# Patient Record
Sex: FEMALE | Race: BLACK OR AFRICAN AMERICAN | NOT HISPANIC OR LATINO | Employment: FULL TIME | URBAN - METROPOLITAN AREA
[De-identification: names, ages, dates, MRNs, and addresses within clinical notes are randomized per-mention and may not be internally consistent; named-entity substitution may affect disease eponyms.]

---

## 2017-01-17 ENCOUNTER — ALLSCRIPTS OFFICE VISIT (OUTPATIENT)
Dept: OTHER | Facility: OTHER | Age: 32
End: 2017-01-17

## 2017-01-20 ENCOUNTER — ALLSCRIPTS OFFICE VISIT (OUTPATIENT)
Dept: OTHER | Facility: OTHER | Age: 32
End: 2017-01-20

## 2017-01-20 ENCOUNTER — APPOINTMENT (OUTPATIENT)
Dept: LAB | Facility: HOSPITAL | Age: 32
End: 2017-01-20
Payer: COMMERCIAL

## 2017-01-20 DIAGNOSIS — J02.9 ACUTE PHARYNGITIS: ICD-10-CM

## 2017-01-20 LAB
FLUAV AG SPEC QL IA: NEGATIVE
INFLUENZA B AG (HISTORICAL): NEGATIVE
S PYO AG THROAT QL: NEGATIVE

## 2017-01-20 PROCEDURE — 87070 CULTURE OTHR SPECIMN AEROBIC: CPT

## 2017-01-22 ENCOUNTER — GENERIC CONVERSION - ENCOUNTER (OUTPATIENT)
Dept: OTHER | Facility: OTHER | Age: 32
End: 2017-01-22

## 2017-01-22 LAB — BACTERIA THROAT CULT: NORMAL

## 2017-02-03 ENCOUNTER — ALLSCRIPTS OFFICE VISIT (OUTPATIENT)
Dept: OTHER | Facility: OTHER | Age: 32
End: 2017-02-03

## 2017-02-15 ENCOUNTER — ALLSCRIPTS OFFICE VISIT (OUTPATIENT)
Dept: OTHER | Facility: OTHER | Age: 32
End: 2017-02-15

## 2017-02-17 ENCOUNTER — ALLSCRIPTS OFFICE VISIT (OUTPATIENT)
Dept: OTHER | Facility: OTHER | Age: 32
End: 2017-02-17

## 2017-02-20 DIAGNOSIS — M65.30 TRIGGER FINGER: ICD-10-CM

## 2017-02-23 ENCOUNTER — TRANSCRIBE ORDERS (OUTPATIENT)
Dept: LAB | Facility: HOSPITAL | Age: 32
End: 2017-02-23

## 2017-02-23 ENCOUNTER — APPOINTMENT (OUTPATIENT)
Dept: LAB | Facility: HOSPITAL | Age: 32
End: 2017-02-23
Attending: ORTHOPAEDIC SURGERY
Payer: COMMERCIAL

## 2017-02-23 DIAGNOSIS — M65.30 TRIGGER FINGER: ICD-10-CM

## 2017-02-23 LAB
ANION GAP SERPL CALCULATED.3IONS-SCNC: 7 MMOL/L (ref 4–13)
BASOPHILS # BLD AUTO: 0.03 THOUSANDS/ΜL (ref 0–0.1)
BASOPHILS NFR BLD AUTO: 0 % (ref 0–1)
BUN SERPL-MCNC: 12 MG/DL (ref 5–25)
CALCIUM SERPL-MCNC: 9.3 MG/DL (ref 8.3–10.1)
CHLORIDE SERPL-SCNC: 107 MMOL/L (ref 100–108)
CO2 SERPL-SCNC: 27 MMOL/L (ref 21–32)
CREAT SERPL-MCNC: 1 MG/DL (ref 0.6–1.3)
EOSINOPHIL # BLD AUTO: 0.14 THOUSAND/ΜL (ref 0–0.61)
EOSINOPHIL NFR BLD AUTO: 2 % (ref 0–6)
ERYTHROCYTE [DISTWIDTH] IN BLOOD BY AUTOMATED COUNT: 14.2 % (ref 11.6–15.1)
GFR SERPL CREATININE-BSD FRML MDRD: >60 ML/MIN/1.73SQ M
GLUCOSE SERPL-MCNC: 73 MG/DL (ref 65–140)
HCT VFR BLD AUTO: 40.5 % (ref 34.8–46.1)
HGB BLD-MCNC: 13.3 G/DL (ref 11.5–15.4)
LYMPHOCYTES # BLD AUTO: 2.31 THOUSANDS/ΜL (ref 0.6–4.47)
LYMPHOCYTES NFR BLD AUTO: 32 % (ref 14–44)
MCH RBC QN AUTO: 25.8 PG (ref 26.8–34.3)
MCHC RBC AUTO-ENTMCNC: 32.8 G/DL (ref 31.4–37.4)
MCV RBC AUTO: 79 FL (ref 82–98)
MONOCYTES # BLD AUTO: 0.33 THOUSAND/ΜL (ref 0.17–1.22)
MONOCYTES NFR BLD AUTO: 5 % (ref 4–12)
NEUTROPHILS # BLD AUTO: 4.51 THOUSANDS/ΜL (ref 1.85–7.62)
NEUTS SEG NFR BLD AUTO: 61 % (ref 43–75)
NRBC BLD AUTO-RTO: 0 /100 WBCS
PLATELET # BLD AUTO: 259 THOUSANDS/UL (ref 149–390)
PMV BLD AUTO: 11.3 FL (ref 8.9–12.7)
POTASSIUM SERPL-SCNC: 4 MMOL/L (ref 3.5–5.3)
RBC # BLD AUTO: 5.16 MILLION/UL (ref 3.81–5.12)
SODIUM SERPL-SCNC: 141 MMOL/L (ref 136–145)
WBC # BLD AUTO: 7.33 THOUSAND/UL (ref 4.31–10.16)

## 2017-02-23 PROCEDURE — 85025 COMPLETE CBC W/AUTO DIFF WBC: CPT

## 2017-02-23 PROCEDURE — 80048 BASIC METABOLIC PNL TOTAL CA: CPT

## 2017-02-23 PROCEDURE — 36415 COLL VENOUS BLD VENIPUNCTURE: CPT

## 2017-02-27 ENCOUNTER — ANESTHESIA EVENT (OUTPATIENT)
Dept: PERIOP | Facility: HOSPITAL | Age: 32
End: 2017-02-27
Payer: COMMERCIAL

## 2017-02-27 RX ORDER — LIRAGLUTIDE 6 MG/ML
2.4 INJECTION, SOLUTION SUBCUTANEOUS DAILY
COMMUNITY
End: 2018-03-15

## 2017-02-27 RX ORDER — AMLODIPINE BESYLATE 5 MG/1
5 TABLET ORAL
COMMUNITY
End: 2018-03-27 | Stop reason: SDUPTHER

## 2017-02-28 ENCOUNTER — ANESTHESIA (OUTPATIENT)
Dept: PERIOP | Facility: HOSPITAL | Age: 32
End: 2017-02-28
Payer: COMMERCIAL

## 2017-02-28 ENCOUNTER — HOSPITAL ENCOUNTER (OUTPATIENT)
Facility: HOSPITAL | Age: 32
Setting detail: OUTPATIENT SURGERY
Discharge: HOME | End: 2017-02-28
Attending: ORTHOPAEDIC SURGERY | Admitting: ORTHOPAEDIC SURGERY
Payer: COMMERCIAL

## 2017-02-28 VITALS
HEART RATE: 78 BPM | RESPIRATION RATE: 16 BRPM | BODY MASS INDEX: 32.43 KG/M2 | TEMPERATURE: 97.3 F | SYSTOLIC BLOOD PRESSURE: 125 MMHG | OXYGEN SATURATION: 94 % | WEIGHT: 183 LBS | HEIGHT: 63 IN | DIASTOLIC BLOOD PRESSURE: 80 MMHG

## 2017-02-28 PROBLEM — M65.311 TRIGGER FINGER OF RIGHT THUMB: Status: ACTIVE | Noted: 2017-02-28

## 2017-02-28 LAB — EXT PREGNANCY TEST URINE: NEGATIVE

## 2017-02-28 PROCEDURE — 81025 URINE PREGNANCY TEST: CPT | Performed by: STUDENT IN AN ORGANIZED HEALTH CARE EDUCATION/TRAINING PROGRAM

## 2017-02-28 RX ORDER — ONDANSETRON 2 MG/ML
INJECTION INTRAMUSCULAR; INTRAVENOUS AS NEEDED
Status: DISCONTINUED | OUTPATIENT
Start: 2017-02-28 | End: 2017-02-28 | Stop reason: SURG

## 2017-02-28 RX ORDER — BUPIVACAINE HYDROCHLORIDE 2.5 MG/ML
INJECTION, SOLUTION INFILTRATION; PERINEURAL AS NEEDED
Status: DISCONTINUED | OUTPATIENT
Start: 2017-02-28 | End: 2017-02-28 | Stop reason: HOSPADM

## 2017-02-28 RX ORDER — PROPOFOL 10 MG/ML
INJECTION, EMULSION INTRAVENOUS AS NEEDED
Status: DISCONTINUED | OUTPATIENT
Start: 2017-02-28 | End: 2017-02-28 | Stop reason: SURG

## 2017-02-28 RX ORDER — ONDANSETRON 2 MG/ML
4 INJECTION INTRAMUSCULAR; INTRAVENOUS EVERY 6 HOURS PRN
Status: DISCONTINUED | OUTPATIENT
Start: 2017-02-28 | End: 2017-02-28 | Stop reason: HOSPADM

## 2017-02-28 RX ORDER — OXYCODONE HYDROCHLORIDE 10 MG/1
10 TABLET ORAL EVERY 4 HOURS PRN
Status: DISCONTINUED | OUTPATIENT
Start: 2017-02-28 | End: 2017-02-28 | Stop reason: HOSPADM

## 2017-02-28 RX ORDER — MAGNESIUM HYDROXIDE 1200 MG/15ML
LIQUID ORAL AS NEEDED
Status: DISCONTINUED | OUTPATIENT
Start: 2017-02-28 | End: 2017-02-28 | Stop reason: HOSPADM

## 2017-02-28 RX ORDER — OXYCODONE AND ACETAMINOPHEN 2.5; 325 MG/1; MG/1
1 TABLET ORAL EVERY 4 HOURS PRN
Qty: 30 TABLET | Refills: 0 | Status: SHIPPED | OUTPATIENT
Start: 2017-02-28 | End: 2017-03-10

## 2017-02-28 RX ORDER — OXYCODONE HYDROCHLORIDE 5 MG/1
5 TABLET ORAL EVERY 4 HOURS PRN
Status: DISCONTINUED | OUTPATIENT
Start: 2017-02-28 | End: 2017-02-28 | Stop reason: HOSPADM

## 2017-02-28 RX ORDER — MORPHINE SULFATE 2 MG/ML
2 INJECTION, SOLUTION INTRAMUSCULAR; INTRAVENOUS EVERY 4 HOURS PRN
Status: DISCONTINUED | OUTPATIENT
Start: 2017-02-28 | End: 2017-02-28 | Stop reason: HOSPADM

## 2017-02-28 RX ORDER — PROPOFOL 10 MG/ML
INJECTION, EMULSION INTRAVENOUS CONTINUOUS PRN
Status: DISCONTINUED | OUTPATIENT
Start: 2017-02-28 | End: 2017-02-28 | Stop reason: SURG

## 2017-02-28 RX ORDER — MIDAZOLAM HYDROCHLORIDE 1 MG/ML
INJECTION INTRAMUSCULAR; INTRAVENOUS AS NEEDED
Status: DISCONTINUED | OUTPATIENT
Start: 2017-02-28 | End: 2017-02-28 | Stop reason: SURG

## 2017-02-28 RX ORDER — FENTANYL CITRATE/PF 50 MCG/ML
25 SYRINGE (ML) INJECTION
Status: DISCONTINUED | OUTPATIENT
Start: 2017-02-28 | End: 2017-02-28 | Stop reason: HOSPADM

## 2017-02-28 RX ORDER — FENTANYL CITRATE 50 UG/ML
INJECTION, SOLUTION INTRAMUSCULAR; INTRAVENOUS AS NEEDED
Status: DISCONTINUED | OUTPATIENT
Start: 2017-02-28 | End: 2017-02-28 | Stop reason: SURG

## 2017-02-28 RX ORDER — ACETAMINOPHEN 325 MG/1
650 TABLET ORAL EVERY 6 HOURS PRN
Status: DISCONTINUED | OUTPATIENT
Start: 2017-02-28 | End: 2017-02-28 | Stop reason: HOSPADM

## 2017-02-28 RX ORDER — ONDANSETRON 2 MG/ML
4 INJECTION INTRAMUSCULAR; INTRAVENOUS ONCE
Status: DISCONTINUED | OUTPATIENT
Start: 2017-02-28 | End: 2017-02-28 | Stop reason: HOSPADM

## 2017-02-28 RX ORDER — SODIUM CHLORIDE, SODIUM LACTATE, POTASSIUM CHLORIDE, CALCIUM CHLORIDE 600; 310; 30; 20 MG/100ML; MG/100ML; MG/100ML; MG/100ML
125 INJECTION, SOLUTION INTRAVENOUS CONTINUOUS
Status: DISCONTINUED | OUTPATIENT
Start: 2017-02-28 | End: 2017-02-28 | Stop reason: HOSPADM

## 2017-02-28 RX ADMIN — MIDAZOLAM HYDROCHLORIDE 2 MG: 1 INJECTION, SOLUTION INTRAMUSCULAR; INTRAVENOUS at 08:27

## 2017-02-28 RX ADMIN — SODIUM CHLORIDE, SODIUM LACTATE, POTASSIUM CHLORIDE, AND CALCIUM CHLORIDE 125 ML/HR: .6; .31; .03; .02 INJECTION, SOLUTION INTRAVENOUS at 07:31

## 2017-02-28 RX ADMIN — PROPOFOL 70 MCG/KG/MIN: 10 INJECTION, EMULSION INTRAVENOUS at 08:31

## 2017-02-28 RX ADMIN — ONDANSETRON 4 MG: 2 INJECTION INTRAMUSCULAR; INTRAVENOUS at 08:31

## 2017-02-28 RX ADMIN — SODIUM CHLORIDE, SODIUM LACTATE, POTASSIUM CHLORIDE, AND CALCIUM CHLORIDE: .6; .31; .03; .02 INJECTION, SOLUTION INTRAVENOUS at 08:27

## 2017-02-28 RX ADMIN — CEFAZOLIN SODIUM 2000 MG: 2 SOLUTION INTRAVENOUS at 08:27

## 2017-02-28 RX ADMIN — FENTANYL CITRATE 100 MCG: 50 INJECTION INTRAMUSCULAR; INTRAVENOUS at 08:27

## 2017-02-28 RX ADMIN — PROPOFOL 40 MG: 10 INJECTION, EMULSION INTRAVENOUS at 08:31

## 2017-02-28 RX ADMIN — OXYCODONE HYDROCHLORIDE 5 MG: 5 TABLET ORAL at 09:26

## 2017-03-13 ENCOUNTER — ALLSCRIPTS OFFICE VISIT (OUTPATIENT)
Dept: OTHER | Facility: OTHER | Age: 32
End: 2017-03-13

## 2017-03-27 ENCOUNTER — ALLSCRIPTS OFFICE VISIT (OUTPATIENT)
Dept: OTHER | Facility: OTHER | Age: 32
End: 2017-03-27

## 2017-03-27 ENCOUNTER — APPOINTMENT (EMERGENCY)
Dept: RADIOLOGY | Facility: HOSPITAL | Age: 32
End: 2017-03-27
Payer: COMMERCIAL

## 2017-03-27 ENCOUNTER — HOSPITAL ENCOUNTER (EMERGENCY)
Facility: HOSPITAL | Age: 32
Discharge: HOME | End: 2017-03-27
Attending: EMERGENCY MEDICINE | Admitting: EMERGENCY MEDICINE
Payer: COMMERCIAL

## 2017-03-27 VITALS
WEIGHT: 180.4 LBS | TEMPERATURE: 97.5 F | HEIGHT: 63 IN | DIASTOLIC BLOOD PRESSURE: 85 MMHG | HEART RATE: 73 BPM | BODY MASS INDEX: 31.96 KG/M2 | OXYGEN SATURATION: 99 % | SYSTOLIC BLOOD PRESSURE: 122 MMHG | RESPIRATION RATE: 18 BRPM

## 2017-03-27 DIAGNOSIS — R07.9 CHEST PAIN: ICD-10-CM

## 2017-03-27 DIAGNOSIS — R00.2 HEART PALPITATIONS: Primary | ICD-10-CM

## 2017-03-27 LAB
ANION GAP SERPL CALCULATED.3IONS-SCNC: 7 MMOL/L (ref 4–13)
BASOPHILS # BLD AUTO: 0.04 THOUSANDS/ΜL (ref 0–0.1)
BASOPHILS NFR BLD AUTO: 1 % (ref 0–1)
BUN SERPL-MCNC: 9 MG/DL (ref 5–25)
CALCIUM SERPL-MCNC: 9.4 MG/DL (ref 8.3–10.1)
CHLORIDE SERPL-SCNC: 106 MMOL/L (ref 100–108)
CO2 SERPL-SCNC: 28 MMOL/L (ref 21–32)
CREAT SERPL-MCNC: 0.94 MG/DL (ref 0.6–1.3)
EOSINOPHIL # BLD AUTO: 0.08 THOUSAND/ΜL (ref 0–0.61)
EOSINOPHIL NFR BLD AUTO: 1 % (ref 0–6)
ERYTHROCYTE [DISTWIDTH] IN BLOOD BY AUTOMATED COUNT: 14.3 % (ref 11.6–15.1)
GFR SERPL CREATININE-BSD FRML MDRD: >60 ML/MIN/1.73SQ M
GLUCOSE SERPL-MCNC: 94 MG/DL (ref 65–140)
HCG UR QL: NEGATIVE
HCT VFR BLD AUTO: 40.7 % (ref 34.8–46.1)
HGB BLD-MCNC: 13.5 G/DL (ref 11.5–15.4)
LYMPHOCYTES # BLD AUTO: 1.25 THOUSANDS/ΜL (ref 0.6–4.47)
LYMPHOCYTES NFR BLD AUTO: 17 % (ref 14–44)
MCH RBC QN AUTO: 25.7 PG (ref 26.8–34.3)
MCHC RBC AUTO-ENTMCNC: 33.2 G/DL (ref 31.4–37.4)
MCV RBC AUTO: 77 FL (ref 82–98)
MONOCYTES # BLD AUTO: 0.39 THOUSAND/ΜL (ref 0.17–1.22)
MONOCYTES NFR BLD AUTO: 5 % (ref 4–12)
NEUTROPHILS # BLD AUTO: 5.52 THOUSANDS/ΜL (ref 1.85–7.62)
NEUTS SEG NFR BLD AUTO: 76 % (ref 43–75)
NRBC BLD AUTO-RTO: 0 /100 WBCS
PLATELET # BLD AUTO: 263 THOUSANDS/UL (ref 149–390)
PMV BLD AUTO: 10.9 FL (ref 8.9–12.7)
POTASSIUM SERPL-SCNC: 3.8 MMOL/L (ref 3.5–5.3)
RBC # BLD AUTO: 5.26 MILLION/UL (ref 3.81–5.12)
SODIUM SERPL-SCNC: 141 MMOL/L (ref 136–145)
SPECIMEN SOURCE: NORMAL
TROPONIN I BLD-MCNC: 0 NG/ML (ref 0–0.08)
TSH SERPL DL<=0.05 MIU/L-ACNC: 2.74 UIU/ML (ref 0.36–3.74)
WBC # BLD AUTO: 7.29 THOUSAND/UL (ref 4.31–10.16)

## 2017-03-27 PROCEDURE — 80048 BASIC METABOLIC PNL TOTAL CA: CPT

## 2017-03-27 PROCEDURE — 84484 ASSAY OF TROPONIN QUANT: CPT

## 2017-03-27 PROCEDURE — 81025 URINE PREGNANCY TEST: CPT | Performed by: EMERGENCY MEDICINE

## 2017-03-27 PROCEDURE — 99285 EMERGENCY DEPT VISIT HI MDM: CPT

## 2017-03-27 PROCEDURE — 36415 COLL VENOUS BLD VENIPUNCTURE: CPT

## 2017-03-27 PROCEDURE — 71020 HB CHEST X-RAY 2VW FRONTAL&LATL: CPT

## 2017-03-27 PROCEDURE — 84443 ASSAY THYROID STIM HORMONE: CPT | Performed by: EMERGENCY MEDICINE

## 2017-03-27 PROCEDURE — 93005 ELECTROCARDIOGRAM TRACING: CPT

## 2017-03-27 PROCEDURE — 85025 COMPLETE CBC W/AUTO DIFF WBC: CPT

## 2017-03-27 RX ORDER — ONDANSETRON 2 MG/ML
4 INJECTION INTRAMUSCULAR; INTRAVENOUS ONCE
Status: DISCONTINUED | OUTPATIENT
Start: 2017-03-27 | End: 2017-03-27

## 2017-03-27 RX ORDER — ONDANSETRON 4 MG/1
4 TABLET, ORALLY DISINTEGRATING ORAL ONCE
Status: COMPLETED | OUTPATIENT
Start: 2017-03-27 | End: 2017-03-27

## 2017-03-27 RX ADMIN — ONDANSETRON 4 MG: 4 TABLET, ORALLY DISINTEGRATING ORAL at 16:04

## 2017-03-28 ENCOUNTER — GENERIC CONVERSION - ENCOUNTER (OUTPATIENT)
Dept: OTHER | Facility: OTHER | Age: 32
End: 2017-03-28

## 2017-03-28 LAB
ATRIAL RATE: 81 BPM
P AXIS: 66 DEGREES
PR INTERVAL: 128 MS
QRS AXIS: 56 DEGREES
QRSD INTERVAL: 78 MS
QT INTERVAL: 378 MS
QTC INTERVAL: 439 MS
T WAVE AXIS: 37 DEGREES
VENTRICULAR RATE: 81 BPM

## 2017-03-31 ENCOUNTER — HOSPITAL ENCOUNTER (OUTPATIENT)
Dept: NON INVASIVE DIAGNOSTICS | Facility: HOSPITAL | Age: 32
Discharge: HOME | End: 2017-03-31
Attending: EMERGENCY MEDICINE
Payer: COMMERCIAL

## 2017-03-31 DIAGNOSIS — R00.2 HEART PALPITATIONS: ICD-10-CM

## 2017-03-31 PROCEDURE — 93226 XTRNL ECG REC<48 HR SCAN A/R: CPT

## 2017-03-31 PROCEDURE — 93225 XTRNL ECG REC<48 HRS REC: CPT

## 2017-04-03 ENCOUNTER — ALLSCRIPTS OFFICE VISIT (OUTPATIENT)
Dept: OTHER | Facility: OTHER | Age: 32
End: 2017-04-03

## 2017-04-13 ENCOUNTER — ALLSCRIPTS OFFICE VISIT (OUTPATIENT)
Dept: OTHER | Facility: OTHER | Age: 32
End: 2017-04-13

## 2017-05-17 ENCOUNTER — ALLSCRIPTS OFFICE VISIT (OUTPATIENT)
Dept: OTHER | Facility: OTHER | Age: 32
End: 2017-05-17

## 2017-05-17 ENCOUNTER — LAB REQUISITION (OUTPATIENT)
Dept: LAB | Facility: HOSPITAL | Age: 32
End: 2017-05-17
Payer: COMMERCIAL

## 2017-05-17 DIAGNOSIS — Z01.419 ENCOUNTER FOR GYNECOLOGICAL EXAMINATION WITHOUT ABNORMAL FINDING: ICD-10-CM

## 2017-05-17 DIAGNOSIS — I10 ESSENTIAL (PRIMARY) HYPERTENSION: ICD-10-CM

## 2017-05-17 DIAGNOSIS — E66.9 OBESITY: ICD-10-CM

## 2017-05-17 DIAGNOSIS — R73.01 IMPAIRED FASTING GLUCOSE: ICD-10-CM

## 2017-05-17 DIAGNOSIS — R10.2 PELVIC AND PERINEAL PAIN: ICD-10-CM

## 2017-05-17 PROCEDURE — 87624 HPV HI-RISK TYP POOLED RSLT: CPT | Performed by: PHYSICIAN ASSISTANT

## 2017-05-17 PROCEDURE — G0145 SCR C/V CYTO,THINLAYER,RESCR: HCPCS | Performed by: PHYSICIAN ASSISTANT

## 2017-05-18 ENCOUNTER — ALLSCRIPTS OFFICE VISIT (OUTPATIENT)
Dept: OTHER | Facility: OTHER | Age: 32
End: 2017-05-18

## 2017-05-22 LAB — HPV RRNA GENITAL QL NAA+PROBE: NORMAL

## 2017-05-23 LAB
LAB AP GYN PRIMARY INTERPRETATION: NORMAL
LAB AP LMP: NORMAL
Lab: NORMAL

## 2017-05-30 ENCOUNTER — ALLSCRIPTS OFFICE VISIT (OUTPATIENT)
Dept: OTHER | Facility: OTHER | Age: 32
End: 2017-05-30

## 2017-06-20 ENCOUNTER — ALLSCRIPTS OFFICE VISIT (OUTPATIENT)
Dept: OTHER | Facility: OTHER | Age: 32
End: 2017-06-20

## 2017-06-20 ENCOUNTER — LAB REQUISITION (OUTPATIENT)
Dept: LAB | Facility: HOSPITAL | Age: 32
End: 2017-06-20
Payer: COMMERCIAL

## 2017-06-20 DIAGNOSIS — N89.8 OTHER SPECIFIED NONINFLAMMATORY DISORDER OF VAGINA: ICD-10-CM

## 2017-06-20 DIAGNOSIS — L29.8 OTHER PRURITUS: ICD-10-CM

## 2017-06-20 PROCEDURE — 87070 CULTURE OTHR SPECIMN AEROBIC: CPT | Performed by: PHYSICIAN ASSISTANT

## 2017-06-23 ENCOUNTER — GENERIC CONVERSION - ENCOUNTER (OUTPATIENT)
Dept: OTHER | Facility: OTHER | Age: 32
End: 2017-06-23

## 2017-06-23 LAB — BACTERIA GENITAL AEROBE CULT: NORMAL

## 2017-08-10 ENCOUNTER — TRANSCRIBE ORDERS (OUTPATIENT)
Dept: LAB | Facility: HOSPITAL | Age: 32
End: 2017-08-10

## 2017-08-10 ENCOUNTER — APPOINTMENT (OUTPATIENT)
Dept: LAB | Facility: HOSPITAL | Age: 32
End: 2017-08-10
Payer: COMMERCIAL

## 2017-08-10 ENCOUNTER — GENERIC CONVERSION - ENCOUNTER (OUTPATIENT)
Dept: OTHER | Facility: OTHER | Age: 32
End: 2017-08-10

## 2017-08-10 DIAGNOSIS — I10 ESSENTIAL (PRIMARY) HYPERTENSION: ICD-10-CM

## 2017-08-10 DIAGNOSIS — E66.9 OBESITY: ICD-10-CM

## 2017-08-10 DIAGNOSIS — R73.01 IMPAIRED FASTING GLUCOSE: ICD-10-CM

## 2017-08-10 LAB
ALBUMIN SERPL BCP-MCNC: 3.9 G/DL (ref 3.5–5)
ALP SERPL-CCNC: 59 U/L (ref 46–116)
ALT SERPL W P-5'-P-CCNC: 25 U/L (ref 12–78)
ANION GAP SERPL CALCULATED.3IONS-SCNC: 7 MMOL/L (ref 4–13)
AST SERPL W P-5'-P-CCNC: 15 U/L (ref 5–45)
BILIRUB SERPL-MCNC: 0.3 MG/DL (ref 0.2–1)
BUN SERPL-MCNC: 8 MG/DL (ref 5–25)
CALCIUM SERPL-MCNC: 9.4 MG/DL (ref 8.3–10.1)
CHLORIDE SERPL-SCNC: 103 MMOL/L (ref 100–108)
CHOLEST SERPL-MCNC: 186 MG/DL (ref 50–200)
CO2 SERPL-SCNC: 28 MMOL/L (ref 21–32)
CREAT SERPL-MCNC: 0.91 MG/DL (ref 0.6–1.3)
EST. AVERAGE GLUCOSE BLD GHB EST-MCNC: 117 MG/DL
GFR SERPL CREATININE-BSD FRML MDRD: 96 ML/MIN/1.73SQ M
GLUCOSE P FAST SERPL-MCNC: 96 MG/DL (ref 65–99)
HBA1C MFR BLD: 5.7 % (ref 4.2–6.3)
HDLC SERPL-MCNC: 64 MG/DL (ref 40–60)
LDLC SERPL CALC-MCNC: 93 MG/DL (ref 0–100)
POTASSIUM SERPL-SCNC: 3.6 MMOL/L (ref 3.5–5.3)
PROT SERPL-MCNC: 7.7 G/DL (ref 6.4–8.2)
SODIUM SERPL-SCNC: 138 MMOL/L (ref 136–145)
TRIGL SERPL-MCNC: 146 MG/DL

## 2017-08-10 PROCEDURE — 80053 COMPREHEN METABOLIC PANEL: CPT

## 2017-08-10 PROCEDURE — 36415 COLL VENOUS BLD VENIPUNCTURE: CPT

## 2017-08-10 PROCEDURE — 80061 LIPID PANEL: CPT

## 2017-08-10 PROCEDURE — 83036 HEMOGLOBIN GLYCOSYLATED A1C: CPT

## 2017-08-22 ENCOUNTER — ALLSCRIPTS OFFICE VISIT (OUTPATIENT)
Dept: OTHER | Facility: OTHER | Age: 32
End: 2017-08-22

## 2017-08-22 ENCOUNTER — LAB REQUISITION (OUTPATIENT)
Dept: LAB | Facility: HOSPITAL | Age: 32
End: 2017-08-22
Payer: COMMERCIAL

## 2017-08-22 DIAGNOSIS — N89.8 OTHER SPECIFIED NONINFLAMMATORY DISORDER OF VAGINA: ICD-10-CM

## 2017-08-22 PROCEDURE — 87660 TRICHOMONAS VAGIN DIR PROBE: CPT | Performed by: NURSE PRACTITIONER

## 2017-08-22 PROCEDURE — 87510 GARDNER VAG DNA DIR PROBE: CPT | Performed by: NURSE PRACTITIONER

## 2017-08-22 PROCEDURE — 87480 CANDIDA DNA DIR PROBE: CPT | Performed by: NURSE PRACTITIONER

## 2017-08-24 LAB
CANDIDA RRNA VAG QL PROBE: NEGATIVE
G VAGINALIS RRNA GENITAL QL PROBE: POSITIVE
T VAGINALIS RRNA GENITAL QL PROBE: NEGATIVE

## 2017-09-12 ENCOUNTER — TRANSCRIBE ORDERS (OUTPATIENT)
Dept: ADMINISTRATIVE | Facility: HOSPITAL | Age: 32
End: 2017-09-12

## 2017-09-12 DIAGNOSIS — I49.3 VENTRICULAR EXTRASYSTOLES: ICD-10-CM

## 2017-09-12 DIAGNOSIS — I10 BENIGN HYPERTENSION: Primary | ICD-10-CM

## 2017-09-15 ENCOUNTER — HOSPITAL ENCOUNTER (OUTPATIENT)
Dept: NON INVASIVE DIAGNOSTICS | Facility: CLINIC | Age: 32
Discharge: HOME | End: 2017-09-15
Payer: COMMERCIAL

## 2017-09-15 DIAGNOSIS — I10 BENIGN HYPERTENSION: ICD-10-CM

## 2017-09-15 DIAGNOSIS — I49.3 VENTRICULAR EXTRASYSTOLES: ICD-10-CM

## 2017-09-15 PROCEDURE — 93306 TTE W/DOPPLER COMPLETE: CPT

## 2017-09-16 ENCOUNTER — GENERIC CONVERSION - ENCOUNTER (OUTPATIENT)
Dept: OTHER | Facility: OTHER | Age: 32
End: 2017-09-16

## 2017-09-18 ENCOUNTER — ALLSCRIPTS OFFICE VISIT (OUTPATIENT)
Dept: OTHER | Facility: OTHER | Age: 32
End: 2017-09-18

## 2017-09-18 ENCOUNTER — APPOINTMENT (OUTPATIENT)
Dept: LAB | Facility: HOSPITAL | Age: 32
End: 2017-09-18
Payer: COMMERCIAL

## 2017-09-18 DIAGNOSIS — N39.0 URINARY TRACT INFECTION: ICD-10-CM

## 2017-09-18 DIAGNOSIS — M54.50 LOW BACK PAIN: ICD-10-CM

## 2017-09-18 DIAGNOSIS — R10.2 PELVIC AND PERINEAL PAIN: ICD-10-CM

## 2017-09-18 LAB
BILIRUB UR QL STRIP: NORMAL
CLARITY UR: NORMAL
COLOR UR: YELLOW
GLUCOSE (HISTORICAL): NORMAL
HGB UR QL STRIP.AUTO: NORMAL
KETONES UR STRIP-MCNC: NORMAL MG/DL
LEUKOCYTE ESTERASE UR QL STRIP: NORMAL
NITRITE UR QL STRIP: NORMAL
PH UR STRIP.AUTO: 7.5 [PH]
PROT UR STRIP-MCNC: NORMAL MG/DL
SP GR UR STRIP.AUTO: 1
UROBILINOGEN UR QL STRIP.AUTO: 0.2

## 2017-09-18 PROCEDURE — 87086 URINE CULTURE/COLONY COUNT: CPT

## 2017-09-21 ENCOUNTER — GENERIC CONVERSION - ENCOUNTER (OUTPATIENT)
Dept: OTHER | Facility: OTHER | Age: 32
End: 2017-09-21

## 2017-09-21 LAB — BACTERIA UR CULT: NORMAL

## 2017-11-09 ENCOUNTER — ALLSCRIPTS OFFICE VISIT (OUTPATIENT)
Dept: OTHER | Facility: OTHER | Age: 32
End: 2017-11-09

## 2017-11-10 NOTE — PROGRESS NOTES
Assessment  Assessed    1  PVC (premature ventricular contraction) (427 69) (I49 3)   2  Palpitations (785 1) (R00 2)   3  Benign essential hypertension (401 1) (I10)    Plan  PVC (premature ventricular contraction)    · Follow-up visit in 1 year Evaluation and Treatment  Follow-up  Status: Hold For -Scheduling  Requested for: 06JJN1183   Ordered;PVC (premature ventricular contraction); Ordered By: Yi Lee Performed:  Due: 61CHZ2579    Discussion/Summary  Cardiology Discussion Summary Free Text Note Form Matthew Hem:   1  palps, on occasion correlate with pvc, at times nsrholter 3/2017normal lvef  hypertension, controlled  Patient has occasionally continue palpitations  Beta-blocker therapy has been discussed previously caused airway reaction per her report  We will change her amlodipine to Cardizem CD 1 20 mg daily  She will do home blood pressure checks to ensure blood pressure is controlled on this regimen  Hopefully, this will offer some relief of her palpitations  Again, as mostly sinus rhythm on monitoring but occasion she has an isolated PVC  Continued sensible diet and exercise encouraged  Cholesterol profile reviewed and shows no significant elevation with a low 10 year risk  Chief Complaint  Chief Complaint Free Text Note Form: 6 mth f/u Occ  palps      History of Present Illness  Cardiology HPI Free Text Note Form [facility]: Patient is seen in follow-up palpitations  echocardiogram performed in September shows normal left ventricular systolic function  This is consistent with the prior echocardiogram   monitoring for palpitations in March of 2017 showed sinus rhythm during palpitations on occasion isolated PVC  PVC total 499 for 24 hour     that visit, we discussed beta-blocker therapy but states she had issues with an airway reaction on metoprolol  has been on amlodipine 10 mg daily for essential hypertension    any lightheadedness dizziness chest discomfort shortness of breath, does not feel occasional dizziness if her palpitations persist   other complaints      Review of Systems  Cardiology Female ROS:    Cardiac: palpitations present , but-- as noted in HPI  Skin: No complaints of nonhealing sores or skin rash  Genitourinary: No complaints of recurrent urinary tract infections, frequent urination at night, difficult urination, blood in urine, kidney stones, loss of bladder control, kidney problems, denies any birth control or hormone replacement, is not post menopausal, not currently pregnant  Psychological: No complaints of feeling depressed, anxiety, panic attacks, or difficulty concentrating  General: No complaints of trouble sleeping, lack of energy, fatigue, appetite changes, weight changes, fever, frequent infections, or night sweats  Respiratory: No complaints of shortness of breath, cough with sputum, or wheezing  HEENT: No complaints of serious problems, hearing problems, nose problems, throat problems, or snoring  Gastrointestinal: No complaints of liver problems, nausea, vomiting, heartburn, constipation, bloody stools, diarrhea, problems swallowing, adbominal pain, or rectal bleeding  Hematologic: No complaints of bleeding disorders, anemia, blood clots, or excessive brusing  Neurological: No complaints of numbness, tingling, dizziness, weakness, seizures, headaches, syncope or fainting, AM fatigue, daytime sleepiness, no witnessed apnea episodes  Musculoskeletal: No complaints of arthritis, back pain, or painfull swelling  Active Problems  Problems    1  Acute UTI (599 0) (N39 0)   2  Asthma in adult, mild intermittent, uncomplicated (608 58) (C52 48)   3  Benign essential hypertension (401 1) (I10)   4  Burning with urination (788 1) (R30 0)   5  Encounter for postoperative care (V58 49) (Z48 89)   6  Encounter for routine gynecological examination (V72 31) (Z01 419)   7  Examination following surgery (V67 00) (Z09)   8  Female infertility (628 9) (N97 9)   9   GERD (gastroesophageal reflux disease) (530 81) (K21 9)   10  Impaired fasting glucose (790 21) (R73 01)   11  Low back pain (724 2) (M54 5)   12  Mild mitral regurgitation (424 0) (I34 0)   13  Need for DTP vaccine (V06 1) (Z23)   14  Obesity (BMI 30-39 9) (278 00) (E66 9)   15  Pap smear, as part of routine gynecological examination (V76 2) (Z01 419)   16  Pelvic pain in female (625 9) (R10 2)   17  PVC (premature ventricular contraction) (427 69) (I49 3)   18  Screening for colon cancer (V76 51) (Z12 11)   19  Screening for diabetes mellitus (DM) (V77 1) (Z13 1)   20  Screening for lipoid disorders (V77 91) (Z13 220)   21  Trigger finger, right (727 03) (M65 30)   22  Trigger thumb, acquired (727 03) (M65 30)   23  Vaginal irritation (623 9) (N89 8)   24  Vaginal itching (698 1) (L29 8)    Past Medical History  Problems    1  History of Mild Intermittent Asthma (493 90)  Active Problems And Past Medical History Reviewed: The active problems and past medical history were reviewed and updated today  Surgical History  Problems    1  History of Cholecystectomy Laparoscopic   2  History of Hernia Repair  Surgical History Reviewed: The surgical history was reviewed and updated today  Family History  Mother    1  Family history of Hypertension (V17 49)   2  Family history of Type 2 Diabetes Mellitus  Father    3  Family history of Hypertension (V17 49)   4  Family history of Type 2 Diabetes Mellitus  Grandmother    5  Family history of arthritis (V17 7) (Z82 61)   6  Family history of cerebrovascular accident (CVA) (V17 1) (Z82 3)   7  Family history of malignant neoplasm (V16 9) (Z80 9)   8  Family history of High cholesterol  Maternal Grandfather    9  Family history of Acute Myocardial Infarction (V17 3)   10  Family history of arthritis (V17 7) (Z82 61)   11  Family history of cerebrovascular accident (CVA) (V17 1) (Z82 3)   15  Family history of malignant neoplasm (V16 9) (Z80 9)   13   Family history of High cholesterol  Paternal Grandfather    14  Family history of arthritis (V17 7) (Z82 61)   15  Family history of cerebrovascular accident (CVA) (V17 1) (Z82 3)   12  Family history of malignant neoplasm (V16 9) (Z80 9)   17  Family history of High cholesterol  Maternal Uncle    25  Family history of Acute Myocardial Infarction (V17 3)  Family History    19  Family history of cerebrovascular accident (CVA) (V17 1) (Z82 3)   21  Family history of High cholesterol  Family History Reviewed: The family history was reviewed and updated today  Social History  Problems    · Alcohol Use (History)   · Exercises, 3x week   ·    · Never A Smoker   · Pets/Animals: Dog  Social History Reviewed: The social history was reviewed and updated today  Current Meds   1  AmLODIPine Besylate 5 MG Oral Tablet; TAKE 1 TABLET DAILY AS DIRECTED; Therapy: 69Fvw9390 to (Evaluate:14Jan2018)  Requested for: 43Vow7485; Last Rx:99Kec4610 Ordered   2  Cyclobenzaprine HCl - 10 MG Oral Tablet; TAKE 1 TABLET 3 TIMES DAILY AS NEEDED; Therapy: 93Poc3667 to (Evaluate:73Cvp7434)  Requested for: 06Tsd4210; Last Rx:48Twb7122 Ordered   3  Meloxicam 15 MG Oral Tablet; TAKE 1 TABLET DAILY AS NEEDED; Therapy: 07ZWK8678 to (Evaluate:12Jan2018)  Requested for: 74KLJ2056; Last Rx:17Jan2017 Ordered   4  Montelukast Sodium 10 MG Oral Tablet; take 1 tablet by mouth daily as directed; Therapy: 16QGU9065 to (Evaluate:01Jap6097)  Requested for: 42ZKH6025; Last Rx:29Slr7246 Ordered   5  Multi For Her Oral Tablet; TAKE 1 TABLET DAILY; Therapy: 10WIJ4979 to Recorded   6  NovoFine 32G X 6 MM Miscellaneous; USE AS DIRECTED; Therapy: 84RUP8891 to (Last Rx:10Tqy4446)  Requested for: 13Oav0873 Ordered   7  ProAir  (90 Base) MCG/ACT Inhalation Aerosol Solution; INHALE 2 PUFFS EVERY 4-6 HOURS AS NEEDED; Therapy: 90Fcm3293 to (Evaluate:78Rlj3910)  Requested for: 62JFW2037; Last Rx:13Zbm6588 Ordered   8   Saxenda 18 MG/3ML Subcutaneous Solution Pen-injector; 0 6mg Sub Q once a day for 1 week then increase dose by 0 6mg/day q week  Max 3 mg/day; Therapy: 01TXS7610 to (Evaluate:85Two1436)  Requested for: 83Dcl5196; Last Rx:39Dju0622 Ordered  Medication List Reviewed: The medication list was reviewed and updated today  Allergies  Medication    1  No Known Drug Allergies  Non-Medication    2  Seasonal    Vitals  Vital Signs    Recorded: 04YVT6518 10:07AM   Heart Rate 76   Systolic 621, RUE, Sitting   Diastolic 88, RUE, Sitting   Height 5 ft 3 in   Weight 185 lb 2 oz   BMI Calculated 32 79   BSA Calculated 1 87       Physical Exam   Constitutional  General appearance: No acute distress, well appearing and well nourished  Eyes  Conjunctiva and Sclera examination: Conjunctiva pink, sclera anicteric  Ears, Nose, Mouth, and Throat - Oropharynx: Clear, nares are clear, mucous membranes are moist   Neck  Neck and thyroid: Normal, supple, trachea midline, no thyromegaly  Pulmonary  Respiratory effort: No increased work of breathing or signs of respiratory distress  Auscultation of lungs: Clear to auscultation, no rales, no rhonchi, no wheezing, good air movement  Cardiovascular  Auscultation of heart: Normal rate and rhythm, normal S1 and S2, no murmurs  Carotid pulses: Normal, 2+ bilaterally  Peripheral vascular exam: Normal pulses throughout, no tenderness, erythema or swelling  Pedal pulses: Normal, 2+ bilaterally  Examination of extremities for edema and/or varicosities: Normal    Abdomen  Abdomen: Non-tender and no distention  Liver and spleen: No hepatomegaly or splenomegaly  Musculoskeletal Gait and station: Normal gait  -- Digits and nails: Normal without clubbing or cyanosis  -- Inspection/palpation of joints, bones, and muscles: Normal, ROM normal    Skin - Skin and subcutaneous tissue: Normal without rashes or lesions  Skin is warm and well perfused, normal turgor  Neurologic - Cranial nerves: II - XII intact  -- Speech: Normal

## 2017-11-13 ENCOUNTER — HOSPITAL ENCOUNTER (OUTPATIENT)
Dept: RADIOLOGY | Facility: HOSPITAL | Age: 32
Discharge: HOME | End: 2017-11-13
Payer: COMMERCIAL

## 2017-11-13 ENCOUNTER — TRANSCRIBE ORDERS (OUTPATIENT)
Dept: RADIOLOGY | Facility: HOSPITAL | Age: 32
End: 2017-11-13

## 2017-11-13 DIAGNOSIS — R10.2 PELVIC AND PERINEAL PAIN: ICD-10-CM

## 2017-11-13 PROCEDURE — 76830 TRANSVAGINAL US NON-OB: CPT

## 2017-11-13 PROCEDURE — 76856 US EXAM PELVIC COMPLETE: CPT

## 2017-11-20 ENCOUNTER — GENERIC CONVERSION - ENCOUNTER (OUTPATIENT)
Dept: OTHER | Facility: OTHER | Age: 32
End: 2017-11-20

## 2017-11-29 ENCOUNTER — ALLSCRIPTS OFFICE VISIT (OUTPATIENT)
Dept: OTHER | Facility: OTHER | Age: 32
End: 2017-11-29

## 2017-12-05 NOTE — CONSULTS
Assessment    1  Encounter for preconception consultation (V26 49) (Z31 69)    Discussion/Summary  Discussion Summary:   TOPIC: US RESULTS     Carefully reviewed the results of the Pelvic us     Other than small fibroids, results were NORMAL     She had 1 unsuccessful IVF     Suggested another opinion with Dr Jorge DUBOSE at [facility]    Phone # was given and she will contact them     All questions were answered for her  Counseling Documentation [facility]: The patient was counseled regarding diagnostic results, instructions for management, risk factor reductions, prognosis, patient and family education, impressions, risks and benefits of treatment options and importance of compliance with treatment  Chief Complaint    1  Abdominal Pain   2  Pelvic Pain  Chief Complaint Free Text Note Form: Gyn Consult  Pt is here to discuss u/s result      History of Present Illness  HPI: As noted in the Chief Complaint      Active Problems    1  Acute UTI (599 0) (N39 0)   2  Asthma in adult, mild intermittent, uncomplicated (409 67) (Y72 82)   3  Benign essential hypertension (401 1) (I10)   4  Burning with urination (788 1) (R30 0)   5  Encounter for postoperative care (V58 49) (Z48 89)   6  Encounter for routine gynecological examination (V72 31) (Z01 419)   7  Examination following surgery (V67 00) (Z09)   8  Female infertility (628 9) (N97 9)   9  GERD (gastroesophageal reflux disease) (530 81) (K21 9)   10  Impaired fasting glucose (790 21) (R73 01)   11  Low back pain (724 2) (M54 5)   12  Mild mitral regurgitation (424 0) (I34 0)   13  Need for DTP vaccine (V06 1) (Z23)   14  Obesity (BMI 30-39 9) (278 00) (E66 9)   15  Palpitations (785 1) (R00 2)   16  Pap smear, as part of routine gynecological examination (V76 2) (Z01 419)   17  Pelvic pain in female (625 9) (R10 2)   18  PVC (premature ventricular contraction) (427 69) (I49 3)   19  Screening for colon cancer (V76 51) (Z12 11)   20   Screening for diabetes mellitus (DM) (V77 1) (Z13 1)   21  Screening for lipoid disorders (V77 91) (Z13 220)   22  Trigger finger, right (727 03) (M65 30)   23  Trigger thumb, acquired (727 03) (M65 30)   24  Vaginal irritation (623 9) (N89 8)   25  Vaginal itching (698 1) (L29 8)    Past Medical History    1  History of Mild Intermittent Asthma (493 90)    Surgical History    1  History of Cholecystectomy Laparoscopic   2  History of Hernia Repair    Family History  Mother    1  Family history of Hypertension (V17 49)   2  Family history of Type 2 Diabetes Mellitus  Father    3  Family history of Hypertension (V17 49)   4  Family history of Type 2 Diabetes Mellitus  Grandmother    5  Family history of arthritis (V17 7) (Z82 61)   6  Family history of cerebrovascular accident (CVA) (V17 1) (Z82 3)   7  Family history of malignant neoplasm (V16 9) (Z80 9)   8  Family history of High cholesterol  Maternal Grandfather    9  Family history of Acute Myocardial Infarction (V17 3)   10  Family history of arthritis (V17 7) (Z82 61)   11  Family history of cerebrovascular accident (CVA) (V17 1) (Z82 3)   15  Family history of malignant neoplasm (V16 9) (Z80 9)   13  Family history of High cholesterol  Paternal Grandfather    15  Family history of arthritis (V17 7) (Z82 61)   15  Family history of cerebrovascular accident (CVA) (V17 1) (Z82 3)   12  Family history of malignant neoplasm (V16 9) (Z80 9)   17  Family history of High cholesterol  Maternal Uncle    25  Family history of Acute Myocardial Infarction (V17 3)  Family History    19  Family history of cerebrovascular accident (CVA) (V17 1) (Z82 3)   21  Family history of High cholesterol    Social History    · Alcohol Use (History)   · Exercises, 3x week   ·    · Never A Smoker   · Pets/Animals: Dog    Current Meds   1  Cyclobenzaprine HCl - 10 MG Oral Tablet; TAKE 1 TABLET 3 TIMES DAILY AS NEEDED;    Therapy: 67Ppj6651 to (Evaluate:16Wlh8438)  Requested for: 31DSG5846; Last Rx: 14KVE9686 Ordered   2  DilTIAZem HCl  MG Oral Capsule Extended Release 24 Hour; TAKE 1 CAPSULE   ONCE DAILY; Therapy: 23IIV3464 to (Evaluate:94Umz0770)  Requested for: 28QBF9962; Last   Rx:13Nov2017 Ordered   3  Meloxicam 15 MG Oral Tablet; TAKE 1 TABLET DAILY AS NEEDED; Therapy: 97KPH4763 to (Evaluate:12Jan2018)  Requested for: 90XGF4212; Last   Rx:17Jan2017 Ordered   4  Montelukast Sodium 10 MG Oral Tablet; take 1 tablet by mouth daily as directed; Therapy: 91AJF9480 to (Evaluate:94Snv1152)  Requested for: 28DDX6510; Last   Rx:83Spc7363 Ordered   5  Multi For Her Oral Tablet; TAKE 1 TABLET DAILY; Therapy: 79FDR3651 to Recorded   6  NovoFine 32G X 6 MM Miscellaneous; USE AS DIRECTED; Therapy: 76VVP3637 to (Last Rx:84Kmv8303)  Requested for: 49Klk3086 Ordered   7  ProAir  (90 Base) MCG/ACT Inhalation Aerosol Solution; INHALE 2 PUFFS   EVERY 4-6 HOURS AS NEEDED; Therapy: 62Vyf5288 to (Evaluate:53Xwa1618)  Requested for: 73BZR6008; Last   Rx:58Gje3139 Ordered   8  Saxenda 18 MG/3ML Subcutaneous Solution Pen-injector; 0 6mg Sub Q once a day for 1   week then increase dose by 0 6mg/day q week  Max 3 mg/day; Therapy: 36AKC4957 to (Evaluate:17Aug2017)  Requested for: 93Vpm6395; Last   Rx:39Unc8342 Ordered    Allergies    1  No Known Drug Allergies    2   Seasonal    Vitals  Vital Signs    Recorded: 38YTD8949 73:84ZH   Systolic 941   Diastolic 60   Height 5 ft 3 in   Weight 185 lb 8 oz   BMI Calculated 32 86   BSA Calculated 1 87   LMP 25XMP5297     Signatures   Electronically signed by : VENECIA Molina ; Nov 30 2017  2:03PM EST                       (Author)

## 2017-12-07 ENCOUNTER — GENERIC CONVERSION - ENCOUNTER (OUTPATIENT)
Dept: OTHER | Facility: OTHER | Age: 32
End: 2017-12-07

## 2017-12-07 ENCOUNTER — LAB REQUISITION (OUTPATIENT)
Dept: LAB | Facility: HOSPITAL | Age: 32
End: 2017-12-07
Payer: COMMERCIAL

## 2017-12-07 DIAGNOSIS — L29.8 OTHER PRURITUS: ICD-10-CM

## 2017-12-07 PROCEDURE — 87660 TRICHOMONAS VAGIN DIR PROBE: CPT | Performed by: OBSTETRICS & GYNECOLOGY

## 2017-12-07 PROCEDURE — 87510 GARDNER VAG DNA DIR PROBE: CPT | Performed by: OBSTETRICS & GYNECOLOGY

## 2017-12-07 PROCEDURE — 87480 CANDIDA DNA DIR PROBE: CPT | Performed by: OBSTETRICS & GYNECOLOGY

## 2017-12-08 LAB
CANDIDA RRNA VAG QL PROBE: NEGATIVE
G VAGINALIS RRNA GENITAL QL PROBE: NEGATIVE
T VAGINALIS RRNA GENITAL QL PROBE: NEGATIVE

## 2017-12-28 ENCOUNTER — ALLSCRIPTS OFFICE VISIT (OUTPATIENT)
Dept: OTHER | Facility: OTHER | Age: 32
End: 2017-12-28

## 2017-12-29 NOTE — PROGRESS NOTES
Assessment   1  Obesity (BMI 30-39 9) (278 00) (E66 9)   2  Benign essential hypertension (401 1) (I10)    Plan   Benign essential hypertension    · Lisinopril 10 MG Oral Tablet; TAKE 1 TABLET BY MOUTH ONCE DAILY  Benign essential hypertension, Impaired fasting glucose    · AmLODIPine Besylate 5 MG Oral Tablet; TAKE 1 TABLET DAILY AS    DIRECTED  Obesity (BMI 30-39  9)    · Saxenda 18 MG/3ML Subcutaneous Solution Pen-injector; 0 6mg Sub Q once a    day for 1 week then increase dose by 0 6mg/day q week  Max 3 mg/day    Discussion/Summary      To start her lisinopril today  continue Amlodipine  call in 1 week with readings  in 2 months  Chief Complaint   Pt here for BP Check      History of Present Illness   HPI: blood pressure has been ups and downs for the last  170/111-196/100 in the last 3-4 days  on and off  stopped Diltiazem a month ago due to shortness of breath  tolerate metoprolol either  started on Lisinopril         Hypertension (Follow-Up): The patient presents for follow-up of essential hypertension  The patient states she has been stable with her blood pressure control since the last visit  She has no comorbid illnesses  She has no significant interval events  Symptoms: Associated symptoms include headache, but-- no focal neurologic deficits-- and-- no memory loss  Home monitoring: The patient checks her blood pressure sporadically  Blood pressure control has been poor  Medications: the patient is adherent with her medication regimen  -- She denies medication side effects  Disease Management: the patient is not doing well with her blood pressure goals  Obesity (Follow-Up): The patient is being seen for follow-up of obesity  The patient reports doing well and a weight loss of 3 pounds  She has had no significant interval events  The patient is currently asymptomatic  Associated symptoms: no poor self esteem-- and-- no constipation        Medications:  the patient is adherent to her medication regimen, but-- she denies medication side effects  Disease management:  the patient is doing well with her goals  Review of Systems        Constitutional: no fever-- and-- no chills  ENT: no ear ache, no loss of hearing, no nosebleeds or nasal discharge, no sore throat or hoarseness  Cardiovascular: no complaints of slow or fast heart rate, no chest pain, no palpitations, no leg claudication or lower extremity edema  Respiratory: no complaints of shortness of breath, no wheezing, no dyspnea on exertion, no orthopnea or PND  Integumentary: no complaints of skin rash or lesion, no itching or dry skin, no skin wounds  Neurological: headache, but-- no numbness,-- no tingling,-- no confusion,-- no dizziness-- and-- no fainting  Active Problems   1  Asthma in adult, mild intermittent, uncomplicated (263 63) (W61 76)   2  Benign essential hypertension (401 1) (I10)   3  Examination following surgery (V67 00) (Z09)   4  Female infertility (628 9) (N97 9)   5  GERD (gastroesophageal reflux disease) (530 81) (K21 9)   6  Impaired fasting glucose (790 21) (R73 01)   7  Low back pain (724 2) (M54 5)   8  Mild mitral regurgitation (424 0) (I34 0)   9  Need for DTP vaccine (V06 1) (Z23)   10  Obesity (BMI 30-39 9) (278 00) (E66 9)   11  Palpitations (785 1) (R00 2)   12  Pap smear, as part of routine gynecological examination (V76 2) (Z01 419)   13  PVC (premature ventricular contraction) (427 69) (I49 3)   14  Trigger thumb, acquired (727 03) (M65 30)    Past Medical History   1  History of Mild Intermittent Asthma (493 90)  Active Problems And Past Medical History Reviewed: The active problems and past medical history were reviewed and updated today  Family History   Mother    1  Family history of Hypertension (V17 49)   2  Family history of Type 2 Diabetes Mellitus  Father    3  Family history of Hypertension (V17 49)   4   Family history of Type 2 Diabetes Mellitus  Grandmother    5  Family history of arthritis (V17 7) (Z82 61)   6  Family history of cerebrovascular accident (CVA) (V17 1) (Z82 3)   7  Family history of malignant neoplasm (V16 9) (Z80 9)   8  Family history of High cholesterol  Maternal Grandfather    9  Family history of Acute Myocardial Infarction (V17 3)   10  Family history of arthritis (V17 7) (Z82 61)   11  Family history of cerebrovascular accident (CVA) (V17 1) (Z82 3)   15  Family history of malignant neoplasm (V16 9) (Z80 9)   13  Family history of High cholesterol  Paternal Grandfather    15  Family history of arthritis (V17 7) (Z82 61)   15  Family history of cerebrovascular accident (CVA) (V17 1) (Z82 3)   12  Family history of malignant neoplasm (V16 9) (Z80 9)   17  Family history of High cholesterol  Maternal Uncle    25  Family history of Acute Myocardial Infarction (V17 3)  Family History    19  Family history of cerebrovascular accident (CVA) (V17 1) (Z82 3)   21  Family history of High cholesterol  Family History Reviewed: The family history was reviewed and updated today  Social History    · Alcohol Use (History)   · Exercises, 3x week   ·    · Never A Smoker   · Pets/Animals: Dog  The social history was reviewed and updated today  The social history was reviewed and is unchanged  Surgical History   1  History of Cholecystectomy Laparoscopic   2  History of Hernia Repair  Surgical History Reviewed: The surgical history was reviewed and updated today  Current Meds    1  AmLODIPine Besylate 5 MG Oral Tablet; TAKE 1 TABLET DAILY AS DIRECTED; Therapy: 87Khr2843 to (Evaluate:32Jth0526)  Requested for: 46Gqy9506 Recorded   2  Cyclobenzaprine HCl - 10 MG Oral Tablet; TAKE 1 TABLET 3 TIMES DAILY AS NEEDED; Therapy: 71Qqb5452 to (Evaluate:97Ybt0503)  Requested for: 04Kpp7397; Last     Rx:94Epo5481 Ordered   3  Lisinopril 10 MG Oral Tablet; TAKE 1 TABLET BY MOUTH ONCE DAILY;      Therapy: 65CJU9984 to (SDYZTZGS:76ZPF3750)  Requested for: 38Uhx4193; Last     Rx:13Fhs4593 Ordered   4  Meloxicam 15 MG Oral Tablet; TAKE 1 TABLET DAILY AS NEEDED; Therapy: 79XRS6977 to (Evaluate:12Jan2018)  Requested for: 55UVK5321; Last     Rx:17Jan2017 Ordered   5  Multi For Her Oral Tablet; TAKE 1 TABLET DAILY; Therapy: 03QBF1155 to Recorded   6  NovoFine 32G X 6 MM Miscellaneous; USE AS DIRECTED; Therapy: 53FUL0755 to (Last Rx:13Uzi6354)  Requested for: 07Abh0169 Ordered   7  ProAir  (90 Base) MCG/ACT Inhalation Aerosol Solution; INHALE 2 PUFFS     EVERY 4-6 HOURS AS NEEDED; Therapy: 12Apr2011 to (Evaluate:97Gpk9917)  Requested for: 78KEV4755; Last     Rx:33Cps8463 Ordered   8  Saxenda 18 MG/3ML Subcutaneous Solution Pen-injector; 0 6mg Sub Q once a day for 1     week then increase dose by 0 6mg/day q week  Max 3 mg/day; Therapy: 35FQK6747 to (Evaluate:17Aug2017)  Requested for: 52Gim8540; Last     Rx:95Vgs4403 Ordered     The medication list was reviewed and updated today  Allergies   1  No Known Drug Allergies  2  Seasonal    Vitals    Recorded: 28Dec2017 02:13PM   Heart Rate 72   Respiration 16   Systolic 355   Diastolic 92   Height 5 ft 3 in   Weight 182 lb 4 oz   BMI Calculated 32 28   BSA Calculated 1 86     Physical Exam        Constitutional      General appearance: No acute distress, well appearing and well nourished  Eyes      Conjunctiva and lids: No swelling, erythema or discharge  Pupils and irises: Equal, round and reactive to light  Pulmonary      Respiratory effort: No increased work of breathing or signs of respiratory distress  Auscultation of lungs: Clear to auscultation  Cardiovascular      Palpation of heart: Normal PMI, no thrills  Auscultation of heart: Normal rate and rhythm, normal S1 and S2, without murmurs         Examination of extremities for edema and/or varicosities: Normal        Lymphatic      Palpation of lymph nodes in neck: No lymphadenopathy  Skin      Skin and subcutaneous tissue: Normal without rashes or lesions  Neurologic      Cranial nerves: Cranial nerves 2-12 intact  Reflexes: 2+ and symmetric  Sensation: No sensory loss         Psychiatric      Orientation to person, place, and time: Normal        Mood and affect: Normal           Signatures    Electronically signed by : Hilario Leonard MD; Dec 28 2017  2:31PM EST                       (Author)

## 2018-01-10 NOTE — PROGRESS NOTES
Chief Complaint  Patient presents to office post Right L5/S1 Metryx microdiscectomy  History of Present Illness  HPI: Patient presents to office accompanied by family member for 2-week POV for incision check  Patient states she is doing well  C/O minimal lower back pain with tightness of posterior R thigh and slight tingling of R foot  C/O tingling of L arm into hand and mid back discomfort since surgery, relieved with Methocarbamol  Pt using Tylenol, methocarbamol and Oxycodone 5mg sometimes before bed  Ambulating without difficulty  Pt questioning PT  Active Problems     1  Low back pain (724 2) (M54 5)   2  Mild Intermittent Asthma (493 90)   3  Mild mitral regurgitation (424 0) (I34 0)   4  Need for DTP vaccine (V06 1) (Z23)   5  Screening for colon cancer (V76 51) (Z12 11)   6  Screening for diabetes mellitus (DM) (V77 1) (Z13 1)   7  Screening for lipoid disorders (V77 91) (Z13 220)    Lower abdominal pain (789 09) (R10 30)          Current Meds   1  Chlorzoxazone 500 MG Oral Tablet; TAKE 1 TABLET 3 TIMES DAILY; Therapy: 37Rla0539 to (Evaluate:11Yep5332)  Requested for: 00Upc2967; Last   Rx:33Nic5414 Ordered   2  Dicyclomine HCl - 20 MG Oral Tablet; TAKE 1 TABLET EVERY 6 HOURS AS NEEDED; Therapy: 32EZM3601 to (Veronica Roche)  Requested for: 16DND9853; Last   Rx:08Jun2016 Ordered   3  Gabapentin 300 MG Oral Capsule; TAKE 1 CAPSULE AT BEDTIME; Therapy: 69RYN0625 to (Evaluate:45Mal1167)  Requested for: 39Zry2354; Last   Rx:06Jun2016 Ordered   4  Meloxicam 15 MG Oral Tablet; TAKE 1 TABLET DAILY AS NEEDED; Therapy: 70NEO3111 to (Evaluate:80Ugi9620)  Requested for: 34Tvu2475; Last   Rx:34Ntj5324 Ordered   5  Multi For Her Oral Tablet; Therapy: 77DER8038 to Recorded   6  Prenatal Vitamins TABS; TAKE 1 TABLET DAILY; Therapy: (97 525557) to Recorded   7  ProAir  (90 Base) MCG/ACT Inhalation Aerosol Solution; INHALE 2 PUFFS   EVERY 4-6 HOURS AS NEEDED;    Therapy: 84Xpi7845 to (Aracely Cowan)  Requested for: 21Apr2015; Last   Rx:10Dcg9932 Ordered   8  TraMADol HCl - 50 MG Oral Tablet; TAKE 1 TABLET Every twelve hours PRN; Therapy: 41DVX8315 to (Evaluate:29Hxh9112); Last Rx:95Gmq1073 Ordered    Allergies    1  No Known Drug Allergies    2  Seasonal    Vitals  Signs    Systolic: 499, LUE, Sitting  Diastolic: 440, LUE, Sitting  Heart Rate: 93, L Brachial Artery  Pulse Quality: Normal, L Brachial Artery  Respiration Quality: Normal  Respiration: 16  Temperature: 97 8 F, Tympanic  Height: 5 ft 3 in  Weight: 190 lb   BMI Calculated: 33 66  BSA Calculated: 1 89    Procedure  The wound was located on the lumbar  Wound Exam: Incision c/d/i  No gapping, edema, redness or drainage present  Procedure Note:      Plan  Examination following surgery    · XR SPINE LUMBAR BENDING ONLY 2 OR 3 VIEWS; Status:Active; Requested  for:28Sep2016;    · Follow-up Visit in 4 Weeks Evaluation and Treatment  Follow-up  Status: Complete   Done: 00GCC0034    Discussion/Summary    Reviewed postop instructions and wound care with pt and family member  Wash incision with mild soap and water, pat dry  No creams, lotions or ointments to be applied to incision  No hot tub use or tub baths  Observe incision daily for s/s of infection which include increased redness, edema, increased tenderness, gapping of incision, drainage or fever > 101  Increase ambulation daily as tolerated safely  Use proper body mechanics and maintain weight restrictions  Discussed xray to be done 2-3 days prior to next f/u  No refills provided  Pt advised to contact PCP if L arm issues continue  To contact office for any questions or concerns  Future Appointments    Date/Time Provider Specialty Site   02/14/2017 02:30 PM Pro Rhodes MD [id number] [facility]   10/07/2016 07:00 AM David Smart MD Pain Management [facility] SPINE   11/01/2016 11:30 AM VENECIA Jones   [facility]urosurgery [facility]     Signatures   Electronically signed by : Bartolome Morgan, ; Sep 28 2016  1:30PM EST                       (Author)    Electronically signed by : VENECIA Reinoso ; Oct  4 2016 11:10AM EST                       (Author)

## 2018-01-10 NOTE — PROGRESS NOTES
Assessment    1  Encounter for preventive health examination (V70 0) (Z00 00)   2  Obesity (BMI 30-39 9) (278 00) (E66 9)    Plan  Health Maintenance    · Follow-up visit in 1 year Evaluation and Treatment  Follow-up  Status: Hold For -  Scheduling  Requested for: 85QNI4143   · Always use a seat belt and shoulder strap when riding or driving a motor vehicle ;  Status:Complete;   Done: 96ULR5207   · Begin a limited exercise program ; Status:Complete;   Done: 11HGS9628   · Begin or continue regular aerobic exercise  Gradually work up to at least 3 sessions of 30  minutes of exercise a week ; Status:Complete;   Done: 27NNO5773   · Brush your teeth 3 times a day and floss at least once a day ; Status:Complete;   Done:  02NJT4289   · Eat a low fat and low cholesterol diet ; Status:Complete;   Done: 72YFW9028   · Eat foods that are high in calcium ; Status:Complete;   Done: 14UUJ1608   · Use a sun block product with an SPF of 15 or more ; Status:Complete;   Done:  39ADZ0139   · Vitamins can help you get daily requirements that your diet may not be giving you ;  Status:Complete;   Done: 40AQS0465   · We recommend routine visits to a dentist ; Status:Complete;   Done: 88GBK5649   · We recommend that you bring your body mass index down to 26 ; Status:Complete;    Done: 53TCT1628   · Call [phone] if: You find a new or different kind of lump in your breast ;  Status:Complete;   Done: 07OKF9980   · Call [phone] if: You have any warning signs of skin cancer ; Status:Complete;    Done: 93BUR2881  Obesity (BMI 30-39  9)    · Saxenda 18 MG/3ML Subcutaneous Solution Pen-injector; 0 6mg Sub Q once a  day for 1 week then increase dose by 0 6mg/day q week  Max 3 mg/day    Discussion/Summary  health maintenance visit Currently, she eats an adequate diet   the risks and benefits of cervical cancer screening were discussed cervical cancer screening is current Breast cancer screening: the risks and benefits of breast cancer screening were discussed and breast cancer screening is not indicated  Colorectal cancer screening: the risks and benefits of colorectal cancer screening were discussed and colorectal cancer screening is not indicated  Osteoporosis screening: the risks and benefits of osteoporosis screening were discussed and bone mineral density testing is not indicated  The immunizations are up to date  She was advised to be evaluated by an ophthalmologist and a dentist  Advice and education were given regarding nutrition, aerobic exercise, weight bearing exercise, weight loss, calcium supplements, vitamin D supplements, contraception, sunscreen use, self skin examination, helmet use and seat belt use  Patient discussion: discussed with the patient  Chief Complaint  pt here for annual wellness  History of Present Illness  HM, Adult Female: The patient is being seen for a health maintenance evaluation  The last health maintenance visit was 1 year(s) ago  General Health: The patient's health since the last visit is described as good  She has regular dental visits  She denies vision problems  She denies hearing loss  Immunizations status: not up to date  Lifestyle:  She consumes a diverse and healthy diet  She does not have any weight concerns  She exercises regularly  She does not use tobacco  She denies alcohol use  She denies drug use  Reproductive health:  she reports normal menses  she uses no contraception  she is sexually active  she denies prior pregnancies  Screening: cancer screening reviewed and updated  metabolic screening reviewed and updated  risk screening reviewed and updated  Review of Systems    Constitutional: No fever, no chills, feels well, no tiredness, no recent weight gain or weight loss  Eyes: No complaints of eye pain, no red eyes, no eyesight problems, no discharge, no dry eyes, no itching of eyes     ENT: no complaints of earache, no loss of hearing, no nose bleeds, no nasal discharge, no sore throat, no hoarseness  Cardiovascular: No complaints of slow heart rate, no fast heart rate, no chest pain, no palpitations, no leg claudication, no lower extremity edema  Respiratory: No complaints of shortness of breath, no wheezing, no cough, no SOB on exertion, no orthopnea, no PND  Gastrointestinal: No complaints of abdominal pain, no constipation, no nausea or vomiting, no diarrhea, no bloody stools  Genitourinary: No complaints of dysuria, no incontinence, no pelvic pain, no dysmenorrhea, no vaginal discharge or bleeding  Musculoskeletal: No complaints of arthralgias, no myalgias, no joint swelling or stiffness, no limb pain or swelling  Integumentary: No complaints of skin rash or lesions, no itching, no skin wounds, no breast pain or lump  Neurological: No complaints of headache, no confusion, no convulsions, no numbness, no dizziness or fainting, no tingling, no limb weakness, no difficulty walking  Psychiatric: Not suicidal, no sleep disturbance, no anxiety or depression, no change in personality, no emotional problems  Endocrine: No complaints of proptosis, no hot flashes, no muscle weakness, no deepening of the voice, no feelings of weakness  Hematologic/Lymphatic: No complaints of swollen glands, no swollen glands in the neck, does not bleed easily, does not bruise easily  Active Problems    1  Asthma in adult, mild intermittent, uncomplicated (945 64) (R84 74)   2  Benign essential hypertension (401 1) (I10)   3  Body aches (780 96) (R52)   4  Encounter for postoperative care (V58 49) (Z48 89)   5  Examination following surgery (V67 00) (Z09)   6  GERD (gastroesophageal reflux disease) (530 81) (K21 9)   7  Impaired fasting glucose (790 21) (R73 01)   8  Low back pain (724 2) (M54 5)   9  Mild mitral regurgitation (424 0) (I34 0)   10  Need for DTP vaccine (V06 1) (Z23)   11  Obesity (BMI 30-39 9) (278 00) (E66 9)   12   Screening for colon cancer (V76 51) (Z12 11)   13  Screening for diabetes mellitus (DM) (V77 1) (Z13 1)   14  Screening for lipoid disorders (V77 91) (Z13 220)   15  Sore throat (462) (J02 9)   16  Trigger finger, right (727 03) (M65 30)    Past Medical History    · History of Mild Intermittent Asthma (493 90)   · History of Palpitations (785 1) (R00 2)    Surgical History    · History of Cholecystectomy Laparoscopic   · History of Hernia Repair    Family History  Mother    · Family history of Hypertension (V17 49)   · Family history of Type 2 Diabetes Mellitus  Father    · Family history of Hypertension (V17 49)   · Family history of Type 2 Diabetes Mellitus  Grandmother    · Family history of arthritis (V17 7) (Z82 61)   · Family history of cerebrovascular accident (CVA) (V17 1) (Z82 3)   · Family history of malignant neoplasm (V16 9) (Z80 9)   · Family history of High cholesterol  Maternal Grandfather    · Family history of Acute Myocardial Infarction (V17 3)   · Family history of arthritis (V17 7) (Z82 61)   · Family history of cerebrovascular accident (CVA) (V17 1) (Z82 3)   · Family history of malignant neoplasm (V16 9) (Z80 9)   · Family history of High cholesterol  Paternal Grandfather    · Family history of arthritis (V17 7) (Z82 61)   · Family history of cerebrovascular accident (CVA) (V17 1) (Z82 3)   · Family history of malignant neoplasm (V16 9) (Z80 9)   · Family history of High cholesterol  Maternal Uncle    · Family history of Acute Myocardial Infarction (V17 3)  Family History    · Family history of cerebrovascular accident (CVA) (V17 1) (Z82 3)   · Family history of High cholesterol    Social History    · Alcohol Use (History)   · Exercises, 3x week   ·    · Never A Smoker   · Pets/Animals: Dog    Current Meds   1  AmLODIPine Besylate 5 MG Oral Tablet; TAKE 1 TABLET DAILY AS DIRECTED; Therapy: 93Vvu9275 to (Evaluate:25Jun2017)  Requested for: 54SDK6785; Last   Rx:29Fth0684 Ordered   2   Flovent HFA 44 MCG/ACT Inhalation Aerosol; INHALE 2 PUFFS EVERY 12 HOURS; Therapy: 50AQX0497 to (Evaluate:89Gxb1182)  Requested for: 20Jan2017; Last   Rx:20Jan2017 Ordered   3  Meloxicam 15 MG Oral Tablet; TAKE 1 TABLET DAILY AS NEEDED; Therapy: 19OTE4079 to (Evaluate:12Jan2018)  Requested for: 78HQM3710; Last   Rx:17Jan2017 Ordered   4  Methocarbamol 500 MG Oral Tablet; TAKE 1 TABLET Every 6 hours PRN; Therapy: (Recorded:01Nov2016) to Recorded   5  Multi For Her Oral Tablet; TAKE 1 TABLET DAILY; Therapy: 10BIF4507 to Recorded   6  ProAir  (90 Base) MCG/ACT Inhalation Aerosol Solution; INHALE 2 PUFFS   EVERY 4-6 HOURS AS NEEDED; Therapy: 12Apr2011 to (Evaluate:00Qfn0084)  Requested for: 65LFD8272; Last   Rx:97Qss9365 Ordered   7  Promethazine-DM 6 25-15 MG/5ML Oral Syrup; TAKE 5 ML EVERY 4 TO 6 HOURS AS   NEEDED FOR COUGH; Therapy: 57EDA0201 to (Evaluate:27Jan2017)  Requested for: 20Jan2017; Last   Rx:20Jan2017 Ordered   8  Saxenda 18 MG/3ML Subcutaneous Solution Pen-injector; 0 6mg Sub Q once a day for 1   week then increase dose by 0 6mg/day q week  Max 3 mg/day; Therapy: 33AMZ3277 to (Evaluate:50Rfj0880); Last Rx:17Jan2017 Ordered    Allergies    1  No Known Drug Allergies    2  Seasonal    Vitals   Recorded: 12CZJ5288 04:32PM   Heart Rate 62   Respiration 18   Systolic 954   Diastolic 88   Height 5 ft 3 62 in   Weight 186 lb 8 oz   BMI Calculated 32 39   BSA Calculated 1 89     Physical Exam    Constitutional   General appearance: No acute distress, well appearing and well nourished  Head and Face   Head and face: Normal     Palpation of the face and sinuses: No sinus tenderness  Eyes   Conjunctiva and lids: No swelling, erythema or discharge  Pupils and irises: Equal, round, reactive to light  Ophthalmoscopic examination: Normal fundi and optic discs  Ears, Nose, Mouth, and Throat   External inspection of ears and nose: Normal     Otoscopic examination: Tympanic membranes translucent with normal light reflex  Canals patent without erythema  Hearing: Normal     Nasal mucosa, septum, and turbinates: Normal without edema or erythema  Lips, teeth, and gums: Normal, good dentition  Oropharynx: Normal with no erythema, edema, exudate or lesions  Neck   Neck: Supple, symmetric, trachea midline, no masses  Thyroid: Normal, no thyromegaly  Pulmonary   Respiratory effort: No increased work of breathing or signs of respiratory distress  Percussion of chest: Normal     Auscultation of lungs: Clear to auscultation  Cardiovascular   Palpation of heart: Normal PMI, no thrills  Auscultation of heart: Normal rate and rhythm, normal S1 and S2, no murmurs  Examination of extremities for edema and/or varicosities: Normal     Chest   Chest: Normal     Abdomen   Abdomen: Non-tender, no masses  Liver and spleen: No hepatomegaly or splenomegaly  Examination for hernias: No hernia appreciated  Lymphatic   Palpation of lymph nodes in neck: No lymphadenopathy  Palpation of lymph nodes in axillae: No lymphadenopathy  Palpation of lymph nodes in groin: No lymphadenopathy  Musculoskeletal   Gait and station: Normal     Digits and nails: Normal without clubbing or cyanosis  Joints, bones, and muscles: Normal     Range of motion: Normal     Stability: Normal     Muscle strength/tone: Normal     Skin   Skin and subcutaneous tissue: Normal without rashes or lesions  Palpation of skin and subcutaneous tissue: Normal turgor  Neurologic   Cranial nerves: Cranial nerves II-XII intact  Cortical function: Normal mental status  Reflexes: 2+ and symmetric  Sensation: No sensory loss  Coordination: Normal finger to nose and heel to shin  Psychiatric   Judgment and insight: Normal     Orientation to person, place, and time: Normal     Recent and remote memory: Intact      Mood and affect: Normal        Future Appointments    Date/Time Provider Specialty Site   02/17/2017 01:40 PM Joanne Stallings DO Orthopedic Surgery  [address] [facility]     Signatures   Electronically signed by : Mariza Myers MD; Feb 15 2017  4:57PM EST                       (Author)

## 2018-01-10 NOTE — RESULT NOTES
Discussion/Summary     negative urine culture   she may continue her antibiotics if she is feeling better    - Dr Noah Fulton     Verified Results  (1) URINE CULTURE 50Htv0629 10:10PM Jovon Rhodes Order Number: [id number]     Test Name Result Flag Reference   CLINICAL REPORT (Report)     Test:        Urine culture  Specimen Type:   Urine  Specimen Date:   9/18/2017 10:10 PM  Result Date:    9/21/2017 2:20 PM  Result Status:   Final result  Resulting Lab:   [place] [address]            Tel: [phone]      CULTURE                                       ------------------                                   20,000-29,000 cfu/ml Mixed Contaminants X3       *** Including lactose fermenting gram negative rods ***   Including lactose fermenting gram negative rods       Signatures   Electronically signed by : Gurpreet Govea MD; Sep 21 2017  2:42PM EST                       (Author)

## 2018-01-11 NOTE — MISCELLANEOUS
Signatures   Electronically signed by : Meron Benz MD; Oct  7 2016  7:41AM EST                       (Author)

## 2018-01-11 NOTE — MISCELLANEOUS
Message   Recorded as Task   Date: 09/28/2016 01:44 PM, Created By: Allison Andrea   Task Name: Miscellaneous   Assigned To: Costenbader,Rondel   Regarding Patient: Jeff Dahl, Status: Active   Comment:    Costenbader,Rondel - 28 Sep 2016 1:44 PM     TASK CREATED  Pt seen for 2-week POV, doing well  Minimal LBP with tightness of posterior R thigh with some tingling of leg present  Using tylenol, methocarbamol and ocassional Oxycodone 5mg before bed  Questioning if she will need PT and when she can start  Please advise, Michaela Mercado - 06 Oct 2016 3:09 PM     TASK REPLIED TO: Previously Assigned To Margert Reyna                      usually at 4-6 wks   Costenbader,Rondel - 07 Oct 2016 10:06 AM     TASK EDITED                 [facility] requesting call back  Costenbader,Rondel - 07 Oct 2016 1:47 PM     TASK EDITED                 Informed pt of DKO's reply  Pt will discuss PT at 6 week f/u          Signatures   Electronically signed by : Jenna Velazco, ; Oct  7 2016  1:47PM EST                       (Author)

## 2018-01-12 VITALS
DIASTOLIC BLOOD PRESSURE: 85 MMHG | HEART RATE: 80 BPM | SYSTOLIC BLOOD PRESSURE: 125 MMHG | BODY MASS INDEX: 33.04 KG/M2 | WEIGHT: 186.5 LBS

## 2018-01-12 NOTE — PROGRESS NOTES
Assessment    1  Abdominal pain (789 00) (R10 9)    Plan  Abdominal pain    · Follow-up PRN Evaluation and Treatment  Follow-up  Status: Complete  Done:  20OCS2596   Ordered; For: Abdominal pain; Ordered By: Deja Armas Performed:  Due: 28COD1946    Discussion/Summary  Discussion Summary:   33 year old female with nonspecific abdominal pain without alarm symptoms  Seems more like dyspepsia and has improved on it's own with dietary changes  Does have family history of stomach cancer and Colon cancer in a paternal grandfather  EGD with gastritis but without h  pylori  She does not have Celiac disease  Plan:  screening colonoscopy at age 38 since she is    can follow up as needed       Chief Complaint  Chief Complaint Free Text Note Form: Pt here for follow up  History of Present Illness  HPI: 31year old female here for follow up  Symptoms now much improved  Grandmother had stomach cancer diagnosed in her 63's  Paternal grandfather had colon cancer unsure when he was diagnosed  My partner did EGD while I was on leave and she had gastriitis, small HH  Path was neg for h pylori and duodenal biopsies were normal without villous blunting  She tried a trial of gluten free diet but quit and she is feeling better anyway  Recemly tried IVF but it was unsuccessful  She is quite affected by this  Review of Systems  Complete-Female GI Adult:   Constitutional: No fever, no chills, feels well, no tiredness, no recent weight gain or weight loss  Eyes: No complaints of eye pain, no red eyes, no eyesight problems, no discharge, no dry eyes, no itching of eyes  ENT: no complaints of earache, no loss of hearing, no nose bleeds, no nasal discharge, no sore throat, no hoarseness  Cardiovascular: No complaints of slow heart rate, no fast heart rate, no chest pain, no palpitations, no leg claudication, no lower extremity edema     Respiratory: No complaints of shortness of breath, no wheezing, no cough, no SOB on exertion, no orthopnea, no PND  Gastrointestinal: No complaints of abdominal pain, no constipation, no nausea or vomiting, no diarrhea, no bloody stools  Genitourinary: No complaints of dysuria, no incontinence, no pelvic pain, no dysmenorrhea, no vaginal discharge or bleeding  Musculoskeletal: No complaints of arthralgias, no myalgias, no joint swelling or stiffness, no limb pain or swelling  Integumentary: No complaints of skin rash or lesions, no itching, no skin wounds, no breast pain or lump  Neurological: No complaints of headache, no confusion, no convulsions, no numbness, no dizziness or fainting, no tingling, no limb weakness, no difficulty walking  Psychiatric: Not suicidal, no sleep disturbance, no anxiety or depression, no change in personality, no emotional problems  Endocrine: No complaints of proptosis, no hot flashes, no muscle weakness, no deepening of the voice, no feelings of weakness  Hematologic/Lymphatic: No complaints of swollen glands, no swollen glands in the neck, does not bleed easily, does not bruise easily  ROS Reviewed:   ROS reviewed  Active Problems    1  Abdominal pain (789 00) (R10 9)   2  Chest pain (786 50) (R07 9)   3  Low back pain (724 2) (M54 5)   4  Lumbago (724 2) (M54 5)   5  Mild Intermittent Asthma (493 90)   6  Need for DTP vaccine (V06 1) (Z23)   7  Palpitations (785 1) (R00 2)   8  Screening for diabetes mellitus (DM) (V77 1) (Z13 1)   9  Screening for lipoid disorders (V77 91) (Z13 220)    Past Medical History    1  History of Mild Intermittent Asthma (493 90)    Surgical History    1  History of Cholecystectomy Laparoscopic   2  History of Hernia Repair  Surgical History Reviewed: The surgical history was reviewed and updated today  Family History    1  Family history of Hypertension (V17 49)   2  Family history of Type 2 Diabetes Mellitus    3  Family history of Hypertension (V17 49)   4  Family history of Type 2 Diabetes Mellitus    5  Family history of Acute Myocardial Infarction (V17 3)    6  Family history of Acute Myocardial Infarction (V17 3)  Family History Reviewed: The family history was reviewed and updated today  Social History    · Alcohol Use (History)   · Never A Smoker  Social History Reviewed: The social history was reviewed and updated today  Current Meds   1  Meloxicam 7 5 MG Oral Tablet; TAKE 1 TABLET DAILY AS NEEDED; Therapy: 68IBK6463 to (Verona King)  Requested for: 04NTV3412; Last   Rx:06Jan2016 Ordered   2  MetFORMIN HCl TABS Recorded   3  Prenatal Vitamins TABS; TAKE 1 TABLET DAILY; Therapy: (72 [phone]) to Recorded   4  ProAir  (90 Base) MCG/ACT Inhalation Aerosol Solution; INHALE 2 PUFFS   EVERY 4-6 HOURS AS NEEDED; Therapy: 12Apr2011 to (Evaluate:79Osg9234)  Requested for: 21Apr2015; Last   Rx:21Apr2015 Ordered  Medication List Reviewed: The medication list was reviewed and updated today  Allergies    1  No Known Drug Allergies    Vitals  Vital Signs [Data Includes: Current Encounter]    Recorded: 60HWT6816 01:10PM   Temperature 97 7 F   Heart Rate 78   Respiration 16   Systolic 123   Diastolic 90   Height 5 ft 3 9 in   Weight 187 lb 2 08 oz   BMI Calculated 32 22   BSA Calculated 1 9     Physical Exam    Constitutional   General appearance: No acute distress, well appearing and well nourished  Eyes   Conjunctiva and lids: No swelling, erythema or discharge  Pupils and irises: Equal, round and reactive to light  Ears, Nose, Mouth, and Throat   External inspection of ears and nose: Normal     Nasal mucosa, septum, and turbinates: Normal without edema or erythema  Oropharynx: Normal with no erythema, edema, exudate or lesions  Pulmonary   Respiratory effort: No increased work of breathing or signs of respiratory distress  Auscultation of lungs: Clear to auscultation      Cardiovascular   Auscultation of heart: Normal rate and rhythm, normal S1 and S2, without murmurs  Examination of extremities for edema and/or varicosities: Normal     Abdomen   Abdomen: Non-tender, no masses  Liver and spleen: No hepatomegaly or splenomegaly  Lymphatic   Palpation of lymph nodes in neck: No lymphadenopathy  Musculoskeletal   Gait and station: Normal     Digits and nails: Normal without clubbing or cyanosis  Inspection/palpation of joints, bones, and muscles: Normal     Skin   Skin and subcutaneous tissue: Normal without rashes or lesions      Psychiatric   Orientation to person, place, and time: Normal     Mood and affect: Normal          Future Appointments    Date/Time Provider Specialty Site   02/11/2016 02:00 PM Damien Rhodes MD [address]   Electronically signed by : Jose Condon DO; Jan 27 2016 10:11PM EST                       (Author)

## 2018-01-13 VITALS
HEIGHT: 63 IN | BODY MASS INDEX: 32.87 KG/M2 | WEIGHT: 185.5 LBS | DIASTOLIC BLOOD PRESSURE: 77 MMHG | HEART RATE: 98 BPM | SYSTOLIC BLOOD PRESSURE: 109 MMHG

## 2018-01-13 VITALS
DIASTOLIC BLOOD PRESSURE: 85 MMHG | BODY MASS INDEX: 33.31 KG/M2 | SYSTOLIC BLOOD PRESSURE: 131 MMHG | WEIGHT: 188 LBS | HEART RATE: 93 BPM | HEIGHT: 63 IN

## 2018-01-13 VITALS
RESPIRATION RATE: 18 BRPM | DIASTOLIC BLOOD PRESSURE: 90 MMHG | HEART RATE: 62 BPM | WEIGHT: 195.5 LBS | BODY MASS INDEX: 34.63 KG/M2 | SYSTOLIC BLOOD PRESSURE: 134 MMHG

## 2018-01-13 VITALS
HEART RATE: 64 BPM | WEIGHT: 182.56 LBS | HEIGHT: 63 IN | SYSTOLIC BLOOD PRESSURE: 110 MMHG | BODY MASS INDEX: 32.35 KG/M2 | DIASTOLIC BLOOD PRESSURE: 68 MMHG

## 2018-01-13 VITALS
DIASTOLIC BLOOD PRESSURE: 82 MMHG | RESPIRATION RATE: 16 BRPM | SYSTOLIC BLOOD PRESSURE: 122 MMHG | HEART RATE: 64 BPM | BODY MASS INDEX: 31.93 KG/M2 | WEIGHT: 180.25 LBS

## 2018-01-13 VITALS
DIASTOLIC BLOOD PRESSURE: 88 MMHG | HEIGHT: 63 IN | BODY MASS INDEX: 32.8 KG/M2 | SYSTOLIC BLOOD PRESSURE: 120 MMHG | HEART RATE: 76 BPM | WEIGHT: 185.13 LBS

## 2018-01-13 VITALS
HEIGHT: 63 IN | SYSTOLIC BLOOD PRESSURE: 108 MMHG | BODY MASS INDEX: 32.78 KG/M2 | DIASTOLIC BLOOD PRESSURE: 70 MMHG | WEIGHT: 185 LBS

## 2018-01-13 VITALS
DIASTOLIC BLOOD PRESSURE: 88 MMHG | HEIGHT: 64 IN | HEART RATE: 62 BPM | SYSTOLIC BLOOD PRESSURE: 118 MMHG | BODY MASS INDEX: 31.84 KG/M2 | RESPIRATION RATE: 18 BRPM | WEIGHT: 186.5 LBS

## 2018-01-13 NOTE — RESULT NOTES
Verified Results  (1) THROAT CULTURE (CULTURE, UPPER RESPIRATORY) 20Jan2017 07:48PM CarmeloPriscila Kana Order Number: [id number]     Test Name Result Flag Reference   CLINICAL REPORT (Report)     Test:        Throat culture  Specimen Type:   Throat  Specimen Date:   1/20/2017 7:48 PM  Result Date:    1/22/2017 2:36 PM  Result Status:   Final result  Resulting Lab:   [place] [address]            Tel: [phone]      CULTURE                                       ------------------                                   Negative for beta-hemolytic Streptococcus       Discussion/Summary   negative throat culture   - Dr Roxanna Porras   Electronically signed by : Sarmad Kirkland MD; Jan 22 2017  4:37PM EST                       (Author)

## 2018-01-13 NOTE — RESULT NOTES
Verified Results  HOLTER MONITOR - 50 HOUR 21Jan2016 01:43PM Imani Rhodes     Test Name Result Flag Reference   HOLTER MONITOR - 50 HOUR (Report)     PATIENT NAME: Luisa Peterson      AGE: 31 y o  Sex: female   MRN: [id number]       PROCEDURE: Holter monitor - 48 hour        INDICATIONS: Palpitations     HOLTER FINDINGS:     The patient was monitored for 48 continuous hours  This revealed the patient to be in Sinus rhythm throughout the duration of the study, with an average rate of 77 BPM; a minimum rate of 48 BPM; and a maximum rate of 185 BPM       There were a total of 71 premature ventricular contractions  One ventricular couplet, and no arrhythmias  There were a total of 13 premature atrial contractions  One atrial couplet and no arrhythmias  There was no evidence of atrial fibrillation or    atrial flutter  There was approximately 5 hours of bradycardia  The slowest episode was in the late morning hours, was 48 bpm, Which was sinus bradycardia  There was no evidence of heart block, and no significant pauses were seen  There was    approximately 2 hours of tachycardia  Highest HR was 185 bpm, this was sinus tachycardia  There were a few symptoms of palpitations and one of shortness of breath, but did not correspond with any significant findings  1  The patient was in sinus rhythm throughout the duration of the study  2  The patients average heart rate was 77 bpm     3  Rare ectopy with no arrhythmias  4  Symptoms as stated above not corresponding with any significant findings         Discussion/Summary   Normal 48 hour holter monitor     - Dr Jimmy Rangel   Electronically signed by : Verenice Olguin MD; Jan 28 2016 12:53PM EST                       (Author)

## 2018-01-13 NOTE — RESULT NOTES
Discussion/Summary     cholesterol looks good   Normal blood sugar, liver and kidney function    overall looks good!   - Dr Carlita Kothari     Verified Results  (1) COMPREHENSIVE METABOLIC PANEL 21EKK3695 73:95DU Jatin Rhodes Marker Order Number: [id number]     Test Name Result Flag Reference   SODIUM 138 mmol/L  136-145   POTASSIUM 3 6 mmol/L  3 5-5 3   CHLORIDE 103 mmol/L  100-108   CARBON DIOXIDE 28 mmol/L  21-32   ANION GAP (CALC) 7 mmol/L  4-13   BLOOD UREA NITROGEN 8 mg/dL  5-25   CREATININE 0 91 mg/dL  0 60-1 30   Standardized to IDMS reference method   CALCIUM 9 4 mg/dL  8 3-10 1   BILI, TOTAL 0 30 mg/dL  0 20-1 00   ALK PHOSPHATAS 59 U/L     ALT (SGPT) 25 U/L  12-78   Specimen collection should occur prior to Sulfasalazine and/or Sulfapyridine administration due to the potential for falsely depressed results  AST(SGOT) 15 U/L  5-45   Specimen collection should occur prior to Sulfasalazine administration due to the potential for falsely depressed results  ALBUMIN 3 9 g/dL  3 5-5 0   TOTAL PROTEIN 7 7 g/dL  6 4-8 2   eGFR 96 ml/min/1 73sq m     [facility] Disease Education Program recommendations are as follows:  GFR calculation is accurate only with a steady state creatinine  Chronic Kidney disease less than 60 ml/min/1 73 sq  meters  Kidney failure less than 15 ml/min/1 73 sq  meters  GLUCOSE FASTING 96 mg/dL  65-99   Specimen collection should occur prior to Sulfasalazine administration due to the potential for falsely depressed results  Specimen collection should occur prior to Sulfapyridine administration due to the potential for falsely elevated results  (1) LIPID PANEL, FASTING 10Aug2017 09:52AM Jatin Rhodes Marker Order Number: [id number]     Test Name Result Flag Reference   CHOLESTEROL 186 mg/dL     HDL,DIRECT 64 mg/dL H 40-60   Specimen collection should occur prior to Metamizole administration due to the potential for falsley depressed results     LDL CHOLESTEROL CALCULATED 93 mg/dL  0-100   Triglyceride:        Normal ??? ??? ??? ??? ??? ??? ??? <150 mg/dl   ??? ??? ???Borderline High ??? ??? 150-199 mg/dl   ??? ??? ? ?? High ??? ??? ??? ??? ??? ??? ??? 200-499 mg/dl   ??? ??? ? ??Very High ??? ??? ??? ??? ??? >499 mg/dl      Cholesterol:       Desirable ??? ??? ??? ??? <200 mg/dl   ??? ??? Borderline High ??? 200-239 mg/dl   ??? ??? High ??? ??? ??? ??? ??? ??? >239 mg/dl      HDL Cholesterol:       High ??? ???>59 mg/dL   ??? ??? Low ??? ??? <41 mg/dL      This screening LDL is a calculated result  It does not have the accuracy of the Direct Measured LDL in the monitoring of patients with hyperlipidemia and/or statin therapy  Direct Measure LDL (EXI038) must be ordered separately in these patients  TRIGLYCERIDES 146 mg/dL  <=150   Specimen collection should occur prior to N-Acetylcysteine or Metamizole administration due to the potential for falsely depressed results  (1) HEMOGLOBIN A1C 10Aug2017 09:52AM Jigna Rhodes Order Number: [id number]     Test Name Result Flag Reference   HEMOGLOBIN A1C 5 7 %  4 2-6 3   EST  AVG   GLUCOSE 117 mg/dl         Signatures   Electronically signed by : Ari Ramos MD; Aug 10 2017 12:01PM EST                       (Author)

## 2018-01-13 NOTE — RESULT NOTES
Verified Results  STRESS TEST ONLY, EXERCISE [id number] 02:00PM Shirley Ferrera Order Number: [id number]     Test Name Result Flag Reference   STRESS TEST ONLY, EXERCISE (Report)     [address] [facility]   [phone]     Stress Electrocardiography during Exercise     Name: Emely Leung   MR #: [id number]   Account #: [de-identified]   Study date: 2016   : 1985   Age: 31 years   Gender: Female   Height: 63 in   Weight: 184 lb   BSA: 1 87 m squared     Allergies: ALLERGIES NOT ON FILE     RN: Isauro Shaw RN   Primary Physician: Chris Frost MD   Referring Physician: Chris Frost MD   Group: [facility]   Report Prepared By[de-identified] Isauro Shaw RN   Technician: Anthony Pettit   Interpreting Physician: Gisselle Byrd MD     CLINICAL QUESTION: Detection of coronary artery disease  HISTORY: The patient is a 31year old  female  Chest pain   status: chest pain  Other symptoms: palpitations  Coronary artery disease risk   factors: family history of premature coronary artery disease  Cardiovascular   history: none significant  PHYSICAL EXAM: Baseline physical exam screening: normal      REST ECG: Normal sinus rhythm  Nonspecific ST and T wave abnormalities were   present  PROCEDURE: Treadmill exercise testing was performed, using the Todd protocol  Stress electrocardiographic evaluation was performed  TODD PROTOCOL:   HR bpm SBP mmHg DBP mmHg Symptoms   Baseline 78 138 80 none   Stage 1 123 152 84 --   Stage 2 151 164 82 --   Stage 3 166 182 82 --   Stage 4 176 186 80 moderate fatigue, leg pain   Recovery 1 139 162 84 none   Recovery 2 108 132 80 none   The patient's O2 sat was 98% pre stress test and 99% with peak exercise  No   medications or fluids given  STRESS SUMMARY: Duration of exercise was 9 min and 52 sec  The patient   exercised to protocol stage 4   Maximal work rate was 11  5 METs  Functional   capacity was normal  Maximal heart rate during stress was 176 bpm ( 93 % of   maximal predicted heart rate)  The heart rate response to stress was normal    There was normal resting blood pressure with an appropriate response to stress  The rate-pressure product for the peak heart rate and blood pressure was 30703  There was no chest pain during stress  The stress test was terminated due to   leg pain and moderate fatigue  The stress ECG was negative for ischemia  There   were no stress arrhythmias or conduction abnormalities  Based on [facility] Treadmill   Scoring, this patient was at low risk for cardiac events  SUMMARY:   - Stress results: Duration of exercise was 9 min and 52 sec  Target heart rate   was achieved  There was no chest pain during stress  - ECG conclusions: The   stress ECG was negative for ischemia  Based on [facility] Treadmill Scoring, this   patient was at low risk for cardiac events  IMPRESSIONS: Normal study after maximal exercise       Prepared and signed by     Gisela George MD   Signed 02/26/2016 15:06:41       Discussion/Summary   normal stress test     - Dr Ofe Rivreo   Electronically signed by : Shilpi Cassidy MD; Feb 26 2016  3:18PM EST                       (Author)

## 2018-01-14 VITALS
HEART RATE: 72 BPM | RESPIRATION RATE: 16 BRPM | DIASTOLIC BLOOD PRESSURE: 74 MMHG | BODY MASS INDEX: 31.91 KG/M2 | SYSTOLIC BLOOD PRESSURE: 112 MMHG | WEIGHT: 180.13 LBS

## 2018-01-14 VITALS
BODY MASS INDEX: 31.92 KG/M2 | HEIGHT: 63 IN | SYSTOLIC BLOOD PRESSURE: 126 MMHG | DIASTOLIC BLOOD PRESSURE: 76 MMHG | WEIGHT: 180.13 LBS

## 2018-01-14 VITALS
HEART RATE: 68 BPM | SYSTOLIC BLOOD PRESSURE: 132 MMHG | DIASTOLIC BLOOD PRESSURE: 78 MMHG | RESPIRATION RATE: 16 BRPM | WEIGHT: 178.5 LBS | BODY MASS INDEX: 31.62 KG/M2

## 2018-01-14 VITALS
WEIGHT: 191.13 LBS | SYSTOLIC BLOOD PRESSURE: 122 MMHG | TEMPERATURE: 96.8 F | HEART RATE: 96 BPM | DIASTOLIC BLOOD PRESSURE: 86 MMHG | BODY MASS INDEX: 33.86 KG/M2 | RESPIRATION RATE: 18 BRPM

## 2018-01-14 VITALS
HEIGHT: 63 IN | SYSTOLIC BLOOD PRESSURE: 118 MMHG | BODY MASS INDEX: 32.87 KG/M2 | DIASTOLIC BLOOD PRESSURE: 60 MMHG | WEIGHT: 185.5 LBS

## 2018-01-14 VITALS
HEIGHT: 63 IN | SYSTOLIC BLOOD PRESSURE: 126 MMHG | DIASTOLIC BLOOD PRESSURE: 86 MMHG | BODY MASS INDEX: 31.94 KG/M2 | HEART RATE: 81 BPM | WEIGHT: 180.25 LBS

## 2018-01-15 NOTE — RESULT NOTES
Verified Results  (1) CBC/PLT/DIFF [id number] 12:41PM [facility] Order Number: [id number]     Test Name Result Flag Reference   WBC COUNT 8 00 Thousand/uL  4 31-10 16   RBC COUNT 5 29 Million/uL H 3 81-5 12   HEMOGLOBIN 13 8 g/dL  11 5-15 4   HEMATOCRIT 41 6 %  34 8-46  1   MCV 79 fL L 82-98   MCH 26 1 pg L 26 8-34 3   MCHC 33 2 g/dL  31 4-37 4   RDW 14 2 %  11 6-15 1   MPV 11 0 fL  8 9-12 7   PLATELET COUNT 352 Thousands/uL  149-390   nRBC AUTOMATED 0 /100 WBCs     NEUTROPHILS RELATIVE PERCENT 66 %  43-75   LYMPHOCYTES RELATIVE PERCENT 27 %  14-44   MONOCYTES RELATIVE PERCENT 5 %  4-12   EOSINOPHILS RELATIVE PERCENT 1 %  0-6   BASOPHILS RELATIVE PERCENT 1 %  0-1   NEUTROPHILS ABSOLUTE COUNT 5 28 Thousands/?L  1 85-7 62   LYMPHOCYTES ABSOLUTE COUNT 2 15 Thousands/?L  0 60-4 47   MONOCYTES ABSOLUTE COUNT 0 38 Thousand/?L  0 17-1 22   EOSINOPHILS ABSOLUTE COUNT 0 11 Thousand/?L  0 00-0 61   BASOPHILS ABSOLUTE COUNT 0 05 Thousands/?L  0 00-0 10   - Patient Instructions: This bloodwork is non-fasting  Please drink two glasses of water morning of bloodwork  - Patient Instructions: This bloodwork is non-fasting  Please drink two glasses of water morning of bloodwork  (1) LIPID PANEL, FASTING [id number] 12:41PM [facility] Order Number: [id number]     Test Name Result Flag Reference   CHOLESTEROL 209 mg/dL H    HDL,DIRECT 77 mg/dL H 40-60   Specimen collection should occur prior to Metamizole administration due to the potential for falsely depressed results  LDL CHOLESTEROL CALCULATED 111 mg/dL H 0-100   - Patient Instructions: This is a fasting blood test  Water,black tea or black  coffee only after 9:00pm the night before test   Drink 2 glasses of water the morning of test     - Patient Instructions:  This is a fasting blood test  Water,black tea or black  coffee only after 9:00pm the night before test Drink 2 glasses of water the morning of test   Triglyceride: Normal              <150 mg/dl       Borderline High    150-199 mg/dl       High               200-499 mg/dl       Very High          >499 mg/dl  Cholesterol:         Desirable        <200 mg/dl      Borderline High  200-239 mg/dl      High             >239 mg/dl  HDL Cholesterol:        High    >59 mg/dL      Low     <41 mg/dL  LDL CALCULATED:    This screening LDL is a calculated result  It does not have the accuracy of the Direct Measured LDL in the monitoring of patients with hyperlipidemia and/or statin therapy  Direct Measure LDL (AVP816) must be ordered separately in these patients  TRIGLYCERIDES 107 mg/dL  <=150   Specimen collection should occur prior to N-Acetylcysteine or Metamizole administration due to the potential for falsely depressed results  (1) HEMOGLOBIN A1C [id number] 12:41PM Jovany Rueda Order Number: [id number]     Test Name Result Flag Reference   HEMOGLOBIN A1C 5 6 %  4 2-6 3   EST  AVG  GLUCOSE 114 mg/dl       (1) COMPREHENSIVE METABOLIC PANEL [id number] 98:52AT Jovany Rueda Order Number: [id number]     Test Name Result Flag Reference   GLUCOSE,RANDM 98 mg/dL     If the patient is fasting, the ADA then defines impaired fasting glucose as > 100 mg/dL and diabetes as > or equal to 123 mg/dL  SODIUM 138 mmol/L  136-145   POTASSIUM 4 3 mmol/L  3 5-5 3   CHLORIDE 104 mmol/L  100-108   CARBON DIOXIDE 28 mmol/L  21-32   ANION GAP (CALC) 6 mmol/L  4-13   BLOOD UREA NITROGEN 13 mg/dL  5-25   CREATININE 1 03 mg/dL  0 60-1 30   Standardized to IDMS reference method   CALCIUM 9 4 mg/dL  8 3-10 1   BILI, TOTAL 0 29 mg/dL  0 20-1 00   ALK PHOSPHATAS 67 U/L     ALT (SGPT) 35 U/L  12-78   AST(SGOT) 15 U/L  5-45   ALBUMIN 4 1 g/dL  3 5-5 0   TOTAL PROTEIN 8 1 g/dL  6 4-8 2   eGFR Non-African American      >60 0 ml/min/1 73sq m   - Patient Instructions:  This is a fasting blood test  Water,black tea or black  coffee only after 9:00pm the night before test Drink 2 glasses of water the morning of test   National Kidney Disease Education Program recommendations are as follows:  GFR calculation is accurate only with a steady state creatinine  Chronic Kidney disease less than 60 ml/min/1 73 sq  meters  Kidney failure less than 15 ml/min/1 73 sq  meters       (1) URINALYSIS WITH MICROSCOPIC [id number] 12:41PM Zo Reed Order Number: [id number]     Test Name Result Flag Reference   COLOR Yellow     CLARITY Cloudy     PH UA 5 0  4 5-8 0   LEUKOCYTE ESTERASE UA Small A Negative   NITRITE UA Negative  Negative   PROTEIN UA Negative mg/dl  Negative   GLUCOSE UA Negative mg/dl  Negative   KETONES UA Negative mg/dl  Negative   UROBILINOGEN UA 0 2 E U /dl  0 2, 1 0 E U /dl   BILIRUBIN UA Negative  Negative   BLOOD UA Negative  Negative   SPECIFIC GRAVITY UA 1 018  1 003-1 030   WBC UA 4-10 /hpf A None Seen   RBC UA None Seen /hpf  None Seen   BACTERIA Occasional /hpf  None Seen, Occasional   EPITHELIAL CELLS None Seen /hpf  None Seen, Occasional

## 2018-01-15 NOTE — RESULT NOTES
Verified Results  XR SPINE LUMBAR COMPLETE Maria Eugenia Melena MINIMUM 6 VIEWS [id number] 05:43PM Henry Rhodes Order Number: [id number]     Test Name Result Flag Reference   XR SPINE LUMBAR COMPLETE W BENDING MINIMUM 6 VIEWS (Report)     LUMBAR SPINE     INDICATION: Back pain  COMPARISON: None     VIEWS: AP, bilateral oblique, coned down and lateral projections in neutral, flexion and extension; 7 images     FINDINGS: Small anterior marginal osteophytes are noted in the lower thoracic spine  Minimal thoracolumbar levoscoliosis  There is no subluxation and alignment is stable in flexion, extension, and neutral positioning  There is no radiographic evidence of acute fracture or destructive osseous lesion  No significant lumbar degenerative change noted  Surgical clips are noted in the right upper quadrant of the abdomen  IMPRESSION:     Mild degenerative changes, lower thoracic spine  Workstation performed: [id number]     Signed by:   Syed Carson MD   3/22/16       Discussion/Summary   mild scoliosis mid back along with minimal arthritis  may need Physical therapy   Orthopedic referral if not better    - Dr Emmy Ibarra   Electronically signed by : Rosa Maria Varner MD; Mar 22 2016 11:15AM EST                       (Author)

## 2018-01-15 NOTE — RESULT NOTES
Verified Results  ECHO COMPLETE WITH CONTRAST IF INDICATED [id number] 11:08AM [employer] [employer]     Test Name Result Flag Reference   ECHO COMPLETE WITH CONTRAST IF INDICATED (Report)     [facility] [address]   [address]   [phone]     Transthoracic Echocardiogram   2D, M-mode, Doppler, and Color Doppler     Study date: 06-Sep-2016     Patient: Marquis Sandhu   MR number: [id number]   Account number: [de-identified]   : 1985   Age: 27 years   Gender: Female   Status: Outpatient   Location: Echo lab   Height: 63 in   Weight: 191 lb   BP: 134/ 82 mmHg     Indications: Chest pain, evaluate LV function, mild MR     Diagnoses: R07 9 - Chest pain, unspecified     Sonographer: Stew Foreman   Primary Physician: Kenneth Lewis MD   Referring Physician: Lluvia Thomsa DO   Group: [facility]   Interpreting Physician: Margoth Mendez MD     SUMMARY     LEFT VENTRICLE:   Systolic function was normal  Ejection fraction was estimated to be 60 %  There were no regional wall motion abnormalities  RIGHT VENTRICLE:   The size was normal    Systolic function was normal      HISTORY: PRIOR HISTORY: Asthma     PROCEDURE: The procedure was performed in the echo lab  This was a routine   study  The transthoracic approach was used  The study included complete 2D   imaging, M-mode, complete spectral Doppler, and color Doppler  Image quality   was adequate  LEFT VENTRICLE: Size was normal  Systolic function was normal  Ejection   fraction was estimated to be 60 %  There were no regional wall motion   abnormalities   Wall thickness was normal  DOPPLER: The transmitral flow pattern   was normal  Left ventricular diastolic function parameters were normal      RIGHT VENTRICLE: The size was normal  Systolic function was normal  Wall   thickness was normal      LEFT ATRIUM: Size was normal      RIGHT ATRIUM: Size was normal      MITRAL VALVE: Valve structure was normal  There was normal leaflet separation  DOPPLER: The transmitral velocity was within the normal range  There was no   evidence for stenosis  There was trace regurgitation  AORTIC VALVE: The valve was trileaflet  Leaflets exhibited normal thickness and   normal cuspal separation  DOPPLER: Transaortic velocity was within the normal   range  There was no evidence for stenosis  There was no significant   regurgitation  TRICUSPID VALVE: The valve structure was normal  There was normal leaflet   separation  DOPPLER: The transtricuspid velocity was within the normal range  There was no evidence for stenosis  There was trace regurgitation  PULMONIC VALVE: Leaflets exhibited normal thickness, no calcification, and   normal cuspal separation  DOPPLER: The transpulmonic velocity was within the   normal range  There was trace regurgitation  PERICARDIUM: There was no pericardial effusion  The pericardium was normal in   appearance  AORTA: The root exhibited normal size  SYSTEMIC VEINS: IVC: The inferior vena cava was normal in size   Respirophasic   changes were normal      SYSTEM MEASUREMENT TABLES     2D   %FS: 25 68 %   Ao Diam: 2 94 cm   EDV(Teich): 101 73 ml   EF(Teich): 50 55 %   ESV(Teich): 50 3 ml   IVSd: 0 8 cm   LA Area: 12 95 cm2   LA Diam: 3 01 cm   LVEDV MOD A4C: 113 4 ml   LVEF MOD A4C: 54 55 %   LVESV MOD A4C: 51 54 ml   LVIDd: 4 69 cm   LVIDs: 3 48 cm   LVLd A4C: 7 59 cm   LVLs A4C: 6 39 cm   LVPWd: 0 89 cm   RA Area: 9 93 cm2   RVIDd: 2 78 cm   SV MOD A4C: 61 86 ml   SV(Teich): 51 43 ml     MM   TAPSE: 1 56 cm     PW   AVC: 374 2 ms   E': 0 07 m/s   E/E': 7 8   MV A Chaim: 0 6 m/s   MV Dec [place]: 3 65 m/s2   MV DecT: 140 38 ms   MV E Chaim: 0 51 m/s   MV E/A Ratio: 0 85   MV PHT: 40 71 ms   MVA By PHT: 5 4 cm2     In[employer]oci[employer] Commission Accredited Echocardiography Laboratory     Prepared and electronically signed by     Martha Dalal MD   Signed 06-Sep-2016 13:50:27

## 2018-01-16 NOTE — RESULT NOTES
Verified Results  (1) HEMOGLOBIN A1C [id number] 12:01PM Imani Rhodes   5 7-6 4% impaired fasting glucose  >=6 5% diagnosis of diabetes    Falsely low levels are seen in conditions linked to short RBC life span-  hemolytic anemia, and splenomegaly  Falsely elevated levels are seen in situations where there is an increased production of RBC- receipt of erythropoietin or blood transfusions  Adopted from ADA-Clinical Practice Recommendations     Test Name Result Flag Reference   HEMOGLOBIN A1C 5 8 % H 4 0-5 6   EST  AVG  GLUCOSE 120 mg/dl       (1) COMPREHENSIVE METABOLIC PANEL [id number] 93:57PJ Imani Rhodes   National Kidney Disease Education Program recommendations are as follows:  GFR calculation is accurate only with a steady state creatinine  Chronic Kidney disease less than 60 ml/min/1 73 sq  meters  Kidney failure less than 15 ml/min/1 73 sq  meters  Test Name Result Flag Reference   GLUCOSE,RANDM 98 mg/dL     If the patient is fasting, the ADA then defines impaired fasting glucose as > 100 mg/dL and diabetes as > or equal to 123 mg/dL     SODIUM 139 mmol/L  136-145   POTASSIUM 4 5 mmol/L  3 5-5 3   CHLORIDE 108 mmol/L  100-108   CARBON DIOXIDE 23 mmol/L  21-32   ANION GAP (CALC) 8 mmol/L  4-13   BLOOD UREA NITROGEN 10 mg/dL  5-25   CREATININE 0 91 mg/dL  0 60-1 30   Standardized to IDMS reference method   CALCIUM 9 2 mg/dL  8 3-10 1   BILI, TOTAL 0 34 mg/dL  0 20-1 00   ALK PHOSPHATAS 51 U/L     ALT (SGPT) 34 U/L  12-78   AST(SGOT) 19 U/L  5-45   ALBUMIN 4 0 g/dL  3 5-5 0   TOTAL PROTEIN 7 5 g/dL  6 4-8 2   eGFR Non-African American      >60 0 ml/min/1 73sq m     (1) LIPID PANEL, FASTING [id number] 12:01PM Imani Rhodes   Triglyceride:         Normal              <150 mg/dl       Borderline High    150-199 mg/dl       High               200-499 mg/dl       Very High          >499 mg/dl  Cholesterol:         Desirable        <200 mg/dl      Borderline High  200-239 mg/dl      High >239 mg/dl  HDL Cholesterol:        High    >59 mg/dL      Low     <41 mg/dL  LDL CALCULATED:    This screening LDL is a calculated result  It does not have the accuracy of the Direct Measured LDL in the monitoring of patients with hyperlipidemia and/or statin therapy  Direct Measure LDL (PQH476) must be ordered separately in these patients  Test Name Result Flag Reference   CHOLESTEROL 209 mg/dL H    HDL,DIRECT 68 mg/dL H 40-60   LDL CHOLESTEROL CALCULATED 118 mg/dL H 0-100   TRIGLYCERIDES 115 mg/dL  <=150     (1) TSH WITH FT4 REFLEX [id number] 12:01PM Imani Rhodes   Patients undergoing fluorescein dye angiography may retain small amounts of fluorescein in the body for 48-72 hours post procedure  Samples containing fluorescein can produce falsely depressed TSH values  If the patient had this procedure,a specimen should be resubmitted post fluorescein clearance          The recommended reference ranges for TSH during pregnancy are as follows:  First trimester 0 1 to 2 5 uIU/mL  Second trimester  0 2 to 3 0 uIU/mL  Third trimester 0 3 to 3 0 uIU/m     Test Name Result Flag Reference   TSH 1 940 uIU/mL  0 358-3 740       Discussion/Summary      diabetes test showed you are in Pre-diabetes range   normal kidney and liver function   slightly high bad cholesterol   watch sweets, fatty food and regular exercises   - Dr Andrew Terry   Electronically signed by : Masoud Woodall MD; Feb 11 2016  3:53PM EST                       (Author)

## 2018-01-16 NOTE — PROGRESS NOTES
Assessment    1  Vaginal discharge (623 5) (N89 8)   2  Vaginal irritation (623 9) (N89 8)   3  Vaginal odor (625 8) (N89 8)    Plan  Vaginal discharge, Vaginal irritation, Vaginal odor    · (1) VAGINITIS/ VAGINOSIS, DNA ( AFFIRM); Status: In Progress - Specimen/Data  Collected,Retrospective By Protocol Authorization;   Done: [id number]   Perform:[facility] In Office Collection; Due:35Qum8219; Last Updated By:Haley Cardenas; 8/22/2017 2:53:53 PM;Ordered; For:Vaginal discharge, Vaginal irritation, Vaginal odor; Ordered By:Sharee Ray;  Vaginal irritation    · Fluconazole 150 MG Oral Tablet; TAKE 1 TABLET NOW, AND REPEAT IN 4 DAYS   Rx By: Peyman Juarez; Dispense: 2 Days ; #:2 Tablet; Refill: 0; For: Vaginal irritation; IVAN = N; Verified Transmission to [address]; Last Updated By: [employer]; 8/22/2017 3:02:32 PM  Vaginal odor    · MetroNIDAZOLE 500 MG Oral Tablet; TAKE 1 TABLET TWICE DAILY UNTIL  FINISHED   Rx By: Peyman Juarez; Dispense: 7 Days ; #:14 Tablet; Refill: 0; For: Vaginal odor; IVAN = N; Verified Transmission to [address]; Last Updated By: System, [employer]; 8/22/2017 3:02:32 PM    Discussion/Summary  Discussion Summary:   Vaginal discharge noted on exam, malodorous  Vaginal culture obtained  RX send for oral metronidazole  Also requests fluconazole to follow to prevent yeast infection, which was sent as well  GYN Discussion and Summary:   Vaginitis--  Goals and Barriers: The patient has the current Goals: Resolution of vulvovaginal symptoms  The patent has the current Barriers: None  Patient's Capacity to Self-Care: Patient is able to Self-Care  Medication SE Review and Pt Understands Tx: Possible side effects of new medications were reviewed with the patient/guardian today  The treatment plan was reviewed with the patient/guardian   The patient/guardian understands and agrees with the treatment plan   Self Referrals: Self Referrals: No      Chief Complaint  Chief Complaint Free Text Note Form: Acute Visit  Pt c/o vaginal irritation with discharge and odor      History of Present Illness  HPI: 33 yo patient presents for problem visit  CC: vulvovaginal symptoms    c/o vaginal irritation with discharge and odor-- started on Thursday (5 days ago), after sex noticed foul odor, pelvic discomfort and pressure  burny/irritated, no itching  no abnormal vaginal bleeding  no urinary frequency, urgency or dysuria, just burns when urine hits outside  No recent antibiotic treatment  Last seen for same on 6/20/17 by NC  Vaginal culture negative at this time  Used topical monistat for symptoms  Review of Systems   Female ROS: pelvic pressure, vaginal discharge and vaginal odor, but no vaginal itching, no nonmenstrual bleeding, no vulvar itching, no dysuria, no hematuria, no change in urinary frequency and no feelings of urinary urgency    The patient presents with complaints of pelvic pain (mild, achey)  Focused-Female:   Constitutional: no fever and no chills  Genitourinary: pelvic pain and vaginal discharge, but no dysuria, no incontinence and no unexplained vaginal bleeding  ROS Reviewed:   ROS reviewed  Active Problems    1  Asthma in adult, mild intermittent, uncomplicated (965 07) (L32 87)   2  Benign essential hypertension (401 1) (I10)   3  Encounter for postoperative care (V58 49) (Z48 89)   4  Encounter for routine gynecological examination (V72 31) (Z01 419)   5  Examination following surgery (V67 00) (Z09)   6  Female infertility (628 9) (N97 9)   7  GERD (gastroesophageal reflux disease) (530 81) (K21 9)   8  Impaired fasting glucose (790 21) (R73 01)   9  Mild mitral regurgitation (424 0) (I34 0)   10  Need for DTP vaccine (V06 1) (Z23)   11  Obesity (BMI 30-39 9) (278 00) (E66 9)   12  Pap smear, as part of routine gynecological examination (V76 2) (Z01 419)   13  Pelvic pain in female (625 9) (R10 2)   14  PVC (premature ventricular contraction) (427 69) (I49 3)   15  Screening for colon cancer (V76 51) (Z12 11)   16  Screening for diabetes mellitus (DM) (V77 1) (Z13 1)   17  Screening for lipoid disorders (V77 91) (Z13 220)   18  Trigger finger, right (727 03) (M65 30)   19  Trigger thumb, acquired (727 03) (M65 30)   20  Vaginal burning (625 8) (N94 9)   21  Vaginal itching (698 1) (L29 8)   22  Vaginal odor (625 8) (N89 8)    Past Medical History    1  History of Mild Intermittent Asthma (493 90)   2  History of Palpitations (785 1) (R00 2)  Active Problems And Past Medical History Reviewed: The active problems and past medical history were reviewed and updated today  Surgical History    1  History of Cholecystectomy Laparoscopic   2  History of Hernia Repair  Surgical History Reviewed: The surgical history was reviewed and updated today  Family History  Mother    1  Family history of Hypertension (V17 49)   2  Family history of Type 2 Diabetes Mellitus  Father    3  Family history of Hypertension (V17 49)   4  Family history of Type 2 Diabetes Mellitus  Grandmother    5  Family history of arthritis (V17 7) (Z82 61)   6  Family history of cerebrovascular accident (CVA) (V17 1) (Z82 3)   7  Family history of malignant neoplasm (V16 9) (Z80 9)   8  Family history of High cholesterol  Maternal Grandfather    9  Family history of Acute Myocardial Infarction (V17 3)   10  Family history of arthritis (V17 7) (Z82 61)   11  Family history of cerebrovascular accident (CVA) (V17 1) (Z82 3)   15  Family history of malignant neoplasm (V16 9) (Z80 9)   13  Family history of High cholesterol  Paternal Grandfather    15  Family history of arthritis (V17 7) (Z82 61)   15  Family history of cerebrovascular accident (CVA) (V17 1) (Z82 3)   12  Family history of malignant neoplasm (V16 9) (Z80 9)   17  Family history of High cholesterol  Maternal Uncle    25   Family history of Acute Myocardial Infarction (V17 3)  Family History    19  Family history of cerebrovascular accident (CVA) (V17 1) (Z82 3)   21  Family history of High cholesterol  Family History Reviewed: The family history was reviewed and updated today  Social History    · Alcohol Use (History)   · Exercises, 3x week   ·    · Never A Smoker   · Pets/Animals: Dog  Social History Reviewed: The social history was reviewed and updated today  Current Meds   1  AmLODIPine Besylate 5 MG Oral Tablet; TAKE 1 TABLET DAILY AS DIRECTED; Therapy: 51Siz7703 to (Evaluate:14Jan2018)  Requested for: 67LYC8847; Last   Rx:39Vqa2516 Ordered   2  Meloxicam 15 MG Oral Tablet; TAKE 1 TABLET DAILY AS NEEDED; Therapy: 54BKZ1219 to (Evaluate:12Jan2018)  Requested for: 70WZD9989; Last   Rx:17Jan2017 Ordered   3  Montelukast Sodium 10 MG Oral Tablet; take 1 tablet by mouth daily as directed; Therapy: 55QVI0939 to (Evaluate:01Msk3336)  Requested for: 39TAK2871; Last   Rx:85Dpk7979 Ordered   4  Multi For Her Oral Tablet; TAKE 1 TABLET DAILY; Therapy: 19OAH9042 to Recorded   5  NovoFine 32G X 6 MM Miscellaneous; USE AS DIRECTED; Therapy: 92JPX5930 to (Last Rx:23Npw2075)  Requested for: 02Grw6936 Ordered   6  ProAir  (90 Base) MCG/ACT Inhalation Aerosol Solution; INHALE 2 PUFFS   EVERY 4-6 HOURS AS NEEDED; Therapy: 63Qck1803 to (Evaluate:53Fiz4746)  Requested for: 76DTL6835; Last   Rx:99Otg1672 Ordered   7  Saxenda 18 MG/3ML Subcutaneous Solution Pen-injector; 0 6mg Sub Q once a day for 1   week then increase dose by 0 6mg/day q week  Max 3 mg/day; Therapy: 42KOO4514 to (Evaluate:17Aug2017)  Requested for: 66PQR2247; Last   Rx:90Xul2939 Ordered  Medication List Reviewed: The medication list was reviewed and updated today  Allergies    1  No Known Drug Allergies    2   Seasonal    Vitals  Vital Signs    Recorded: 14JUB1600 79:63JS   Systolic 507   Diastolic 72   Height 5 ft 3 in   Weight 178 lb 8 oz   BMI Calculated 31 62   BSA Calculated 1 84   LMP 69Dxp8091     Physical Exam    Constitutional   General appearance: No acute distress, well appearing and well nourished  Genitourinary   External genitalia: Normal and no lesions appreciated  Vagina: Abnormal   + off white malodorous discharge  Urethra: Normal     Urethral meatus: Normal     Bladder: Normal, soft, non-tender and no prolapse or masses appreciated  Cervix: Normal, no palpable masses  Uterus: Normal, non-tender, not enlarged, and no palpable masses  Adnexa/parametria: Abnormal   Nonpalpable bilaterally, mild tenderness on left        Future Appointments    Date/Time Provider Specialty Site   09/18/2017 04:45 PM Marily Rhodes MD [facility]  Electronically signed by : Jose Alejandro Barr NP; Aug 22 2017  3:06PM EST                       (Author)

## 2018-01-17 NOTE — MISCELLANEOUS
Message   Recorded as Task   Date: 09/21/2016 02:30 PM, Created By: Erica Tijerina   Task Name: Miscellaneous   Assigned To: Nelly Douglass   Regarding Patient: Charles Hugo, Status: Active   Comment:    Nelly Douglass - 21 Sep 2016 2:30 PM     TASK CREATED  Pt called to confirm her post-op office visits and asked some general pot-op questions re: the return of some nerve pain in her R leg  It was discussed that she did have a steroid placed at the time of surgery and this may be wearing off  It takes time post-op for the affected nerves to regenerate and she may continue to feel some discomfort  She stated an understanding          Signatures   Electronically signed by : Andie Chino, ; Sep 21 2016  2:30PM EST                       (Author)

## 2018-01-17 NOTE — RESULT NOTES
Discussion/Summary   ultrasound showed uterine fibroids  normal ovaries  I want her f/u with her OBGYN   - Dr Renan Rivera ([address] [place] [address]) [id number] 05:07PM Jona Leonard Order Number: [id number]    - Patient Instructions: To schedule this appointment, please contact Central Scheduling at [phone]  Test Name Result Flag Reference   US PELVIS COMPLETE (TRANSABDOMINAL AND TRANSVAGINAL) (Report)     PELVIC ULTRASOUND, COMPLETE     INDICATION: Pelvic pain  LMP = October 27, 2017        COMPARISON: December 18, 2009     TECHNIQUE:  Transabdominal pelvic ultrasound was performed in sagittal and transverse planes with a curvilinear transducer  Additional transvaginal imaging was performed to better evaluate the endometrium and ovaries  Imaging included volumetric    sweeps as well as traditional still imaging technique  FINDINGS:     UTERUS:   The uterus is anteverted in position, measuring 7 0 x 4 8 x 5 8 cm  Volume = 104 mL  Myometrial echotexture is heterogeneous  Posterior fundal subserosal fibroid measuring 1 4 cm  Anterior intramural/partially submucosal fibroid measuring 1 9 cm  The cervix shows no suspicious abnormality  ENDOMETRIUM:    Endometrium is thickened and heterogeneous measuring 15 mm  OVARIES/ADNEXA:   Right ovary: 2 9 x 2 5 x 2 2 cm  No suspicious right ovarian abnormality  Doppler flow within normal limits  Left ovary: 2 7 x 1 9 x 2 3 cm  No suspicious left ovarian abnormality  Doppler flow within normal limits  Trace fluid in the left adnexa and cul-de-sac  IMPRESSION:   Top normal sized uterus  Uterine fibroids as described  Normal thickness endometrium for patient's age  Normal ovaries  Workstation performed: [id number]     Signed by:    Jh Wilson DO   11/17/17       Signatures   Electronically signed by : Mariza Myers MD; Nov 20 2017  8:10AM EST (Author)

## 2018-01-17 NOTE — MISCELLANEOUS
Provider Comments  Provider Comments:   Dear Galdino Mclaughlin,    We called you about your scheduled appointment for today but were unable to reach you  It is very important that you follow up with us so that we can assess your physical and nutritional safety  Please call our office at [phone] to reschedule your appointment       Sincerely,     Nathen Downey [facility]            Signatures   Electronically signed by : Rosa Elena Barahona, ; Sep 27 2016  9:46AM EST                       (Author)

## 2018-01-17 NOTE — MISCELLANEOUS
Message  Spoke with patient regarding vaginal culture results - all negative  Can finish OTC Monistat 7 day        Signatures   Electronically signed by : TATIANA Rizo; Jun 23 2017  4:29PM EST                       (Author)

## 2018-01-18 ENCOUNTER — TRANSCRIBE ORDERS (OUTPATIENT)
Dept: ADMINISTRATIVE | Facility: HOSPITAL | Age: 33
End: 2018-01-18

## 2018-01-18 DIAGNOSIS — N97.9 PRIMARY FEMALE INFERTILITY: Primary | ICD-10-CM

## 2018-01-18 NOTE — RESULT NOTES
Verified Results  ECHO COMPLETE WITH CONTRAST IF INDICATED 2017 02:01PM [employer]     Test Name Result Flag Reference   ECHO COMPLETE WITH CONTRAST IF INDICATED (Report)     Sanju Syed   [address]   [phone]     Transthoracic Echocardiogram   Limited 2D, M-mode, Doppler, and Color Doppler     Study date: 15-Sep-2017     Patient: Raeann Hemphill   MR number: [id number]   Account number: [de-identified]   : 1985   Age: 34 years   Gender: Female   Status: Outpatient   Location: [address] [place] [address] [facility]   Height: 63 in   Weight: 178 lb   BP: 118/ 72 mmHg     Indications: Hypertension, PVCs     Diagnoses: I10  - Essential (primary) hypertension, I49 3 - Ventricular premature depolarization     Sonographer: JASON Bowles   Primary Physician: Hilario Leonard MD   Referring Physician: Reta Davis DO   Group: [facility]   Interpreting Physician: Caroline Shane MD     SUMMARY     LEFT VENTRICLE:   Systolic function was normal  Ejection fraction was estimated to be 60 %  There were no regional wall motion abnormalities  RIGHT VENTRICLE:   The size was normal    Systolic function was normal      PULMONIC VALVE:   There was trace regurgitation  HISTORY: PRIOR HISTORY: Hypertension, PVCs, asthma, GERD     PROCEDURE: The study was performed in the [address] [facility]  This was a routine study  The transthoracic approach was used  The study included limited 2D imaging, M-mode, complete spectral Doppler, and color Doppler  Image   quality was adequate  LEFT VENTRICLE: Size was normal  Systolic function was normal  Ejection fraction was estimated to be 60 %  There were no regional wall motion abnormalities   Wall thickness was normal  DOPPLER: Left ventricular diastolic function parameters   were normal      RIGHT VENTRICLE: The size was normal  Systolic function was normal  Wall thickness was normal      LEFT ATRIUM: Size was normal      RIGHT ATRIUM: Size was normal      MITRAL VALVE: Valve structure was normal  There was normal leaflet separation  DOPPLER: The transmitral velocity was within the normal range  There was no evidence for stenosis  There was no significant regurgitation  AORTIC VALVE: The valve was trileaflet  Leaflets exhibited normal thickness and normal cuspal separation  DOPPLER: Transaortic velocity was within the normal range  There was no evidence for stenosis  There was no significant   regurgitation  TRICUSPID VALVE: The valve structure was normal  There was normal leaflet separation  DOPPLER: The transtricuspid velocity was within the normal range  There was no evidence for stenosis  There was no significant regurgitation  PULMONIC VALVE: Leaflets exhibited normal thickness, no calcification, and normal cuspal separation  DOPPLER: The transpulmonic velocity was within the normal range  There was trace regurgitation  PERICARDIUM: There was no pericardial effusion  The pericardium was normal in appearance  AORTA: The root exhibited normal size  SYSTEMIC VEINS: IVC: The inferior vena cava was normal in size   Respirophasic changes were normal      MEASUREMENT TABLES     OTHER ECHO MEASUREMENTS   (Reference normals)   Estimated CVP  5 mmHg  (--)     SYSTEM MEASUREMENT TABLES     2D   %FS: 34 27 %   AV Diam: 3 02 cm   EDV(Teich): 86 04 ml   EF(Cube): 71 6 %   EF(Teich): 63 54 %   ESV(Cube): 23 61 ml   ESV(Teich): 31 37 ml   IVSd: 0 86 cm   LA Area: 11 98 cm2   LA Diam: 3 03 cm   LVEDV MOD A4C: 100 2 ml   LVEF MOD A4C: 62 06 %   LVESV MOD A4C: 38 02 ml   LVIDd: 4 36 cm   LVIDs: 2 87 cm   LVLd A4C: 8 03 cm   LVLs A4C: 5 85 cm   LVPWd: 0 83 cm   RA Area: 12 36 cm2   RV Diam : 2 98 cm   SV MOD A4C: 62 18 ml   SV(Cube): 59 53 ml   SV(Teich): 54 67 ml     MM   TAPSE: 1 75 cm     PW   E': 0 09 m/s   E/E': 5 55   MV A Chaim: 0 58 m/s   MV Dec [place]:  2 66 m/s2   MV DecT: 181 63 ms   MV E Chaim: 0 48 m/s   MV E/A Ratio: 0 84     In[employer]oci[employer] Commission Accredited Echocardiography Laboratory     Prepared and electronically signed by     Ritesh Spaulding MD   Signed 15-Sep-2017 14:58:19

## 2018-01-22 VITALS
SYSTOLIC BLOOD PRESSURE: 118 MMHG | DIASTOLIC BLOOD PRESSURE: 72 MMHG | WEIGHT: 178.5 LBS | HEIGHT: 63 IN | BODY MASS INDEX: 31.63 KG/M2

## 2018-01-23 VITALS
RESPIRATION RATE: 16 BRPM | HEIGHT: 63 IN | WEIGHT: 182.25 LBS | BODY MASS INDEX: 32.29 KG/M2 | SYSTOLIC BLOOD PRESSURE: 132 MMHG | DIASTOLIC BLOOD PRESSURE: 92 MMHG | HEART RATE: 72 BPM

## 2018-01-24 VITALS
HEIGHT: 63 IN | SYSTOLIC BLOOD PRESSURE: 112 MMHG | BODY MASS INDEX: 32.78 KG/M2 | WEIGHT: 185 LBS | DIASTOLIC BLOOD PRESSURE: 70 MMHG

## 2018-01-25 PROCEDURE — 88342 IMHCHEM/IMCYTCHM 1ST ANTB: CPT | Performed by: OBSTETRICS & GYNECOLOGY

## 2018-01-25 PROCEDURE — 88305 TISSUE EXAM BY PATHOLOGIST: CPT | Performed by: OBSTETRICS & GYNECOLOGY

## 2018-01-25 PROCEDURE — 88305 TISSUE EXAM BY PATHOLOGIST: CPT | Performed by: PATHOLOGY

## 2018-01-25 PROCEDURE — 88342 IMHCHEM/IMCYTCHM 1ST ANTB: CPT | Performed by: PATHOLOGY

## 2018-01-27 ENCOUNTER — LAB REQUISITION (OUTPATIENT)
Dept: LAB | Facility: HOSPITAL | Age: 33
End: 2018-01-27
Payer: COMMERCIAL

## 2018-01-27 DIAGNOSIS — N71.1 CHRONIC INFLAMMATORY DISEASE OF UTERUS: ICD-10-CM

## 2018-02-08 ENCOUNTER — HOSPITAL ENCOUNTER (OUTPATIENT)
Dept: RADIOLOGY | Facility: HOSPITAL | Age: 33
Discharge: HOME | End: 2018-02-08
Attending: OBSTETRICS & GYNECOLOGY | Admitting: RADIOLOGY
Payer: COMMERCIAL

## 2018-02-08 DIAGNOSIS — N97.9 PRIMARY FEMALE INFERTILITY: ICD-10-CM

## 2018-02-08 PROCEDURE — 58340 CATHETER FOR HYSTEROGRAPHY: CPT

## 2018-02-08 PROCEDURE — 74740 X-RAY FEMALE GENITAL TRACT: CPT

## 2018-02-08 RX ADMIN — IOHEXOL 50 ML: 240 INJECTION, SOLUTION INTRATHECAL; INTRAVASCULAR; INTRAVENOUS; ORAL at 12:10

## 2018-03-12 PROBLEM — I49.3 PVC (PREMATURE VENTRICULAR CONTRACTION): Status: ACTIVE | Noted: 2017-04-13

## 2018-03-12 PROBLEM — E66.9 OBESITY (BMI 30-39.9): Status: ACTIVE | Noted: 2017-01-17

## 2018-03-14 ENCOUNTER — ANESTHESIA EVENT (OUTPATIENT)
Dept: PERIOP | Facility: AMBULARY SURGERY CENTER | Age: 33
End: 2018-03-14
Payer: COMMERCIAL

## 2018-03-15 RX ORDER — LEVOTHYROXINE SODIUM 0.05 MG/1
50 TABLET ORAL
COMMUNITY
End: 2018-04-19

## 2018-03-15 RX ORDER — DIPHENOXYLATE HYDROCHLORIDE AND ATROPINE SULFATE 2.5; .025 MG/1; MG/1
1 TABLET ORAL
COMMUNITY
End: 2021-07-29

## 2018-03-15 RX ORDER — L.ACIDOPH,PLANT/B.ANIMAL,LONG 2B CELL
1 CAPSULE ORAL
COMMUNITY
End: 2019-04-30 | Stop reason: ALTCHOICE

## 2018-03-15 NOTE — PRE-PROCEDURE INSTRUCTIONS
Pre-Surgery Instructions:   Medication Instructions    albuterol (PROAIR HFA) 90 mcg/act inhaler Instructed patient per Anesthesia Guidelines   amLODIPine (NORVASC) 5 mg tablet Instructed patient per Anesthesia Guidelines   Cholecalciferol (VITAMIN D3 PO) Instructed patient per Anesthesia Guidelines   cyclobenzaprine (FLEXERIL) 10 mg tablet Instructed patient per Anesthesia Guidelines   levothyroxine 50 mcg tablet Instructed patient per Anesthesia Guidelines   Liraglutide -Weight Management (SAXENDA) 18 MG/3ML SOPN Instructed patient per Anesthesia Guidelines   losartan (COZAAR) 25 mg tablet Instructed patient per Anesthesia Guidelines   meloxicam (MOBIC) 15 mg tablet Instructed patient per Anesthesia Guidelines   multivitamin (THERAGRAN) TABS Instructed patient per Anesthesia Guidelines   Prenatal Multivit-Min-Fe-FA (PRE-AKIRA PO) Instructed patient per Anesthesia Guidelines   Probiotic Product (PROBIOTIC ACIDOPHILUS BEADS) CAPS Instructed patient per Anesthesia Guidelines      Pre op instructions reviewed- showering instructions using an antibacterial soap reviewed

## 2018-03-16 ENCOUNTER — OFFICE VISIT (OUTPATIENT)
Dept: FAMILY MEDICINE CLINIC | Facility: CLINIC | Age: 33
End: 2018-03-16
Payer: COMMERCIAL

## 2018-03-16 VITALS
HEART RATE: 76 BPM | RESPIRATION RATE: 16 BRPM | HEIGHT: 64 IN | SYSTOLIC BLOOD PRESSURE: 108 MMHG | WEIGHT: 181.8 LBS | BODY MASS INDEX: 31.04 KG/M2 | DIASTOLIC BLOOD PRESSURE: 72 MMHG

## 2018-03-16 DIAGNOSIS — I10 BENIGN ESSENTIAL HYPERTENSION: ICD-10-CM

## 2018-03-16 DIAGNOSIS — E66.9 OBESITY (BMI 30-39.9): ICD-10-CM

## 2018-03-16 DIAGNOSIS — Z00.00 WELL ADULT EXAM: Primary | ICD-10-CM

## 2018-03-16 PROCEDURE — 99395 PREV VISIT EST AGE 18-39: CPT | Performed by: FAMILY MEDICINE

## 2018-03-16 NOTE — PROGRESS NOTES
Lisa Steiner was seen today for physical exam     Diagnoses and all orders for this visit:    Well adult exam    Benign essential hypertension    Obesity (BMI 30-39 9)  -     Insulin Pen Needle 32G X 6 MM MISC; by Does not apply route daily for 90 days      Patient Counseling:  --Nutrition: Stressed importance of moderation in sodium/caffeine intake, saturated fat and cholesterol, caloric balance, sufficient intake of fresh fruits, vegetables, fiber, calcium, iron, and 1 mg of folate supplement per day   --Discussed  calcium supplement, and the daily use of baby aspirin  --Exercise: Stressed the importance of regular exercise  --Substance Abuse: Discussed cessation/primary prevention of tobacco, alcohol, or other drug use; driving or other dangerous activities under the influence; availability of treatment for abuse  --Sexuality: Discussed sexually transmitted diseases, partner selection, use of condoms, avoidance of unintended pregnancy  and contraceptive alternatives  --Injury prevention: Discussed safety belts, safety helmets, smoke detector, smoking near bedding or upholstery  --Dental health: Discussed importance of regular tooth brushing, flossing, and dental visits  --Immunizations reviewed  --Discussed benefits of screening colonoscopy    Chief Complaint   Patient presents with    Physical Exam     Pt here for Physical Exam       HPI    Patient here for a comprehensive physical exam The patient reports no problems    Do you take any herbs or supplements that were not prescribed by a doctor? no   Are you taking calcium supplements? no   Are you taking aspirin daily? no  How often do you exercise? 7 days ago- Yoga   Diet? Plant based diet  2-3 serving of fruits and vegetables  Dental visit: more than 1 year ago     Vision test: wears glasses for distance        History:  LMP: last month  Last pap date: 2017  Abnormal pap? no  : 0  Para: 0  Still trying to conceive for the last 10 years- seeing Reproductive specialist      History   Sexual Activity    Sexual activity: Yes    Partners: Male    Birth control/ protection: None       Review of Systems   Constitutional: Negative  HENT: Negative  Eyes: Negative  Respiratory: Negative  Cardiovascular: Negative  Gastrointestinal: Negative  Endocrine: Negative  Genitourinary: Negative  Musculoskeletal: Negative  Skin: Negative  Allergic/Immunologic: Negative  Neurological: Negative  Hematological: Negative  Psychiatric/Behavioral: Negative  Family History   Problem Relation Age of Onset    Diabetes Mother      type2    Hypertension Mother     Diabetes Father      type2    Hypertension Father     Heart attack Maternal Grandfather      acute MI    Arthritis Maternal Grandfather     Stroke Maternal Grandfather      CVA    Cancer Maternal Grandfather     Hyperlipidemia Maternal Grandfather     Arthritis Paternal Grandfather     Stroke Paternal Grandfather      CVA    Cancer Paternal Grandfather     Hyperlipidemia Paternal Grandfather     Heart attack Maternal Uncle      acute MI    Arthritis Family     Stroke Family      CVA    Cancer Family     Hyperlipidemia Family     Stroke Family      CVA    Hyperlipidemia Family        Past Medical History:   Diagnosis Date    Abdominal pain     Asthma     mild intermittent    Disease of thyroid gland     GERD (gastroesophageal reflux disease)     History of palpitations     Hypertension     Lower back pain     Trace mitral regurgitation by prior echocardiogram          Social History     Social History    Marital status: /Civil Union     Spouse name: N/A    Number of children: N/A    Years of education: N/A     Occupational History    Not on file       Social History Main Topics    Smoking status: Never Smoker    Smokeless tobacco: Never Used    Alcohol use Yes      Comment: socialy    Drug use: No    Sexual activity: Yes Partners: Male     Birth control/ protection: None     Other Topics Concern    Not on file     Social History Narrative    Exercises 3x week    Pet: dog       Current Outpatient Prescriptions on File Prior to Visit   Medication Sig Dispense Refill    amLODIPine (NORVASC) 5 mg tablet Take 5 mg by mouth daily after dinner        cyclobenzaprine (FLEXERIL) 10 mg tablet Take 1 tablet by mouth 3 (three) times a day as needed      levothyroxine 50 mcg tablet Take 50 mcg by mouth daily in the early morning      losartan (COZAAR) 25 mg tablet Take 1 tablet by mouth daily after dinner        meloxicam (MOBIC) 15 mg tablet Take 1 tablet by mouth daily as needed      Prenatal Multivit-Min-Fe-FA (PRE-AKIRA PO) Take 1 tablet by mouth daily in the early morning      [DISCONTINUED] Liraglutide -Weight Management (SAXENDA) 18 MG/3ML SOPN Inject 3 mg under the skin daily after dinner        albuterol (PROAIR HFA) 90 mcg/act inhaler Inhale 2 puffs every 4 (four) hours as needed      Cholecalciferol (VITAMIN D3 PO) Take 2,000 mg by mouth daily in the early morning      multivitamin (THERAGRAN) TABS Take 1 tablet by mouth daily after dinner      Probiotic Product (PROBIOTIC ACIDOPHILUS BEADS) CAPS Take 1 capsule by mouth daily in the early morning      [DISCONTINUED] Insulin Pen Needle (NOVOFINE) 32G X 6 MM MISC by Does not apply route      [DISCONTINUED] methocarbamol (ROBAXIN) 750 mg tablet Take 1 tablet by mouth every 8 (eight) hours for 30 days  60 tablet 0    [DISCONTINUED] metoprolol tartrate (LOPRESSOR) 25 mg tablet Take 0 5 tablets by mouth 2 (two) times a day for 15 days 15 tablet 0    [DISCONTINUED] ondansetron (ZOFRAN) 4 mg tablet Take 1 tablet by mouth every 4 (four) hours as needed for nausea or vomiting for up to 30 days  20 tablet 0     No current facility-administered medications on file prior to visit            No Known Allergies      Vitals:    18 1014   BP: 108/72   Pulse: 76   Resp: 16 Weight: 82 5 kg (181 lb 12 8 oz)   Height: 5' 4 06" (1 627 m)       Physical Exam   Constitutional: She is oriented to person, place, and time  Vital signs are normal  She appears well-developed and well-nourished  HENT:   Head: Normocephalic and atraumatic  Nose: Nose normal    Mouth/Throat: Oropharynx is clear and moist    Eyes: Pupils are equal, round, and reactive to light  Neck: Normal range of motion  Neck supple  No thyromegaly present  Cardiovascular: Normal rate and regular rhythm  No murmur heard  Pulmonary/Chest: Effort normal and breath sounds normal    Abdominal: Soft  Bowel sounds are normal    Musculoskeletal: Normal range of motion  She exhibits no edema or deformity  Neurological: She is alert and oriented to person, place, and time  She has normal reflexes  Skin: Skin is warm  No rash noted  No erythema  Psychiatric: She has a normal mood and affect   Her behavior is normal

## 2018-03-19 ENCOUNTER — ANESTHESIA (OUTPATIENT)
Dept: PERIOP | Facility: AMBULARY SURGERY CENTER | Age: 33
End: 2018-03-19
Payer: COMMERCIAL

## 2018-03-19 ENCOUNTER — HOSPITAL ENCOUNTER (OUTPATIENT)
Facility: AMBULARY SURGERY CENTER | Age: 33
Setting detail: OUTPATIENT SURGERY
Discharge: HOME | End: 2018-03-19
Attending: OBSTETRICS & GYNECOLOGY | Admitting: OBSTETRICS & GYNECOLOGY
Payer: COMMERCIAL

## 2018-03-19 VITALS
TEMPERATURE: 97.4 F | HEIGHT: 64 IN | OXYGEN SATURATION: 100 % | HEART RATE: 83 BPM | RESPIRATION RATE: 18 BRPM | DIASTOLIC BLOOD PRESSURE: 69 MMHG | SYSTOLIC BLOOD PRESSURE: 108 MMHG | BODY MASS INDEX: 31.05 KG/M2 | WEIGHT: 181.88 LBS

## 2018-03-19 DIAGNOSIS — D25.0 FIBROIDS, SUBMUCOSAL: ICD-10-CM

## 2018-03-19 LAB — EXT PREGNANCY TEST URINE: NEGATIVE

## 2018-03-19 PROCEDURE — 88305 TISSUE EXAM BY PATHOLOGIST: CPT | Performed by: PATHOLOGY

## 2018-03-19 PROCEDURE — 81025 URINE PREGNANCY TEST: CPT | Performed by: OBSTETRICS & GYNECOLOGY

## 2018-03-19 RX ORDER — ONDANSETRON 2 MG/ML
4 INJECTION INTRAMUSCULAR; INTRAVENOUS ONCE AS NEEDED
Status: DISCONTINUED | OUTPATIENT
Start: 2018-03-19 | End: 2018-03-19 | Stop reason: HOSPADM

## 2018-03-19 RX ORDER — SODIUM CHLORIDE 9 MG/ML
INJECTION, SOLUTION INTRAVENOUS CONTINUOUS PRN
Status: DISCONTINUED | OUTPATIENT
Start: 2018-03-19 | End: 2018-03-19 | Stop reason: SURG

## 2018-03-19 RX ORDER — AZITHROMYCIN 500 MG/1
250 TABLET, FILM COATED ORAL DAILY
COMMUNITY
End: 2018-04-19

## 2018-03-19 RX ORDER — FENTANYL CITRATE 50 UG/ML
INJECTION, SOLUTION INTRAMUSCULAR; INTRAVENOUS AS NEEDED
Status: DISCONTINUED | OUTPATIENT
Start: 2018-03-19 | End: 2018-03-19 | Stop reason: SURG

## 2018-03-19 RX ORDER — ONDANSETRON 2 MG/ML
4 INJECTION INTRAMUSCULAR; INTRAVENOUS EVERY 6 HOURS PRN
Status: DISCONTINUED | OUTPATIENT
Start: 2018-03-19 | End: 2018-03-19 | Stop reason: HOSPADM

## 2018-03-19 RX ORDER — KETOROLAC TROMETHAMINE 30 MG/ML
INJECTION, SOLUTION INTRAMUSCULAR; INTRAVENOUS AS NEEDED
Status: DISCONTINUED | OUTPATIENT
Start: 2018-03-19 | End: 2018-03-19 | Stop reason: SURG

## 2018-03-19 RX ORDER — FENTANYL CITRATE/PF 50 MCG/ML
25 SYRINGE (ML) INJECTION
Status: DISCONTINUED | OUTPATIENT
Start: 2018-03-19 | End: 2018-03-19 | Stop reason: HOSPADM

## 2018-03-19 RX ORDER — IBUPROFEN 600 MG/1
600 TABLET ORAL EVERY 6 HOURS PRN
Status: DISCONTINUED | OUTPATIENT
Start: 2018-03-19 | End: 2018-03-19 | Stop reason: HOSPADM

## 2018-03-19 RX ORDER — MIDAZOLAM HYDROCHLORIDE 1 MG/ML
INJECTION INTRAMUSCULAR; INTRAVENOUS AS NEEDED
Status: DISCONTINUED | OUTPATIENT
Start: 2018-03-19 | End: 2018-03-19 | Stop reason: SURG

## 2018-03-19 RX ORDER — GLYCOPYRROLATE 0.2 MG/ML
INJECTION INTRAMUSCULAR; INTRAVENOUS AS NEEDED
Status: DISCONTINUED | OUTPATIENT
Start: 2018-03-19 | End: 2018-03-19 | Stop reason: SURG

## 2018-03-19 RX ORDER — ACETAMINOPHEN 325 MG/1
650 TABLET ORAL EVERY 6 HOURS PRN
Status: DISCONTINUED | OUTPATIENT
Start: 2018-03-19 | End: 2018-03-19 | Stop reason: HOSPADM

## 2018-03-19 RX ORDER — SODIUM CHLORIDE, SODIUM LACTATE, POTASSIUM CHLORIDE, CALCIUM CHLORIDE 600; 310; 30; 20 MG/100ML; MG/100ML; MG/100ML; MG/100ML
125 INJECTION, SOLUTION INTRAVENOUS CONTINUOUS
Status: DISCONTINUED | OUTPATIENT
Start: 2018-03-19 | End: 2018-03-19 | Stop reason: HOSPADM

## 2018-03-19 RX ORDER — PROPOFOL 10 MG/ML
INJECTION, EMULSION INTRAVENOUS AS NEEDED
Status: DISCONTINUED | OUTPATIENT
Start: 2018-03-19 | End: 2018-03-19 | Stop reason: SURG

## 2018-03-19 RX ORDER — ONDANSETRON 2 MG/ML
INJECTION INTRAMUSCULAR; INTRAVENOUS AS NEEDED
Status: DISCONTINUED | OUTPATIENT
Start: 2018-03-19 | End: 2018-03-19 | Stop reason: SURG

## 2018-03-19 RX ADMIN — FENTANYL CITRATE 25 MCG: 50 INJECTION, SOLUTION INTRAMUSCULAR; INTRAVENOUS at 08:45

## 2018-03-19 RX ADMIN — ACETAMINOPHEN 650 MG: 325 TABLET ORAL at 09:50

## 2018-03-19 RX ADMIN — MIDAZOLAM HYDROCHLORIDE 2 MG: 1 INJECTION, SOLUTION INTRAMUSCULAR; INTRAVENOUS at 08:20

## 2018-03-19 RX ADMIN — FENTANYL CITRATE 25 MCG: 50 INJECTION, SOLUTION INTRAMUSCULAR; INTRAVENOUS at 08:36

## 2018-03-19 RX ADMIN — KETOROLAC TROMETHAMINE 30 MG: 30 INJECTION, SOLUTION INTRAMUSCULAR at 08:46

## 2018-03-19 RX ADMIN — SODIUM CHLORIDE: 0.9 INJECTION, SOLUTION INTRAVENOUS at 08:10

## 2018-03-19 RX ADMIN — FENTANYL CITRATE 50 MCG: 50 INJECTION, SOLUTION INTRAMUSCULAR; INTRAVENOUS at 08:24

## 2018-03-19 RX ADMIN — PROPOFOL 200 MG: 10 INJECTION, EMULSION INTRAVENOUS at 08:20

## 2018-03-19 RX ADMIN — ONDANSETRON 4 MG: 2 INJECTION INTRAMUSCULAR; INTRAVENOUS at 08:45

## 2018-03-19 RX ADMIN — GLYCOPYRROLATE 0.4 MG: 0.2 INJECTION, SOLUTION INTRAMUSCULAR; INTRAVENOUS at 08:25

## 2018-03-19 RX ADMIN — DEXAMETHASONE SODIUM PHOSPHATE 8 MG: 10 INJECTION INTRAMUSCULAR; INTRAVENOUS at 08:34

## 2018-03-19 NOTE — OP NOTE
OPERATIVE REPORT  PATIENT NAME: Tico Shipley    :    MRN: [id number]  Pt Location: [facility] OR ROOM 06    SURGERY DATE: 3/19/2018    Surgeon(s) and Role:     Alisha Ty DO - Primary    Preop Diagnosis:  Fibroids, submucosal [D25 0]    Post-Op Diagnosis Codes:     * Fibroids, submucosal [D25 0]    Procedure(s) (LRB):  HYSTEROSCOPY; MYOMECTOMY; ENDOMETRIAL BIOPSY (N/A)    Specimen(s):  ID Type Source Tests Collected by Time Destination   1 : endometrial biopsy Tissue Endometrium TISSUE EXAM Shawn Nobles DO 3/19/2018 9253        Estimated Blood Loss:   Minimal    Drains:None    IVF: 750 ml  Urine : 25 ml  Hysteroscopic fluid deficit: 260 ml       Anesthesia Type:   Choice    Operative Indications:  Fibroids, submucosal [D25 0]      Operative Findings: The uterus sounded to 9 5 centimeters retroverted  Through the hysteroscope both ostia were well-visualized  There was a polyp like structure the low uterine segment anterior uterine corpus as well as what appeared to be a possible fundal fibroid  Complications:   None    Procedure and Technique:  Patient was identified in the preoperative holding area and taken to the operative suite where she was placed in supine position  She then underwent general anesthesia  She was then placed in the dorsal lithotomy position and prepped and draped in the normal sterile fashion  A time-out was held according to protocol and was deemed we could begin the procedure  A straight catheter was used to empty the bladder  The cervix was visualized using a Monterroso retractor posteriorly and a Coleman retractor anteriorly  The anterior lip of the cervix was grasped with a single-tooth tenaculum  The uterus sounded to 9 5 centimeters retroverted using an endometrial biopsy [place]  The cervix was then progressively dilated up to a 23 [place] using Clarence Rosalio dilators    The hysteroscope was then inserted and a careful survey of the cavity revealed the findings as noted above   Using the MyoSure hysteroscope the low uterine segment anterior polyp was then resected  Attention was then turned to the fundus where the possible fundal fibroid was also resected  All tissue was sent to pathology for further evaluation  All instruments were then removed and hemostasis was observed  Patient was returned to supine position  The patient was awakened from anesthesia and taken to the recovery room were she was noted to be in stable condition  It should be noted at the end of procedure all sponge lap needle instrument counts were correct x2 per the RN     I was present for the entire procedure    Patient Disposition:  PACU     SIGNATURE: eRfugio Jeffery DO  DATE: March 19, 2018  TIME: 8:59 AM

## 2018-03-19 NOTE — ANESTHESIA POSTPROCEDURE EVALUATION
Post-Op Assessment Note      CV Status:  Stable    Mental Status:  Alert and awake    Hydration Status:  Euvolemic    PONV Controlled:  Controlled    Airway Patency:  Patent and adequate    Post Op Vitals Reviewed: Yes          Staff: CRNA           BP (P) 112/66 (03/19/18 0852)    Temp (!) (P) 97 3 °F (36 3 °C) (03/19/18 0852)    Pulse (!) (P) 112 (03/19/18 0852)   Resp (P) 18 (03/19/18 0852)    SpO2 (P) 100 % (03/19/18 0852)

## 2018-03-19 NOTE — DISCHARGE INSTRUCTIONS
Myomectomy   WHAT YOU NEED TO KNOW:   Myomectomy is surgery to remove one or more myomas from your uterus  Myomas are also called fibroid tumors or leiomyomas  DISCHARGE INSTRUCTIONS:   Medicines:   · Medicines  may be given to prevent or treat a bacterial infection or decrease pain  Ask your healthcare provider how to take prescription pain medicine safely  You may be given medicine to help decrease certain hormones in your blood  You may also need to take iron pills if you have anemia  · Take your medicine as directed  Contact your healthcare provider if you think your medicine is not helping or if you have side effects  Tell him if you are allergic to any medicine  Keep a list of the medicines, vitamins, and herbs you take  Include the amounts, and when and why you take them  Bring the list or the pill bottles to follow-up visits  Carry your medicine list with you in case of an emergency  Follow up with your surgeon or gynecologist as directed: You may need to return for an ultrasound to check for new myomas  Write down your questions so you remember to ask them during your visits  Wound care:  Care for your wound as directed  You may need to wash the wound with soap and water  Dry the area and put on new, clean bandages as directed  Change your bandages when they get wet or dirty  Activity:  Ask your healthcare provider if you should avoid any activities after surgery  Contact your surgeon or gynecologist if:   · You start to bleed more than usual during or between your monthly periods, after your myomas are removed  · You are constipated  · You still cannot get pregnant, even after your myomas have been removed  · You feel new pressure in your abdomen  · You have a fever  · You have nausea, or you are vomiting  · You have new pain in your abdomen, or you have pain that is getting worse  · Your wound is swollen, red, or has pus coming from it    Seek care immediately or call 911 if:   · You feel lightheaded, short of breath, and have chest pain  · You cough up blood  · Your arm or leg feels warm, tender, and painful  It may look swollen and red  · You have any of the following signs of a heart attack:      ¨ Squeezing, pressure, or pain in your chest that lasts longer than 5 minutes or returns    ¨ Discomfort or pain in your back, neck, jaw, stomach, or arm     ¨ Trouble breathing    ¨ Nausea or vomiting    ¨ Lightheadedness or a sudden cold sweat, especially with chest pain or trouble breathing    · You cannot have a bowel movement at all  · You cannot urinate, or you urinate very little  · You have bleeding from your vagina that does not stop  · You suddenly have severe abdominal pain  · Your stitches come apart  © 2017 [address] Information is for End User's use only and may not be sold, redistributed or otherwise used for commercial purposes  All illustrations and images included in CareNotes® are the copyrighted property of A D A M [employer]  or Clarence Santamaria  The above information is an  only  It is not intended as medical advice for individual conditions or treatments  Talk to your doctor, nurse or pharmacist before following any medical regimen to see if it is safe and effective for you  Hysteroscopy   WHAT YOU SHOULD KNOW:   A hysteroscopy is a procedure to look at the inside of your uterus  Other procedures may also be done during the hysteroscopy  AFTER YOU LEAVE:   Medicines:   · Acetaminophen  decreases pain  It is available without a doctor's order  Ask how much to take and how often to take it  Follow directions  Acetaminophen can cause liver damage if not taken correctly  · NSAIDs  help decrease swelling and pain  This medicine is available with or without a doctor's order  NSAIDs can cause stomach bleeding or kidney problems in certain people   If you take blood thinner medicine, always ask your primary healthcare provider (PHP) if NSAIDs are safe for you  Always read the medicine label and follow directions  · Take your medicine as directed  Contact your PHP if you think your medicine is not helping or if you have side effects  Tell him if you are allergic to any medicine  Keep a list of the medicines, vitamins, and herbs you take  Include the amounts, and when and why you take them  Bring the list or the pill bottles to follow-up visits  Carry your medicine list with you in case of an emergency  Follow up with your PHP or gynecologist as directed:  Write down your questions so you remember to ask them during your visits  Activity guidelines: You may feel like resting more after the procedure  Slowly start to do more each day  Rest when you feel it is needed  Ask your PHP when you can return to your daily activities  Self-care:   · Do not put anything into your vagina until your PHP says it is okay  Do not have sex, douche, or use tampons  · You may need to continue to wear a sanitary pad for up to a week  You may have light bleeding or spotting  Contact your PHP if:   · You have a fever  · You have to change your sanitary pad every hour  · You have chills, a cough, or feel weak and achy  · You have abdominal pain that does not go away  · You have abnormal or foul-smelling vaginal discharge  · Your skin is itchy, swollen, or you have a rash  · You have questions or concerns about your condition or care  © 2014 [address] is for End User's use only and may not be sold, redistributed or otherwise used for commercial purposes  All illustrations and images included in CareNotes® are the copyrighted property of [employer]  or Clarence Santamaria  The above information is an  only  It is not intended as medical advice for individual conditions or treatments   Talk to your doctor, nurse or pharmacist before following any medical regimen to see if it is safe and effective for you

## 2018-03-19 NOTE — ANESTHESIA PREPROCEDURE EVALUATION
Review of Systems/Medical History  Patient summary reviewed        Cardiovascular  EKG reviewed, Hypertension ,   Comment: LEFT VENTRICLE:  Systolic function was normal  Ejection fraction was estimated to be 60 %  There were no regional wall motion abnormalities      RIGHT VENTRICLE:  The size was normal   Systolic function was normal      PULMONIC VALVE:  There was trace regurgitation  ,  Pulmonary  Asthma: well controlled/ stable ,        GI/Hepatic    GERD well controlled,        Negative  ROS        Endo/Other  History of thyroid disease , hypothyroidism,      GYN  Negative gynecology ROS          Hematology  Negative hematology ROS      Musculoskeletal  Negative musculoskeletal ROS        Neurology    Neuromuscular disease ,    Psychology   Negative psychology ROS              Physical Exam    Airway    Mallampati score: I  TM Distance: >3 FB  Neck ROM: full     Dental       Cardiovascular  Cardiovascular exam normal    Pulmonary  Pulmonary exam normal     Other Findings        Anesthesia Plan  ASA Score- 2     Anesthesia Type- general with ASA Monitors  Additional Monitors:   Airway Plan:         Plan Factors-    Induction- intravenous  Postoperative Plan-     Informed Consent- Anesthetic plan and risks discussed with patient  I personally reviewed this patient with the CRNA  Discussed and agreed on the Anesthesia Plan with the CRNA  Grady Rudd

## 2018-03-21 ENCOUNTER — TRANSCRIBE ORDERS (OUTPATIENT)
Dept: LAB | Facility: HOSPITAL | Age: 33
End: 2018-03-21

## 2018-03-21 ENCOUNTER — APPOINTMENT (OUTPATIENT)
Dept: LAB | Facility: HOSPITAL | Age: 33
End: 2018-03-21
Attending: OBSTETRICS & GYNECOLOGY
Payer: COMMERCIAL

## 2018-03-21 DIAGNOSIS — Z22.4 GONORRHEA CARRIER: ICD-10-CM

## 2018-03-21 DIAGNOSIS — Z01.83 ENCOUNTER FOR BLOOD TYPING: ICD-10-CM

## 2018-03-21 DIAGNOSIS — Z11.3 SCREENING EXAMINATION FOR VENEREAL DISEASE: ICD-10-CM

## 2018-03-21 DIAGNOSIS — E28.2 POLYCYSTIC OVARIES: Primary | ICD-10-CM

## 2018-03-21 DIAGNOSIS — E66.8 OBESITY OF ENDOCRINE ORIGIN: ICD-10-CM

## 2018-03-21 DIAGNOSIS — Z11.59 SCREENING EXAMINATION FOR POLIOMYELITIS: ICD-10-CM

## 2018-03-21 DIAGNOSIS — Z11.8 SCREENING EXAMINATION FOR TRACHOMA: ICD-10-CM

## 2018-03-21 DIAGNOSIS — R76.0 RAISED ANTIBODY TITER: ICD-10-CM

## 2018-03-21 DIAGNOSIS — D50.8 OTHER IRON DEFICIENCY ANEMIA: ICD-10-CM

## 2018-03-21 DIAGNOSIS — E28.2 POLYCYSTIC OVARIES: ICD-10-CM

## 2018-03-21 DIAGNOSIS — E07.9 DISEASE OF THYROID GLAND: ICD-10-CM

## 2018-03-21 DIAGNOSIS — Z31.41 FERTILITY TESTING: ICD-10-CM

## 2018-03-21 LAB
ABO GROUP BLD: NORMAL
ALBUMIN SERPL BCP-MCNC: 3.6 G/DL (ref 3.5–5)
ALP SERPL-CCNC: 50 U/L (ref 46–116)
ALT SERPL W P-5'-P-CCNC: 32 U/L (ref 12–78)
ANION GAP SERPL CALCULATED.3IONS-SCNC: 6 MMOL/L (ref 4–13)
AST SERPL W P-5'-P-CCNC: 15 U/L (ref 5–45)
BASOPHILS # BLD AUTO: 0.05 THOUSANDS/ΜL (ref 0–0.1)
BASOPHILS NFR BLD AUTO: 0 % (ref 0–1)
BILIRUB SERPL-MCNC: 0.22 MG/DL (ref 0.2–1)
BLD GP AB SCN SERPL QL: NEGATIVE
BUN SERPL-MCNC: 8 MG/DL (ref 5–25)
CALCIUM SERPL-MCNC: 8.5 MG/DL (ref 8.3–10.1)
CHLORIDE SERPL-SCNC: 107 MMOL/L (ref 100–108)
CHOLEST SERPL-MCNC: 173 MG/DL (ref 50–200)
CO2 SERPL-SCNC: 28 MMOL/L (ref 21–32)
CREAT SERPL-MCNC: 0.9 MG/DL (ref 0.6–1.3)
EOSINOPHIL # BLD AUTO: 0.13 THOUSAND/ΜL (ref 0–0.61)
EOSINOPHIL NFR BLD AUTO: 1 % (ref 0–6)
ERYTHROCYTE [DISTWIDTH] IN BLOOD BY AUTOMATED COUNT: 14.8 % (ref 11.6–15.1)
EST. AVERAGE GLUCOSE BLD GHB EST-MCNC: 108 MG/DL
GFR SERPL CREATININE-BSD FRML MDRD: 97 ML/MIN/1.73SQ M
GLUCOSE P FAST SERPL-MCNC: 88 MG/DL (ref 65–99)
HBA1C MFR BLD: 5.4 % (ref 4.2–6.3)
HCT VFR BLD AUTO: 38.6 % (ref 34.8–46.1)
HDLC SERPL-MCNC: 64 MG/DL (ref 40–60)
HGB BLD-MCNC: 12.8 G/DL (ref 11.5–15.4)
INSULIN SERPL-ACNC: 13.7 MU/L (ref 3–25)
LDLC SERPL CALC-MCNC: 82 MG/DL (ref 0–100)
LYMPHOCYTES # BLD AUTO: 4.14 THOUSANDS/ΜL (ref 0.6–4.47)
LYMPHOCYTES NFR BLD AUTO: 35 % (ref 14–44)
MCH RBC QN AUTO: 25.8 PG (ref 26.8–34.3)
MCHC RBC AUTO-ENTMCNC: 33.2 G/DL (ref 31.4–37.4)
MCV RBC AUTO: 78 FL (ref 82–98)
MONOCYTES # BLD AUTO: 0.43 THOUSAND/ΜL (ref 0.17–1.22)
MONOCYTES NFR BLD AUTO: 4 % (ref 4–12)
NEUTROPHILS # BLD AUTO: 7.07 THOUSANDS/ΜL (ref 1.85–7.62)
NEUTS SEG NFR BLD AUTO: 60 % (ref 43–75)
NRBC BLD AUTO-RTO: 0 /100 WBCS
PLATELET # BLD AUTO: 286 THOUSANDS/UL (ref 149–390)
PMV BLD AUTO: 10.5 FL (ref 8.9–12.7)
POTASSIUM SERPL-SCNC: 3.6 MMOL/L (ref 3.5–5.3)
PROLACTIN SERPL-MCNC: 29 NG/ML
PROT SERPL-MCNC: 7.2 G/DL (ref 6.4–8.2)
RBC # BLD AUTO: 4.96 MILLION/UL (ref 3.81–5.12)
RH BLD: NEGATIVE
RPR SER QL: NORMAL
RUBV IGG SERPL IA-ACNC: >175 IU/ML
SODIUM SERPL-SCNC: 141 MMOL/L (ref 136–145)
T3FREE SERPL-MCNC: 2.18 PG/ML (ref 2.3–4.2)
T4 FREE SERPL-MCNC: 0.93 NG/DL (ref 0.76–1.46)
TRIGL SERPL-MCNC: 133 MG/DL
TSH SERPL DL<=0.05 MIU/L-ACNC: 2.56 UIU/ML (ref 0.36–3.74)
WBC # BLD AUTO: 11.85 THOUSAND/UL (ref 4.31–10.16)

## 2018-03-21 PROCEDURE — 84146 ASSAY OF PROLACTIN: CPT

## 2018-03-21 PROCEDURE — 86631 CHLAMYDIA ANTIBODY: CPT

## 2018-03-21 PROCEDURE — 83036 HEMOGLOBIN GLYCOSYLATED A1C: CPT

## 2018-03-21 PROCEDURE — 86632 CHLAMYDIA IGM ANTIBODY: CPT

## 2018-03-21 PROCEDURE — 86705 HEP B CORE ANTIBODY IGM: CPT

## 2018-03-21 PROCEDURE — 84481 FREE ASSAY (FT-3): CPT

## 2018-03-21 PROCEDURE — 86790 VIRUS ANTIBODY NOS: CPT

## 2018-03-21 PROCEDURE — 86787 VARICELLA-ZOSTER ANTIBODY: CPT

## 2018-03-21 PROCEDURE — 84403 ASSAY OF TOTAL TESTOSTERONE: CPT

## 2018-03-21 PROCEDURE — 87661 TRICHOMONAS VAGINALIS AMPLIF: CPT

## 2018-03-21 PROCEDURE — 84439 ASSAY OF FREE THYROXINE: CPT

## 2018-03-21 PROCEDURE — 87591 N.GONORRHOEAE DNA AMP PROB: CPT

## 2018-03-21 PROCEDURE — 80053 COMPREHEN METABOLIC PANEL: CPT

## 2018-03-21 PROCEDURE — 86645 CMV ANTIBODY IGM: CPT

## 2018-03-21 PROCEDURE — 83516 IMMUNOASSAY NONANTIBODY: CPT

## 2018-03-21 PROCEDURE — 82627 DEHYDROEPIANDROSTERONE: CPT

## 2018-03-21 PROCEDURE — 36415 COLL VENOUS BLD VENIPUNCTURE: CPT

## 2018-03-21 PROCEDURE — 80061 LIPID PANEL: CPT

## 2018-03-21 PROCEDURE — 87491 CHLMYD TRACH DNA AMP PROBE: CPT

## 2018-03-21 PROCEDURE — 86704 HEP B CORE ANTIBODY TOTAL: CPT

## 2018-03-21 PROCEDURE — 83498 ASY HYDROXYPROGESTERONE 17-D: CPT

## 2018-03-21 PROCEDURE — 80081 OBSTETRIC PANEL INC HIV TSTG: CPT

## 2018-03-21 PROCEDURE — 83525 ASSAY OF INSULIN: CPT

## 2018-03-21 PROCEDURE — 84443 ASSAY THYROID STIM HORMONE: CPT

## 2018-03-21 PROCEDURE — 86644 CMV ANTIBODY: CPT

## 2018-03-21 PROCEDURE — 84402 ASSAY OF FREE TESTOSTERONE: CPT

## 2018-03-21 PROCEDURE — 86803 HEPATITIS C AB TEST: CPT

## 2018-03-22 LAB
CHLAMYDIA DNA CVX QL NAA+PROBE: NORMAL
CMV IGG SERPL IA-ACNC: >10 U/ML (ref 0–0.59)
CMV IGM SERPL IA-ACNC: <30 AU/ML (ref 0–29.9)
DHEA-S SERPL-MCNC: 75.7 UG/DL (ref 84.8–378)
HBV CORE AB SER QL: NORMAL
HBV CORE IGM SER QL: NORMAL
HBV SURFACE AG SER QL: NORMAL
HCV AB SER QL: NORMAL
HTLV I+II AB SER QL IA: NEGATIVE
N GONORRHOEA DNA GENITAL QL NAA+PROBE: NORMAL
TESTOST FREE SERPL-MCNC: 0.6 PG/ML (ref 0–4.2)
TESTOST SERPL-MCNC: 3 NG/DL (ref 8–48)
VZV IGG SER IA-ACNC: NORMAL

## 2018-03-23 LAB
17OHP SERPL-MCNC: 27 NG/DL
C TRACH IGM TITR SER IF: <0.8 INDEX (ref 0–0.7)
CHLAMYDIA IGG SER-ACNC: <0.91 RATIO (ref 0–0.9)
HIV 1+2 AB+HIV1 P24 AG SERPL QL IA: NORMAL
T VAGINALIS RRNA SPEC QL NAA+PROBE: NEGATIVE

## 2018-03-25 LAB — MIS SERPL-MCNC: 4.16 NG/ML

## 2018-03-27 DIAGNOSIS — J45.909 MILD ASTHMA, UNSPECIFIED WHETHER COMPLICATED, UNSPECIFIED WHETHER PERSISTENT: Primary | ICD-10-CM

## 2018-03-27 DIAGNOSIS — I10 HYPERTENSION, UNSPECIFIED TYPE: Primary | ICD-10-CM

## 2018-03-27 RX ORDER — AMLODIPINE BESYLATE 5 MG/1
5 TABLET ORAL
Qty: 90 TABLET | Refills: 3 | Status: SHIPPED | OUTPATIENT
Start: 2018-03-27 | End: 2018-04-19 | Stop reason: ALTCHOICE

## 2018-03-27 RX ORDER — ALBUTEROL SULFATE 90 UG/1
2 AEROSOL, METERED RESPIRATORY (INHALATION) EVERY 4 HOURS PRN
Qty: 1 INHALER | Refills: 0 | Status: SHIPPED | OUTPATIENT
Start: 2018-03-27 | End: 2020-01-13 | Stop reason: SDUPTHER

## 2018-04-19 ENCOUNTER — OFFICE VISIT (OUTPATIENT)
Dept: PERINATAL CARE | Facility: CLINIC | Age: 33
End: 2018-04-19
Payer: COMMERCIAL

## 2018-04-19 VITALS
SYSTOLIC BLOOD PRESSURE: 136 MMHG | HEIGHT: 64 IN | HEART RATE: 85 BPM | BODY MASS INDEX: 32.17 KG/M2 | WEIGHT: 188.4 LBS | DIASTOLIC BLOOD PRESSURE: 81 MMHG

## 2018-04-19 DIAGNOSIS — I10 ESSENTIAL HYPERTENSION: ICD-10-CM

## 2018-04-19 DIAGNOSIS — Z31.69 ENCOUNTER FOR PRECONCEPTION CONSULTATION: Primary | ICD-10-CM

## 2018-04-19 PROCEDURE — 99242 OFF/OP CONSLTJ NEW/EST SF 20: CPT | Performed by: OBSTETRICS & GYNECOLOGY

## 2018-04-19 RX ORDER — NIFEDIPINE 30 MG/1
30 TABLET, EXTENDED RELEASE ORAL DAILY
Qty: 30 TABLET | Refills: 11 | Status: SHIPPED | OUTPATIENT
Start: 2018-04-19 | End: 2018-07-23 | Stop reason: ALTCHOICE

## 2018-04-19 NOTE — PATIENT INSTRUCTIONS

## 2018-04-19 NOTE — PROGRESS NOTES
PRECONCEPTION CONSULTATION: MATERNAL-FETAL MEDICINE    Referring physician:   [facility]  [address]  [place], [address]    Reason for consultation:   Chief Complaint   Patient presents with   Michelle Manrique     Dear Dr Ceferino Cowan,    Thank you for referring patient Armaan Gayle for preconception Maternal-Fetal Medicine consultation regarding hypertension  As you know, Ms Derian Wilkerson is a 34y o  year-old para   Her history is as follows:    Past Medical History:   Diagnosis Date    Abdominal pain     Asthma     mild intermittent    GERD (gastroesophageal reflux disease)     History of palpitations     Hypertension     Lower back pain     Trace mitral regurgitation by prior echocardiogram      Past surgical history:   Past Surgical History:   Procedure Laterality Date    CHOLECYSTECTOMY      laparoscopic    HERNIA REPAIR      umbilical    LUMBAR LAMINECTOMY Right 2016    Procedure: Right L5/S1 Metryx microdiscectomy;  Surgeon: Ledy Wilkerson MD;  [facility];  Service:    Tali Hansen DC COLONOSCOPY FLX DX W/COLLJ SPEC WHEN PFRMD N/A 2016    Procedure: COLONOSCOPY;  Surgeon: Lily Hawkins DO;  [facility]; Service: Gastroenterology    DC HYSTEROSCOPY,W/ENDO BX N/A 3/19/2018    Procedure: HYSTEROSCOPY; MYOMECTOMY; ENDOMETRIAL BIOPSY;  Surgeon: Lea Freeman DO;  [facility];  Service: Gynecology    DC INCISE FINGER TENDON SHEATH Right 2017    Procedure: THUMB TRIGGER RELEASE ;  Surgeon: Klarissa Parker DO;  [facility];  Service: Orthopedics    WISDOM TOOTH EXTRACTION         Obstetric history:   OB History      Para Term  AB Living    1              SAB TAB Ectopic Multiple Live Births                     Gynecologic history: Patient's last menstrual period was 2017 (exact date)   4/15/18    Social History     Social History    Marital status: /Civil Union     Spouse name: N/A   Tali Hansen Number of children: N/A    Years of education: N/A     Occupational History    Not on file       Social History Main Topics    Smoking status: Never Smoker    Smokeless tobacco: Never Used    Alcohol use Yes      Comment: socialy    Drug use: No    Sexual activity: Yes     Partners: Male     Birth control/ protection: None     Other Topics Concern    Not on file     Social History Narrative    Exercises 3x week    Pet: dog     Family History   Problem Relation Age of Onset    Diabetes Mother      type2    Hypertension Mother     Diabetes Father      type2    Hypertension Father     Heart attack Maternal Grandfather      acute MI    Arthritis Maternal Grandfather     Stroke Maternal Grandfather      CVA    Cancer Maternal Grandfather     Hyperlipidemia Maternal Grandfather     Arthritis Paternal Grandfather     Stroke Paternal Grandfather      CVA    Cancer Paternal Grandfather     Hyperlipidemia Paternal Grandfather     Heart attack Maternal Uncle      acute MI    Arthritis Family     Stroke Family      CVA    Cancer Family     Hyperlipidemia Family     Stroke Family      CVA    Hyperlipidemia Family          Current Outpatient Prescriptions:     albuterol (PROAIR HFA) 90 mcg/act inhaler, Inhale 2 puffs every 4 (four) hours as needed (When has flare up), Disp: 1 Inhaler, Rfl: 0    amLODIPine (NORVASC) 5 mg tablet, Take 1 tablet (5 mg total) by mouth daily after dinner, Disp: 90 tablet, Rfl: 3    Cholecalciferol (VITAMIN D3 PO), Take 2,000 mg by mouth daily in the early morning, Disp: , Rfl:     Liraglutide -Weight Management 18 MG/3ML SOPN, Inject 3 mg under the skin, Disp: , Rfl:     meloxicam (MOBIC) 15 mg tablet, Take 1 tablet by mouth daily as needed, Disp: , Rfl:     multivitamin (THERAGRAN) TABS, Take 1 tablet by mouth daily after dinner, Disp: , Rfl:     Prenatal Multivit-Min-Fe-FA (PRE- PO), Take 1 tablet by mouth daily in the early morning, Disp: , Rfl:     Probiotic Product (PROBIOTIC ACIDOPHILUS BEADS) CAPS, Take 1 capsule by mouth daily in the early morning, Disp: , Rfl:     azithromycin (ZITHROMAX) 500 MG tablet, Take 250 mg by mouth daily, Disp: , Rfl:     cyclobenzaprine (FLEXERIL) 10 mg tablet, Take 1 tablet by mouth 3 (three) times a day as needed, Disp: , Rfl:     Insulin Pen Needle 32G X 6 MM MISC, by Does not apply route daily for 90 days, Disp: 100 each, Rfl: 3    levothyroxine 50 mcg tablet, Take 50 mcg by mouth daily in the early morning, Disp: , Rfl:     losartan (COZAAR) 25 mg tablet, Take 1 tablet by mouth daily after dinner  , Disp: , Rfl:     No Known Allergies    Exam:  Vitals: Blood pressure 136/81, pulse 85, height 5' 4" (1 626 m), weight 85 5 kg (188 lb 6 4 oz), last menstrual period 2017, unknown if currently breastfeeding  General appearance: alert, well appearing, and in no distress  The remainder of her physical examination was deferred as she was here today for consultation and discussion  Assessment and Plan: Ms Elier Haas is a 34y o  year-old para  here for preconception counseling  By issue:    Problem List Items Addressed This Visit     None        Hypertension   Summary of Recommendations:  1  Change from amlodopine to procardia xl given that there is greater safety data on Procardia during pregnancy  Although amlodipine is also safe to utilized during pregnancy  I provided her with a prescription for Procardia XL 30 mg daily  We discussed the implications of hypertension pregnancy in that there is increased risk for adverse pregnancy outcome, particularly related to hypertensive disorders of pregnancy such as preeclampsia as well as ischemic placental disease which can lead to fetal growth restriction  Unfortunately, antihypertensive therapy does not mitigate this risk of the development of preeclampsia or adverse pregnancy outcome and is reserved primarily for maternal benefit    I recommend antihypertensive therapy when there is persistent hypertension greater than or approaching 160/105 primarily for maternal benefit in the setting of chronic hypertension   surveillance is recommended starting at 32 weeks if the patient's blood pressures are greater than 140/90 or if she is on medications  2   We discussed her genetic screening results  She is a carrier for sickle cell disease and can of an disease  Her  is not  We discussed the low reproductive risks  3   We discussed her weight and I would like to see her lose a few lb before pregnancy but overall she is and has done a good job maintaining relatively good weight  4   We discussed continuing her current dose coenzyme Q10 and prenatal vitamins  5   We discussed the risk reduction for adverse outcomes related to hypertensive disorders of pregnancy in those women who take low-dose aspirin early in pregnancy  I advised her to start low-dose aspirin by 12 weeks gestation once she achieves pregnancy  Please note, I spent approximately 30 minutes of face-to-face time with the patient, greater than 50% of which was spent in counseling and the coordination of care for this patient  A total of [] minutes were spent in this encounter with >50% of the time spent in face-to-face counseling and in coordination of care  At the conclusion of today's encounter, all questions were answered to her satisfaction  Thank you very much for this kind referral and please do not hesitate to contact me with any further questions or concerns      Sincerely,    Rachel Crawford MD  Attending Physician, [facility]

## 2018-05-22 ENCOUNTER — OFFICE VISIT (OUTPATIENT)
Dept: OBGYN CLINIC | Facility: CLINIC | Age: 33
End: 2018-05-22
Payer: COMMERCIAL

## 2018-05-22 VITALS — SYSTOLIC BLOOD PRESSURE: 120 MMHG | DIASTOLIC BLOOD PRESSURE: 70 MMHG | BODY MASS INDEX: 33.03 KG/M2 | WEIGHT: 192.4 LBS

## 2018-05-22 DIAGNOSIS — N76.0 VULVOVAGINITIS: Primary | ICD-10-CM

## 2018-05-22 PROCEDURE — 87660 TRICHOMONAS VAGIN DIR PROBE: CPT | Performed by: OBSTETRICS & GYNECOLOGY

## 2018-05-22 PROCEDURE — 99214 OFFICE O/P EST MOD 30 MIN: CPT | Performed by: OBSTETRICS & GYNECOLOGY

## 2018-05-22 PROCEDURE — 87480 CANDIDA DNA DIR PROBE: CPT | Performed by: OBSTETRICS & GYNECOLOGY

## 2018-05-22 PROCEDURE — 87510 GARDNER VAG DNA DIR PROBE: CPT | Performed by: OBSTETRICS & GYNECOLOGY

## 2018-05-22 RX ORDER — AMLODIPINE BESYLATE 5 MG/1
TABLET ORAL DAILY
COMMUNITY
End: 2018-07-23 | Stop reason: ALTCHOICE

## 2018-05-22 NOTE — PROGRESS NOTES
Assessment/Plan:     Assessment:  Evaluation for white vaginal discharge and vulvar and vaginal itching    Plan:  Pelvic examination was essentially unremarkable except for a light white discharge    Appropriate cultures for yeast and BV were taken    Prescription for Mycolog was given with 3 refills to provide relief for the vulvar discomfort    Patient was told to call should any problems issues or concerns arise in the future    We will call her with the results of the cultures         Diagnoses and all orders for this visit:    Vulvovaginitis  -     VAGINOSIS DNA PROBE (AFFIRM)    Other orders  -     amLODIPine (NORVASC) 5 mg tablet; Daily  -     Probiotic Product (PROBIOTIC-10 PO); Probiotic  -     Multiple Vitamins-Minerals (MULTIVITAMIN ADULT EXTRA C PO); multivitamin          Subjective:  Patient presents today complaining of vaginal white discharge and mild vulvar and vaginal itching     Patient ID: Amalia Kirby is a 28 y o  female  Patient is a 59-year-old black female who presents today complaining of vaginal and vulvar itching along with mild white discharge    She denies any abnormal bleeding    She also denies any significant pelvic or abdominal pain    She also reports normal bowel and bladder habits    She denies any fever shakes or chills as well            Review of Systems   Constitutional: Negative  HENT: Negative  Eyes: Negative  Respiratory: Negative  Cardiovascular: Negative  Gastrointestinal: Negative  Endocrine: Negative  Genitourinary: Negative  Musculoskeletal: Negative  Skin: Negative  Neurological: Negative  Psychiatric/Behavioral: Negative  Objective:     Physical Exam   Constitutional: She is oriented to person, place, and time  She appears well-developed and well-nourished  HENT:   Head: Normocephalic  Neck: Normal range of motion  Neck supple  Pulmonary/Chest: Effort normal    Abdominal: Soft  There is no tenderness   There is no rebound and no guarding  Genitourinary: Uterus normal  Vaginal discharge found  Genitourinary Comments: Pelvic exam revealed mild white vaginal discharge    Culture for yeast and BV taken today    Remainder of the exam was unremarkable    Uterus was normal mid plane and mobile    Adnexae reveal no masses or tenderness       Musculoskeletal: Normal range of motion  Neurological: She is alert and oriented to person, place, and time  Psychiatric: She has a normal mood and affect   Her behavior is normal  Judgment and thought content normal

## 2018-05-22 NOTE — PATIENT INSTRUCTIONS
Topic:  Vaginal discharge with vulvar and vaginal itching    Appropriate cultures were obtained    Prescription for Mycolog was given to the patient    Further treatment will be based upon culture results    Patient will call should any problems issues or concerns arise during the interim

## 2018-05-25 DIAGNOSIS — N76.0 BACTERIAL VAGINOSIS: Primary | ICD-10-CM

## 2018-05-25 DIAGNOSIS — B96.89 BACTERIAL VAGINOSIS: Primary | ICD-10-CM

## 2018-05-25 RX ORDER — METRONIDAZOLE 500 MG/1
TABLET ORAL
Qty: 14 TABLET | Refills: 1 | Status: SHIPPED | OUTPATIENT
Start: 2018-05-25 | End: 2018-05-25

## 2018-06-08 ENCOUNTER — TELEPHONE (OUTPATIENT)
Dept: OBGYN CLINIC | Facility: CLINIC | Age: 33
End: 2018-06-08

## 2018-06-08 DIAGNOSIS — N89.8 VAGINAL ITCHING: Primary | ICD-10-CM

## 2018-06-08 RX ORDER — FLUCONAZOLE 150 MG/1
150 TABLET ORAL ONCE
Qty: 1 TABLET | Refills: 0 | Status: SHIPPED | OUTPATIENT
Start: 2018-06-08 | End: 2018-06-08

## 2018-06-08 NOTE — TELEPHONE ENCOUNTER
Patient e-mailed with question regarding possible yeast infection  States she just finished a course of Metronidazole for BV infection  She is now having itching and burning, and would like an rx for Diflucan  Rx sent to pharmacy  Patient to call if symptoms do not resolve

## 2018-07-19 ENCOUNTER — TRANSCRIBE ORDERS (OUTPATIENT)
Dept: LAB | Facility: HOSPITAL | Age: 33
End: 2018-07-19

## 2018-07-19 ENCOUNTER — APPOINTMENT (OUTPATIENT)
Dept: LAB | Facility: HOSPITAL | Age: 33
End: 2018-07-19
Payer: COMMERCIAL

## 2018-07-19 ENCOUNTER — OFFICE VISIT (OUTPATIENT)
Dept: OBGYN CLINIC | Facility: CLINIC | Age: 33
End: 2018-07-19
Payer: COMMERCIAL

## 2018-07-19 VITALS — WEIGHT: 195.2 LBS | BODY MASS INDEX: 33.51 KG/M2 | SYSTOLIC BLOOD PRESSURE: 110 MMHG | DIASTOLIC BLOOD PRESSURE: 64 MMHG

## 2018-07-19 DIAGNOSIS — N94.9 VAGINAL BURNING: ICD-10-CM

## 2018-07-19 DIAGNOSIS — Z00.8 HEALTH EXAMINATION IN POPULATION SURVEY: ICD-10-CM

## 2018-07-19 DIAGNOSIS — Z00.8 HEALTH EXAMINATION IN POPULATION SURVEY: Primary | ICD-10-CM

## 2018-07-19 DIAGNOSIS — L29.2 VULVAR ITCHING: Primary | ICD-10-CM

## 2018-07-19 LAB
CHOLEST SERPL-MCNC: 201 MG/DL (ref 50–200)
EST. AVERAGE GLUCOSE BLD GHB EST-MCNC: 108 MG/DL
HBA1C MFR BLD: 5.4 % (ref 4.2–6.3)
HDLC SERPL-MCNC: 65 MG/DL (ref 40–60)
LDLC SERPL CALC-MCNC: 106 MG/DL (ref 0–100)
NONHDLC SERPL-MCNC: 136 MG/DL
TRIGL SERPL-MCNC: 150 MG/DL

## 2018-07-19 PROCEDURE — 36415 COLL VENOUS BLD VENIPUNCTURE: CPT

## 2018-07-19 PROCEDURE — 83036 HEMOGLOBIN GLYCOSYLATED A1C: CPT

## 2018-07-19 PROCEDURE — 87480 CANDIDA DNA DIR PROBE: CPT | Performed by: PHYSICIAN ASSISTANT

## 2018-07-19 PROCEDURE — 87510 GARDNER VAG DNA DIR PROBE: CPT | Performed by: PHYSICIAN ASSISTANT

## 2018-07-19 PROCEDURE — 99214 OFFICE O/P EST MOD 30 MIN: CPT | Performed by: PHYSICIAN ASSISTANT

## 2018-07-19 PROCEDURE — 80061 LIPID PANEL: CPT

## 2018-07-19 PROCEDURE — 87660 TRICHOMONAS VAGIN DIR PROBE: CPT | Performed by: PHYSICIAN ASSISTANT

## 2018-07-19 RX ORDER — FLUCONAZOLE 150 MG/1
150 TABLET ORAL ONCE
Qty: 1 TABLET | Refills: 1 | Status: SHIPPED | OUTPATIENT
Start: 2018-07-19 | End: 2018-07-19

## 2018-07-19 NOTE — PATIENT INSTRUCTIONS
Rx for Diflucan sent to pharmacy  Take first dose today  Call [place] [facility] for appointment    F/u for annual exam

## 2018-07-19 NOTE — PROGRESS NOTES
Assessment/Plan:    No problem-specific Assessment & Plan notes found for this encounter  Diagnoses and all orders for this visit:    Vulvar itching  -     fluconazole (DIFLUCAN) 150 mg tablet; Take 1 tablet (150 mg total) by mouth once for 1 dose  -     VAGINOSIS DNA PROBE (AFFIRM)    Vaginal burning  -     fluconazole (DIFLUCAN) 150 mg tablet; Take 1 tablet (150 mg total) by mouth once for 1 dose  -     VAGINOSIS DNA PROBE (AFFIRM)        Vaginal cultures done  We will call with results  Rx for Diflucan 150 mg x 1 dose with 1 refill sent to pharmacy  Patient can take first dose today  Referred to [place] [facility] for evaluation  Discussed using [employer] laundry detergent and hypoallergenic body wash  F/u in 2 weeks for annual Gyn exam     Subjective:      Patient ID: Mariana Rodriguez is a 28 y o  female  Patient is here with complaint of likely yeast infection  She was treated for a yeast infection with Diflucan in early June  Symptoms returned after her most recent period on July 14  She is experiencing vulvovaginal itching and burning  No urinary symptoms, discharge, odor, abnormal bleeding, pelvic pain, abdominal pain, n/v, and fever/chills  No new sexual partners  Has a history of recurrent BV and yeast   [facility] issues  Overdue for yearly Gyn exam         The following portions of the patient's history were reviewed and updated as appropriate: allergies, current medications, past family history, past medical history, past social history, past surgical history and problem list     Review of Systems   Constitutional: Negative for chills and fever  Gastrointestinal: Negative for abdominal distention, abdominal pain, nausea and vomiting  Genitourinary: Negative for difficulty urinating, dysuria, frequency, genital sores, hematuria, menstrual problem, pelvic pain, urgency, vaginal bleeding, vaginal discharge and vaginal pain          Vulvovaginal itching and burning           Objective:      /64   Wt 88 5 kg (195 lb 3 2 oz)   LMP 07/14/2018   BMI 33 51 kg/m²          Physical Exam   Constitutional: She is oriented to person, place, and time  Vital signs are normal  She appears well-developed and well-nourished  Genitourinary: Vagina normal and uterus normal  No labial fusion  There is no rash, tenderness, lesion or injury on the right labia  There is no rash, tenderness, lesion or injury on the left labia  Cervix exhibits no motion tenderness, no discharge and no friability  Right adnexum displays no mass, no tenderness and no fullness  Left adnexum displays no mass, no tenderness and no fullness  No vaginal discharge found  Lymphadenopathy:        Right: No inguinal adenopathy present  Left: No inguinal adenopathy present  Neurological: She is alert and oriented to person, place, and time  Skin: Skin is warm and dry  Psychiatric: She has a normal mood and affect  Her behavior is normal  Judgment and thought content normal    Vitals reviewed

## 2018-07-23 ENCOUNTER — OFFICE VISIT (OUTPATIENT)
Dept: FAMILY MEDICINE CLINIC | Facility: CLINIC | Age: 33
End: 2018-07-23
Payer: COMMERCIAL

## 2018-07-23 ENCOUNTER — TELEPHONE (OUTPATIENT)
Dept: FAMILY MEDICINE CLINIC | Facility: CLINIC | Age: 33
End: 2018-07-23

## 2018-07-23 VITALS
HEIGHT: 64 IN | DIASTOLIC BLOOD PRESSURE: 78 MMHG | BODY MASS INDEX: 32.95 KG/M2 | HEART RATE: 78 BPM | SYSTOLIC BLOOD PRESSURE: 128 MMHG | WEIGHT: 193 LBS | RESPIRATION RATE: 18 BRPM

## 2018-07-23 DIAGNOSIS — K21.9 GASTROESOPHAGEAL REFLUX DISEASE WITHOUT ESOPHAGITIS: ICD-10-CM

## 2018-07-23 DIAGNOSIS — E66.9 OBESITY (BMI 30-39.9): ICD-10-CM

## 2018-07-23 DIAGNOSIS — I10 BENIGN ESSENTIAL HYPERTENSION: Primary | ICD-10-CM

## 2018-07-23 DIAGNOSIS — G43.709 CHRONIC MIGRAINE WITHOUT AURA WITHOUT STATUS MIGRAINOSUS, NOT INTRACTABLE: ICD-10-CM

## 2018-07-23 PROCEDURE — 3008F BODY MASS INDEX DOCD: CPT | Performed by: FAMILY MEDICINE

## 2018-07-23 PROCEDURE — 99214 OFFICE O/P EST MOD 30 MIN: CPT | Performed by: FAMILY MEDICINE

## 2018-07-23 PROCEDURE — 96372 THER/PROPH/DIAG INJ SC/IM: CPT | Performed by: FAMILY MEDICINE

## 2018-07-23 RX ORDER — AMLODIPINE BESYLATE 5 MG/1
5 TABLET ORAL DAILY
Qty: 90 TABLET | Refills: 3 | Status: SHIPPED | OUTPATIENT
Start: 2018-07-23 | End: 2018-12-03 | Stop reason: SDUPTHER

## 2018-07-23 RX ORDER — AMLODIPINE BESYLATE 5 MG/1
TABLET ORAL
Refills: 0 | Status: CANCELLED | OUTPATIENT
Start: 2018-07-23

## 2018-07-23 RX ORDER — AMLODIPINE BESYLATE 5 MG/1
5 TABLET ORAL DAILY
COMMUNITY
End: 2018-07-23 | Stop reason: SDUPTHER

## 2018-07-23 RX ORDER — KETOROLAC TROMETHAMINE 30 MG/ML
30 INJECTION, SOLUTION INTRAMUSCULAR; INTRAVENOUS ONCE
Status: COMPLETED | OUTPATIENT
Start: 2018-07-23 | End: 2018-07-24

## 2018-07-23 RX ORDER — SUMATRIPTAN 50 MG/1
50 TABLET, FILM COATED ORAL ONCE AS NEEDED
Qty: 9 TABLET | Refills: 0 | Status: SHIPPED | OUTPATIENT
Start: 2018-07-23

## 2018-07-23 NOTE — PROGRESS NOTES
Assessment/Plan:         Diagnoses and all orders for this visit:    Benign essential hypertension  -     amLODIPine (NORVASC) 5 mg tablet; Take 1 tablet (5 mg total) by mouth daily    Chronic migraine without aura without status migrainosus, not intractable  -     SUMAtriptan (IMITREX) 50 mg tablet; Take 1 tablet (50 mg total) by mouth once as needed for migraine for up to 1 dose  -     ketorolac (TORADOL) injection 30 mg; Inject 1 mL (30 mg total) into a muscle once     Gastroesophageal reflux disease without esophagitis    Obesity (BMI 30-39 9)  -     Insulin Pen Needle (NOVOFINE) 32G X 6 MM MISC; by Does not apply route daily    Other orders  -     Discontinue: amLODIPine (NORVASC) 5 mg tablet; Take 5 mg by mouth daily      continue current meds  To restart Saxenda for weight loss  Blood pressure doing well on amlodipine    Subjective:      Patient ID: Armaan Gayle is a 28 y o  female  Taking saxenda on and off for her weight loss  Trying to watch portion size   Cutting back on sweets  Headache - Recurrent or Known Dx Migraines   This is a recurrent problem  The current episode started in the past 7 days  The problem occurs 2 to 4 times per day  The problem has been waxing and waning  Associated symptoms include headaches, nausea and a visual change  Pertinent negatives include no abdominal pain, anorexia, arthralgias, change in bowel habit, chest pain, chills, congestion, coughing, diaphoresis, fatigue, fever, joint swelling, myalgias, neck pain, numbness, rash, sore throat, swollen glands, urinary symptoms, vertigo, vomiting or weakness  Nothing aggravates the symptoms  She has tried nothing for the symptoms  The treatment provided mild relief  Nausea   Associated symptoms include headaches, nausea and a visual change   Pertinent negatives include no abdominal pain, anorexia, arthralgias, change in bowel habit, chest pain, chills, congestion, coughing, diaphoresis, fatigue, fever, joint swelling, myalgias, neck pain, numbness, rash, sore throat, swollen glands, urinary symptoms, vertigo, vomiting or weakness  Hypertension   This is a chronic problem  The current episode started more than 1 year ago  The problem has been gradually improving since onset  The problem is controlled  Associated symptoms include headaches  Pertinent negatives include no anxiety, blurred vision, chest pain, neck pain, peripheral edema, PND or shortness of breath  There are no associated agents to hypertension  There are no known risk factors for coronary artery disease  Past treatments include calcium channel blockers  The current treatment provides moderate improvement  There are no compliance problems  There is no history of angina, kidney disease, CAD/MI, PVD or retinopathy  The following portions of the patient's history were reviewed and updated as appropriate: allergies, current medications, past family history, past medical history, past social history, past surgical history and problem list     Review of Systems   Constitutional: Negative for chills, diaphoresis, fatigue and fever  HENT: Negative for congestion and sore throat  Eyes: Negative for blurred vision  Respiratory: Negative for cough and shortness of breath  Cardiovascular: Negative for chest pain and PND  Gastrointestinal: Positive for nausea  Negative for abdominal pain, anorexia, change in bowel habit and vomiting  Musculoskeletal: Negative for arthralgias, joint swelling, myalgias and neck pain  Skin: Negative for rash  Neurological: Positive for headaches  Negative for vertigo, weakness and numbness               Past Medical History:   Diagnosis Date    Abdominal pain     Asthma     mild intermittent    GERD (gastroesophageal reflux disease)     History of palpitations     Hypertension     Lower back pain     Trace mitral regurgitation by prior echocardiogram      Past Surgical History:   Procedure Laterality Date    CHOLECYSTECTOMY      laparoscopic    HERNIA REPAIR      umbilical    LUMBAR LAMINECTOMY Right 9/14/2016    Procedure: Right L5/S1 Metryx microdiscectomy;  Surgeon: Kelli Liang MD;  [facility];  Service:    [facility] DE COLONOSCOPY FLX DX W/COLLJ SPEC WHEN PFRMD N/A 7/7/2016    Procedure: COLONOSCOPY;  Surgeon: Rogelia Prader, DO;  [facility]; Service: Gastroenterology    DE HYSTEROSCOPY,W/ENDO BX N/A 3/19/2018    Procedure: HYSTEROSCOPY; MYOMECTOMY; ENDOMETRIAL BIOPSY;  Surgeon: Camilla Simon DO;  [facility];  Service: Gynecology    DE INCISE FINGER TENDON SHEATH Right 2/28/2017    Procedure: THUMB TRIGGER RELEASE ;  Surgeon: Stacie Haynes DO;  [facility];  Service: Orthopedics    WISDOM TOOTH EXTRACTION       Social History     Social History    Marital status: /Civil Union     Spouse name: N/A    Number of children: N/A    Years of education: N/A     Occupational History    Not on file       Social History Main Topics    Smoking status: Never Smoker    Smokeless tobacco: Never Used    Alcohol use Yes      Comment: socialy    Drug use: No    Sexual activity: Yes     Partners: Male     Birth control/ protection: None     Other Topics Concern    Not on file     Social History Narrative    Exercises 3x week    Pet: dog     Allergies   Allergen Reactions    Other          Family History   Problem Relation Age of Onset    Diabetes Mother         type2    Hypertension Mother     Diabetes Father         type2    Hypertension Father     Heart attack Maternal Grandfather         acute MI    Arthritis Maternal Grandfather     Stroke Maternal Grandfather         CVA    Cancer Maternal Grandfather     Hyperlipidemia Maternal Grandfather     Arthritis Paternal Grandfather     Stroke Paternal Grandfather         CVA    Cancer Paternal Grandfather     Hyperlipidemia Paternal Grandfather     Heart attack Maternal Uncle         acute MI    Arthritis Family     Stroke Family         CVA    Cancer Family     Hyperlipidemia Family     Stroke Family         CVA    Hyperlipidemia Family            Current Outpatient Prescriptions:     albuterol (PROAIR HFA) 90 mcg/act inhaler, Inhale 2 puffs every 4 (four) hours as needed (When has flare up), Disp: 1 Inhaler, Rfl: 0    amLODIPine (NORVASC) 5 mg tablet, Take 1 tablet (5 mg total) by mouth daily, Disp: 90 tablet, Rfl: 3    Cholecalciferol (VITAMIN D3 PO), Take 2,000 mg by mouth daily in the early morning, Disp: , Rfl:     Liraglutide -Weight Management 18 MG/3ML SOPN, Inject 3 mg under the skin, Disp: , Rfl:     meloxicam (MOBIC) 15 mg tablet, Take 1 tablet by mouth daily as needed, Disp: , Rfl:     Multiple Vitamins-Minerals (MULTIVITAMIN ADULT EXTRA C PO), multivitamin, Disp: , Rfl:     multivitamin (THERAGRAN) TABS, Take 1 tablet by mouth daily after dinner, Disp: , Rfl:     nystatin-triamcinolone (MYCOLOG-II) cream, Apply topically 2 (two) times a day, Disp: 30 g, Rfl: 2    Prenatal Multivit-Min-Fe-FA (PRE-AKIRA PO), Take 1 tablet by mouth daily in the early morning, Disp: , Rfl:     Probiotic Product (PROBIOTIC ACIDOPHILUS BEADS) CAPS, Take 1 capsule by mouth daily in the early morning, Disp: , Rfl:     Probiotic Product (PROBIOTIC-10 PO), Probiotic, Disp: , Rfl:     Insulin Pen Needle (NOVOFINE) 32G X 6 MM MISC, by Does not apply route daily, Disp: 100 each, Rfl: 0    SUMAtriptan (IMITREX) 50 mg tablet, Take 1 tablet (50 mg total) by mouth once as needed for migraine for up to 1 dose, Disp: 9 tablet, Rfl: 0    Current Facility-Administered Medications:     ketorolac (TORADOL) injection 30 mg, 30 mg, Intramuscular, Once, Imani Rhodes MD      Objective:      /78 (BP Location: Left arm, Patient Position: Sitting, Cuff Size: Standard)   Pulse 78   Resp 18   Ht 5' 4" (1 626 m)   Wt 87 5 kg (193 lb)   LMP 2018   BMI 33 13 kg/m²          Physical Exam   Constitutional: She is oriented to person, place, and time  She appears well-developed  No distress  HENT:   Head: Normocephalic and atraumatic  Eyes: EOM are normal  Pupils are equal, round, and reactive to light  Neck: Normal range of motion  Neck supple  Cardiovascular: Normal rate and normal heart sounds  Pulmonary/Chest: Effort normal and breath sounds normal    Neurological: She is alert and oriented to person, place, and time  No cranial nerve deficit  Coordination normal    Psychiatric: She has a normal mood and affect   Her behavior is normal

## 2018-07-24 ENCOUNTER — TELEPHONE (OUTPATIENT)
Dept: OBGYN CLINIC | Facility: CLINIC | Age: 33
End: 2018-07-24

## 2018-07-24 DIAGNOSIS — N76.0 BACTERIAL VAGINOSIS: Primary | ICD-10-CM

## 2018-07-24 DIAGNOSIS — B96.89 BACTERIAL VAGINOSIS: Primary | ICD-10-CM

## 2018-07-24 DIAGNOSIS — B37.3 VAGINAL CANDIDIASIS: ICD-10-CM

## 2018-07-24 RX ORDER — FLUCONAZOLE 150 MG/1
150 TABLET ORAL ONCE
Qty: 1 TABLET | Refills: 0 | Status: SHIPPED | OUTPATIENT
Start: 2018-07-24 | End: 2018-07-24

## 2018-07-24 RX ORDER — METRONIDAZOLE 500 MG/1
500 TABLET ORAL EVERY 8 HOURS SCHEDULED
Qty: 15 TABLET | Refills: 0 | Status: SHIPPED | OUTPATIENT
Start: 2018-07-24 | End: 2018-07-29

## 2018-07-24 RX ADMIN — KETOROLAC TROMETHAMINE 30 MG: 30 INJECTION, SOLUTION INTRAMUSCULAR; INTRAVENOUS at 07:47

## 2018-07-24 NOTE — TELEPHONE ENCOUNTER
Spoke with patient regarding vaginal culture results  Positive for BV  Rx for metronidazole 500 mg BID x 5 days sent to pharmacy  Patient also requests a Diflucan as she often gets a yeast infection from antibiotics  Rx sent  Call if symptoms do not resolve

## 2018-08-08 ENCOUNTER — ANNUAL EXAM (OUTPATIENT)
Dept: OBGYN CLINIC | Facility: CLINIC | Age: 33
End: 2018-08-08
Payer: COMMERCIAL

## 2018-08-08 VITALS
SYSTOLIC BLOOD PRESSURE: 118 MMHG | HEIGHT: 63 IN | BODY MASS INDEX: 33.49 KG/M2 | DIASTOLIC BLOOD PRESSURE: 80 MMHG | WEIGHT: 189 LBS

## 2018-08-08 DIAGNOSIS — R10.2 PELVIC PAIN: ICD-10-CM

## 2018-08-08 DIAGNOSIS — Z01.419 PAP SMEAR, AS PART OF ROUTINE GYNECOLOGICAL EXAMINATION: ICD-10-CM

## 2018-08-08 DIAGNOSIS — Z01.419 ENCOUNTER FOR GYNECOLOGICAL EXAMINATION WITHOUT ABNORMAL FINDING: Primary | ICD-10-CM

## 2018-08-08 PROCEDURE — 99395 PREV VISIT EST AGE 18-39: CPT | Performed by: PHYSICIAN ASSISTANT

## 2018-08-08 PROCEDURE — G0145 SCR C/V CYTO,THINLAYER,RESCR: HCPCS | Performed by: PHYSICIAN ASSISTANT

## 2018-08-08 NOTE — PROGRESS NOTES
Assessment/Plan:    No problem-specific Assessment & Plan notes found for this encounter  Diagnoses and all orders for this visit:    Encounter for gynecological examination without abnormal finding    Pap smear, as part of routine gynecological examination  -     Liquid-based pap, screening    Pelvic pain  -     US pelvis complete w transvaginal; Future        Pap done  Patient given slip for pelvic U/S to assess pain  We will call with results  F/u with Dr James Sanchez as planned  If no problems, patient to return in 1 year for routine gyn care  Subjective:      Patient ID: Verner Ruby is a 28 y o  female  Patient is here for yearly Gyn exam   States she is doing well overall  All BV and yeast symptoms have resolved  Periods have been regular every 28-32 days, and bleeding lasts for 5-7 days  She has had heavy bleeding, as well as cramping  Currently under the care of Dr James Sanchez with [facility]  She and her  have gone through two rounds of IUI, as well as one of IVF  The embryos arrested  Her  is going in soon for semen analysis  She is complaining of L sided pelvic pain that has been going on for several days  She is due to get her period in a few days  Has a history of 6 cm ovarian cyst on that side 2 months ago  Patient denies any change in bowel/bladder habits, bloating, abdominal pain, n/v, change in appetite, and thyroid disease  She is performing self-breast exam   Denies new masses, skin changes, nipple discharge, and pain/tenderness  The following portions of the patient's history were reviewed and updated as appropriate: allergies, current medications, past family history, past medical history, past social history, past surgical history and problem list     Review of Systems   Constitutional: Negative for appetite change and unexpected weight change  Cardiovascular:        No masses, skin changes, nipple discharge, and pain/tenderness  Gastrointestinal: Negative for abdominal distention, abdominal pain, constipation, diarrhea, nausea and vomiting  Genitourinary: Positive for pelvic pain (L sided shooting, stabbing)  Negative for difficulty urinating, dysuria, frequency, genital sores, menstrual problem, urgency, vaginal bleeding, vaginal discharge and vaginal pain  Objective:      /80 (BP Location: Left arm, Patient Position: Sitting, Cuff Size: Standard)   Ht 5' 3" (1 6 m)   Wt 85 7 kg (189 lb)   LMP 07/14/2018 (Exact Date)   BMI 33 48 kg/m²          Physical Exam   Constitutional: She is oriented to person, place, and time  Vital signs are normal  She appears well-developed and well-nourished  Neck: No thyromegaly present  Cardiovascular: Normal rate, regular rhythm and normal heart sounds  Exam reveals no gallop and no friction rub  No murmur heard  Pulmonary/Chest: Effort normal and breath sounds normal  Right breast exhibits no inverted nipple, no mass, no nipple discharge, no skin change and no tenderness  Left breast exhibits no inverted nipple, no mass, no nipple discharge, no skin change and no tenderness  Breasts are symmetrical    Abdominal: Soft  Normal appearance and bowel sounds are normal  She exhibits no distension  There is no tenderness  Genitourinary: Vagina normal and uterus normal  No breast swelling, tenderness, discharge or bleeding  No labial fusion  There is no rash, tenderness, lesion or injury on the right labia  There is no rash, tenderness, lesion or injury on the left labia  Cervix exhibits no motion tenderness, no discharge and no friability  Right adnexum displays no mass, no tenderness and no fullness  Left adnexum displays no mass, no tenderness and no fullness  No erythema, tenderness or bleeding in the vagina  No vaginal discharge found  Lymphadenopathy:     She has no cervical adenopathy  Right: No inguinal adenopathy present          Left: No inguinal adenopathy present  Neurological: She is alert and oriented to person, place, and time  Skin: Skin is warm and dry  Psychiatric: She has a normal mood and affect  Her behavior is normal  Judgment and thought content normal    Vitals reviewed

## 2018-08-14 LAB
LAB AP GYN PRIMARY INTERPRETATION: NORMAL
Lab: NORMAL

## 2018-08-17 ENCOUNTER — TELEPHONE (OUTPATIENT)
Dept: OBGYN CLINIC | Facility: CLINIC | Age: 33
End: 2018-08-17

## 2018-09-06 ENCOUNTER — LAB REQUISITION (OUTPATIENT)
Dept: LAB | Facility: HOSPITAL | Age: 33
End: 2018-09-06
Payer: COMMERCIAL

## 2018-09-06 DIAGNOSIS — E34.9 ENDOCRINE DISORDER: ICD-10-CM

## 2018-09-06 DIAGNOSIS — E07.9 DISORDER OF THYROID: ICD-10-CM

## 2018-09-06 DIAGNOSIS — D10.0 BENIGN NEOPLASM OF LIP: ICD-10-CM

## 2018-09-06 DIAGNOSIS — Z31.41 ENCOUNTER FOR FERTILITY TESTING: ICD-10-CM

## 2018-09-06 LAB
PROLACTIN SERPL-MCNC: 26.8 NG/ML
T3FREE SERPL-MCNC: 2.91 PG/ML (ref 2.3–4.2)
T4 FREE SERPL-MCNC: 1.04 NG/DL (ref 0.76–1.46)
TSH SERPL DL<=0.05 MIU/L-ACNC: 1.42 UIU/ML (ref 0.36–3.74)

## 2018-09-06 PROCEDURE — 88305 TISSUE EXAM BY PATHOLOGIST: CPT | Performed by: PATHOLOGY

## 2018-09-06 PROCEDURE — 84443 ASSAY THYROID STIM HORMONE: CPT | Performed by: OBSTETRICS & GYNECOLOGY

## 2018-09-06 PROCEDURE — 84439 ASSAY OF FREE THYROXINE: CPT | Performed by: OBSTETRICS & GYNECOLOGY

## 2018-09-06 PROCEDURE — 84481 FREE ASSAY (FT-3): CPT | Performed by: OBSTETRICS & GYNECOLOGY

## 2018-09-06 PROCEDURE — 84146 ASSAY OF PROLACTIN: CPT | Performed by: OBSTETRICS & GYNECOLOGY

## 2018-09-19 PROBLEM — N97.9 FEMALE INFERTILITY: Status: ACTIVE | Noted: 2018-09-19

## 2018-09-19 PROBLEM — E28.2 POLYCYSTIC OVARIES: Status: ACTIVE | Noted: 2018-09-19

## 2018-09-27 ENCOUNTER — OFFICE VISIT (OUTPATIENT)
Dept: OBGYN CLINIC | Facility: CLINIC | Age: 33
End: 2018-09-27
Payer: COMMERCIAL

## 2018-09-27 VITALS — DIASTOLIC BLOOD PRESSURE: 70 MMHG | BODY MASS INDEX: 34.29 KG/M2 | SYSTOLIC BLOOD PRESSURE: 118 MMHG | WEIGHT: 193.6 LBS

## 2018-09-27 DIAGNOSIS — N89.8 VAGINAL DISCHARGE: Primary | ICD-10-CM

## 2018-09-27 DIAGNOSIS — N89.8 VAGINAL ITCHING: ICD-10-CM

## 2018-09-27 PROCEDURE — 87660 TRICHOMONAS VAGIN DIR PROBE: CPT | Performed by: PHYSICIAN ASSISTANT

## 2018-09-27 PROCEDURE — 87510 GARDNER VAG DNA DIR PROBE: CPT | Performed by: PHYSICIAN ASSISTANT

## 2018-09-27 PROCEDURE — 99214 OFFICE O/P EST MOD 30 MIN: CPT | Performed by: PHYSICIAN ASSISTANT

## 2018-09-27 PROCEDURE — 87480 CANDIDA DNA DIR PROBE: CPT | Performed by: PHYSICIAN ASSISTANT

## 2018-09-27 RX ORDER — LEVOTHYROXINE SODIUM 88 UG/1
88 TABLET ORAL DAILY
COMMUNITY
Start: 2018-09-06 | End: 2020-06-04

## 2018-09-27 NOTE — PROGRESS NOTES
Assessment/Plan:    No problem-specific Assessment & Plan notes found for this encounter  Diagnoses and all orders for this visit:    Vaginal discharge  -     VAGINOSIS DNA PROBE (AFFIRM)    Vaginal itching  -     VAGINOSIS DNA PROBE (AFFIRM)    Other orders  -     levothyroxine 88 mcg tablet;         Vaginal cultures done  We will call with results  Patient can start OTC Monistat 7 day treatment  Call if symptoms worsen or change  Subjective:      Patient ID: Verner Ruby is a 28 y o  female  Patient is here with complaints of a possible vaginal infection  Noticed an increase in yellow discharge 4 days ago  Also complains of vaginal itching and burning; there was some discomfort with intercourse  She denies any urinary symptoms, odor, abnormal bleeding, pelvic pain, abdominal pain, n/v, and fever/chills  No new sexual partners  The following portions of the patient's history were reviewed and updated as appropriate: allergies, current medications, past family history, past medical history, past social history, past surgical history and problem list     Review of Systems   Constitutional: Negative for chills and fever  Gastrointestinal: Negative for abdominal distention, abdominal pain, nausea and vomiting  Genitourinary: Positive for dyspareunia and vaginal discharge  Negative for difficulty urinating, dysuria, frequency, genital sores, hematuria, menstrual problem, pelvic pain, urgency, vaginal bleeding and vaginal pain  Vaginal itching and burning         Objective:      /70   Wt 87 8 kg (193 lb 9 6 oz)   LMP 09/09/2018   BMI 34 29 kg/m²          Physical Exam   Constitutional: She is oriented to person, place, and time  Vital signs are normal  She appears well-developed and well-nourished  Genitourinary: Uterus normal  No labial fusion  There is no rash, tenderness, lesion or injury on the right labia   There is no rash, tenderness, lesion or injury on the left labia  Cervix exhibits no motion tenderness, no discharge and no friability  Right adnexum displays no mass, no tenderness and no fullness  Left adnexum displays no mass, no tenderness and no fullness  No erythema, tenderness or bleeding in the vagina  Vaginal discharge (Moderate, thick, white) found  Lymphadenopathy:        Right: No inguinal adenopathy present  Left: No inguinal adenopathy present  Neurological: She is alert and oriented to person, place, and time  Skin: Skin is warm and dry  Psychiatric: She has a normal mood and affect  Her behavior is normal  Judgment and thought content normal    Vitals reviewed

## 2018-10-02 ENCOUNTER — TELEPHONE (OUTPATIENT)
Dept: OBGYN CLINIC | Facility: CLINIC | Age: 33
End: 2018-10-02

## 2018-10-02 DIAGNOSIS — B96.89 BACTERIAL VAGINOSIS: Primary | ICD-10-CM

## 2018-10-02 DIAGNOSIS — B37.3 VAGINAL CANDIDA: ICD-10-CM

## 2018-10-02 DIAGNOSIS — N76.0 BACTERIAL VAGINOSIS: Primary | ICD-10-CM

## 2018-10-02 RX ORDER — FLUCONAZOLE 150 MG/1
150 TABLET ORAL ONCE
Qty: 1 TABLET | Refills: 0 | Status: SHIPPED | OUTPATIENT
Start: 2018-10-02 | End: 2018-10-02

## 2018-10-02 RX ORDER — METRONIDAZOLE 500 MG/1
500 TABLET ORAL EVERY 12 HOURS SCHEDULED
Qty: 10 TABLET | Refills: 0 | Status: SHIPPED | OUTPATIENT
Start: 2018-10-02 | End: 2018-10-08 | Stop reason: ALTCHOICE

## 2018-10-08 ENCOUNTER — OFFICE VISIT (OUTPATIENT)
Dept: FAMILY MEDICINE CLINIC | Facility: CLINIC | Age: 33
End: 2018-10-08
Payer: COMMERCIAL

## 2018-10-08 VITALS
SYSTOLIC BLOOD PRESSURE: 128 MMHG | BODY MASS INDEX: 32.61 KG/M2 | DIASTOLIC BLOOD PRESSURE: 84 MMHG | WEIGHT: 191 LBS | HEIGHT: 64 IN | HEART RATE: 88 BPM | OXYGEN SATURATION: 99 % | RESPIRATION RATE: 18 BRPM | TEMPERATURE: 98 F

## 2018-10-08 DIAGNOSIS — I49.3 PVC (PREMATURE VENTRICULAR CONTRACTION): ICD-10-CM

## 2018-10-08 DIAGNOSIS — I10 BENIGN ESSENTIAL HYPERTENSION: Primary | ICD-10-CM

## 2018-10-08 DIAGNOSIS — K21.9 GASTROESOPHAGEAL REFLUX DISEASE WITHOUT ESOPHAGITIS: ICD-10-CM

## 2018-10-08 DIAGNOSIS — E66.9 OBESITY (BMI 30-39.9): ICD-10-CM

## 2018-10-08 DIAGNOSIS — N97.9 FEMALE INFERTILITY: ICD-10-CM

## 2018-10-08 DIAGNOSIS — M51.26 HERNIATED LUMBAR INTERVERTEBRAL DISC: ICD-10-CM

## 2018-10-08 DIAGNOSIS — J45.20 ASTHMA IN ADULT, MILD INTERMITTENT, UNCOMPLICATED: ICD-10-CM

## 2018-10-08 PROCEDURE — 99214 OFFICE O/P EST MOD 30 MIN: CPT | Performed by: FAMILY MEDICINE

## 2018-10-08 PROCEDURE — 3079F DIAST BP 80-89 MM HG: CPT | Performed by: FAMILY MEDICINE

## 2018-10-08 PROCEDURE — 3074F SYST BP LT 130 MM HG: CPT | Performed by: FAMILY MEDICINE

## 2018-10-08 RX ORDER — CYCLOBENZAPRINE HCL 10 MG
10 TABLET ORAL 3 TIMES DAILY PRN
Qty: 30 TABLET | Refills: 0 | Status: SHIPPED | OUTPATIENT
Start: 2018-10-08 | End: 2019-11-26 | Stop reason: SDUPTHER

## 2018-10-08 NOTE — PROGRESS NOTES
Assessment/Plan:         Diagnoses and all orders for this visit:    Benign essential hypertension    Asthma in adult, mild intermittent, uncomplicated    Gastroesophageal reflux disease without esophagitis    Obesity (BMI 30-39 9)  -     liraglutide (SAXENDA) injection; Inject 0 3 mL (1 8 mg total) under the skin daily    Female infertility    PVC (premature ventricular contraction)    Herniated lumbar intervertebral disc  -     cyclobenzaprine (FLEXERIL) 10 mg tablet; Take 1 tablet (10 mg total) by mouth 3 (three) times a day as needed for muscle spasms  -     Ambulatory referral to Physical Therapy      to continue current medications  To start PT for her low back   BP well controlled  Seeing cardiologist next month for her PVCs  Asthma well controlled at this time     Subjective:      Patient ID: Shaan Barros is a 28 y o  female  Started on Levothyroxine 88 mcg daily   No side effects from it   Still trying to get pregnant- trying to save money for IVF  Worsening back pain and sciatica symptoms for the last few weeks  No injury or trauma that she could recall     Hypertension   This is a chronic problem  The current episode started more than 1 year ago  The problem has been waxing and waning since onset  The problem is controlled  Pertinent negatives include no anxiety, blurred vision, chest pain, headaches, malaise/fatigue, neck pain, orthopnea, palpitations, peripheral edema, PND, shortness of breath or sweats  There are no associated agents to hypertension  Risk factors for coronary artery disease include obesity  The current treatment provides mild improvement  There are no compliance problems  There is no history of angina, kidney disease, CAD/MI, CVA, heart failure, left ventricular hypertrophy, PVD or retinopathy   There is no history of chronic renal disease, coarctation of the aorta, hyperaldosteronism, hypercortisolism, hyperparathyroidism, a hypertension causing med, pheochromocytoma, renovascular disease, sleep apnea or a thyroid problem  Asthma   There is no chest tightness, cough, difficulty breathing or shortness of breath  This is a chronic problem  The problem has been gradually improving  Pertinent negatives include no appetite change, chest pain, ear pain, headaches, heartburn, malaise/fatigue, nasal congestion, orthopnea, PND, rhinorrhea, sneezing, sore throat, sweats, trouble swallowing or weight loss  She reports minimal improvement on treatment  Her past medical history is significant for asthma  She is doing well 1 8 mg of Saxenda   Higher dose made her nausea    The following portions of the patient's history were reviewed and updated as appropriate: allergies, current medications, past family history, past medical history, past social history, past surgical history and problem list     Review of Systems   Constitutional: Negative  Negative for appetite change, malaise/fatigue and weight loss  HENT: Negative  Negative for ear pain, rhinorrhea, sneezing, sore throat and trouble swallowing  Eyes: Negative  Negative for blurred vision  Respiratory: Negative for cough and shortness of breath  Cardiovascular: Negative for chest pain, palpitations, orthopnea and PND  Gastrointestinal: Negative for heartburn  Endocrine: Negative  Genitourinary: Negative  Musculoskeletal: Negative for neck pain  Neurological: Negative for headaches  Psychiatric/Behavioral: Negative                Past Medical History:   Diagnosis Date    Abdominal pain     Asthma     mild intermittent    GERD (gastroesophageal reflux disease)     History of palpitations     Hypertension     Lower back pain     Trace mitral regurgitation by prior echocardiogram      Past Surgical History:   Procedure Laterality Date    CHOLECYSTECTOMY      laparoscopic    HERNIA REPAIR      umbilical    LUMBAR LAMINECTOMY Right 9/14/2016    Procedure: Right L5/S1 Metryx microdiscectomy;  Surgeon: Kaela Garcia Jude Brian MD;  [facility];  Service:     KS COLONOSCOPY FLX DX W/COLLJ SPEC WHEN PFRMD N/A 7/7/2016    Procedure: COLONOSCOPY;  Surgeon: Alton Ruiz DO;  Location: [facility]; Service: Gastroenterology    KS HYSTEROSCOPY,W/ENDO BX N/A 3/19/2018    Procedure: HYSTEROSCOPY; MYOMECTOMY; ENDOMETRIAL BIOPSY;  Surgeon: Price Mar DO;  [facility];  Service: Gynecology    KS INCISE FINGER TENDON SHEATH Right 2/28/2017    Procedure: THUMB TRIGGER RELEASE ;  Surgeon: Ke Camargo DO;  [facility];  Service: Orthopedics    WISDOM TOOTH EXTRACTION       Social History     Social History    Marital status: /Civil Union     Spouse name: N/A    Number of children: N/A    Years of education: N/A     Occupational History    Not on file       Social History Main Topics    Smoking status: Never Smoker    Smokeless tobacco: Never Used    Alcohol use Yes      Comment: socialy    Drug use: No    Sexual activity: Yes     Partners: Male     Birth control/ protection: None     Other Topics Concern    Not on file     Social History Narrative    Exercises 3x week    Pet: dog     Allergies   Allergen Reactions    Other          Family History   Problem Relation Age of Onset    Diabetes Mother         type2    Hypertension Mother     Diabetes Father         type2    Hypertension Father     Heart attack Maternal Grandfather         acute MI    Arthritis Maternal Grandfather     Stroke Maternal Grandfather         CVA    Cancer Maternal Grandfather     Hyperlipidemia Maternal Grandfather     Arthritis Paternal Grandfather     Stroke Paternal Grandfather         CVA    Cancer Paternal Grandfather     Hyperlipidemia Paternal Grandfather     Heart attack Maternal Uncle         acute MI    Arthritis Family     Stroke Family         CVA    Cancer Family     Hyperlipidemia Family     Stroke Family         CVA    Hyperlipidemia Family            Current Outpatient Prescriptions:     albuterol (PROAIR HFA) 90 mcg/act inhaler, Inhale 2 puffs every 4 (four) hours as needed (When has flare up), Disp: 1 Inhaler, Rfl: 0    amLODIPine (NORVASC) 5 mg tablet, Take 1 tablet (5 mg total) by mouth daily, Disp: 90 tablet, Rfl: 3    Cholecalciferol (VITAMIN D3 PO), Take 2,000 mg by mouth daily in the early morning, Disp: , Rfl:     Insulin Pen Needle (NOVOFINE) 32G X 6 MM MISC, by Does not apply route daily, Disp: 100 each, Rfl: 0    levothyroxine 88 mcg tablet, , Disp: , Rfl:     liraglutide (SAXENDA) injection, Inject 0 3 mL (1 8 mg total) under the skin daily, Disp: 15 pen, Rfl: 0    meloxicam (MOBIC) 15 mg tablet, Take 1 tablet by mouth daily as needed, Disp: , Rfl:     Multiple Vitamins-Minerals (MULTIVITAMIN ADULT EXTRA C PO), multivitamin, Disp: , Rfl:     multivitamin (THERAGRAN) TABS, Take 1 tablet by mouth daily after dinner, Disp: , Rfl:     Prenatal Multivit-Min-Fe-FA (PRE- PO), Take 1 tablet by mouth daily in the early morning, Disp: , Rfl:     Probiotic Product (PROBIOTIC ACIDOPHILUS BEADS) CAPS, Take 1 capsule by mouth daily in the early morning, Disp: , Rfl:     Probiotic Product (PROBIOTIC-10 PO), Probiotic, Disp: , Rfl:     SUMAtriptan (IMITREX) 50 mg tablet, Take 1 tablet (50 mg total) by mouth once as needed for migraine for up to 1 dose, Disp: 9 tablet, Rfl: 0    cyclobenzaprine (FLEXERIL) 10 mg tablet, Take 1 tablet (10 mg total) by mouth 3 (three) times a day as needed for muscle spasms, Disp: 30 tablet, Rfl: 0      Objective:      /84 (BP Location: Left arm, Patient Position: Sitting, Cuff Size: Standard)   Pulse 88   Temp 98 °F (36 7 °C)   Resp 18   Ht 5' 4" (1 626 m)   Wt 86 6 kg (191 lb)   LMP 2018   SpO2 99%   BMI 32 79 kg/m²          Physical Exam   Constitutional: She is oriented to person, place, and time  She appears well-developed and well-nourished  HENT:   Head: Normocephalic and atraumatic     Eyes: Pupils are equal, round, and reactive to light  EOM are normal    Neck: Normal range of motion  Neck supple  Cardiovascular: Normal rate  Pulmonary/Chest: Effort normal and breath sounds normal    Musculoskeletal: She exhibits no tenderness or deformity  Neurological: She is alert and oriented to person, place, and time  She displays normal reflexes  She exhibits normal muscle tone  Psychiatric: She has a normal mood and affect   Her behavior is normal

## 2018-10-25 ENCOUNTER — OFFICE VISIT (OUTPATIENT)
Dept: OBGYN CLINIC | Facility: CLINIC | Age: 33
End: 2018-10-25
Payer: COMMERCIAL

## 2018-10-25 VITALS — WEIGHT: 191.2 LBS | BODY MASS INDEX: 32.82 KG/M2 | DIASTOLIC BLOOD PRESSURE: 72 MMHG | SYSTOLIC BLOOD PRESSURE: 118 MMHG

## 2018-10-25 DIAGNOSIS — N89.8 VAGINAL DISCHARGE: Primary | ICD-10-CM

## 2018-10-25 DIAGNOSIS — N94.9 VAGINAL BURNING: ICD-10-CM

## 2018-10-25 PROCEDURE — 87510 GARDNER VAG DNA DIR PROBE: CPT | Performed by: PHYSICIAN ASSISTANT

## 2018-10-25 PROCEDURE — 87480 CANDIDA DNA DIR PROBE: CPT | Performed by: PHYSICIAN ASSISTANT

## 2018-10-25 PROCEDURE — 99214 OFFICE O/P EST MOD 30 MIN: CPT | Performed by: PHYSICIAN ASSISTANT

## 2018-10-25 PROCEDURE — 87660 TRICHOMONAS VAGIN DIR PROBE: CPT | Performed by: PHYSICIAN ASSISTANT

## 2018-10-25 RX ORDER — METRONIDAZOLE 500 MG/1
500 TABLET ORAL EVERY 12 HOURS SCHEDULED
Qty: 14 TABLET | Refills: 0 | Status: SHIPPED | OUTPATIENT
Start: 2018-10-25 | End: 2018-11-01

## 2018-10-25 RX ORDER — FLUCONAZOLE 150 MG/1
150 TABLET ORAL ONCE
Qty: 1 TABLET | Refills: 1 | Status: SHIPPED | OUTPATIENT
Start: 2018-10-25 | End: 2018-10-25

## 2018-10-25 NOTE — PROGRESS NOTES
Assessment/Plan:    No problem-specific Assessment & Plan notes found for this encounter  Diagnoses and all orders for this visit:    Vaginal discharge  -     metroNIDAZOLE (FLAGYL) 500 mg tablet; Take 1 tablet (500 mg total) by mouth every 12 (twelve) hours for 7 days  -     fluconazole (DIFLUCAN) 150 mg tablet; Take 1 tablet (150 mg total) by mouth once for 1 dose    Vaginal burning  -     metroNIDAZOLE (FLAGYL) 500 mg tablet; Take 1 tablet (500 mg total) by mouth every 12 (twelve) hours for 7 days  -     fluconazole (DIFLUCAN) 150 mg tablet; Take 1 tablet (150 mg total) by mouth once for 1 dose        Vaginal cultures done; we will call with results  Rx for metronidazole 500 mg BID x 7 days sent to pharmacy, as well as rx for Diflucan  Patient can continue probiotic if she wishes  Explained that it may not help  Have blood sugars checked  Call if symptoms worsen or change  Subjective:      Patient ID: Kelsy Ballesteros is a 28 y o  female  Patient here with complaints of recurrent vaginal infection  States symptoms started 10/21 and have been getting worse  Complains of thick, white vaginal discharge, as well as vaginal burning and mild pelvic cramping  Was treated for BV infection 3 weeks ago  She denies any urinary symptoms, itching, odor, abnormal bleeding, abdominal pain, n/v, and fever/chills  No new sexual partners  Thyroid is stable  Taking Saxenda for weight loss, but has not had blood sugars checked recently  Has not started hormonal fertility treatments yet  The following portions of the patient's history were reviewed and updated as appropriate: allergies, current medications, past family history, past medical history, past social history, past surgical history and problem list     Review of Systems   Constitutional: Negative for chills and fever  Gastrointestinal: Negative for abdominal distention, abdominal pain, nausea and vomiting     Genitourinary: Positive for pelvic pain (Mild cramping), vaginal discharge and vaginal pain (Burning)  Negative for difficulty urinating, dysuria, frequency, genital sores, hematuria, menstrual problem, urgency and vaginal bleeding  Objective:      /72   Wt 86 7 kg (191 lb 3 2 oz)   LMP 10/08/2018   BMI 32 82 kg/m²          Physical Exam   Constitutional: She is oriented to person, place, and time  Vital signs are normal  She appears well-developed and well-nourished  Genitourinary: Uterus normal  No labial fusion  There is no rash, tenderness, lesion or injury on the right labia  There is no rash, tenderness, lesion or injury on the left labia  Cervix exhibits no motion tenderness, no discharge and no friability  Right adnexum displays no mass, no tenderness and no fullness  Left adnexum displays no mass, no tenderness and no fullness  No erythema, tenderness or bleeding in the vagina  Vaginal discharge (Moderate, thick, white, curd-like discharge) found  Lymphadenopathy:        Right: No inguinal adenopathy present  Left: No inguinal adenopathy present  Neurological: She is alert and oriented to person, place, and time  Skin: Skin is warm and dry  Psychiatric: She has a normal mood and affect  Her behavior is normal  Judgment and thought content normal    Vitals reviewed

## 2018-10-30 ENCOUNTER — TELEPHONE (OUTPATIENT)
Dept: OBGYN CLINIC | Facility: CLINIC | Age: 33
End: 2018-10-30

## 2018-10-30 NOTE — TELEPHONE ENCOUNTER
Spoke with patient regarding vaginal culture - positive for BV  Patient to finish course of metronidazole  Call if symptoms do not resolve

## 2018-11-13 ENCOUNTER — OFFICE VISIT (OUTPATIENT)
Dept: OBGYN CLINIC | Facility: CLINIC | Age: 33
End: 2018-11-13
Payer: COMMERCIAL

## 2018-11-13 VITALS — DIASTOLIC BLOOD PRESSURE: 72 MMHG | WEIGHT: 188.6 LBS | BODY MASS INDEX: 32.37 KG/M2 | SYSTOLIC BLOOD PRESSURE: 120 MMHG

## 2018-11-13 DIAGNOSIS — N89.8 VAGINAL ITCHING: Primary | ICD-10-CM

## 2018-11-13 DIAGNOSIS — N89.8 VAGINAL DISCHARGE: ICD-10-CM

## 2018-11-13 PROCEDURE — 87480 CANDIDA DNA DIR PROBE: CPT | Performed by: PHYSICIAN ASSISTANT

## 2018-11-13 PROCEDURE — 99214 OFFICE O/P EST MOD 30 MIN: CPT | Performed by: PHYSICIAN ASSISTANT

## 2018-11-13 PROCEDURE — 87510 GARDNER VAG DNA DIR PROBE: CPT | Performed by: PHYSICIAN ASSISTANT

## 2018-11-13 PROCEDURE — 87660 TRICHOMONAS VAGIN DIR PROBE: CPT | Performed by: PHYSICIAN ASSISTANT

## 2018-11-13 RX ORDER — FLUCONAZOLE 150 MG/1
150 TABLET ORAL ONCE
Qty: 1 TABLET | Refills: 1 | Status: SHIPPED | OUTPATIENT
Start: 2018-11-13 | End: 2018-11-13

## 2018-11-13 NOTE — PATIENT INSTRUCTIONS
Rx for Diflucan 150 mg once with 1 refill sent to pharmacy  Take first dose today, and again in 48 hours if symptoms do not resolve

## 2018-11-13 NOTE — PROGRESS NOTES
Assessment/Plan:    No problem-specific Assessment & Plan notes found for this encounter  Diagnoses and all orders for this visit:    Vaginal itching  -     fluconazole (DIFLUCAN) 150 mg tablet; Take 1 tablet (150 mg total) by mouth once for 1 dose  -     VAGINOSIS DNA PROBE (AFFIRM)    Vaginal discharge  -     VAGINOSIS DNA PROBE (AFFIRM)        Vaginal cultures done  We will call with results  Suspect yeast infection due to recent courses of antibiotics  Rx for Diflucan 150 mg once with 1 refill sent to pharmacy  Patient to take medication today, then again in 48 hours if symptoms still present  F/u if symptoms do not resolve  Subjective:      Patient ID: Ching Nevarez is a 28 y o  female  Patient is here with complaints of probable yeast infection  Has had two courses of metronidazole for BV infection in the last 6 weeks  Symptoms resolved after second course  Got her period last week  Patient started to experience vaginal itching, dryness, and burning, as well as a dark brown discharge on 11/10  Symptoms are still present today  Had the discharge this morning  Notes that periods have been heavier since her fibroid surgery; she needs to wear 2 pads on her heaviest days  Not interested in hormonal contraception as she is seeing fertility specialist to try to conceive  She denies any urinary symptoms, odor, pelvic pain, abdominal pain, n/v, and fever/chills  The following portions of the patient's history were reviewed and updated as appropriate: allergies, current medications, past family history, past medical history, past social history, past surgical history and problem list     Review of Systems   Constitutional: Negative for chills and fever  Gastrointestinal: Negative for abdominal distention, abdominal pain, nausea and vomiting  Genitourinary: Positive for menstrual problem (Menorrhagia) and vaginal discharge   Negative for difficulty urinating, dysuria, frequency, genital sores, hematuria, pelvic pain, urgency, vaginal bleeding and vaginal pain  Vaginal itching, burning, dryness         Objective:      /72   Wt 85 5 kg (188 lb 9 6 oz)   LMP 03/22/2017 (Exact Date)   BMI 32 37 kg/m²          Physical Exam   Constitutional: She is oriented to person, place, and time  Vital signs are normal  She appears well-developed and well-nourished  Genitourinary: Vagina normal and uterus normal  No labial fusion  There is no rash, tenderness, lesion or injury on the right labia  There is no rash, tenderness, lesion or injury on the left labia  Cervix exhibits no motion tenderness, no discharge and no friability  Right adnexum displays no mass, no tenderness and no fullness  Left adnexum displays no mass, no tenderness and no fullness  No erythema, tenderness or bleeding in the vagina  No vaginal discharge found  Lymphadenopathy:        Right: No inguinal adenopathy present  Left: No inguinal adenopathy present  Neurological: She is alert and oriented to person, place, and time  Skin: Skin is warm and dry  Psychiatric: She has a normal mood and affect  Her behavior is normal  Judgment and thought content normal    Vitals reviewed

## 2018-11-16 ENCOUNTER — TELEPHONE (OUTPATIENT)
Dept: OBGYN CLINIC | Facility: CLINIC | Age: 33
End: 2018-11-16

## 2018-11-16 NOTE — TELEPHONE ENCOUNTER
Spoke with patient regarding vaginal culture results - negative  Patient took two doses of Diflucan but is still having itching  Recommended external hydrocortisone sparingly  Call if symptoms do not resolve

## 2018-12-03 DIAGNOSIS — I10 BENIGN ESSENTIAL HYPERTENSION: ICD-10-CM

## 2018-12-03 RX ORDER — AMLODIPINE BESYLATE 5 MG/1
5 TABLET ORAL DAILY
Qty: 90 TABLET | Refills: 3 | Status: SHIPPED | OUTPATIENT
Start: 2018-12-03 | End: 2019-04-23 | Stop reason: SDUPTHER

## 2018-12-03 NOTE — TELEPHONE ENCOUNTER
From: Amairani Medina  Sent: 45/5/9516 2:01 PM EST  Subject: Medication Renewal Request    Amairani Medina would like a refill of the following medications:     amLODIPine (NORVASC) 5 mg tablet [Imani Rhodes MD]    Preferred pharmacy: [facility] : [id number]    Comment:

## 2018-12-11 RX ORDER — FLUCONAZOLE 150 MG/1
TABLET ORAL
COMMUNITY
Start: 2018-11-15 | End: 2019-03-11

## 2018-12-12 ENCOUNTER — OFFICE VISIT (OUTPATIENT)
Dept: CARDIOLOGY CLINIC | Facility: CLINIC | Age: 33
End: 2018-12-12
Payer: COMMERCIAL

## 2018-12-12 VITALS
DIASTOLIC BLOOD PRESSURE: 86 MMHG | WEIGHT: 194.8 LBS | BODY MASS INDEX: 33.26 KG/M2 | HEIGHT: 64 IN | SYSTOLIC BLOOD PRESSURE: 122 MMHG | HEART RATE: 95 BPM

## 2018-12-12 DIAGNOSIS — I49.3 PVC (PREMATURE VENTRICULAR CONTRACTION): Primary | ICD-10-CM

## 2018-12-12 PROBLEM — R00.2 PALPITATIONS: Status: ACTIVE | Noted: 2018-12-12

## 2018-12-12 PROCEDURE — 93000 ELECTROCARDIOGRAM COMPLETE: CPT | Performed by: INTERNAL MEDICINE

## 2018-12-12 PROCEDURE — 99213 OFFICE O/P EST LOW 20 MIN: CPT | Performed by: INTERNAL MEDICINE

## 2018-12-19 NOTE — PROGRESS NOTES
Cardiology Follow Up Visit    Ankush Shi  [birth year]  [id number]  [address] [facility] 141  [phone]    1  PVC (premature ventricular contraction)  POCT ECG    Echo stress test w contrast if indicated       Interval History:     Patient presents follow-up history of palpitations  Since last visit, no major interval change  Had tried metoprolol and Cardizem unsuccessfully due to side effects  Holter monitoring overall not terribly revealing, occasional PVC    Admits to chest pain at times well at rest   Never with exertion  Unpredictable, no specific aggravating or relieving factors  Patient Active Problem List   Diagnosis    Lower back pain    Herniated lumbar intervertebral disc    Lumbar radiculopathy, acute    Trigger finger of right thumb    Asthma in adult, mild intermittent, uncomplicated    Benign essential hypertension    Mild mitral regurgitation    Obesity (BMI 30-39  9)    PVC (premature ventricular contraction)    Fibroids, submucosal    GERD (gastroesophageal reflux disease)    Female infertility    Polycystic ovaries    Palpitations     Past Medical History:   Diagnosis Date    Abdominal pain     Asthma     mild intermittent    GERD (gastroesophageal reflux disease)     History of palpitations     Hypertension     Lower back pain     Trace mitral regurgitation by prior echocardiogram      Social History     Social History    Marital status: /Civil Union     Spouse name: N/A    Number of children: N/A    Years of education: N/A     Occupational History    Not on file       Social History Main Topics    Smoking status: Never Smoker    Smokeless tobacco: Never Used    Alcohol use Yes      Comment: socialy    Drug use: No    Sexual activity: Yes     Partners: Male     Birth control/ protection: None     Other Topics Concern    Not on file     Social History Narrative    Exercises 3x week    Pet: dog      Family History   Problem Relation Age of Onset    Diabetes Mother         type2    Hypertension Mother     Diabetes Father         type2    Hypertension Father     Heart attack Maternal Grandfather         acute MI    Arthritis Maternal Grandfather     Stroke Maternal Grandfather         CVA    Cancer Maternal Grandfather     Hyperlipidemia Maternal Grandfather     Arthritis Paternal Grandfather     Stroke Paternal Grandfather         CVA    Cancer Paternal Grandfather     Hyperlipidemia Paternal Grandfather     Heart attack Maternal Uncle         acute MI    Arthritis Family     Stroke Family         CVA    Cancer Family     Hyperlipidemia Family     Stroke Family         CVA    Hyperlipidemia Family      Past Surgical History:   Procedure Laterality Date    CHOLECYSTECTOMY      laparoscopic    HERNIA REPAIR      umbilical    LUMBAR LAMINECTOMY Right 9/14/2016    Procedure: Right L5/S1 Metryx microdiscectomy;  Surgeon: Gauri Monroe MD;  [facility];  Service:    Tio Thompson [place] COLONOSCOPY FLX DX W/COLLJ SPEC WHEN PFRMD N/A 7/7/2016    Procedure: COLONOSCOPY;  Surgeon: Lieutenant Kristine DO;  Location: [facility];   Service: Gastroenterology    [place] HYSTEROSCOPY,W/ENDO BX N/A 3/19/2018    Procedure: HYSTEROSCOPY; MYOMECTOMY; ENDOMETRIAL BIOPSY;  Surgeon: Sapna Naranjo DO;  [facility];  Service: Gynecology    [place] INCISE FINGER TENDON SHEATH Right 2/28/2017    Procedure: THUMB TRIGGER RELEASE ;  Surgeon: Ke Newsome DO;  Location[facility];  Service: Orthopedics    WISDOM TOOTH EXTRACTION         Current Outpatient Prescriptions:     albuterol (PROAIR HFA) 90 mcg/act inhaler, Inhale 2 puffs every 4 (four) hours as needed (When has flare up), Disp: 1 Inhaler, Rfl: 0    amLODIPine (NORVASC) 5 mg tablet, Take 1 tablet (5 mg total) by mouth daily, Disp: 90 tablet, Rfl: 3    Cholecalciferol (VITAMIN D3 PO), Take 2,000 mg by mouth daily in the early morning, Disp: , Rfl:     cyclobenzaprine (FLEXERIL) 10 mg tablet, Take 1 tablet (10 mg total) by mouth 3 (three) times a day as needed for muscle spasms, Disp: 30 tablet, Rfl: 0    levothyroxine 88 mcg tablet, , Disp: , Rfl:     meloxicam (MOBIC) 15 mg tablet, Take 1 tablet by mouth daily as needed, Disp: , Rfl:     Multiple Vitamins-Minerals (MULTIVITAMIN ADULT EXTRA C PO), multivitamin, Disp: , Rfl:     Prenatal Multivit-Min-Fe-FA (PRE- PO), Take 1 tablet by mouth daily in the early morning, Disp: , Rfl:     Probiotic Product (PROBIOTIC ACIDOPHILUS BEADS) CAPS, Take 1 capsule by mouth daily in the early morning, Disp: , Rfl:     SUMAtriptan (IMITREX) 50 mg tablet, Take 1 tablet (50 mg total) by mouth once as needed for migraine for up to 1 dose, Disp: 9 tablet, Rfl: 0    fluconazole (DIFLUCAN) 150 mg tablet, , Disp: , Rfl:     Insulin Pen Needle (NOVOFINE) 32G X 6 MM MISC, by Does not apply route daily (Patient not taking: Reported on 2018 ), Disp: 100 each, Rfl: 0    liraglutide (SAXENDA) injection, Inject 0 3 mL (1 8 mg total) under the skin daily (Patient not taking: Reported on 2018 ), Disp: 15 pen, Rfl: 0    multivitamin (THERAGRAN) TABS, Take 1 tablet by mouth daily after dinner, Disp: , Rfl:     Probiotic Product (PROBIOTIC-10 PO), Probiotic, Disp: , Rfl:   Allergies   Allergen Reactions    Other          Social, Family, Medication history reviewed and updated as necessary      Labs:     Lab Results   Component Value Date     2015    K 3 6 2018     2018    CO2 28 2018    BUN 8 2018    CREATININE 0 90 2018    CREATININE 0 91 08/10/2017    GLUCOSE 85 2015    CALCIUM 8 5 2018       Lab Results   Component Value Date    WBC 11 85 (H) 2018    HGB 12 8 2018    HGB 13 5 2017    HCT 38 6 2018    HCT 40 7 2017     2018     2017 Lab Results   Component Value Date    CHOL 181 06/23/2015    CHOL 187 01/13/2015     Lab Results   Component Value Date    HDL 65 (H) 07/19/2018    HDL 64 (H) 03/21/2018     Lab Results   Component Value Date    LDLCALC 106 (H) 07/19/2018    LDLCALC 82 03/21/2018     No results found for: LDLDIRECT          Lab Results   Component Value Date    TRIG 150 07/19/2018    TRIG 133 03/21/2018       Lab Results   Component Value Date    ALT 32 03/21/2018    ALT 25 08/10/2017    AST 15 03/21/2018    AST 15 08/10/2017       Lab Results   Component Value Date    INR 1 03 09/13/2016       No results found for: NTBNP    Lab Results   Component Value Date    HGBA1C 5 4 07/19/2018    HGBA1C 5 4 03/21/2018    HGBA1C 5 7 08/10/2017           Imaging: Reviewed in epic        Review of Systems:  14 systems reviewed and negative with exception of the above         PHYSICAL EXAM:      Vitals:    12/12/18 1644   BP: 122/86   Pulse: 95     Body mass index is 33 44 kg/m²  Weight (last 2 days)     None          Gen: No acute distress  HEENT: anicteric, mucous membranes moist  Neck: supple, no jugular venous distention, or carotid bruit  Heart: regular, normal s1 and s2, no murmur/rub or gallop  Lungs :clear to auscultation bilaterally, no rales/rhonchi or wheeze  Abdomen: soft nontender, normoactive bowel sounds, no organomegaly  Ext: warm and perfused, normal femoral pulses, no edema, or clubbing  Skin: warm, no rashes  Neuro: AAO x 3, no focal findings  Psychiatric: normal affect  Musculoskeletal: no obvious joint deformities   Discussion/Summary:    1  Stable palpitations with a 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

## 2018-12-29 ENCOUNTER — HOSPITAL ENCOUNTER (EMERGENCY)
Facility: HOSPITAL | Age: 33
Discharge: HOME | End: 2018-12-29
Attending: EMERGENCY MEDICINE | Admitting: EMERGENCY MEDICINE
Payer: COMMERCIAL

## 2018-12-29 VITALS
BODY MASS INDEX: 34.2 KG/M2 | HEART RATE: 74 BPM | TEMPERATURE: 97.8 F | DIASTOLIC BLOOD PRESSURE: 85 MMHG | OXYGEN SATURATION: 99 % | WEIGHT: 193 LBS | RESPIRATION RATE: 18 BRPM | HEIGHT: 63 IN | SYSTOLIC BLOOD PRESSURE: 150 MMHG

## 2018-12-29 DIAGNOSIS — H81.399 PERIPHERAL VERTIGO: Primary | ICD-10-CM

## 2018-12-29 LAB
ATRIAL RATE: 76 BPM
P AXIS: 216 DEGREES
PR INTERVAL: 144 MS
QRS AXIS: 16 DEGREES
QRSD INTERVAL: 72 MS
QT INTERVAL: 360 MS
QTC INTERVAL: 405 MS
T WAVE AXIS: 13 DEGREES
VENTRICULAR RATE: 76 BPM

## 2018-12-29 PROCEDURE — 93010 ELECTROCARDIOGRAM REPORT: CPT | Performed by: INTERNAL MEDICINE

## 2018-12-29 PROCEDURE — 99284 EMERGENCY DEPT VISIT MOD MDM: CPT

## 2018-12-29 PROCEDURE — 93005 ELECTROCARDIOGRAM TRACING: CPT

## 2018-12-29 RX ORDER — ONDANSETRON 4 MG/1
4 TABLET, ORALLY DISINTEGRATING ORAL ONCE
Status: COMPLETED | OUTPATIENT
Start: 2018-12-29 | End: 2018-12-29

## 2018-12-29 RX ORDER — DIAZEPAM 5 MG/1
5 TABLET ORAL EVERY 8 HOURS PRN
Qty: 8 TABLET | Refills: 0 | Status: SHIPPED | OUTPATIENT
Start: 2018-12-29 | End: 2019-04-30 | Stop reason: ALTCHOICE

## 2018-12-29 RX ORDER — DIAZEPAM 5 MG/1
5 TABLET ORAL ONCE
Status: COMPLETED | OUTPATIENT
Start: 2018-12-29 | End: 2018-12-29

## 2018-12-29 RX ORDER — ONDANSETRON 4 MG/1
4 TABLET, ORALLY DISINTEGRATING ORAL EVERY 6 HOURS PRN
Qty: 20 TABLET | Refills: 0 | Status: SHIPPED | OUTPATIENT
Start: 2018-12-29

## 2018-12-29 RX ORDER — MECLIZINE HYDROCHLORIDE 25 MG/1
25 TABLET ORAL ONCE
Status: COMPLETED | OUTPATIENT
Start: 2018-12-29 | End: 2018-12-29

## 2018-12-29 RX ADMIN — MECLIZINE HYDROCHLORIDE 25 MG: 25 TABLET ORAL at 12:42

## 2018-12-29 RX ADMIN — ONDANSETRON 4 MG: 4 TABLET, ORALLY DISINTEGRATING ORAL at 13:24

## 2018-12-29 RX ADMIN — DIAZEPAM 5 MG: 5 TABLET ORAL at 13:42

## 2018-12-29 NOTE — ED NOTES
Patient symptoms not improved, still feels dizzy and is now also nauseous - Dr Kothari made aware     Ysabel Lopez, RN  12/29/18 1422

## 2018-12-29 NOTE — ED PROVIDER NOTES
History  Chief Complaint   Patient presents with    Dizziness     dizziness, since 11am, associated nausea, "the room just satrted spinning"  denied fall or head injury     50-year-old female presents for dizziness  Around 11:00 a m , 1 5 hours prior to presentation patient reports sudden-onset room spinning sensation  She was standing went to a lying flat position had sudden onset of symptoms  Symptoms considerably improved when lying still and worse when turning her head to left or right  Nausea without vomiting  No recent viral illness  Reports left ear fullness but no ear ringing or decreased hearing  No fevers chills cough  No recent trauma or falls  No recent medication changes  Does take medications for hypertension which she was unable to take today  Reports mild unstable gait secondary to room spinning sensation when up walking around  Has not had a fall or required assistance for ambulation  No history of similar symptoms in the past   No other focal numbness tingling weakness  No trouble swallowing difficulty with secretions or change in voice  Prior to Admission Medications   Prescriptions Last Dose Informant Patient Reported? Taking?    Cholecalciferol (VITAMIN D3 PO)  Self Yes No   Sig: Take 2,000 mg by mouth daily in the early morning   Insulin Pen Needle (NOVOFINE) 32G X 6 MM MISC  Self No No   Sig: by Does not apply route daily   Patient not taking: Reported on 2018    Multiple Vitamins-Minerals (MULTIVITAMIN ADULT EXTRA C PO)  Self Yes No   Sig: multivitamin   Prenatal Multivit-Min-Fe-FA (PRE-AKIRA PO)  Self Yes No   Sig: Take 1 tablet by mouth daily in the early morning   Probiotic Product (PROBIOTIC ACIDOPHILUS BEADS) CAPS  Self Yes No   Sig: Take 1 capsule by mouth daily in the early morning   Probiotic Product (PROBIOTIC-10 PO)  Self Yes No   Sig: Probiotic   SUMAtriptan (IMITREX) 50 mg tablet  Self No No   Sig: Take 1 tablet (50 mg total) by mouth once as needed for migraine for up to 1 dose   albuterol (PROAIR HFA) 90 mcg/act inhaler  Self No No   Sig: Inhale 2 puffs every 4 (four) hours as needed (When has flare up)   amLODIPine (NORVASC) 5 mg tablet  Self No No   Sig: Take 1 tablet (5 mg total) by mouth daily   cyclobenzaprine (FLEXERIL) 10 mg tablet  Self No No   Sig: Take 1 tablet (10 mg total) by mouth 3 (three) times a day as needed for muscle spasms   fluconazole (DIFLUCAN) 150 mg tablet  Self Yes No   levothyroxine 88 mcg tablet  Self Yes No   liraglutide (SAXENDA) injection  Self No No   Sig: Inject 0 3 mL (1 8 mg total) under the skin daily   Patient not taking: Reported on 12/12/2018    meloxicam (MOBIC) 15 mg tablet  Self Yes No   Sig: Take 1 tablet by mouth daily as needed   multivitamin (THERAGRAN) TABS  Self Yes No   Sig: Take 1 tablet by mouth daily after dinner      Facility-Administered Medications: None       Past Medical History:   Diagnosis Date    Abdominal pain     Asthma     mild intermittent    GERD (gastroesophageal reflux disease)     History of palpitations     Hypertension     Lower back pain     Trace mitral regurgitation by prior echocardiogram        Past Surgical History:   Procedure Laterality Date    CHOLECYSTECTOMY      laparoscopic    HERNIA REPAIR      umbilical    LUMBAR LAMINECTOMY Right 9/14/2016    Procedure: Right L5/S1 Metryx microdiscectomy;  Surgeon: Jessica Dorman MD;  [facility];  Service:    Maylin Oglesby [place] COLONOSCOPY FLX DX W/COLLJ SPEC WHEN PFRMD N/A 7/7/2016    Procedure: COLONOSCOPY;  Surgeon: Jose Condon DO;  [facility];   Service: Gastroenterology    [place] HYSTEROSCOPY,W/ENDO BX N/A 3/19/2018    Procedure: HYSTEROSCOPY; MYOMECTOMY; ENDOMETRIAL BIOPSY;  Surgeon: Racheal Lacy DO;  [facility];  Service: Gynecology    MT INCISE FINGER TENDON SHEATH Right 2/28/2017    Procedure: THUMB TRIGGER RELEASE ;  Surgeon: Tanmay Poon DO;  [facility];  Service: Orthopedics    ELLEN TOOTH EXTRACTION         Family History   Problem Relation Age of Onset    Diabetes Mother         type2    Hypertension Mother     Diabetes Father         type2    Hypertension Father     Heart attack Maternal Grandfather         acute MI    Arthritis Maternal Grandfather     Stroke Maternal Grandfather         CVA    Cancer Maternal Grandfather     Hyperlipidemia Maternal Grandfather     Arthritis Paternal Grandfather     Stroke Paternal Grandfather         CVA    Cancer Paternal Grandfather     Hyperlipidemia Paternal Grandfather     Heart attack Maternal Uncle         acute MI    Arthritis Family     Stroke Family         CVA    Cancer Family     Hyperlipidemia Family     Stroke Family         CVA    Hyperlipidemia Family      I have reviewed and agree with the history as documented  Social History   Substance Use Topics    Smoking status: Never Smoker    Smokeless tobacco: Never Used    Alcohol use Yes      Comment: socialy        Review of Systems   Constitutional: Negative for chills, fatigue and fever  HENT: Negative for congestion  Eyes: Negative for visual disturbance  Respiratory: Negative for chest tightness and shortness of breath  Cardiovascular: Negative for chest pain and palpitations  Gastrointestinal: Positive for nausea  Negative for abdominal pain, diarrhea and vomiting  Genitourinary: Negative for dysuria, frequency and urgency  Musculoskeletal: Negative for back pain and gait problem  Skin: Negative for rash  Neurological: Positive for dizziness  Negative for syncope, weakness, light-headedness, numbness and headaches         Physical Exam  ED Triage Vitals   Temperature Pulse Respirations Blood Pressure SpO2   12/29/18 1215 12/29/18 1215 12/29/18 1215 12/29/18 1215 12/29/18 1215   97 8 °F (36 6 °C) 86 18 148/77 100 %      Temp Source Heart Rate Source Patient Position - Orthostatic VS BP Location FiO2 (%)   12/29/18 1215 12/29/18 1215 12/29/18 1215 12/29/18 1215 --   Oral Monitor Sitting Left arm       Pain Score       12/29/18 1216       No Pain           Orthostatic Vital Signs  Vitals:    12/29/18 1215 12/29/18 1300 12/29/18 1401   BP: 148/77 148/86 150/85   Pulse: 86 78 74   Patient Position - Orthostatic VS: Sitting  Lying       Physical Exam   Constitutional: She is oriented to person, place, and time  Vital signs are normal  She appears well-developed and well-nourished  She does not appear ill  No distress  HENT:   Head: Normocephalic and atraumatic  Head is without abrasion and without contusion  Right Ear: Tympanic membrane normal    Left Ear: Tympanic membrane normal    Nose: Nose normal    Mouth/Throat: Uvula is midline, oropharynx is clear and moist and mucous membranes are normal    Eyes: Pupils are equal, round, and reactive to light  Conjunctivae and EOM are normal    Neck: Trachea normal, normal range of motion, full passive range of motion without pain and phonation normal  Neck supple  No JVD present  No spinous process tenderness and no muscular tenderness present  Carotid bruit is not present  Normal range of motion present  No thyromegaly present  Cardiovascular: Normal rate, regular rhythm and intact distal pulses  Exam reveals no friction rub  No murmur heard  Pulmonary/Chest: Effort normal and breath sounds normal  No accessory muscle usage or stridor  No tachypnea  No respiratory distress  She has no decreased breath sounds  She has no wheezes  She has no rhonchi  She has no rales  She exhibits no tenderness, no crepitus, no edema and no retraction  Abdominal: Soft  Normal appearance and bowel sounds are normal  She exhibits no distension  There is no tenderness  There is no rigidity, no rebound, no guarding and no CVA tenderness  Musculoskeletal: Normal range of motion  Lymphadenopathy:     She has no cervical adenopathy  Neurological: She is alert and oriented to person, place, and time  She has normal strength  She is not disoriented  No cranial nerve deficit or sensory deficit  She exhibits normal muscle tone  GCS eye subscore is 4  GCS verbal subscore is 5  GCS motor subscore is 6  Unremarkable cranial nerve exam  Pupils 4 mm equal round reactive to light  No nystagmus, normal extraocular motion  5 out of 5 upper and lower extremity strength  No subjective sensory deficits to the face, upper or lower extremity  Normal finger-nose and heel shin  Negative pronator drift  Normal tandem gait  no nystagmus  Normal head impulse testing  Skin: Skin is warm, dry and intact  No rash noted  She is not diaphoretic  Psychiatric: She has a normal mood and affect  Nursing note and vitals reviewed  ED Medications  Medications   meclizine (ANTIVERT) tablet 25 mg (25 mg Oral Given 12/29/18 1242)   ondansetron (ZOFRAN-ODT) dispersible tablet 4 mg (4 mg Oral Given 12/29/18 1324)   diazepam (VALIUM) tablet 5 mg (5 mg Oral Given 12/29/18 1342)       Diagnostic Studies  Results Reviewed     None                 No orders to display         Procedures  Procedures      Phone Consults  ED Phone Contact    ED Course  ED Course as of Dec 29 1640   Sat Dec 29, 2018   1332 Continued sx  Will tx w valium and reassess    1411 Patient reports considerableResolution of vertigo since Valium and complete resolution of nausea  1510 Patient reports complete resolution of symptoms  Ambulate without difficulty  No nausea or vomiting  [facility]  CritCare Time    Disposition  Final diagnoses:   Peripheral vertigo     Time reflects when diagnosis was documented in both MDM as applicable and the Disposition within this note     Time User Action Codes Description Comment    12/29/2018  3:11 PM Mechelle Trevino Add [P19 [id number]] Peripheral vertigo       ED Disposition     ED Disposition Condition Comment    Discharge  Gladystine Florence discharge to home/self care      Condition at discharge: Good        Follow-up Information Follow up With Specialties Details Why Contact Info Additional [address] Emergency Department Emergency Medicine Go to If symptoms worsen [address]  [phone] [facility], [address], Tae Lemus [place], MD Family Medicine Schedule an appointment as soon as possible for a visit in 2 days As needed [address]  [facility] [place] [address] [place]  [phone]             Discharge Medication List as of 12/29/2018  3:13 PM      START taking these medications    Details   diazepam (VALIUM) 5 mg tablet Take 1 tablet (5 mg total) by mouth every 8 (eight) hours as needed (vertigo) for up to 10 days, Starting Sat 12/29/2018, Until Tue 1/8/2019, Print      ondansetron (ZOFRAN-ODT) 4 mg disintegrating tablet Take 1 tablet (4 mg total) by mouth every 6 (six) hours as needed for nausea or vomiting, Starting Sat 12/29/2018, Print         CONTINUE these medications which have NOT CHANGED    Details   albuterol (PROAIR HFA) 90 mcg/act inhaler Inhale 2 puffs every 4 (four) hours as needed (When has flare up), Starting Tue 3/27/2018, Normal      amLODIPine (NORVASC) 5 mg tablet Take 1 tablet (5 mg total) by mouth daily, Starting Mon 12/3/2018, Normal      Cholecalciferol (VITAMIN D3 PO) Take 2,000 mg by mouth daily in the early morning, Historical Med      cyclobenzaprine (FLEXERIL) 10 mg tablet Take 1 tablet (10 mg total) by mouth 3 (three) times a day as needed for muscle spasms, Starting Mon 10/8/2018, Normal      fluconazole (DIFLUCAN) 150 mg tablet Starting Thu 11/15/2018, Historical Med      Insulin Pen Needle (NOVOFINE) 32G X 6 MM MISC by Does not apply route daily, Starting Mon 7/23/2018, Normal      levothyroxine 88 mcg tablet Starting Thu 9/6/2018, Historical Med      liraglutide (SAXENDA) injection Inject 0 3 mL (1 8 mg total) under the skin daily, Starting Mon 10/8/2018, Normal      meloxicam (MOBIC) 15 mg tablet Take 1 tablet by mouth daily as needed, Starting Thu 3/17/2016, Historical Med      Multiple Vitamins-Minerals (MULTIVITAMIN ADULT EXTRA C PO) multivitamin, Historical Med      multivitamin (THERAGRAN) TABS Take 1 tablet by mouth daily after dinner, Historical Med      Prenatal Multivit-Min-Fe-FA (PRE-AKIRA PO) Take 1 tablet by mouth daily in the early morning, Historical Med      !! Probiotic Product (PROBIOTIC ACIDOPHILUS BEADS) CAPS Take 1 capsule by mouth daily in the early morning, Historical Med      !! Probiotic Product (PROBIOTIC-10 PO) Probiotic, Historical Med      SUMAtriptan (IMITREX) 50 mg tablet Take 1 tablet (50 mg total) by mouth once as needed for migraine for up to 1 dose, Starting Mon 2018, Normal       !! - Potential duplicate medications found  Please discuss with provider  No discharge procedures on file  ED Provider  Attending physically available and evaluated Melanie Yani ATWOOD managed the patient along with the ED Attending      Electronically Signed by         Herrera Arellano DO  18 1640

## 2018-12-29 NOTE — DISCHARGE INSTRUCTIONS
Follow-up with the primary care doctor in 2-3 days  Return with any new worsening symptoms, severe headache vision changes blurring vision or double vision symptoms not improve by the medication provided today or any other concerning symptoms  Vertigo   WHAT YOU NEED TO KNOW:   Vertigo is a condition that causes you to feel dizzy  You may feel that you or everything around you is moving or spinning  You may also feel like you are being pulled down or toward your side  DISCHARGE INSTRUCTIONS:   Return to the emergency department if:   · You have a headache and a stiff neck  · You have shaking chills and a fever  · You vomit over and over with no relief  · You have blood, pus, or fluid coming out of your ears  · You are confused  Contact your healthcare provider if:   · Your symptoms do not get better with treatment  · You have questions about your condition or care  Medicines:   · Medicine  may be given to help relieve your symptoms  · Take your medicine as directed  Contact your healthcare provider if you think your medicine is not helping or if you have side effects  Tell him or her if you are allergic to any medicine  Keep a list of the medicines, vitamins, and herbs you take  Include the amounts, and when and why you take them  Bring the list or the pill bottles to follow-up visits  Carry your medicine list with you in case of an emergency  Manage your symptoms:   · Do not drive , walk without help, or operate heavy machinery when you are dizzy  · Move slowly  when you move from one position to another position  Get up slowly from sitting or lying down  Sit or lie down right away if you feel dizzy  · Drink plenty of liquids  Liquids help prevent dehydration  Ask how much liquid to drink each day and which liquids are best for you  · Vestibular and balance rehabilitation therapy (VBRT)  is used to teach you exercises to improve your balance and strength   These exercises may help decrease your vertigo and improve your balance  Ask for more information about this therapy  Follow up with your healthcare provider as directed:  Write down your questions so you remember to ask them during your visits  © 2017 [employer] Information is for End User's use only and may not be sold, redistributed or otherwise used for commercial purposes  All illustrations and images included in CareNotes® are the copyrighted property of [employer]  or Clarence Santamaria  The above information is an  only  It is not intended as medical advice for individual conditions or treatments  Talk to your doctor, nurse or pharmacist before following any medical regimen to see if it is safe and effective for you

## 2018-12-29 NOTE — ED ATTENDING ATTESTATION
Leigh Shabazz MD, saw and evaluated the patient  I have discussed the patient with the resident/non-physician practitioner and agree with the resident's/non-physician practitioner's findings, Plan of Care, and MDM as documented in the resident's/non-physician practitioner's note, except where noted  All available labs and Radiology studies were reviewed  At this point I agree with the current assessment done in the Emergency Department  I have conducted an independent evaluation of this patient a history and physical is as follows:    50-year-old female here with sudden onset of dizziness while leaning back in bed  Patient has had some facial and ear fullness on the left  He has never had symptoms like this before  Denies neck pain, neck manipulation, headache, limb swelling, or stroke risk factors  The patient does not have autoimmune disease  She denies numbness, tingling, weakness, or changes in her vision  She does have nausea, but no vomiting  Review of systems negative in 12 systems reviewed  On exam Vital signs were reviewed  Patient is awake, alert, interactive  The patient's pupils are equally round reactive to light  Oropharynx is clear with moist mucous membranes  Neck is supple and nontender with no adenopathy or JVD  Heart is regular with no murmurs, rubs, or gallops  Lungs are clear and equal with no wheezes, rales, or rhonchi  Abdomen is soft and nontender with no masses, rebound, or guarding  There is no CVA tenderness  The patient was completely exposed  There is no skin breakdown  There are no rashes or skin changes  Extremities are warm and well perfused with good pulses  The patient has normal strength, sensation, and cranial nerves  Impression:  Peripheral vertigo    Will plan to treat symptomatically  Critical Care Time  CritCare Time    Procedures

## 2019-01-16 ENCOUNTER — TELEPHONE (OUTPATIENT)
Dept: NON INVASIVE DIAGNOSTICS | Facility: HOSPITAL | Age: 34
End: 2019-01-16

## 2019-02-06 ENCOUNTER — OFFICE VISIT (OUTPATIENT)
Dept: OBGYN CLINIC | Facility: CLINIC | Age: 34
End: 2019-02-06
Payer: COMMERCIAL

## 2019-02-06 VITALS
HEIGHT: 63 IN | SYSTOLIC BLOOD PRESSURE: 118 MMHG | DIASTOLIC BLOOD PRESSURE: 82 MMHG | WEIGHT: 191 LBS | BODY MASS INDEX: 33.84 KG/M2

## 2019-02-06 DIAGNOSIS — N89.8 VAGINAL ODOR: ICD-10-CM

## 2019-02-06 DIAGNOSIS — N89.8 VAGINAL ITCHING: ICD-10-CM

## 2019-02-06 DIAGNOSIS — N89.8 VAGINAL DISCHARGE: Primary | ICD-10-CM

## 2019-02-06 PROCEDURE — 99214 OFFICE O/P EST MOD 30 MIN: CPT | Performed by: PHYSICIAN ASSISTANT

## 2019-02-06 PROCEDURE — 87660 TRICHOMONAS VAGIN DIR PROBE: CPT | Performed by: PHYSICIAN ASSISTANT

## 2019-02-06 PROCEDURE — 87510 GARDNER VAG DNA DIR PROBE: CPT | Performed by: PHYSICIAN ASSISTANT

## 2019-02-06 PROCEDURE — 87480 CANDIDA DNA DIR PROBE: CPT | Performed by: PHYSICIAN ASSISTANT

## 2019-02-06 NOTE — PATIENT INSTRUCTIONS
Can start OTC Monistat 7 day for itching  Call if pelvic pain worsens  F/u with PCP for screening labs

## 2019-02-06 NOTE — PROGRESS NOTES
Assessment/Plan:    No problem-specific Assessment & Plan notes found for this encounter  Diagnoses and all orders for this visit:    Vaginal discharge  -     VAGINOSIS DNA PROBE (AFFIRM)    Vaginal odor  -     VAGINOSIS DNA PROBE (AFFIRM)    Vaginal itching  -     VAGINOSIS DNA PROBE (AFFIRM)        Vaginal cultures done  We will call with results and treat as necessary  Patient can start OTC Monistat 7 day for itching if she wishes  Monitor pelvic pain; call if it worsens  Recommended f/u with PCP for thyroid labs, vitamin D level, etc   She and her  are currently saving for IVF  If no further problems, patient to return in August for yearly gyn exam       Subjective:      Patient ID: Shaheen Lipscomb is a 28 y o  female  Patient is here with complaint of possible vaginal infection  States symptoms started three days ago  She complains of vaginal discharge, odor, itching, and discomfort  Also notes some pelvic tenderness ; thinks she is currently ovulating  She denies any urinary symptoms, abnormal bleeding, abdominal pain, n/v, and fever/chills  No new sexual partners  Patient states that her libido has decreased recently  The following portions of the patient's history were reviewed and updated as appropriate: allergies, current medications, past family history, past medical history, past social history, past surgical history and problem list     Review of Systems   Constitutional: Negative for chills and fever  Gastrointestinal: Negative for abdominal distention, abdominal pain, nausea and vomiting  Genitourinary: Positive for pelvic pain and vaginal discharge (With itching and odor)  Negative for difficulty urinating, dysuria, frequency, genital sores, hematuria, menstrual problem, urgency, vaginal bleeding and vaginal pain           Objective:      /82 (BP Location: Right arm, Patient Position: Sitting, Cuff Size: Large)   Ht 5' 3" (1 6 m)   Wt 86 6 kg (191 lb) BMI 33 83 kg/m²          Physical Exam   Constitutional: She is oriented to person, place, and time  Vital signs are normal  She appears well-developed and well-nourished  Genitourinary: Vagina normal  No labial fusion  There is no rash, tenderness, lesion or injury on the right labia  There is no rash, tenderness, lesion or injury on the left labia  Uterus is tender  Cervix exhibits no motion tenderness, no discharge and no friability  Right adnexum displays no mass, no tenderness and no fullness  Left adnexum displays no mass, no tenderness and no fullness  No erythema, tenderness or bleeding in the vagina  No vaginal discharge found  Lymphadenopathy:        Right: No inguinal adenopathy present  Left: No inguinal adenopathy present  Neurological: She is alert and oriented to person, place, and time  Skin: Skin is warm and dry  Psychiatric: She has a normal mood and affect  Her behavior is normal  Judgment and thought content normal    Vitals reviewed

## 2019-02-18 ENCOUNTER — TELEPHONE (OUTPATIENT)
Dept: OBGYN CLINIC | Facility: CLINIC | Age: 34
End: 2019-02-18

## 2019-02-19 ENCOUNTER — HOSPITAL ENCOUNTER (OUTPATIENT)
Dept: NON INVASIVE DIAGNOSTICS | Facility: HOSPITAL | Age: 34
Discharge: HOME | End: 2019-02-19
Attending: INTERNAL MEDICINE
Payer: COMMERCIAL

## 2019-02-19 ENCOUNTER — TELEPHONE (OUTPATIENT)
Dept: OBGYN CLINIC | Facility: CLINIC | Age: 34
End: 2019-02-19

## 2019-02-19 DIAGNOSIS — I49.3 PVC (PREMATURE VENTRICULAR CONTRACTION): ICD-10-CM

## 2019-02-19 PROCEDURE — 93351 STRESS TTE COMPLETE: CPT | Performed by: INTERNAL MEDICINE

## 2019-02-19 PROCEDURE — 93350 STRESS TTE ONLY: CPT

## 2019-02-19 NOTE — TELEPHONE ENCOUNTER
Spoke with patient regarding vaginal culture results - negative  Explained that symptoms could be hormonal   If just discharge, no treatment  Can use Monistat for itching  Call with further problems

## 2019-02-21 LAB
CHEST PAIN STATEMENT: NORMAL
MAX DIASTOLIC BP: 84 MMHG
MAX HEART RATE: 187 BPM
MAX PREDICTED HEART RATE: 186 BPM
MAX. SYSTOLIC BP: 180 MMHG
PROTOCOL NAME: NORMAL
REASON FOR TERMINATION: NORMAL
TARGET HR FORMULA: NORMAL
TIME IN EXERCISE PHASE: NORMAL

## 2019-03-06 ENCOUNTER — OFFICE VISIT (OUTPATIENT)
Dept: FAMILY MEDICINE CLINIC | Facility: CLINIC | Age: 34
End: 2019-03-06
Payer: COMMERCIAL

## 2019-03-06 VITALS
HEIGHT: 63 IN | TEMPERATURE: 97.5 F | SYSTOLIC BLOOD PRESSURE: 120 MMHG | HEART RATE: 88 BPM | WEIGHT: 186 LBS | DIASTOLIC BLOOD PRESSURE: 78 MMHG | RESPIRATION RATE: 16 BRPM | OXYGEN SATURATION: 99 % | BODY MASS INDEX: 32.96 KG/M2

## 2019-03-06 DIAGNOSIS — J45.21 MILD INTERMITTENT ASTHMA WITH ACUTE EXACERBATION: Primary | ICD-10-CM

## 2019-03-06 PROCEDURE — 1036F TOBACCO NON-USER: CPT | Performed by: FAMILY MEDICINE

## 2019-03-06 PROCEDURE — 3008F BODY MASS INDEX DOCD: CPT | Performed by: FAMILY MEDICINE

## 2019-03-06 PROCEDURE — 99213 OFFICE O/P EST LOW 20 MIN: CPT | Performed by: FAMILY MEDICINE

## 2019-03-06 RX ORDER — PROMETHAZINE HYDROCHLORIDE AND CODEINE PHOSPHATE 6.25; 1 MG/5ML; MG/5ML
5 SYRUP ORAL EVERY 4 HOURS PRN
Qty: 120 ML | Refills: 0 | Status: SHIPPED | OUTPATIENT
Start: 2019-03-06 | End: 2019-04-17 | Stop reason: SDUPTHER

## 2019-03-06 RX ORDER — AZITHROMYCIN 250 MG/1
TABLET, FILM COATED ORAL
Qty: 6 TABLET | Refills: 0 | Status: SHIPPED | OUTPATIENT
Start: 2019-03-06 | End: 2019-03-10

## 2019-03-06 NOTE — PROGRESS NOTES
Assessment/Plan:         Diagnoses and all orders for this visit:    Mild intermittent asthma with acute exacerbation  -     azithromycin (ZITHROMAX) 250 mg tablet; 2 tabs x 1 day, 1 tab x 4 days  -     promethazine-codeine (PHENERGAN WITH CODEINE) 6 25-10 mg/5 mL syrup; Take 5 mL by mouth every 4 (four) hours as needed for cough    Other orders  -     metFORMIN (GLUCOPHAGE) 1000 MG tablet; Every 12 hours  -     liraglutide (SAXENDA) injection; 0 6 mL Daily          Subjective:      Patient ID: Nikhil Giraldo is a 28 y o  female  URI    This is a new problem  The current episode started in the past 7 days  The problem has been waxing and waning  There has been no fever  Associated symptoms include congestion, coughing, rhinorrhea, sinus pain and wheezing  Pertinent negatives include no abdominal pain, chest pain, diarrhea, dysuria, ear pain, headaches, joint pain, joint swelling, nausea, neck pain, plugged ear sensation, rash, sneezing, sore throat, swollen glands or vomiting  The treatment provided mild relief  The following portions of the patient's history were reviewed and updated as appropriate: allergies, current medications, past family history, past medical history, past social history, past surgical history and problem list     Review of Systems   HENT: Positive for congestion, rhinorrhea and sinus pain  Negative for ear pain, sneezing and sore throat  Respiratory: Positive for cough and wheezing  Cardiovascular: Negative for chest pain  Gastrointestinal: Negative for abdominal pain, diarrhea, nausea and vomiting  Genitourinary: Negative for dysuria  Musculoskeletal: Negative for joint pain and neck pain  Skin: Negative for rash  Neurological: Negative for headaches           Objective:      /78 (BP Location: Left arm, Patient Position: Sitting, Cuff Size: Standard)   Pulse 88   Temp 97 5 °F (36 4 °C)   Resp 16   Ht 5' 3" (1 6 m)   Wt 84 4 kg (186 lb)   SpO2 99%   BMI 32 95 kg/m²          Physical Exam   Constitutional: She appears well-developed and well-nourished  HENT:   Head: Normocephalic and atraumatic  Eyes: Pupils are equal, round, and reactive to light  Neck: Normal range of motion  Neck supple  No tracheal deviation present  No thyromegaly present  Cardiovascular: Normal rate and regular rhythm  No murmur heard  Pulmonary/Chest: Effort normal  She has wheezes  Psychiatric: She has a normal mood and affect   Her behavior is normal

## 2019-03-11 DIAGNOSIS — B37.3 CANDIDA VAGINITIS: Primary | ICD-10-CM

## 2019-03-11 RX ORDER — FLUCONAZOLE 150 MG/1
150 TABLET ORAL ONCE
Qty: 1 TABLET | Refills: 0 | Status: SHIPPED | OUTPATIENT
Start: 2019-03-11 | End: 2019-03-11

## 2019-04-01 ENCOUNTER — LAB REQUISITION (OUTPATIENT)
Dept: LAB | Facility: HOSPITAL | Age: 34
End: 2019-04-01
Payer: COMMERCIAL

## 2019-04-01 DIAGNOSIS — E07.9 DISORDER OF THYROID: ICD-10-CM

## 2019-04-01 LAB
T3FREE SERPL-MCNC: 2.81 PG/ML (ref 2.3–4.2)
T4 FREE SERPL-MCNC: 0.88 NG/DL (ref 0.76–1.46)
TSH SERPL DL<=0.05 MIU/L-ACNC: 2.72 UIU/ML (ref 0.36–3.74)

## 2019-04-01 PROCEDURE — 84481 FREE ASSAY (FT-3): CPT | Performed by: OBSTETRICS & GYNECOLOGY

## 2019-04-01 PROCEDURE — 84443 ASSAY THYROID STIM HORMONE: CPT | Performed by: OBSTETRICS & GYNECOLOGY

## 2019-04-01 PROCEDURE — 84439 ASSAY OF FREE THYROXINE: CPT | Performed by: OBSTETRICS & GYNECOLOGY

## 2019-04-17 ENCOUNTER — OFFICE VISIT (OUTPATIENT)
Dept: FAMILY MEDICINE CLINIC | Facility: CLINIC | Age: 34
End: 2019-04-17
Payer: COMMERCIAL

## 2019-04-17 VITALS
HEART RATE: 89 BPM | TEMPERATURE: 87.6 F | OXYGEN SATURATION: 99 % | DIASTOLIC BLOOD PRESSURE: 80 MMHG | SYSTOLIC BLOOD PRESSURE: 118 MMHG | WEIGHT: 198 LBS | RESPIRATION RATE: 16 BRPM | BODY MASS INDEX: 35.08 KG/M2 | HEIGHT: 63 IN

## 2019-04-17 DIAGNOSIS — J30.1 ALLERGIC RHINITIS DUE TO POLLEN, UNSPECIFIED SEASONALITY: ICD-10-CM

## 2019-04-17 DIAGNOSIS — R05.9 COUGH: Primary | ICD-10-CM

## 2019-04-17 DIAGNOSIS — J45.20 ASTHMA IN ADULT, MILD INTERMITTENT, UNCOMPLICATED: ICD-10-CM

## 2019-04-17 PROCEDURE — 3008F BODY MASS INDEX DOCD: CPT | Performed by: FAMILY MEDICINE

## 2019-04-17 PROCEDURE — 99214 OFFICE O/P EST MOD 30 MIN: CPT | Performed by: FAMILY MEDICINE

## 2019-04-17 RX ORDER — PROMETHAZINE HYDROCHLORIDE AND CODEINE PHOSPHATE 6.25; 1 MG/5ML; MG/5ML
5 SYRUP ORAL EVERY 4 HOURS PRN
Qty: 180 ML | Refills: 0 | Status: SHIPPED | OUTPATIENT
Start: 2019-04-17 | End: 2019-08-29 | Stop reason: ALTCHOICE

## 2019-04-17 RX ORDER — MONTELUKAST SODIUM 10 MG/1
10 TABLET ORAL
Qty: 30 TABLET | Refills: 5 | Status: SHIPPED | OUTPATIENT
Start: 2019-04-17 | End: 2019-08-29

## 2019-04-17 RX ORDER — PREDNISONE 10 MG/1
TABLET ORAL
Qty: 30 TABLET | Refills: 0 | Status: SHIPPED | OUTPATIENT
Start: 2019-04-17 | End: 2019-04-30 | Stop reason: ALTCHOICE

## 2019-04-23 DIAGNOSIS — I10 BENIGN ESSENTIAL HYPERTENSION: ICD-10-CM

## 2019-04-23 RX ORDER — AMLODIPINE BESYLATE 5 MG/1
5 TABLET ORAL DAILY
Qty: 90 TABLET | Refills: 3 | Status: SHIPPED | OUTPATIENT
Start: 2019-04-23 | End: 2019-09-26 | Stop reason: SDUPTHER

## 2019-04-30 ENCOUNTER — OFFICE VISIT (OUTPATIENT)
Dept: FAMILY MEDICINE CLINIC | Facility: CLINIC | Age: 34
End: 2019-04-30
Payer: COMMERCIAL

## 2019-04-30 VITALS
BODY MASS INDEX: 35.47 KG/M2 | SYSTOLIC BLOOD PRESSURE: 122 MMHG | HEIGHT: 63 IN | HEART RATE: 102 BPM | TEMPERATURE: 97.5 F | WEIGHT: 200.2 LBS | RESPIRATION RATE: 18 BRPM | DIASTOLIC BLOOD PRESSURE: 72 MMHG | OXYGEN SATURATION: 98 %

## 2019-04-30 DIAGNOSIS — J45.20 ASTHMA IN ADULT, MILD INTERMITTENT, UNCOMPLICATED: Primary | ICD-10-CM

## 2019-04-30 PROBLEM — E66.09 CLASS 2 OBESITY DUE TO EXCESS CALORIES IN ADULT: Status: ACTIVE | Noted: 2019-04-30

## 2019-04-30 PROBLEM — G43.009 MIGRAINE WITHOUT AURA AND WITHOUT STATUS MIGRAINOSUS, NOT INTRACTABLE: Status: ACTIVE | Noted: 2019-04-30

## 2019-04-30 PROBLEM — E66.812 CLASS 2 OBESITY DUE TO EXCESS CALORIES IN ADULT: Status: ACTIVE | Noted: 2019-04-30

## 2019-04-30 PROCEDURE — 99214 OFFICE O/P EST MOD 30 MIN: CPT | Performed by: FAMILY MEDICINE

## 2019-04-30 RX ORDER — PREDNISONE 10 MG/1
40 TABLET ORAL DAILY
Qty: 20 TABLET | Refills: 0 | Status: SHIPPED | OUTPATIENT
Start: 2019-04-30 | End: 2019-05-05

## 2019-04-30 RX ORDER — ALBUTEROL SULFATE 2.5 MG/3ML
2.5 SOLUTION RESPIRATORY (INHALATION) EVERY 6 HOURS PRN
Qty: 30 VIAL | Refills: 3 | Status: SHIPPED | OUTPATIENT
Start: 2019-04-30

## 2019-04-30 RX ORDER — AZITHROMYCIN 250 MG/1
TABLET, FILM COATED ORAL
Qty: 6 TABLET | Refills: 0 | Status: SHIPPED | OUTPATIENT
Start: 2019-04-30 | End: 2019-05-05

## 2019-05-06 DIAGNOSIS — N76.0 VULVOVAGINITIS: Primary | ICD-10-CM

## 2019-05-06 RX ORDER — FLUCONAZOLE 150 MG/1
150 TABLET ORAL ONCE
Qty: 1 TABLET | Refills: 0 | Status: SHIPPED | OUTPATIENT
Start: 2019-05-06 | End: 2019-05-06

## 2019-06-19 DIAGNOSIS — I10 BENIGN ESSENTIAL HYPERTENSION: Primary | ICD-10-CM

## 2019-06-19 RX ORDER — LABETALOL 200 MG/1
200 TABLET, FILM COATED ORAL 2 TIMES DAILY
Qty: 60 TABLET | Refills: 0 | Status: SHIPPED | OUTPATIENT
Start: 2019-06-19 | End: 2019-08-29

## 2019-06-19 NOTE — TELEPHONE ENCOUNTER
2019     To:  To Whom it may concern:      Re:  Yovana Mendoza  : 1974  MRN:  [id number]    Date of Service:  5/3/2019                             DIAGNOSIS (ICD-9):  Right rotator cuff tendonitis     TREATMENT: Continue home exercise program daily as developed by physical therapist     Medications include:  Tylenol 500mg take 2 tablets up to 3 times per day as needed for pain.    Restrictions: Able to work with no restrictions. Patient released with MMI.    FOLLOW-UP: As needed    If you should have any questions regarding this note, please do not hesitate in calling our office.          Jimy Alberto MD    [address] [facility] [id number]   Spoke with patient regarding vaginal culture results  Positive for BV  Rx for metronidazole 500 mg BID x 5 days sent to pharmacy  Patient requests dose of Diflucan because she always gets a yeast infection with abx use  Rx sent  Call if symptoms do not resolve

## 2019-06-26 ENCOUNTER — TELEPHONE (OUTPATIENT)
Dept: FAMILY MEDICINE CLINIC | Facility: CLINIC | Age: 34
End: 2019-06-26

## 2019-06-26 ENCOUNTER — TELEPHONE (OUTPATIENT)
Dept: PERINATAL CARE | Facility: CLINIC | Age: 34
End: 2019-06-26

## 2019-06-26 DIAGNOSIS — I10 BENIGN ESSENTIAL HYPERTENSION: Primary | ICD-10-CM

## 2019-06-26 RX ORDER — METHYLDOPA 250 MG/1
250 TABLET, FILM COATED ORAL 3 TIMES DAILY
Qty: 90 TABLET | Refills: 0 | Status: SHIPPED | OUTPATIENT
Start: 2019-06-26 | End: 2019-06-28 | Stop reason: DRUGHIGH

## 2019-06-26 NOTE — TELEPHONE ENCOUNTER
HEY THESE PT LEFT A VOICEMAIL  YOU SAW HER BACK ON 4/19/18 FOR PRECONCEPTION TO START IVF PROCESS   SHE WANTS TO KNOW IF YOU CAN PRESCRIBED HIGH BLOOD PRESURE MEDICATION  OR SHOULD SHE CALL HER FERTILITY

## 2019-06-27 ENCOUNTER — TELEPHONE (OUTPATIENT)
Dept: FAMILY MEDICINE CLINIC | Facility: CLINIC | Age: 34
End: 2019-06-27

## 2019-06-28 DIAGNOSIS — I10 BENIGN ESSENTIAL HYPERTENSION: Primary | ICD-10-CM

## 2019-06-28 PROCEDURE — 88305 TISSUE EXAM BY PATHOLOGIST: CPT | Performed by: PATHOLOGY

## 2019-06-28 PROCEDURE — 88342 IMHCHEM/IMCYTCHM 1ST ANTB: CPT | Performed by: PATHOLOGY

## 2019-06-28 RX ORDER — NIFEDIPINE 60 MG/1
60 TABLET, EXTENDED RELEASE ORAL DAILY
Qty: 30 TABLET | Refills: 0 | Status: SHIPPED | OUTPATIENT
Start: 2019-06-28 | End: 2019-07-12 | Stop reason: SDUPTHER

## 2019-06-29 ENCOUNTER — LAB REQUISITION (OUTPATIENT)
Dept: LAB | Facility: HOSPITAL | Age: 34
End: 2019-06-29
Payer: COMMERCIAL

## 2019-06-29 DIAGNOSIS — N71.1 CHRONIC INFLAMMATORY DISEASE OF UTERUS: ICD-10-CM

## 2019-07-01 ENCOUNTER — HOSPITAL ENCOUNTER (EMERGENCY)
Facility: HOSPITAL | Age: 34
Discharge: HOME | End: 2019-07-01
Attending: EMERGENCY MEDICINE | Admitting: EMERGENCY MEDICINE
Payer: COMMERCIAL

## 2019-07-01 ENCOUNTER — APPOINTMENT (EMERGENCY)
Dept: RADIOLOGY | Facility: HOSPITAL | Age: 34
End: 2019-07-01
Payer: COMMERCIAL

## 2019-07-01 VITALS
HEART RATE: 86 BPM | SYSTOLIC BLOOD PRESSURE: 116 MMHG | RESPIRATION RATE: 18 BRPM | TEMPERATURE: 97.2 F | OXYGEN SATURATION: 100 % | BODY MASS INDEX: 36.12 KG/M2 | WEIGHT: 203.93 LBS | DIASTOLIC BLOOD PRESSURE: 69 MMHG

## 2019-07-01 DIAGNOSIS — R07.9 CHEST PAIN: Primary | ICD-10-CM

## 2019-07-01 DIAGNOSIS — R51.9 HEADACHE: ICD-10-CM

## 2019-07-01 LAB
ALBUMIN SERPL BCP-MCNC: 4.2 G/DL (ref 3.5–5)
ALP SERPL-CCNC: 60 U/L (ref 46–116)
ALT SERPL W P-5'-P-CCNC: 33 U/L (ref 12–78)
ANION GAP SERPL CALCULATED.3IONS-SCNC: 5 MMOL/L (ref 4–13)
AST SERPL W P-5'-P-CCNC: 17 U/L (ref 5–45)
ATRIAL RATE: 92 BPM
BASOPHILS # BLD AUTO: 0.05 THOUSANDS/ΜL (ref 0–0.1)
BASOPHILS NFR BLD AUTO: 1 % (ref 0–1)
BILIRUB SERPL-MCNC: 0.24 MG/DL (ref 0.2–1)
BUN SERPL-MCNC: 10 MG/DL (ref 5–25)
CALCIUM SERPL-MCNC: 9.2 MG/DL (ref 8.3–10.1)
CHLORIDE SERPL-SCNC: 104 MMOL/L (ref 100–108)
CO2 SERPL-SCNC: 26 MMOL/L (ref 21–32)
CREAT SERPL-MCNC: 0.83 MG/DL (ref 0.6–1.3)
EOSINOPHIL # BLD AUTO: 0.09 THOUSAND/ΜL (ref 0–0.61)
EOSINOPHIL NFR BLD AUTO: 1 % (ref 0–6)
ERYTHROCYTE [DISTWIDTH] IN BLOOD BY AUTOMATED COUNT: 17.9 % (ref 11.6–15.1)
GFR SERPL CREATININE-BSD FRML MDRD: 106 ML/MIN/1.73SQ M
GLUCOSE SERPL-MCNC: 103 MG/DL (ref 65–140)
HCT VFR BLD AUTO: 42.1 % (ref 34.8–46.1)
HGB BLD-MCNC: 13.4 G/DL (ref 11.5–15.4)
IMM GRANULOCYTES # BLD AUTO: 0.01 THOUSAND/UL (ref 0–0.2)
IMM GRANULOCYTES NFR BLD AUTO: 0 % (ref 0–2)
LYMPHOCYTES # BLD AUTO: 1.87 THOUSANDS/ΜL (ref 0.6–4.47)
LYMPHOCYTES NFR BLD AUTO: 26 % (ref 14–44)
MCH RBC QN AUTO: 24.9 PG (ref 26.8–34.3)
MCHC RBC AUTO-ENTMCNC: 31.8 G/DL (ref 31.4–37.4)
MCV RBC AUTO: 78 FL (ref 82–98)
MONOCYTES # BLD AUTO: 0.43 THOUSAND/ΜL (ref 0.17–1.22)
MONOCYTES NFR BLD AUTO: 6 % (ref 4–12)
NEUTROPHILS # BLD AUTO: 4.82 THOUSANDS/ΜL (ref 1.85–7.62)
NEUTS SEG NFR BLD AUTO: 66 % (ref 43–75)
NRBC BLD AUTO-RTO: 0 /100 WBCS
P AXIS: 46 DEGREES
PLATELET # BLD AUTO: 337 THOUSANDS/UL (ref 149–390)
PMV BLD AUTO: 10.7 FL (ref 8.9–12.7)
POTASSIUM SERPL-SCNC: 3.7 MMOL/L (ref 3.5–5.3)
PR INTERVAL: 120 MS
PROT SERPL-MCNC: 8.1 G/DL (ref 6.4–8.2)
QRS AXIS: 24 DEGREES
QRSD INTERVAL: 70 MS
QT INTERVAL: 326 MS
QTC INTERVAL: 403 MS
RBC # BLD AUTO: 5.38 MILLION/UL (ref 3.81–5.12)
SODIUM SERPL-SCNC: 135 MMOL/L (ref 136–145)
T WAVE AXIS: 6 DEGREES
TROPONIN I SERPL-MCNC: <0.02 NG/ML
VENTRICULAR RATE: 92 BPM
WBC # BLD AUTO: 7.27 THOUSAND/UL (ref 4.31–10.16)

## 2019-07-01 PROCEDURE — 84484 ASSAY OF TROPONIN QUANT: CPT | Performed by: EMERGENCY MEDICINE

## 2019-07-01 PROCEDURE — 80053 COMPREHEN METABOLIC PANEL: CPT | Performed by: EMERGENCY MEDICINE

## 2019-07-01 PROCEDURE — 96375 TX/PRO/DX INJ NEW DRUG ADDON: CPT

## 2019-07-01 PROCEDURE — 93010 ELECTROCARDIOGRAM REPORT: CPT | Performed by: INTERNAL MEDICINE

## 2019-07-01 PROCEDURE — 96361 HYDRATE IV INFUSION ADD-ON: CPT

## 2019-07-01 PROCEDURE — 71046 X-RAY EXAM CHEST 2 VIEWS: CPT

## 2019-07-01 PROCEDURE — 93005 ELECTROCARDIOGRAM TRACING: CPT

## 2019-07-01 PROCEDURE — 36415 COLL VENOUS BLD VENIPUNCTURE: CPT

## 2019-07-01 PROCEDURE — 99284 EMERGENCY DEPT VISIT MOD MDM: CPT | Performed by: EMERGENCY MEDICINE

## 2019-07-01 PROCEDURE — 99285 EMERGENCY DEPT VISIT HI MDM: CPT

## 2019-07-01 PROCEDURE — 85025 COMPLETE CBC W/AUTO DIFF WBC: CPT | Performed by: EMERGENCY MEDICINE

## 2019-07-01 PROCEDURE — 96365 THER/PROPH/DIAG IV INF INIT: CPT

## 2019-07-01 RX ORDER — METOCLOPRAMIDE HYDROCHLORIDE 5 MG/ML
10 INJECTION INTRAMUSCULAR; INTRAVENOUS ONCE
Status: COMPLETED | OUTPATIENT
Start: 2019-07-01 | End: 2019-07-01

## 2019-07-01 RX ORDER — DIPHENHYDRAMINE HYDROCHLORIDE 50 MG/ML
25 INJECTION INTRAMUSCULAR; INTRAVENOUS ONCE
Status: COMPLETED | OUTPATIENT
Start: 2019-07-01 | End: 2019-07-01

## 2019-07-01 RX ORDER — MAGNESIUM SULFATE HEPTAHYDRATE 40 MG/ML
2 INJECTION, SOLUTION INTRAVENOUS ONCE
Status: COMPLETED | OUTPATIENT
Start: 2019-07-01 | End: 2019-07-01

## 2019-07-01 RX ORDER — KETOROLAC TROMETHAMINE 30 MG/ML
30 INJECTION, SOLUTION INTRAMUSCULAR; INTRAVENOUS ONCE
Status: COMPLETED | OUTPATIENT
Start: 2019-07-01 | End: 2019-07-01

## 2019-07-01 RX ADMIN — MAGNESIUM SULFATE HEPTAHYDRATE 2 G: 40 INJECTION, SOLUTION INTRAVENOUS at 14:10

## 2019-07-01 RX ADMIN — DIPHENHYDRAMINE HYDROCHLORIDE 25 MG: 50 INJECTION, SOLUTION INTRAMUSCULAR; INTRAVENOUS at 13:43

## 2019-07-01 RX ADMIN — SODIUM CHLORIDE 1000 ML: 0.9 INJECTION, SOLUTION INTRAVENOUS at 13:31

## 2019-07-01 RX ADMIN — METOCLOPRAMIDE 10 MG: 5 INJECTION, SOLUTION INTRAMUSCULAR; INTRAVENOUS at 13:43

## 2019-07-01 RX ADMIN — KETOROLAC TROMETHAMINE 30 MG: 30 INJECTION, SOLUTION INTRAMUSCULAR at 13:43

## 2019-07-01 NOTE — ED ATTENDING ATTESTATION
Naheed Riley MD, saw and evaluated the patient  I have discussed the patient with the resident/non-physician practitioner and agree with the resident's/non-physician practitioner's findings, Plan of Care, and MDM as documented in the resident's/non-physician practitioner's note, except where noted  All available labs and Radiology studies were reviewed  I was present for key portions of any procedure(s) performed by the resident/non-physician practitioner and I was immediately available to provide assistance  At this point I agree with the current assessment done in the Emergency Department  I have conducted an independent evaluation of this patient a history and physical is as follows:    19-year-old woman presenting with left-sided chest pain and headache were started for a m   Noted her blood pressure to be high as well, recently changed blood pressure medications  Headaches consistent with typical headaches  No recent history of trauma  No focal neuro complaints  Patient awake and alert no acute distress  Heart regular rate rhythm, no murmurs rubs or gallops  Lungs clear to auscultation bilaterally  Head atraumatic and normocephalic  Nonfocal neuro  After treatment with headache patient's blood pressure improved and she now states chest pain and headache resolved  Will discharge with return precautions and follow-up instructions        Critical Care Time  Procedures

## 2019-07-01 NOTE — ED PROVIDER NOTES
History  Chief Complaint   Patient presents with    Chest Pain     pt w2ith hypertensive headache and chest pain      HPI    27 y o  F presents with left sided chest pain, started at 4 AM this AM  States it is constant since 4Am  Not pleuritic in nature  Describes as dull pain  Radiates down left arm  No nausea or vomiting  Did have similar episode about one year ago, related to HTN patient states  Hx of HTN: on nifedipine only currently, was previously on labetalol  Hx of hypothyroidism on levothyroxine  Not on birth control  LMP 3 weeks ago, regular  Did also have an associated Headache alleviated with tylenol, taken at 4AM  Like her usual headaches  HA was gradual onset  No f/c/s  No neck stiffness  No focal neurological symptoms  No temporal artery pain/tenderness  No vision changes  Headaches are not increasing in severity or frequency  Not worse in the AM  No head trauma  No difficulty with speech  No family history of subarachnoid hemorrhage or brain tumor or aneurysm  Tyring for In vitro ferticilzaiton in one month  Prior to Admission Medications   Prescriptions Last Dose Informant Patient Reported? Taking?    Cholecalciferol (VITAMIN D3 PO)  Self Yes No   Sig: Take 2,000 mg by mouth daily in the early morning   NIFEdipine (PROCARDIA XL) 60 mg 24 hr tablet   No No   Sig: Take 1 tablet (60 mg total) by mouth daily   Prenatal Multivit-Min-Fe-FA (PRE- PO)  Self Yes No   Sig: Take 1 tablet by mouth daily in the early morning   SUMAtriptan (IMITREX) 50 mg tablet  Self No No   Sig: Take 1 tablet (50 mg total) by mouth once as needed for migraine for up to 1 dose   albuterol (2 5 mg/3 mL) 0 083 % nebulizer solution   No No   Sig: Take 1 vial (2 5 mg total) by nebulization every 6 (six) hours as needed for wheezing or shortness of breath   albuterol (PROAIR HFA) 90 mcg/act inhaler  Self No No   Sig: Inhale 2 puffs every 4 (four) hours as needed (When has flare up)   amLODIPine (NORVASC) 5 mg tablet No No   Sig: Take 1 tablet (5 mg total) by mouth daily   cyclobenzaprine (FLEXERIL) 10 mg tablet  Self No No   Sig: Take 1 tablet (10 mg total) by mouth 3 (three) times a day as needed for muscle spasms   labetalol (NORMODYNE) 200 mg tablet   No No   Sig: Take 1 tablet (200 mg total) by mouth 2 (two) times a day   levothyroxine 88 mcg tablet  Self Yes No   meloxicam (MOBIC) 15 mg tablet  Self Yes No   Sig: Take 1 tablet by mouth daily as needed   montelukast (SINGULAIR) 10 mg tablet   No No   Sig: Take 1 tablet (10 mg total) by mouth daily at bedtime   multivitamin (THERAGRAN) TABS  Self Yes No   Sig: Take 1 tablet by mouth daily after dinner   ondansetron (ZOFRAN-ODT) 4 mg disintegrating tablet   No No   Sig: Take 1 tablet (4 mg total) by mouth every 6 (six) hours as needed for nausea or vomiting   promethazine-codeine (PHENERGAN WITH CODEINE) 6 25-10 mg/5 mL syrup   No No   Sig: Take 5 mL by mouth every 4 (four) hours as needed for cough      Facility-Administered Medications: None       Past Medical History:   Diagnosis Date    Abdominal pain     Asthma     mild intermittent    GERD (gastroesophageal reflux disease)     History of palpitations     Hypertension     Lower back pain     Trace mitral regurgitation by prior echocardiogram        Past Surgical History:   Procedure Laterality Date    CHOLECYSTECTOMY      laparoscopic    HERNIA REPAIR      umbilical    LUMBAR LAMINECTOMY Right 9/14/2016    Procedure: Right L5/S1 Metryx microdiscectomy;  Surgeon: Willie Back MD;  [facility];  Service:    Madden HI COLONOSCOPY FLX DX W/COLLJ SPEC WHEN PFRMD N/A 7/7/2016    Procedure: COLONOSCOPY;  Surgeon: Marco Skinner DO;  Location: [facility];   Service: Gastroenterology    HI HYSTEROSCOPY,W/ENDO BX N/A 3/19/2018    Procedure: HYSTEROSCOPY; MYOMECTOMY; ENDOMETRIAL BIOPSY;  Surgeon: Jean Mcmahon DO;  [facility];  Service: Gynecology    HI INCISE FINGER TENDON SHEATH Right 2/28/2017 Procedure: THUMB TRIGGER RELEASE ;  Surgeon: Jena Strong DO;  [facility];  Service: Orthopedics    WISDOM TOOTH EXTRACTION         Family History   Problem Relation Age of Onset    Diabetes Mother         type2    Hypertension Mother     Diabetes Father         type2    Hypertension Father     Heart attack Maternal Grandfather         acute MI    Arthritis Maternal Grandfather     Stroke Maternal Grandfather         CVA    Cancer Maternal Grandfather     Hyperlipidemia Maternal Grandfather     Arthritis Paternal Grandfather     Stroke Paternal Grandfather         CVA    Cancer Paternal Grandfather     Hyperlipidemia Paternal Grandfather     Heart attack Maternal Uncle         acute MI    Arthritis Family     Stroke Family         CVA    Cancer Family     Hyperlipidemia Family     Stroke Family         CVA    Hyperlipidemia Family      I have reviewed and agree with the history as documented  Social History     Tobacco Use    Smoking status: Never Smoker    Smokeless tobacco: Never Used   Substance Use Topics    Alcohol use: Yes     Comment: socialy    Drug use: No        Review of Systems   Constitutional: Negative for chills, fatigue and fever  HENT: Negative for sore throat  Eyes: Negative for redness and visual disturbance  Respiratory: Negative for shortness of breath  Cardiovascular: Positive for chest pain  Gastrointestinal: Negative for abdominal pain, diarrhea and nausea  Genitourinary: Negative for difficulty urinating, dysuria and pelvic pain  Musculoskeletal: Negative for back pain  Skin: Negative for rash  Neurological: Positive for headaches  Negative for syncope and weakness  All other systems reviewed and are negative        Physical Exam  ED Triage Vitals   Temperature Pulse Respirations Blood Pressure SpO2   07/01/19 1223 07/01/19 1223 07/01/19 1223 07/01/19 1223 07/01/19 1223   (!) 97 2 °F (36 2 °C) (!) 109 18 (!) 160/103 100 % Temp Source Heart Rate Source Patient Position - Orthostatic VS BP Location FiO2 (%)   07/01/19 1223 07/01/19 1458 07/01/19 1458 07/01/19 1458 --   Tympanic Monitor Lying Right arm       Pain Score       07/01/19 1223       4             Orthostatic Vital Signs  Vitals:    07/01/19 1223 07/01/19 1314 07/01/19 1458   BP: (!) 160/103  116/69   Pulse: (!) 109 90 86   Patient Position - Orthostatic VS:   Lying       Physical Exam   Constitutional: She is oriented to person, place, and time  She appears well-developed and well-nourished  No distress  HENT:   Head: Normocephalic and atraumatic  Eyes: Pupils are equal, round, and reactive to light  Conjunctivae are normal    Neck: Normal range of motion  Cardiovascular: Normal rate, regular rhythm and normal heart sounds  No murmur heard  Pulmonary/Chest: Effort normal and breath sounds normal  No respiratory distress  Abdominal: Soft  Bowel sounds are normal  There is no tenderness  Musculoskeletal: Normal range of motion  Neurological: She is alert and oriented to person, place, and time  No cranial nerve deficit or sensory deficit  She exhibits normal muscle tone  Coordination normal    Mental Status: Alert and oriented to person place time and situation, language fluent with good comprehension and repetition  CN: PERRLA, no papilledema, visual fields full to finger counting bilaterally, extraocular muscles intact without nystagmus  Face symmetrical with full sensation  Hearing in tact to bilateral finger rub  Tongue protrudes midline and palate elevates symmetrically  Sternocleidomastoid and trapezius muscle have full strength bilaterally  Motor: Normal muscle bulk and tone throughout 5/5 strength in upper and lower extremities throughout  No clonus present  Sensory: Sensation intact to light touch  Gait at baseline      Skin: Skin is warm and dry  No rash noted  Psychiatric: She has a normal mood and affect     Nursing note and vitals reviewed        ED Medications  Medications   sodium chloride 0 9 % bolus 1,000 mL (0 mL Intravenous Stopped 7/1/19 1538)   diphenhydrAMINE (BENADRYL) injection 25 mg (25 mg Intravenous Given 7/1/19 1343)   metoclopramide (REGLAN) injection 10 mg (10 mg Intravenous Given 7/1/19 1343)   ketorolac (TORADOL) injection 30 mg (30 mg Intravenous Given 7/1/19 1343)   magnesium sulfate 2 g/50 mL IVPB (premix) 2 g (0 g Intravenous Stopped 7/1/19 1538)       Diagnostic Studies  Results Reviewed     Procedure Component Value Units Date/Time    Troponin I [[id number]]  (Normal) Collected:  07/01/19 1235    Lab Status:  Final result Specimen:  Blood from Arm, Left Updated:  07/01/19 1327     Troponin I <0 02 ng/mL     Comprehensive metabolic panel [[id number]]  (Abnormal) Collected:  07/01/19 1235    Lab Status:  Final result Specimen:  Blood from Arm, Left Updated:  07/01/19 1311     Sodium 135 mmol/L      Potassium 3 7 mmol/L      Chloride 104 mmol/L      CO2 26 mmol/L      ANION GAP 5 mmol/L      BUN 10 mg/dL      Creatinine 0 83 mg/dL      Glucose 103 mg/dL      Calcium 9 2 mg/dL      AST 17 U/L      ALT 33 U/L      Alkaline Phosphatase 60 U/L      Total Protein 8 1 g/dL      Albumin 4 2 g/dL      Total Bilirubin 0 24 mg/dL      eGFR 106 ml/min/1 73sq m     Narrative:       [facility]sa guidelines for Chronic Kidney Disease (CKD):     Stage 1 with normal or high GFR (GFR > 90 mL/min/1 73 square meters)    Stage 2 Mild CKD (GFR = 60-89 mL/min/1 73 square meters)    Stage 3A Moderate CKD (GFR = 45-59 mL/min/1 73 square meters)    Stage 3B Moderate CKD (GFR = 30-44 mL/min/1 73 square meters)    Stage 4 Severe CKD (GFR = 15-29 mL/min/1 73 square meters)    Stage 5 End Stage CKD (GFR <15 mL/min/1 73 square meters)  Note: GFR calculation is accurate only with a steady state creatinine    CBC and differential [[id number]]  (Abnormal) Collected:  07/01/19 1235    Lab Status:  Final result Specimen:  Blood from Arm, Left Updated:  07/01/19 1249     WBC 7 27 Thousand/uL      RBC 5 38 Million/uL      Hemoglobin 13 4 g/dL      Hematocrit 42 1 %      MCV 78 fL      MCH 24 9 pg      MCHC 31 8 g/dL      RDW 17 9 %      MPV 10 7 fL      Platelets 665 Thousands/uL      nRBC 0 /100 WBCs      Neutrophils Relative 66 %      Immat GRANS % 0 %      Lymphocytes Relative 26 %      Monocytes Relative 6 %      Eosinophils Relative 1 %      Basophils Relative 1 %      Neutrophils Absolute 4 82 Thousands/µL      Immature Grans Absolute 0 01 Thousand/uL      Lymphocytes Absolute 1 87 Thousands/µL      Monocytes Absolute 0 43 Thousand/µL      Eosinophils Absolute 0 09 Thousand/µL      Basophils Absolute 0 05 Thousands/µL                  XR chest 2 views   Final Result by Rahel Styles MD (07/01 0312)      No acute cardiopulmonary disease  Workstation performed: [id number]               Procedures  Procedures        ED Course  ED Course as of Jul 01 2351   Mon Jul 01, 2019   1300 WBC: 7 27   1339 Troponin I: <0 02         MDM    27 y o  F who presents to ED for evaluation of chest pain and headache  Cardiac work up, rule out acs   symptomatic treatment for headache  Patient does not have alarm symptoms  HTN likely from pain, will treat and re evaluate  Low suspicion for hypertensive emergency  Labs within normal limits  No signs of end organ damage  Headache improved after migraine cocktail  Patient feels significantly better, discharged with return precautions and follow up with PCP  The patient was instructed to follow up as documented  Strict return precautions were discussed with the patient and the patient was instructed to return to the emergency department immediately if symptoms worsen  The patient/patient family member acknowledged and were in agreement with plan         Disposition  Final diagnoses:   Chest pain   Headache     Time reflects when diagnosis was documented in both MDM as applicable and the Disposition within this note     Time User Action Codes Description Comment    7/1/2019  3:28 PM Charo Corti Add [R07 9] Chest pain     7/1/2019  3:29 PM Charo Corti Add [R51] Headache       ED Disposition     ED Disposition Condition Date/Time Comment    Discharge Stable Mon Jul 1, 2019  7:73 PM Radha Denise discharge to home/self care              Follow-up Information     Follow up With Specialties Details Why Contact Info    Nisha Ahn MD Family Medicine Schedule an appointment as soon as possible for a visit in 1 week For follow up regarding your symptoms and recheck regarding blood pressure [address]  [address]  [phone]            Discharge Medication List as of 7/1/2019  3:29 PM      CONTINUE these medications which have NOT CHANGED    Details   albuterol (2 5 mg/3 mL) 0 083 % nebulizer solution Take 1 vial (2 5 mg total) by nebulization every 6 (six) hours as needed for wheezing or shortness of breath, Starting Tue 4/30/2019, Normal      albuterol (PROAIR HFA) 90 mcg/act inhaler Inhale 2 puffs every 4 (four) hours as needed (When has flare up), Starting Tue 3/27/2018, Normal      amLODIPine (NORVASC) 5 mg tablet Take 1 tablet (5 mg total) by mouth daily, Starting Tue 4/23/2019, Normal      Cholecalciferol (VITAMIN D3 PO) Take 2,000 mg by mouth daily in the early morning, Historical Med      cyclobenzaprine (FLEXERIL) 10 mg tablet Take 1 tablet (10 mg total) by mouth 3 (three) times a day as needed for muscle spasms, Starting Mon 10/8/2018, Normal      labetalol (NORMODYNE) 200 mg tablet Take 1 tablet (200 mg total) by mouth 2 (two) times a day, Starting Wed 6/19/2019, Normal      levothyroxine 88 mcg tablet Starting Thu 9/6/2018, Historical Med      meloxicam (MOBIC) 15 mg tablet Take 1 tablet by mouth daily as needed, Starting Thu 3/17/2016, Historical Med      montelukast (SINGULAIR) 10 mg tablet Take 1 tablet (10 mg total) by mouth daily at bedtime, Starting Wed 2019, Normal      multivitamin (THERAGRAN) TABS Take 1 tablet by mouth daily after dinner, Historical Med      NIFEdipine (PROCARDIA XL) 60 mg 24 hr tablet Take 1 tablet (60 mg total) by mouth daily, Starting 2019, Normal      ondansetron (ZOFRAN-ODT) 4 mg disintegrating tablet Take 1 tablet (4 mg total) by mouth every 6 (six) hours as needed for nausea or vomiting, Starting Sat 2018, Print      Prenatal Multivit-Min-Fe-FA (PRE- PO) Take 1 tablet by mouth daily in the early morning, Historical Med      promethazine-codeine (PHENERGAN WITH CODEINE) 6 25-10 mg/5 mL syrup Take 5 mL by mouth every 4 (four) hours as needed for cough, Starting Wed 2019, Normal      SUMAtriptan (IMITREX) 50 mg tablet Take 1 tablet (50 mg total) by mouth once as needed for migraine for up to 1 dose, Starting Mon 2018, Normal           No discharge procedures on file  ED Provider  Attending physically available and evaluated Stephanie Moreira I managed the patient along with the ED Attending      Electronically Signed by         Elvis Monique MD  [phone]

## 2019-07-12 DIAGNOSIS — I10 BENIGN ESSENTIAL HYPERTENSION: ICD-10-CM

## 2019-07-12 RX ORDER — NIFEDIPINE 60 MG/1
60 TABLET, EXTENDED RELEASE ORAL DAILY
Qty: 30 TABLET | Refills: 0 | Status: SHIPPED | OUTPATIENT
Start: 2019-07-12 | End: 2019-08-01

## 2019-07-16 ENCOUNTER — OFFICE VISIT (OUTPATIENT)
Dept: OBGYN CLINIC | Facility: CLINIC | Age: 34
End: 2019-07-16
Payer: COMMERCIAL

## 2019-07-16 VITALS — WEIGHT: 202.3 LBS | DIASTOLIC BLOOD PRESSURE: 60 MMHG | SYSTOLIC BLOOD PRESSURE: 110 MMHG | BODY MASS INDEX: 35.84 KG/M2

## 2019-07-16 DIAGNOSIS — N89.8 VAGINAL ITCHING: ICD-10-CM

## 2019-07-16 DIAGNOSIS — N89.8 VAGINAL DISCHARGE: Primary | ICD-10-CM

## 2019-07-16 PROCEDURE — 87510 GARDNER VAG DNA DIR PROBE: CPT | Performed by: PHYSICIAN ASSISTANT

## 2019-07-16 PROCEDURE — 87480 CANDIDA DNA DIR PROBE: CPT | Performed by: PHYSICIAN ASSISTANT

## 2019-07-16 PROCEDURE — 87660 TRICHOMONAS VAGIN DIR PROBE: CPT | Performed by: PHYSICIAN ASSISTANT

## 2019-07-16 PROCEDURE — 99214 OFFICE O/P EST MOD 30 MIN: CPT | Performed by: PHYSICIAN ASSISTANT

## 2019-07-16 NOTE — PROGRESS NOTES
Assessment/Plan:    No problem-specific Assessment & Plan notes found for this encounter  Diagnoses and all orders for this visit:    Vaginal discharge  -     VAGINOSIS DNA PROBE (AFFIRM)    Vaginal itching  -     VAGINOSIS DNA PROBE (AFFIRM)        Vaginal cultures done  We will call with results and treat as necessary  Patient can try OTC Monistat 7 day if she wishes  Infection could be from vaginal changes related to Lupron  F/u in 1 month for yearly gyn exam     Subjective:      Patient ID: Amalia Kirby is a 27 y o  female  Patient is here with possible vaginal infection  States symptoms started several days ago  Complains of vulvovaginal itching, discharge, and odor  She denies any urinary symptoms, pelvic pain, abdomina pain, n/v, and fever/chills  No new sexual partners  Currently on Lupron injections and starting IVF cycle next week  The following portions of the patient's history were reviewed and updated as appropriate: allergies, current medications, past family history, past medical history, past social history, past surgical history and problem list     Review of Systems   Constitutional: Negative for chills and fever  Gastrointestinal: Negative for abdominal distention, abdominal pain, nausea and vomiting  Genitourinary: Positive for hematuria and vaginal discharge (with odor)  Negative for difficulty urinating, dysuria, frequency, genital sores, menstrual problem, pelvic pain, urgency, vaginal bleeding and vaginal pain  Vulvovaginal itching         Objective:      /60   Wt 91 8 kg (202 lb 4 8 oz)   LMP 06/20/2019   BMI 35 84 kg/m²          Physical Exam   Constitutional: She is oriented to person, place, and time  Vital signs are normal  She appears well-developed and well-nourished  Genitourinary: Vagina normal and uterus normal  No labial fusion  There is no rash, tenderness, lesion or injury on the right labia   There is no rash, tenderness, lesion or injury on the left labia  Cervix exhibits no motion tenderness, no discharge and no friability  Right adnexum displays no mass, no tenderness and no fullness  Left adnexum displays no mass, no tenderness and no fullness  No erythema, tenderness or bleeding in the vagina  No vaginal discharge found  Lymphadenopathy: No inguinal adenopathy noted on the right or left side  Neurological: She is alert and oriented to person, place, and time  Skin: Skin is warm and dry  Psychiatric: She has a normal mood and affect  Her behavior is normal  Judgment and thought content normal    Vitals reviewed

## 2019-07-18 ENCOUNTER — TELEPHONE (OUTPATIENT)
Dept: OBGYN CLINIC | Facility: CLINIC | Age: 34
End: 2019-07-18

## 2019-07-18 DIAGNOSIS — B37.3 VAGINAL CANDIDA: ICD-10-CM

## 2019-07-18 DIAGNOSIS — N76.0 BV (BACTERIAL VAGINOSIS): Primary | ICD-10-CM

## 2019-07-18 DIAGNOSIS — B96.89 BV (BACTERIAL VAGINOSIS): Primary | ICD-10-CM

## 2019-07-18 RX ORDER — METRONIDAZOLE 500 MG/1
500 TABLET ORAL EVERY 12 HOURS SCHEDULED
Qty: 10 TABLET | Refills: 0 | Status: SHIPPED | OUTPATIENT
Start: 2019-07-18 | End: 2019-07-23

## 2019-07-18 RX ORDER — FLUCONAZOLE 150 MG/1
150 TABLET ORAL ONCE
Qty: 1 TABLET | Refills: 0 | Status: SHIPPED | OUTPATIENT
Start: 2019-07-18 | End: 2019-07-18

## 2019-07-18 NOTE — TELEPHONE ENCOUNTER
Spoke with patient regarding vaginal culture results - positive for BV  Rx for metronidazole 500 mg BID x 5 days sent to pharmacy  Instructed patient to avoid alcohol while on abx  Rx for Diflucan 150 mg x 1 dose sent at patient's request   States she gets yeast infections every time she takes an abx  Call if symptoms do not resolve

## 2019-07-30 ENCOUNTER — LAB REQUISITION (OUTPATIENT)
Dept: LAB | Facility: HOSPITAL | Age: 34
End: 2019-07-30
Payer: COMMERCIAL

## 2019-07-30 DIAGNOSIS — E28.9 OVARIAN DYSFUNCTION: ICD-10-CM

## 2019-07-30 LAB
BASOPHILS # BLD AUTO: 0.07 THOUSANDS/ΜL (ref 0–0.1)
BASOPHILS NFR BLD AUTO: 1 % (ref 0–1)
EOSINOPHIL # BLD AUTO: 0.1 THOUSAND/ΜL (ref 0–0.61)
EOSINOPHIL NFR BLD AUTO: 1 % (ref 0–6)
ERYTHROCYTE [DISTWIDTH] IN BLOOD BY AUTOMATED COUNT: 16.9 % (ref 11.6–15.1)
HCT VFR BLD AUTO: 42.9 % (ref 34.8–46.1)
HGB BLD-MCNC: 13.2 G/DL (ref 11.5–15.4)
IMM GRANULOCYTES # BLD AUTO: 0.02 THOUSAND/UL (ref 0–0.2)
IMM GRANULOCYTES NFR BLD AUTO: 0 % (ref 0–2)
LYMPHOCYTES # BLD AUTO: 1.77 THOUSANDS/ΜL (ref 0.6–4.47)
LYMPHOCYTES NFR BLD AUTO: 21 % (ref 14–44)
MCH RBC QN AUTO: 25.3 PG (ref 26.8–34.3)
MCHC RBC AUTO-ENTMCNC: 30.8 G/DL (ref 31.4–37.4)
MCV RBC AUTO: 82 FL (ref 82–98)
MONOCYTES # BLD AUTO: 0.46 THOUSAND/ΜL (ref 0.17–1.22)
MONOCYTES NFR BLD AUTO: 6 % (ref 4–12)
NEUTROPHILS # BLD AUTO: 6.01 THOUSANDS/ΜL (ref 1.85–7.62)
NEUTS SEG NFR BLD AUTO: 71 % (ref 43–75)
NRBC BLD AUTO-RTO: 0 /100 WBCS
PLATELET # BLD AUTO: 327 THOUSANDS/UL (ref 149–390)
PMV BLD AUTO: 11.3 FL (ref 8.9–12.7)
RBC # BLD AUTO: 5.22 MILLION/UL (ref 3.81–5.12)
WBC # BLD AUTO: 8.43 THOUSAND/UL (ref 4.31–10.16)

## 2019-07-30 PROCEDURE — 85025 COMPLETE CBC W/AUTO DIFF WBC: CPT | Performed by: OBSTETRICS & GYNECOLOGY

## 2019-08-01 ENCOUNTER — TELEPHONE (OUTPATIENT)
Dept: PERINATAL CARE | Facility: CLINIC | Age: 34
End: 2019-08-01

## 2019-08-01 DIAGNOSIS — I10 BENIGN ESSENTIAL HYPERTENSION: ICD-10-CM

## 2019-08-01 RX ORDER — NIFEDIPINE 60 MG/1
60 TABLET, EXTENDED RELEASE ORAL DAILY
Qty: 90 TABLET | Refills: 3 | Status: SHIPPED | OUTPATIENT
Start: 2019-08-01 | End: 2019-08-29

## 2019-08-29 ENCOUNTER — OFFICE VISIT (OUTPATIENT)
Dept: CARDIOLOGY CLINIC | Facility: CLINIC | Age: 34
End: 2019-08-29
Payer: COMMERCIAL

## 2019-08-29 ENCOUNTER — OFFICE VISIT (OUTPATIENT)
Dept: FAMILY MEDICINE CLINIC | Facility: CLINIC | Age: 34
End: 2019-08-29
Payer: COMMERCIAL

## 2019-08-29 VITALS
SYSTOLIC BLOOD PRESSURE: 120 MMHG | HEIGHT: 65 IN | BODY MASS INDEX: 32.69 KG/M2 | DIASTOLIC BLOOD PRESSURE: 90 MMHG | OXYGEN SATURATION: 99 % | RESPIRATION RATE: 16 BRPM | TEMPERATURE: 98.3 F | WEIGHT: 196.2 LBS | HEART RATE: 65 BPM

## 2019-08-29 VITALS
HEIGHT: 63 IN | SYSTOLIC BLOOD PRESSURE: 134 MMHG | DIASTOLIC BLOOD PRESSURE: 84 MMHG | HEART RATE: 68 BPM | BODY MASS INDEX: 34.73 KG/M2 | WEIGHT: 196 LBS

## 2019-08-29 DIAGNOSIS — F41.9 ANXIETY: ICD-10-CM

## 2019-08-29 DIAGNOSIS — Z00.00 ANNUAL PHYSICAL EXAM: Primary | ICD-10-CM

## 2019-08-29 DIAGNOSIS — I10 BENIGN ESSENTIAL HYPERTENSION: Primary | ICD-10-CM

## 2019-08-29 DIAGNOSIS — K21.9 GASTROESOPHAGEAL REFLUX DISEASE WITHOUT ESOPHAGITIS: ICD-10-CM

## 2019-08-29 PROCEDURE — 99395 PREV VISIT EST AGE 18-39: CPT | Performed by: FAMILY MEDICINE

## 2019-08-29 PROCEDURE — 99213 OFFICE O/P EST LOW 20 MIN: CPT | Performed by: INTERNAL MEDICINE

## 2019-08-29 RX ORDER — ALPRAZOLAM 0.25 MG/1
0.25 TABLET ORAL
Qty: 20 TABLET | Refills: 0 | Status: SHIPPED | OUTPATIENT
Start: 2019-08-29 | End: 2019-12-05 | Stop reason: SDUPTHER

## 2019-08-29 RX ORDER — RANITIDINE 150 MG/1
150 TABLET ORAL 2 TIMES DAILY
Qty: 60 TABLET | Refills: 0 | Status: SHIPPED | OUTPATIENT
Start: 2019-08-29 | End: 2019-12-16 | Stop reason: SDUPTHER

## 2019-08-29 NOTE — PROGRESS NOTES
ADULT ANNUAL PHYSICAL  [facility] - Teagan Shukla [facility]    NAME: Katie Ernst  AGE: 27 y o  SEX: female  : 1985     DATE: 2019     Assessment and Plan:     Problem List Items Addressed This Visit        Digestive    GERD (gastroesophageal reflux disease)    Relevant Medications    ranitidine (ZANTAC) 150 mg tablet      Other Visit Diagnoses     Annual physical exam    -  Primary    Anxiety        Relevant Medications    ALPRAZolam (XANAX) 0 25 mg tablet          Immunizations and preventive care screenings were discussed with patient today  Appropriate education was printed on patient's after visit summary  Counseling:  BMI Counseling: Body mass index is 32 49 kg/m²  Discussed the patient's BMI with her  The BMI is above average  BMI counseling and education was provided to the patient  Nutrition recommendations include reducing portion sizes, 3-5 servings of fruits/vegetables daily and reducing fast food intake  Exercise recommendations include moderate aerobic physical activity for 150 minutes/week  Alcohol/drug use: discussed moderation in alcohol intake and avoidance of illicit drug use  Dental Health: discussed importance of regular tooth brushing, flossing, and dental visits  Injury prevention: discussed safety/seat belts, safety helmets, smoke detectors, carbon dioxide detectors, and smoking near bedding or upholstery  · Sexual health: discussed sexually transmitted diseases, partner selection, use of condoms, avoidance of unintended pregnancy, and contraceptive alternatives  No follow-ups on file  Chief Complaint:     Chief Complaint   Patient presents with    Physical Exam     patient is here for physical exam      History of Present Illness:     Adult Annual Physical   Patient here for a comprehensive physical exam  The patient reports no problems      Diet and Physical Activity  · Diet/Nutrition: well balanced diet and consuming 3-5 servings of fruits/vegetables daily  · Exercise: walking  Depression Screening  PHQ-9 Depression Screening    PHQ-9:    Frequency of the following problems over the past two weeks:            General Health  · Sleep: sleeps well and gets 7-8 hours of sleep on average  · Hearing: normal - bilateral   · Vision: most recent eye exam <1 year ago  · Dental: regular dental visits  /GYN Health  · Last menstrual period:   · Contraceptive method: none  · History of STDs?: no      Review of Systems:     Review of Systems   Constitutional: Negative  HENT: Negative  Eyes: Negative  Respiratory: Negative  Cardiovascular: Negative  Gastrointestinal: Negative  Endocrine: Negative  Genitourinary: Negative  Musculoskeletal: Negative  Skin: Negative  Allergic/Immunologic: Negative  Neurological: Negative  Hematological: Negative  Psychiatric/Behavioral: Negative  Past Medical History:     Past Medical History:   Diagnosis Date    Abdominal pain     Asthma     mild intermittent    GERD (gastroesophageal reflux disease)     History of palpitations     Hypertension     Lower back pain     Trace mitral regurgitation by prior echocardiogram       Past Surgical History:     Past Surgical History:   Procedure Laterality Date    CHOLECYSTECTOMY      laparoscopic    HERNIA REPAIR      umbilical    LUMBAR LAMINECTOMY Right 9/14/2016    Procedure: Right L5/S1 Metryx microdiscectomy;  Surgeon: Leda Muro MD;  [facility];  Service:    M[place]en GA COLONOSCOPY FLX DX W/COLLJ SPEC WHEN PFRMD N/A 7/7/2016    Procedure: COLONOSCOPY;  Surgeon: Niles Hayes DO;  [facility];   Service: Gastroenterology    GA HYSTEROSCOPY,W/ENDO BX N/A 3/19/2018    Procedure: HYSTEROSCOPY; MYOMECTOMY; ENDOMETRIAL BIOPSY;  Surgeon: Erving Duverney, DO;  [facility];  Service: Gynecology    GA INCISE FINGER TENDON SHEATH Right 2/28/2017    Procedure: THUMB TRIGGER RELEASE ; Surgeon: Mustapha Tello DO;  [facility];  Service: Orthopedics    WISDOM TOOTH EXTRACTION        Social History:     Social History     Socioeconomic History    Marital status: /Civil Union     Spouse name: None    Number of children: None    Years of education: None    Highest education level: None   Occupational History    None   Social Needs    Financial resource strain: None    Food insecurity:     Worry: None     Inability: None    Transportation needs:     Medical: None     Non-medical: None   Tobacco Use    Smoking status: Never Smoker    Smokeless tobacco: Never Used   Substance and Sexual Activity    Alcohol use: Yes     Comment: socialy    Drug use: No    Sexual activity: Yes     Partners: Male     Birth control/protection: None   Lifestyle    Physical activity:     Days per week: None     Minutes per session: None    Stress: None   Relationships    Social connections:     Talks on phone: None     Gets together: None     Attends [employer] witness service: None     Active member of club or organization: None     Attends meetings of clubs or organizations: None     Relationship status: None    Intimate partner violence:     Fear of current or ex partner: None     Emotionally abused: None     Physically abused: None     Forced sexual activity: None   Other Topics Concern    None   Social History Narrative    Exercises 3x week    Pet: dog      Family History:     Family History   Problem Relation Age of Onset    Diabetes Mother         type2    Hypertension Mother     Diabetes Father         type2    Hypertension Father     Heart attack Maternal Grandfather         acute MI    Arthritis Maternal Grandfather     Stroke Maternal Grandfather         CVA    Cancer Maternal Grandfather     Hyperlipidemia Maternal Grandfather     Arthritis Paternal Grandfather     Stroke Paternal Grandfather         CVA    Cancer Paternal Grandfather     Hyperlipidemia Paternal Grandfather     Heart attack Maternal Uncle         acute MI    Arthritis Family     Stroke Family         CVA    Cancer Family     Hyperlipidemia Family     Stroke Family         CVA    Hyperlipidemia Family       Current Medications:     Current Outpatient Medications   Medication Sig Dispense Refill    amLODIPine (NORVASC) 5 mg tablet Take 1 tablet (5 mg total) by mouth daily 90 tablet 3    Cholecalciferol (VITAMIN D3 PO) Take 2,000 mg by mouth daily in the early morning      cyclobenzaprine (FLEXERIL) 10 mg tablet Take 1 tablet (10 mg total) by mouth 3 (three) times a day as needed for muscle spasms 30 tablet 0    levothyroxine 88 mcg tablet       meloxicam (MOBIC) 15 mg tablet Take 1 tablet by mouth daily as needed      multivitamin (THERAGRAN) TABS Take 1 tablet by mouth daily after dinner      ondansetron (ZOFRAN-ODT) 4 mg disintegrating tablet Take 1 tablet (4 mg total) by mouth every 6 (six) hours as needed for nausea or vomiting 20 tablet 0    SUMAtriptan (IMITREX) 50 mg tablet Take 1 tablet (50 mg total) by mouth once as needed for migraine for up to 1 dose 9 tablet 0    albuterol (2 5 mg/3 mL) 0 083 % nebulizer solution Take 1 vial (2 5 mg total) by nebulization every 6 (six) hours as needed for wheezing or shortness of breath (Patient not taking: Reported on 8/29/2019) 30 vial 3    albuterol (PROAIR HFA) 90 mcg/act inhaler Inhale 2 puffs every 4 (four) hours as needed (When has flare up) (Patient not taking: Reported on 8/29/2019) 1 Inhaler 0    ALPRAZolam (XANAX) 0 25 mg tablet Take 1 tablet (0 25 mg total) by mouth daily at bedtime as needed for anxiety 20 tablet 0    ranitidine (ZANTAC) 150 mg tablet Take 1 tablet (150 mg total) by mouth 2 (two) times a day 60 tablet 0     No current facility-administered medications for this visit         Allergies:     No Known Allergies   Physical Exam:     /90 (BP Location: Left arm, Patient Position: Sitting, Cuff Size: Standard)   Pulse 65   Temp 98 3 °F (36 8 °C) (Tympanic)   Resp 16   Ht 5' 5 16" (1 655 m)   Wt 89 kg (196 lb 3 2 oz)   SpO2 99%   BMI 32 49 kg/m²     Physical Exam   Constitutional: She is oriented to person, place, and time  Vital signs are normal  She appears well-developed and well-nourished  HENT:   Head: Normocephalic and atraumatic  Nose: Nose normal    Mouth/Throat: Oropharynx is clear and moist    Eyes: Pupils are equal, round, and reactive to light  Neck: Normal range of motion  Neck supple  No thyromegaly present  Cardiovascular: Normal rate and regular rhythm  No murmur heard  Pulmonary/Chest: Effort normal and breath sounds normal    Abdominal: Soft  Bowel sounds are normal    Musculoskeletal: Normal range of motion  She exhibits no edema or deformity  Neurological: She is alert and oriented to person, place, and time  She has normal reflexes  Skin: Skin is warm  No rash noted  No erythema  Psychiatric: She has a normal mood and affect  Her behavior is normal      BMI Counseling: Body mass index is 32 49 kg/m²  Discussed the patient's BMI with her  The BMI is above average  BMI counseling and education was provided to the patient  Nutrition recommendations include decreasing overall calorie intake, 3-5 servings of fruits/vegetables daily and decreasing soda and/or juice intake  Exercise recommendations include moderate aerobic physical activity for 150 minutes/week      Rachna Garcia MD   [address] [facility]

## 2019-08-29 NOTE — PATIENT INSTRUCTIONS

## 2019-08-29 NOTE — PROGRESS NOTES
Cardiology Follow Up Visit    Britton Mejia  [birth year]  [id number]  [facility] [address] [facility]  [address] [address] [phone]    No diagnosis found  Discussion/Summary:    1  History of essential hypertension, was worse when undergoing IVF and changed to nifedipine but back on amlodipine now and blood pressure stable  2  Stable palpitations, asymptomatic    Plan:  Patient currently asymptomatic  Continue sensible diet and daily exercise  Continue amlodipine 5 mg        Interval History:    Patient presents history of palpitations and history of essential hypertension  Blood pressure was elevated when she changed to nifedipine while undergoing IVF    Had tried metoprolol Cardizem for palpitations but unsuccessful due to side effects  Holter monitor in the past had an occasional PVC but nothing of significance  Notices if does get them it is associated with several cups of caffeinated beverages  Currently asymptomatic    Patient Active Problem List   Diagnosis    Lower back pain    Herniated lumbar intervertebral disc    Lumbar radiculopathy, acute    Trigger finger of right thumb    Asthma in adult, mild intermittent, uncomplicated    Benign essential hypertension    Mild mitral regurgitation    Obesity (BMI 30-39  9)    PVC (premature ventricular contraction)    Fibroids, submucosal    GERD (gastroesophageal reflux disease)    Female infertility    Polycystic ovaries    Palpitations    Migraine without aura and without status migrainosus, not intractable    Class 2 obesity due to excess calories in adult     Past Medical History:   Diagnosis Date    Abdominal pain     Asthma     mild intermittent    GERD (gastroesophageal reflux disease)     History of palpitations     Hypertension     Lower back pain     Trace mitral regurgitation by prior echocardiogram      Social History Socioeconomic History    Marital status: /Civil Union     Spouse name: Not on file    Number of children: Not on file    Years of education: Not on file    Highest education level: Not on file   Occupational History    Not on file   Social Needs    Financial resource strain: Not on file    Food insecurity:     Worry: Not on file     Inability: Not on file    Transportation needs:     Medical: Not on file     Non-medical: Not on file   Tobacco Use    Smoking status: Never Smoker    Smokeless tobacco: Never Used   Substance and Sexual Activity    Alcohol use: Yes     Comment: socialy    Drug use: No    Sexual activity: Yes     Partners: Male     Birth control/protection: None   Lifestyle    Physical activity:     Days per week: Not on file     Minutes per session: Not on file    Stress: Not on file   Relationships    Social connections:     Talks on phone: Not on file     Gets together: Not on file     Attends Church service: Not on file     Active member of club or organization: Not on file     Attends meetings of clubs or organizations: Not on file     Relationship status: Not on file    Intimate partner violence:     Fear of current or ex partner: Not on file     Emotionally abused: Not on file     Physically abused: Not on file     Forced sexual activity: Not on file   Other Topics Concern    Not on file   Social History Narrative    Exercises 3x week    Pet: dog      Family History   Problem Relation Age of Onset    Diabetes Mother         type2    Hypertension Mother     Diabetes Father         type2    Hypertension Father     Heart attack Maternal Grandfather         acute MI    Arthritis Maternal Grandfather     Stroke Maternal Grandfather         CVA    Cancer Maternal Grandfather     Hyperlipidemia Maternal Grandfather     Arthritis Paternal Grandfather     Stroke Paternal Grandfather         CVA    Cancer Paternal Grandfather     Hyperlipidemia Paternal Grandfather     Heart attack Maternal Uncle         acute MI    Arthritis Family     Stroke Family         CVA    Cancer Family     Hyperlipidemia Family     Stroke Family         CVA    Hyperlipidemia Family      Past Surgical History:   Procedure Laterality Date    CHOLECYSTECTOMY      laparoscopic    HERNIA REPAIR      umbilical    LUMBAR LAMINECTOMY Right 9/14/2016    Procedure: Right L5/S1 Metryx microdiscectomy;  Surgeon: Leda Muro MD;  [facility];  Service:    Raphael Morris NV COLONOSCOPY FLX DX W/COLLJ SPEC WHEN PFRMD N/A 7/7/2016    Procedure: COLONOSCOPY;  Surgeon: Niles Hayes DO;  [facility];   Service: Gastroenterology    NV HYSTEROSCOPY,W/ENDO BX N/A 3/19/2018    Procedure: HYSTEROSCOPY; MYOMECTOMY; ENDOMETRIAL BIOPSY;  Surgeon: Erving Duverney, DO;  [facility];  Service: Gynecology    NV INCISE FINGER TENDON SHEATH Right 2/28/2017    Procedure: THUMB TRIGGER RELEASE ;  Surgeon: Lawrence Carrillo DO;  [facility];  Service: Orthopedics    WISDOM TOOTH EXTRACTION         Current Outpatient Medications:     albuterol (2 5 mg/3 mL) 0 083 % nebulizer solution, Take 1 vial (2 5 mg total) by nebulization every 6 (six) hours as needed for wheezing or shortness of breath, Disp: 30 vial, Rfl: 3    albuterol (PROAIR HFA) 90 mcg/act inhaler, Inhale 2 puffs every 4 (four) hours as needed (When has flare up), Disp: 1 Inhaler, Rfl: 0    amLODIPine (NORVASC) 5 mg tablet, Take 1 tablet (5 mg total) by mouth daily, Disp: 90 tablet, Rfl: 3    Cholecalciferol (VITAMIN D3 PO), Take 2,000 mg by mouth daily in the early morning, Disp: , Rfl:     cyclobenzaprine (FLEXERIL) 10 mg tablet, Take 1 tablet (10 mg total) by mouth 3 (three) times a day as needed for muscle spasms, Disp: 30 tablet, Rfl: 0    levothyroxine 88 mcg tablet, , Disp: , Rfl:     meloxicam (MOBIC) 15 mg tablet, Take 1 tablet by mouth daily as needed, Disp: , Rfl:     multivitamin (THERAGRAN) TABS, Take 1 tablet by mouth daily after dinner, Disp: , Rfl:     ondansetron (ZOFRAN-ODT) 4 mg disintegrating tablet, Take 1 tablet (4 mg total) by mouth every 6 (six) hours as needed for nausea or vomiting, Disp: 20 tablet, Rfl: 0    SUMAtriptan (IMITREX) 50 mg tablet, Take 1 tablet (50 mg total) by mouth once as needed for migraine for up to 1 dose, Disp: 9 tablet, Rfl: 0    promethazine-codeine (PHENERGAN WITH CODEINE) 6 25-10 mg/5 mL syrup, Take 5 mL by mouth every 4 (four) hours as needed for cough (Patient not taking: Reported on 8/29/2019), Disp: 180 mL, Rfl: 0  No Known Allergies      Social, Family, Medication history reviewed and updated as necessary      Labs:     Lab Results   Component Value Date     11/17/2015    K 3 7 07/01/2019     07/01/2019    CO2 26 07/01/2019    BUN 10 07/01/2019    CREATININE 0 83 07/01/2019    CREATININE 0 90 03/21/2018    GLUCOSE 85 11/17/2015    CALCIUM 9 2 07/01/2019       Lab Results   Component Value Date    WBC 8 43 07/30/2019    HGB 13 2 07/30/2019    HGB 13 4 07/01/2019    HCT 42 9 07/30/2019    HCT 42 1 07/01/2019     07/30/2019     07/01/2019       Lab Results   Component Value Date    CHOL 181 06/23/2015    CHOL 187 01/13/2015     Lab Results   Component Value Date    HDL 65 (H) 07/19/2018    HDL 64 (H) 03/21/2018     Lab Results   Component Value Date    LDLCALC 106 (H) 07/19/2018    LDLCALC 82 03/21/2018     No results found for: LDLDIRECT          Lab Results   Component Value Date    TRIG 150 07/19/2018    TRIG 133 03/21/2018       Lab Results   Component Value Date    ALT 33 07/01/2019    ALT 32 03/21/2018    AST 17 07/01/2019    AST 15 03/21/2018       Lab Results   Component Value Date    INR 1 03 09/13/2016       No results found for: NTBNP    Lab Results   Component Value Date    HGBA1C 5 4 07/19/2018    HGBA1C 5 4 03/21/2018    HGBA1C 5 7 08/10/2017           Imaging: Reviewed in epic        Review of Systems:  14 systems reviewed and negative with exception of the above         PHYSICAL EXAM:      Vitals:    08/29/19 1552   BP: 134/84   Pulse: 68     Body mass index is 34 72 kg/m²  Weight (last 2 days)     Date/Time   Weight    08/29/19 1552   88 9 (196)                Gen: No acute distress  HEENT: anicteric, mucous membranes moist  Neck: supple, no jugular venous distention, or carotid bruit  Heart: regular, normal s1 and s2, no murmur/rub or gallop  Lungs :clear to auscultation bilaterally, no rales/rhonchi or wheeze  Abdomen: soft nontender, normoactive bowel sounds, no organomegaly  Ext: warm and perfused, normal femoral pulses, no edema, or clubbing  Skin: warm, no rashes  Neuro: AAO x 3, no focal findings  Psychiatric: normal affect  Musculoskeletal: no obvious joint deformities

## 2019-09-11 ENCOUNTER — OFFICE VISIT (OUTPATIENT)
Dept: OBGYN CLINIC | Facility: CLINIC | Age: 34
End: 2019-09-11
Payer: COMMERCIAL

## 2019-09-11 VITALS
WEIGHT: 200 LBS | DIASTOLIC BLOOD PRESSURE: 96 MMHG | SYSTOLIC BLOOD PRESSURE: 130 MMHG | HEIGHT: 63 IN | BODY MASS INDEX: 35.44 KG/M2

## 2019-09-11 DIAGNOSIS — N89.8 VAGINAL DISCHARGE: Primary | ICD-10-CM

## 2019-09-11 PROCEDURE — 87510 GARDNER VAG DNA DIR PROBE: CPT | Performed by: PHYSICIAN ASSISTANT

## 2019-09-11 PROCEDURE — 87660 TRICHOMONAS VAGIN DIR PROBE: CPT | Performed by: PHYSICIAN ASSISTANT

## 2019-09-11 PROCEDURE — 99214 OFFICE O/P EST MOD 30 MIN: CPT | Performed by: PHYSICIAN ASSISTANT

## 2019-09-11 PROCEDURE — 87480 CANDIDA DNA DIR PROBE: CPT | Performed by: PHYSICIAN ASSISTANT

## 2019-09-11 NOTE — PROGRESS NOTES
Assessment/Plan:    No problem-specific Assessment & Plan notes found for this encounter  Diagnoses and all orders for this visit:    Vaginal discharge  -     VAGINOSIS DNA PROBE (AFFIRM)        Vaginal cultures done  We will call with results and treat as necessary  Will put refills on metronidazole if culture positive for BV  F/u with fertility as planned  Patient will schedule yearly gyn exam today  Call if symptoms worsen or change  Subjective:      Patient ID: Dev Moore is a 27 y o  female  Patient is here with complaint of possible vaginal infection  Has had vaginal discharge, vaginal burning, and vulvar pain for the last 3 days  Has a history of recurrent BV  Recently went through IVF cycle that failed  Complains of generalized pelvic discomfort  Has f/u with fertility next week  She denies urinary symptoms, odor, abdominal pain, n/v, and fever/chills  The following portions of the patient's history were reviewed and updated as appropriate: allergies, current medications, past family history, past medical history, past social history, past surgical history and problem list     Review of Systems   Constitutional: Negative for chills and fever  Gastrointestinal: Negative for abdominal distention, abdominal pain, nausea and vomiting  Genitourinary: Positive for pelvic pain, vaginal discharge and vaginal pain (Burning)  Negative for difficulty urinating, dysuria, frequency, genital sores, hematuria, menstrual problem, urgency and vaginal bleeding  Objective:      /96   Ht 5' 3" (1 6 m)   Wt 90 7 kg (200 lb)   LMP 08/25/2019   BMI 35 43 kg/m²          Physical Exam   Constitutional: She is oriented to person, place, and time  Vital signs are normal  She appears well-developed and well-nourished  Genitourinary: Vagina normal and uterus normal  No labial fusion  There is no rash, tenderness, lesion or injury on the right labia   There is no rash, tenderness, lesion or injury on the left labia  Cervix exhibits no motion tenderness, no discharge and no friability  Right adnexum displays no mass, no tenderness and no fullness  Left adnexum displays no mass, no tenderness and no fullness  No erythema, tenderness or bleeding in the vagina  No vaginal discharge found  Lymphadenopathy: No inguinal adenopathy noted on the right or left side  Neurological: She is alert and oriented to person, place, and time  Skin: Skin is warm and dry  Psychiatric: She has a normal mood and affect  Her behavior is normal  Judgment and thought content normal    Vitals reviewed

## 2019-09-19 ENCOUNTER — ANNUAL EXAM (OUTPATIENT)
Dept: OBGYN CLINIC | Facility: CLINIC | Age: 34
End: 2019-09-19
Payer: COMMERCIAL

## 2019-09-19 VITALS
WEIGHT: 196 LBS | BODY MASS INDEX: 34.73 KG/M2 | HEIGHT: 63 IN | DIASTOLIC BLOOD PRESSURE: 84 MMHG | SYSTOLIC BLOOD PRESSURE: 126 MMHG

## 2019-09-19 DIAGNOSIS — Z01.419 ENCOUNTER FOR GYNECOLOGICAL EXAMINATION WITHOUT ABNORMAL FINDING: Primary | ICD-10-CM

## 2019-09-19 PROCEDURE — 99395 PREV VISIT EST AGE 18-39: CPT | Performed by: PHYSICIAN ASSISTANT

## 2019-09-19 PROCEDURE — G0145 SCR C/V CYTO,THINLAYER,RESCR: HCPCS | Performed by: PHYSICIAN ASSISTANT

## 2019-09-19 NOTE — PROGRESS NOTES
Assessment/Plan:    No problem-specific Assessment & Plan notes found for this encounter  Diagnoses and all orders for this visit:    Encounter for gynecological examination without abnormal finding  -     Liquid-based pap, screening        Pap done  Suspect dryness is related to large doses of progesterone patient has just finished  Recommended coconut oil until hormones regulate  Call if symptoms worsen or change  If no problems, patient to return in 1 year for routine gyn care  Subjective:      Patient ID: Dev Moore is a 27 y o  female  Patient is here for yearly gyn exam   States she is doing well overall  Still complaining of vaginal dryness and discomfort; recent vaginal cultures were negative  Just had her first period in a few months  Flow is heavier since her fibroid removal and lasts for 6 days  She needs to wear two pads on her heaviest day and change them every 2-3 hours  Patient's GI symptoms are stable  She denies any change in bowel/bladder habits, pelvic pain, n/v, and change in appetite  Patient is performing self-breast exam   Denies new masses, skin changes, nipple discharge, and pain/tenderness  The following portions of the patient's history were reviewed and updated as appropriate: allergies, current medications, past family history, past medical history, past social history, past surgical history and problem list     Review of Systems   Constitutional: Negative for appetite change and unexpected weight change  Cardiovascular:        No masses, skin changes, nipple discharge, and pain/tenderness  Gastrointestinal: Negative for abdominal distention, abdominal pain, constipation, diarrhea, nausea and vomiting  Genitourinary: Positive for vaginal pain  Negative for difficulty urinating, dysuria, frequency, genital sores, hematuria, menstrual problem, pelvic pain, urgency, vaginal bleeding and vaginal discharge           Objective:      /84 (BP Location: Left arm, Patient Position: Sitting, Cuff Size: Large)   Ht 5' 3" (1 6 m)   Wt 88 9 kg (196 lb)   LMP 08/25/2019   BMI 34 72 kg/m²          Physical Exam   Constitutional: She is oriented to person, place, and time  Vital signs are normal  She appears well-developed and well-nourished  Neck: No thyromegaly present  Cardiovascular: Normal rate, regular rhythm and normal heart sounds  Exam reveals no gallop and no friction rub  No murmur heard  Pulmonary/Chest: Effort normal and breath sounds normal  Right breast exhibits no inverted nipple, no mass, no nipple discharge, no skin change and no tenderness  Left breast exhibits no inverted nipple, no mass, no nipple discharge, no skin change and no tenderness  No breast swelling, tenderness, discharge or bleeding  Breasts are symmetrical    Abdominal: Soft  Normal appearance and bowel sounds are normal  She exhibits no distension  There is no tenderness  Genitourinary: Vagina normal and uterus normal  No breast tenderness, discharge or bleeding  No labial fusion  There is no rash, tenderness, lesion or injury on the right labia  There is no rash, tenderness, lesion or injury on the left labia  Cervix exhibits no motion tenderness, no discharge and no friability  Right adnexum displays no mass, no tenderness and no fullness  Left adnexum displays no mass, no tenderness and no fullness  No erythema, tenderness or bleeding in the vagina  No vaginal discharge found  Lymphadenopathy:     She has no cervical adenopathy  No inguinal adenopathy noted on the right or left side  Right: No inguinal adenopathy present  Left: No inguinal adenopathy present  Neurological: She is alert and oriented to person, place, and time  Skin: Skin is warm and dry  Psychiatric: She has a normal mood and affect  Her behavior is normal  Judgment and thought content normal    Vitals reviewed

## 2019-09-23 LAB
LAB AP GYN PRIMARY INTERPRETATION: NORMAL
Lab: NORMAL

## 2019-09-26 DIAGNOSIS — I10 BENIGN ESSENTIAL HYPERTENSION: ICD-10-CM

## 2019-09-26 RX ORDER — AMLODIPINE BESYLATE 5 MG/1
5 TABLET ORAL DAILY
Qty: 90 TABLET | Refills: 0 | Status: SHIPPED | OUTPATIENT
Start: 2019-09-26 | End: 2019-12-30 | Stop reason: SDUPTHER

## 2019-11-26 DIAGNOSIS — M51.26 HERNIATED LUMBAR INTERVERTEBRAL DISC: ICD-10-CM

## 2019-11-26 RX ORDER — CYCLOBENZAPRINE HCL 10 MG
10 TABLET ORAL 3 TIMES DAILY PRN
Qty: 30 TABLET | Refills: 0 | Status: SHIPPED | OUTPATIENT
Start: 2019-11-26 | End: 2020-10-06 | Stop reason: SDUPTHER

## 2019-12-05 ENCOUNTER — OFFICE VISIT (OUTPATIENT)
Dept: FAMILY MEDICINE CLINIC | Facility: CLINIC | Age: 34
End: 2019-12-05
Payer: COMMERCIAL

## 2019-12-05 VITALS
HEIGHT: 63 IN | RESPIRATION RATE: 16 BRPM | WEIGHT: 199 LBS | HEART RATE: 98 BPM | OXYGEN SATURATION: 95 % | TEMPERATURE: 98.8 F | DIASTOLIC BLOOD PRESSURE: 100 MMHG | BODY MASS INDEX: 35.26 KG/M2 | SYSTOLIC BLOOD PRESSURE: 150 MMHG

## 2019-12-05 DIAGNOSIS — E66.9 OBESITY (BMI 30-39.9): ICD-10-CM

## 2019-12-05 DIAGNOSIS — K21.9 GASTROESOPHAGEAL REFLUX DISEASE WITHOUT ESOPHAGITIS: ICD-10-CM

## 2019-12-05 DIAGNOSIS — G43.009 MIGRAINE WITHOUT AURA AND WITHOUT STATUS MIGRAINOSUS, NOT INTRACTABLE: ICD-10-CM

## 2019-12-05 DIAGNOSIS — J20.9 ACUTE BRONCHITIS, UNSPECIFIED ORGANISM: ICD-10-CM

## 2019-12-05 DIAGNOSIS — F41.9 ANXIETY: ICD-10-CM

## 2019-12-05 DIAGNOSIS — I10 BENIGN ESSENTIAL HYPERTENSION: Primary | ICD-10-CM

## 2019-12-05 PROBLEM — R00.2 PALPITATIONS: Status: RESOLVED | Noted: 2018-12-12 | Resolved: 2019-12-05

## 2019-12-05 PROCEDURE — 99214 OFFICE O/P EST MOD 30 MIN: CPT | Performed by: FAMILY MEDICINE

## 2019-12-05 PROCEDURE — 1036F TOBACCO NON-USER: CPT | Performed by: FAMILY MEDICINE

## 2019-12-05 PROCEDURE — 3008F BODY MASS INDEX DOCD: CPT | Performed by: FAMILY MEDICINE

## 2019-12-05 RX ORDER — ALPRAZOLAM 0.25 MG/1
0.25 TABLET ORAL
Qty: 20 TABLET | Refills: 0 | Status: SHIPPED | OUTPATIENT
Start: 2019-12-05 | End: 2020-06-22 | Stop reason: SDUPTHER

## 2019-12-05 RX ORDER — AZITHROMYCIN 250 MG/1
TABLET, FILM COATED ORAL
Qty: 6 TABLET | Refills: 0 | Status: SHIPPED | OUTPATIENT
Start: 2019-12-05 | End: 2019-12-08

## 2019-12-05 NOTE — PROGRESS NOTES
Assessment/Plan:    No problem-specific Assessment & Plan notes found for this encounter  Diagnoses and all orders for this visit:    Benign essential hypertension  Comments:  not doing well  will call me with reading in 2 weeks     Migraine without aura and without status migrainosus, not intractable  Comments:  stable  continue PRN     Gastroesophageal reflux disease without esophagitis  Comments:  to take zantac BID    Obesity (BMI 30-39  9)  Comments:  not doing well  diet and exercise     Anxiety  -     ALPRAZolam (XANAX) 0 25 mg tablet; Take 1 tablet (0 25 mg total) by mouth daily at bedtime as needed for anxiety    Acute bronchitis, unspecified organism  -     azithromycin (ZITHROMAX) 250 mg tablet; 2 tab x 1 day, 1 tab x 4 days          Subjective:      Patient ID: Jodi Malloy is a 27 y o  female  Here to follow up  Worsening anxiety   Cough for 7 days     Cough   This is a new problem  The current episode started in the past 7 days  The cough is productive of sputum  Associated symptoms include nasal congestion, postnasal drip and shortness of breath  Pertinent negatives include no chest pain, chills, ear congestion, ear pain, fever, headaches, heartburn, hemoptysis, myalgias, rash, rhinorrhea, sore throat, sweats, weight loss or wheezing  Nothing aggravates the symptoms  She has tried OTC cough suppressant for the symptoms  The treatment provided mild relief  There is no history of asthma, bronchiectasis, environmental allergies or pneumonia  Anxiety   Presents for follow-up visit  Symptoms include depressed mood, excessive worry, nervous/anxious behavior and shortness of breath  Patient reports no chest pain, compulsions, confusion, decreased concentration, dry mouth, hyperventilation, impotence, insomnia, irritability, malaise, muscle tension, nausea, obsessions, palpitations, panic or restlessness  Symptoms occur occasionally  The quality of sleep is fair  There is no history of asthma  Compliance with medications is %  The following portions of the patient's history were reviewed and updated as appropriate: allergies, current medications, past family history, past medical history, past social history, past surgical history and problem list     Review of Systems   Constitutional: Negative for chills, fever, irritability and weight loss  HENT: Positive for postnasal drip  Negative for ear pain, rhinorrhea and sore throat  Respiratory: Positive for cough and shortness of breath  Negative for hemoptysis and wheezing  Cardiovascular: Negative for chest pain and palpitations  Gastrointestinal: Negative for heartburn and nausea  Genitourinary: Negative for impotence  Musculoskeletal: Negative for myalgias  Skin: Negative for rash  Allergic/Immunologic: Negative for environmental allergies  Neurological: Negative for headaches  Psychiatric/Behavioral: Negative for confusion and decreased concentration  The patient is nervous/anxious  The patient does not have insomnia  Objective:      /100 (BP Location: Left arm, Patient Position: Sitting, Cuff Size: Standard)   Pulse 98   Temp 98 8 °F (37 1 °C) (Tympanic)   Resp 16   Ht 5' 3" (1 6 m)   Wt 90 3 kg (199 lb)   SpO2 95%   BMI 35 25 kg/m²          Physical Exam   Constitutional: She is oriented to person, place, and time  She appears well-developed and well-nourished  Eyes: Pupils are equal, round, and reactive to light  EOM are normal    Neck: No tracheal deviation present  No thyromegaly present  Cardiovascular: Normal rate and regular rhythm  Pulmonary/Chest: Effort normal and breath sounds normal  No stridor  No respiratory distress  Neurological: She is alert and oriented to person, place, and time  Skin: No erythema  No pallor  Psychiatric: She has a normal mood and affect  Her behavior is normal          BMI Counseling: Body mass index is 35 25 kg/m²   The BMI is above normal  Nutrition recommendations include decreasing portion sizes and encouraging healthy choices of fruits and vegetables  Exercise recommendations include moderate physical activity 150 minutes/week  No pharmacotherapy was ordered

## 2019-12-14 DIAGNOSIS — K21.9 GASTROESOPHAGEAL REFLUX DISEASE WITHOUT ESOPHAGITIS: ICD-10-CM

## 2019-12-14 RX ORDER — RANITIDINE 150 MG/1
150 TABLET ORAL 2 TIMES DAILY
Qty: 60 TABLET | Refills: 0 | Status: CANCELLED | OUTPATIENT
Start: 2019-12-14

## 2019-12-16 DIAGNOSIS — K21.9 GASTROESOPHAGEAL REFLUX DISEASE WITHOUT ESOPHAGITIS: ICD-10-CM

## 2019-12-16 RX ORDER — RANITIDINE 150 MG/1
150 TABLET ORAL 2 TIMES DAILY
Qty: 60 TABLET | Refills: 2 | Status: SHIPPED | OUTPATIENT
Start: 2019-12-16 | End: 2020-05-07 | Stop reason: CLARIF

## 2019-12-19 DIAGNOSIS — I10 BENIGN ESSENTIAL HYPERTENSION: Primary | ICD-10-CM

## 2019-12-19 RX ORDER — LOSARTAN POTASSIUM 50 MG/1
50 TABLET ORAL DAILY
Qty: 30 TABLET | Refills: 0 | Status: SHIPPED | OUTPATIENT
Start: 2019-12-19 | End: 2020-01-13 | Stop reason: SDUPTHER

## 2019-12-28 DIAGNOSIS — I10 BENIGN ESSENTIAL HYPERTENSION: ICD-10-CM

## 2019-12-28 RX ORDER — AMLODIPINE BESYLATE 5 MG/1
5 TABLET ORAL DAILY
Qty: 90 TABLET | Refills: 0 | Status: CANCELLED | OUTPATIENT
Start: 2019-12-28

## 2019-12-30 DIAGNOSIS — I10 BENIGN ESSENTIAL HYPERTENSION: ICD-10-CM

## 2019-12-30 RX ORDER — AMLODIPINE BESYLATE 5 MG/1
5 TABLET ORAL DAILY
Qty: 90 TABLET | Refills: 3 | Status: SHIPPED | OUTPATIENT
Start: 2019-12-30 | End: 2020-01-24

## 2020-01-12 DIAGNOSIS — J45.909 MILD ASTHMA, UNSPECIFIED WHETHER COMPLICATED, UNSPECIFIED WHETHER PERSISTENT: ICD-10-CM

## 2020-01-12 DIAGNOSIS — I10 BENIGN ESSENTIAL HYPERTENSION: ICD-10-CM

## 2020-01-12 RX ORDER — LOSARTAN POTASSIUM 50 MG/1
50 TABLET ORAL DAILY
Qty: 30 TABLET | Refills: 0 | Status: CANCELLED | OUTPATIENT
Start: 2020-01-12

## 2020-01-12 RX ORDER — ALBUTEROL SULFATE 90 UG/1
2 AEROSOL, METERED RESPIRATORY (INHALATION) EVERY 4 HOURS PRN
Qty: 1 INHALER | Refills: 0 | Status: CANCELLED | OUTPATIENT
Start: 2020-01-12

## 2020-01-13 DIAGNOSIS — J45.909 MILD ASTHMA, UNSPECIFIED WHETHER COMPLICATED, UNSPECIFIED WHETHER PERSISTENT: ICD-10-CM

## 2020-01-13 DIAGNOSIS — I10 BENIGN ESSENTIAL HYPERTENSION: ICD-10-CM

## 2020-01-13 RX ORDER — LOSARTAN POTASSIUM 100 MG/1
100 TABLET ORAL DAILY
Qty: 90 TABLET | Refills: 0 | Status: SHIPPED | OUTPATIENT
Start: 2020-01-13 | End: 2020-02-21 | Stop reason: SDUPTHER

## 2020-01-13 RX ORDER — ALBUTEROL SULFATE 90 UG/1
2 AEROSOL, METERED RESPIRATORY (INHALATION) EVERY 4 HOURS PRN
Qty: 1 INHALER | Refills: 0 | Status: SHIPPED | OUTPATIENT
Start: 2020-01-13 | End: 2021-02-14 | Stop reason: SDUPTHER

## 2020-01-24 ENCOUNTER — TELEPHONE (OUTPATIENT)
Dept: FAMILY MEDICINE CLINIC | Facility: CLINIC | Age: 35
End: 2020-01-24

## 2020-01-24 ENCOUNTER — OFFICE VISIT (OUTPATIENT)
Dept: FAMILY MEDICINE CLINIC | Facility: CLINIC | Age: 35
End: 2020-01-24
Payer: COMMERCIAL

## 2020-01-24 VITALS
RESPIRATION RATE: 16 BRPM | BODY MASS INDEX: 35.33 KG/M2 | SYSTOLIC BLOOD PRESSURE: 130 MMHG | OXYGEN SATURATION: 98 % | WEIGHT: 199.4 LBS | HEART RATE: 95 BPM | HEIGHT: 63 IN | DIASTOLIC BLOOD PRESSURE: 92 MMHG | TEMPERATURE: 97.6 F

## 2020-01-24 DIAGNOSIS — E66.9 OBESITY (BMI 30-39.9): ICD-10-CM

## 2020-01-24 DIAGNOSIS — M25.512 ACUTE PAIN OF LEFT SHOULDER: ICD-10-CM

## 2020-01-24 DIAGNOSIS — G43.009 MIGRAINE WITHOUT AURA AND WITHOUT STATUS MIGRAINOSUS, NOT INTRACTABLE: ICD-10-CM

## 2020-01-24 DIAGNOSIS — I10 BENIGN ESSENTIAL HYPERTENSION: Primary | ICD-10-CM

## 2020-01-24 PROCEDURE — 99214 OFFICE O/P EST MOD 30 MIN: CPT | Performed by: FAMILY MEDICINE

## 2020-01-24 RX ORDER — METHYLPREDNISOLONE 4 MG/1
TABLET ORAL
Qty: 21 EACH | Refills: 0 | Status: SHIPPED | OUTPATIENT
Start: 2020-01-24 | End: 2020-05-07 | Stop reason: CLARIF

## 2020-01-24 RX ORDER — METHYLDOPA 250 MG/1
250 TABLET, FILM COATED ORAL 3 TIMES DAILY
Qty: 90 TABLET | Refills: 0 | Status: SHIPPED | OUTPATIENT
Start: 2020-01-24 | End: 2020-01-29 | Stop reason: SDUPTHER

## 2020-01-24 NOTE — PROGRESS NOTES
Assessment/Plan:    No problem-specific Assessment & Plan notes found for this encounter  Diagnoses and all orders for this visit:    Benign essential hypertension  Comments:  added Methyldopa today   will wean off of Losartan in the near future   Orders:  -     methyldopa (ALDOMET) 250 mg tablet; Take 1 tablet (250 mg total) by mouth 3 (three) times a day  -     Ambulatory referral to Cardiology    Migraine without aura and without status migrainosus, not intractable  Comments:  stable  continue current meds     Obesity (BMI 30-39  9)  Comments:  diet and exercise encouraged     Acute pain of left shoulder  -     methylPREDNISolone 4 MG tablet therapy pack; Use as directed on package    Other orders  -     Prenatal Vit-Fe Fumarate-FA (PRENATAL 1+1 PO); Take by mouth          Subjective:      Patient ID: Jarret Banuelos is a 27 y o  female  Here to follow up  Breakfast: fruits shake with veggie or eggs  Lunch: small salad with chicken breast   Snacks: broccoli or healthy snack  Dinner: salad and meats  Her BP has been up and down at home   Found a embryo donor   Left shoulder pain on and off for the last few days       Hypertension   This is a chronic problem  The current episode started more than 1 year ago  The problem has been waxing and waning since onset  Pertinent negatives include no anxiety, blurred vision, chest pain, headaches, malaise/fatigue, neck pain, orthopnea, palpitations, peripheral edema, PND, shortness of breath or sweats  There are no associated agents to hypertension  Past treatments include angiotensin blockers  The current treatment provides mild improvement  The following portions of the patient's history were reviewed and updated as appropriate: allergies, current medications, past family history, past medical history, past social history, past surgical history and problem list     Review of Systems   Constitutional: Positive for fatigue  Negative for malaise/fatigue     Eyes: Negative for blurred vision  Respiratory: Negative for shortness of breath  Cardiovascular: Negative for chest pain, palpitations, orthopnea and PND  Musculoskeletal: Positive for arthralgias  Negative for gait problem and neck pain  Neurological: Negative for headaches  Objective:      /92 (BP Location: Left arm, Patient Position: Sitting, Cuff Size: Large)   Pulse 95   Temp 97 6 °F (36 4 °C) (Tympanic)   Resp 16   Ht 5' 3" (1 6 m)   Wt 90 4 kg (199 lb 6 4 oz)   SpO2 98%   BMI 35 32 kg/m²          Physical Exam   Constitutional: She appears well-developed and well-nourished  Cardiovascular: Normal rate and regular rhythm  Exam reveals no friction rub  No murmur heard  Pulmonary/Chest: Effort normal and breath sounds normal    Musculoskeletal: She exhibits tenderness     Left shoulder

## 2020-01-24 NOTE — TELEPHONE ENCOUNTER
Pharmacy called and stated that medication methyldopa (ALDOMET) 250 mg tablet is on back order but they do have 500 mg  Please advise and Thank you  We can call the pharmacy back with what we are going to order

## 2020-01-28 DIAGNOSIS — K21.9 GASTROESOPHAGEAL REFLUX DISEASE WITHOUT ESOPHAGITIS: ICD-10-CM

## 2020-01-28 RX ORDER — RANITIDINE 150 MG/1
150 TABLET ORAL 2 TIMES DAILY
Qty: 60 TABLET | Refills: 0 | Status: CANCELLED | OUTPATIENT
Start: 2020-01-28

## 2020-01-29 DIAGNOSIS — K21.9 GASTROESOPHAGEAL REFLUX DISEASE WITHOUT ESOPHAGITIS: ICD-10-CM

## 2020-01-29 DIAGNOSIS — I10 BENIGN ESSENTIAL HYPERTENSION: ICD-10-CM

## 2020-01-29 RX ORDER — RANITIDINE 150 MG/1
150 TABLET ORAL 2 TIMES DAILY
Qty: 60 TABLET | Refills: 2 | OUTPATIENT
Start: 2020-01-29

## 2020-01-29 RX ORDER — METHYLDOPA 250 MG/1
250 TABLET, FILM COATED ORAL 3 TIMES DAILY
Qty: 90 TABLET | Refills: 0 | Status: SHIPPED | OUTPATIENT
Start: 2020-01-29 | End: 2020-03-06

## 2020-01-29 NOTE — TELEPHONE ENCOUNTER
Zantac is on recall so I dont want her to take it anymore- not sure she needs it - this is from pharmacy

## 2020-02-07 NOTE — TELEPHONE ENCOUNTER
This can be closed  PT needs to see if there is another medication she can use, they are all on back order  She is going to her cardio next week about this

## 2020-02-21 ENCOUNTER — OFFICE VISIT (OUTPATIENT)
Dept: CARDIOLOGY CLINIC | Facility: CLINIC | Age: 35
End: 2020-02-21
Payer: COMMERCIAL

## 2020-02-21 VITALS
HEART RATE: 75 BPM | WEIGHT: 202 LBS | SYSTOLIC BLOOD PRESSURE: 134 MMHG | HEIGHT: 63 IN | BODY MASS INDEX: 35.79 KG/M2 | DIASTOLIC BLOOD PRESSURE: 90 MMHG

## 2020-02-21 DIAGNOSIS — I10 HYPERTENSION, UNSPECIFIED TYPE: ICD-10-CM

## 2020-02-21 DIAGNOSIS — Z82.41 FAMILY HISTORY OF SUDDEN CARDIAC DEATH (SCD): ICD-10-CM

## 2020-02-21 DIAGNOSIS — I34.0 MITRAL VALVE INSUFFICIENCY, UNSPECIFIED ETIOLOGY: ICD-10-CM

## 2020-02-21 DIAGNOSIS — I49.3 PVC (PREMATURE VENTRICULAR CONTRACTION): Primary | ICD-10-CM

## 2020-02-21 DIAGNOSIS — I10 BENIGN ESSENTIAL HYPERTENSION: ICD-10-CM

## 2020-02-21 PROCEDURE — 3008F BODY MASS INDEX DOCD: CPT | Performed by: INTERNAL MEDICINE

## 2020-02-21 PROCEDURE — 99214 OFFICE O/P EST MOD 30 MIN: CPT | Performed by: INTERNAL MEDICINE

## 2020-02-21 PROCEDURE — 3075F SYST BP GE 130 - 139MM HG: CPT | Performed by: INTERNAL MEDICINE

## 2020-02-21 PROCEDURE — 1036F TOBACCO NON-USER: CPT | Performed by: INTERNAL MEDICINE

## 2020-02-21 PROCEDURE — 3080F DIAST BP >= 90 MM HG: CPT | Performed by: INTERNAL MEDICINE

## 2020-02-21 PROCEDURE — 93000 ELECTROCARDIOGRAM COMPLETE: CPT | Performed by: INTERNAL MEDICINE

## 2020-02-21 RX ORDER — LOSARTAN POTASSIUM 100 MG/1
100 TABLET ORAL DAILY
Qty: 90 TABLET | Refills: 0 | Status: SHIPPED | OUTPATIENT
Start: 2020-02-21 | End: 2020-05-06

## 2020-02-21 RX ORDER — LABETALOL 200 MG/1
200 TABLET, FILM COATED ORAL 2 TIMES DAILY
Qty: 60 TABLET | Refills: 3
Start: 2020-02-21 | End: 2020-02-22

## 2020-02-21 NOTE — PROGRESS NOTES
Cardiology Follow Up    Helaine Siemens  [birth year]  [id number]  [address] [place] [address]  [facility] MAHSA [place] [id number] [facility]  [phone]    1  PVC (premature ventricular contraction)  POCT ECG   2  Hypertension, unspecified type  VAS renal artery complete    labetalol (NORMODYNE) 200 mg tablet   3  Benign essential hypertension  losartan (COZAAR) 100 MG tablet   4  Mitral valve insufficiency, unspecified etiology     5  Family history of sudden cardiac death (SCD)         Diagnoses and all orders for this visit:    PVC (premature ventricular contraction)  -     POCT ECG    Hypertension, unspecified type  -     VAS renal artery complete; Future  -     labetalol (NORMODYNE) 200 mg tablet; Take 1 tablet (200 mg total) by mouth 2 (two) times a day    Benign essential hypertension  -     losartan (COZAAR) 100 MG tablet; Take 1 tablet (100 mg total) by mouth daily 100 mg daily for 1 week, then 50 mg daily the next week and then discontinue    Mitral valve insufficiency, unspecified etiology    Family history of sudden cardiac death (SCD)      I had the pleasure of seeing Helaine Siemens for a follow up visit  Thank you for the referral     INTERVAL HISTORY: none, anti-med changes - she is trying to get pregnant    History of the presenting illness, Discussion/Summary and My Plan are as follows:::    She is a pleasant 62-year-old with a history of longstanding hypertension about 5 years ago, dyslipidemia, but no diabetes  She has also had infertility and is currently undergoing infertility treatments and plans to get pregnant within the next couple of months  She will be on progesterone hormone supplements, but without any estrogen        Family history significant for sudden cardiac death in at least 2 maternal uncles, 1  recently-had a sudden cardiac death at the age of 54, was otherwise healthy, autopsy is pending, and another maternal uncle  in the hospital, was told he had many heart attacks that were not symptomatic since he was also on pain medications  A 3rd maternal uncle's details are not available  Her maternal grandfather  after having knee surgery, was told he had multiple heart attacks  Her dad himself is healthy  Both her parents and brother have hypertension  Lifestyle and diet:  She has put on about 20 lb in the last 2 years  Overall diet probably contains more than the recommended amounts of salt, while not every day, diet has included [place] food, chips, soups occasionally, cheese, pickled olives and ice cream at times  In terms of testing, potassium, creatinine were both normal   Thyroid profile was normal An abdominal ultrasound was normal in     Activity wise, gets about 6000 steps in a day, does not exercise  She had originally seen Dr Ruddy Gibson and since  has had Holters - that showed few PVCs-71 PVCs in 2016, echoes have shown trace to mild mitral regurgitation  She also had a stress test-most recently 2019-with excellent functional capacity without ischemia  For hypertension, has been on losartan at 200 mg daily and methyldopa  Amlodipine was discontinued recently due to edema  Plan:    Hypertension:  She would likely be getting pregnant in the next couple of months  Losartan is contraindicated and will be weaned off over the next couple of weeks as we initiate her on labetalol  She does have a history of bronchial asthma but unlikely to have side effects to labetalol since this is predominantly alpha blocker  Will start 200 mg labetalol twice daily  She will wean off losartan over the next couple of weeks  Continue with had upper without any changes  In the long run, hydrochlorothiazide, methyldopa, labetalol are all safe in pregnancy but will try to keep her on the minimum number possible    Check renal artery Dopplers to rule out fibromuscular dysplasia, considering young age, although likely genetic  Mitral regurgitation:  Clinically insignificant, no further testing at the current time    Family history of sudden death:  Not all details are available, I told her to be on the lookout for the autopsy results of her maternal uncle who passed away recently  Her parents themselves are fine and hence will hold off on further testing at the current time  She has already had an echo , Holter and a stress test     Palpitations:  Few and far between now, no changes at this time, no further evaluation    Dyslipidemia:  I did caution her that her lipid profile showed an increase in total cholesterol and LDL by 25 mg, last evaluated in 2018  Dietary changes were also discussed since pregnancy typically would double her LDL levels  She will keep in touch providing me her blood pressure logs  I will have her follow-up in 6 months and see her if necessary in the interim  Results for Jyothi Nettles (MRN [id number]) as of 2/21/2020 14:11   Ref  Range 3/21/2018 10:07 7/19/2018 13:19   Cholesterol Latest Ref Range: 50 - 200 mg/dL 173 201 (H)   Triglycerides Latest Ref Range: <=150 mg/dL 133 150   HDL Latest Ref Range: 40 - 60 mg/dL 64 (H) 65 (H)   Non-HDL Cholesterol Latest Units: mg/dl  136   LDL Direct Latest Ref Range: 0 - 100 mg/dL 82 106 (H)   Results for Jyothi Nettles (MRN [id number]) as of 2/21/2020 14:11   Ref  Range 4/1/2019 16:25 7/1/2019 12:35   TSH 3RD GENERATON Latest Ref Range: 0 358 - 3 740 uIU/mL 2 720    Free T4 Latest Ref Range: 0 76 - 1 46 ng/dL 0 88    T3, Free Latest Ref Range: 2 30 - 4 20 pg/mL 2 81    Potassium Latest Ref Range: 3 5 - 5 3 mmol/L  3 7   Sodium Latest Ref Range: 136 - 145 mmol/L  135 (L)   Results for Jyothi Nettles (MRN [id number]) as of 2/21/2020 14:11   Ref   Range 7/19/2018 13:19   Hemoglobin A1C Latest Ref Range: 4 2 - 6 3 % 5 4       Patient Active Problem List   Diagnosis    Lower back pain    Hypertension    Herniated lumbar intervertebral disc    Lumbar radiculopathy, acute    Trigger finger of right thumb    Asthma in adult, mild intermittent, uncomplicated    Benign essential hypertension    Mitral valve insufficiency    Obesity (BMI 30-39  9)    PVC (premature ventricular contraction)    Fibroids, submucosal    GERD (gastroesophageal reflux disease)    Female infertility    Polycystic ovaries    Migraine without aura and without status migrainosus, not intractable    Family history of sudden cardiac death (SCD)     Past Medical History:   Diagnosis Date    Abdominal pain     Asthma     mild intermittent    GERD (gastroesophageal reflux disease)     History of palpitations     Hypertension     Lower back pain     Trace mitral regurgitation by prior echocardiogram      Social History     Socioeconomic History    Marital status: /Civil Union     Spouse name: Not on file    Number of children: Not on file    Years of education: Not on file    Highest education level: Not on file   Occupational History    Not on file   Social Needs    Financial resource strain: Not on file    Food insecurity:     Worry: Not on file     Inability: Not on file    Transportation needs:     Medical: Not on file     Non-medical: Not on file   Tobacco Use    Smoking status: Never Smoker    Smokeless tobacco: Never Used   Substance and Sexual Activity    Alcohol use: Yes     Comment: socialy    Drug use: No    Sexual activity: Yes     Partners: Male     Birth control/protection: None   Lifestyle    Physical activity:     Days per week: Not on file     Minutes per session: Not on file    Stress: Not on file   Relationships    Social connections:     Talks on phone: Not on file     Gets together: Not on file     Attends [employer] service: Not on file     Active member of club or organization: Not on file     Attends meetings of clubs or organizations: Not on file     Relationship status: Not on file   [facility] Intimate partner violence:     Fear of current or ex partner: Not on file     Emotionally abused: Not on file     Physically abused: Not on file     Forced sexual activity: Not on file   Other Topics Concern    Not on file   Social History Narrative    Exercises 3x week    Pet: dog      Family History   Problem Relation Age of Onset    Diabetes Mother         type2    Hypertension Mother     Diabetes Father         type2    Hypertension Father     Heart attack Maternal Grandfather         acute MI    Arthritis Maternal Grandfather     Stroke Maternal Grandfather         CVA    Cancer Maternal Grandfather     Hyperlipidemia Maternal Grandfather     Arthritis Paternal Grandfather     Stroke Paternal Grandfather         CVA    Cancer Paternal Grandfather     Hyperlipidemia Paternal Grandfather     Heart attack Maternal Uncle         acute MI    Arthritis Family     Stroke Family         CVA    Cancer Family     Hyperlipidemia Family     Stroke Family         CVA    Hyperlipidemia Family      Past Surgical History:   Procedure Laterality Date    CHOLECYSTECTOMY      laparoscopic    HERNIA REPAIR      umbilical    LUMBAR LAMINECTOMY Right 9/14/2016    Procedure: Right L5/S1 Metryx microdiscectomy;  Surgeon: Patricio Allison MD;  [facility];  Service:    Wily Lees [place] COLONOSCOPY FLX DX W/COLLJ SPEC WHEN PFRMD N/A 7/7/2016    Procedure: COLONOSCOPY;  Surgeon: Frankey Spillers, DO;  Location: [facility];   Service: Gastroenterology    NJ HYSTEROSCOPY,W/ENDO BX N/A 3/19/2018    Procedure: HYSTEROSCOPY; MYOMECTOMY; ENDOMETRIAL BIOPSY;  Surgeon: Hoda Morales DO;  [facility];  Service: Gynecology    NJ INCISE FINGER TENDON SHEATH Right 2/28/2017    Procedure: THUMB TRIGGER RELEASE ;  Surgeon: Beverly Wilson DO;  [facility];  Service: Orthopedics    WISDOM TOOTH EXTRACTION         Current Outpatient Medications:     albuterol (2 5 mg/3 mL) 0 083 % nebulizer solution, Take 1 vial (2 5 mg total) by nebulization every 6 (six) hours as needed for wheezing or shortness of breath, Disp: 30 vial, Rfl: 3    albuterol (PROAIR HFA) 90 mcg/act inhaler, Inhale 2 puffs every 4 (four) hours as needed (When has flare up), Disp: 1 Inhaler, Rfl: 0    ALPRAZolam (XANAX) 0 25 mg tablet, Take 1 tablet (0 25 mg total) by mouth daily at bedtime as needed for anxiety, Disp: 20 tablet, Rfl: 0    Cholecalciferol (VITAMIN D3 PO), Take 2,000 mg by mouth daily in the early morning, Disp: , Rfl:     cyclobenzaprine (FLEXERIL) 10 mg tablet, Take 1 tablet (10 mg total) by mouth 3 (three) times a day as needed for muscle spasms, Disp: 30 tablet, Rfl: 0    losartan (COZAAR) 100 MG tablet, Take 1 tablet (100 mg total) by mouth daily 100 mg daily for 1 week, then 50 mg daily the next week and then discontinue, Disp: 90 tablet, Rfl: 0    meloxicam (MOBIC) 15 mg tablet, Take 1 tablet by mouth daily as needed, Disp: , Rfl:     methyldopa (ALDOMET) 250 mg tablet, Take 1 tablet (250 mg total) by mouth 3 (three) times a day, Disp: 90 tablet, Rfl: 0    multivitamin (THERAGRAN) TABS, Take 1 tablet by mouth daily after dinner, Disp: , Rfl:     ondansetron (ZOFRAN-ODT) 4 mg disintegrating tablet, Take 1 tablet (4 mg total) by mouth every 6 (six) hours as needed for nausea or vomiting, Disp: 20 tablet, Rfl: 0    Prenatal Vit-Fe Fumarate-FA (PRENATAL 1+1 PO), Take by mouth, Disp: , Rfl:     ranitidine (ZANTAC) 150 mg tablet, Take 1 tablet (150 mg total) by mouth 2 (two) times a day, Disp: 60 tablet, Rfl: 2    SUMAtriptan (IMITREX) 50 mg tablet, Take 1 tablet (50 mg total) by mouth once as needed for migraine for up to 1 dose, Disp: 9 tablet, Rfl: 0    labetalol (NORMODYNE) 200 mg tablet, Take 1 tablet (200 mg total) by mouth 2 (two) times a day, Disp: 60 tablet, Rfl: 3    levothyroxine 88 mcg tablet, , Disp: , Rfl:     methylPREDNISolone 4 MG tablet therapy pack, Use as directed on package (Patient not taking: Reported on 2/21/2020), Disp: 21 each, Rfl: 0  No Known Allergies    Imaging: No results found  Review of Systems:  Review of Systems   Constitutional: Negative  HENT: Negative  Eyes: Negative  Respiratory: Negative  Cardiovascular: Negative  Endocrine: Negative  Musculoskeletal: Negative  Neurological: Negative  Physical Exam:  /90 (BP Location: Right arm, Patient Position: Sitting, Cuff Size: Large)   Pulse 75   Ht 5' 3" (1 6 m)   Wt 91 6 kg (202 lb)   BMI 35 78 kg/m²   Physical Exam   Constitutional: She appears well-developed and well-nourished  Non-toxic appearance  She does not appear ill  No distress  HENT:   Head: Normocephalic  Mouth/Throat: Oropharynx is clear and moist  No oropharyngeal exudate or posterior oropharyngeal edema  Eyes: Pupils are equal, round, and reactive to light  Neck: Normal range of motion  No hepatojugular reflux and no JVD present  No tracheal deviation present  No thyromegaly present  Cardiovascular: Normal rate and regular rhythm  No bruit   Pulmonary/Chest: Effort normal and breath sounds normal  No accessory muscle usage  No bradypnea  No respiratory distress  She has no decreased breath sounds  She has no wheezes  She has no rales  Abdominal: Soft  She exhibits no distension and no mass  There is no tenderness  There is no guarding  Neurological: She is alert  She is not disoriented  No cranial nerve deficit  Skin: Skin is warm  No rash noted  She is not diaphoretic  No erythema  No pallor

## 2020-02-21 NOTE — PATIENT INSTRUCTIONS
Therapeutic lifestyle changes were discussed with the patient- Specifically:  1  Decreasing saturated fat intake to less than 13 g per day  I showed the pt how to look at a food label  Watch intake of cheese, red meat, cream and butter containing foods  2  Increase soluble fiber in the diet-beans, pears, oatmeal  3  Weight loss of even 5-10 pounds will also help to lower cholesterol levels  Decreasing caloric intake by about 100 avery a day-should lead to a weight loss of 1-2 pounds over one month  4  Increase aerobic physical activity - ultimate goal of 150 min per week  Start low and increase level and duration of activity slowly  5  Google 'TLC Cholesterol" = Your guide to lowering your cholesterol with TLC is probably the best online resource to lower your LDL cholesterol  6  I showed the patient how to look at a food label  Start labetalol at 200 mg twice daily    Decrease losartan to 100 mg daily for the next 1 week, then 50 mg daily following week, then discontinue   send me a blood pressure log  -twice daily - next week   complete kidney Dopplers

## 2020-02-22 RX ORDER — LABETALOL 200 MG/1
TABLET, FILM COATED ORAL
Qty: 60 TABLET | Refills: 0 | Status: SHIPPED | OUTPATIENT
Start: 2020-02-22 | End: 2020-03-23 | Stop reason: SDUPTHER

## 2020-03-06 DIAGNOSIS — I10 BENIGN ESSENTIAL HYPERTENSION: ICD-10-CM

## 2020-03-06 RX ORDER — METHYLDOPA 250 MG/1
250 TABLET, FILM COATED ORAL 3 TIMES DAILY
Qty: 90 TABLET | Refills: 0 | Status: SHIPPED | OUTPATIENT
Start: 2020-03-06 | End: 2020-04-06

## 2020-03-11 ENCOUNTER — APPOINTMENT (OUTPATIENT)
Dept: LAB | Facility: HOSPITAL | Age: 35
End: 2020-03-11
Attending: OBSTETRICS & GYNECOLOGY
Payer: COMMERCIAL

## 2020-03-11 ENCOUNTER — TRANSCRIBE ORDERS (OUTPATIENT)
Dept: LAB | Facility: HOSPITAL | Age: 35
End: 2020-03-11

## 2020-03-11 DIAGNOSIS — Z22.4 CARRIER OF INFECTIONS WITH PREDOMINANTLY SEXUAL MODE OF TRANSMISSION: ICD-10-CM

## 2020-03-11 DIAGNOSIS — D64.9 ANEMIA, UNSPECIFIED TYPE: ICD-10-CM

## 2020-03-11 DIAGNOSIS — E07.9 DISORDER OF THYROID: ICD-10-CM

## 2020-03-11 DIAGNOSIS — Z11.59 SCREENING FOR VIRAL DISEASE: ICD-10-CM

## 2020-03-11 DIAGNOSIS — E34.9 ENDOCRINE DISORDER: ICD-10-CM

## 2020-03-11 DIAGNOSIS — Z01.83 ENCOUNTER FOR BLOOD TYPING: Primary | ICD-10-CM

## 2020-03-11 DIAGNOSIS — Z01.83 ENCOUNTER FOR BLOOD TYPING: ICD-10-CM

## 2020-03-11 DIAGNOSIS — Z11.3 SCREEN FOR SEXUALLY TRANSMITTED DISEASES: ICD-10-CM

## 2020-03-11 DIAGNOSIS — Z31.41 FERTILITY TESTING: ICD-10-CM

## 2020-03-11 DIAGNOSIS — Z11.8 SCREENING EXAMINATION FOR PARASITIC INFECTION: ICD-10-CM

## 2020-03-11 LAB
ABO GROUP BLD: NORMAL
BASOPHILS # BLD AUTO: 0.06 THOUSANDS/ΜL (ref 0–0.1)
BASOPHILS NFR BLD AUTO: 1 % (ref 0–1)
BLD GP AB SCN SERPL QL: NEGATIVE
EOSINOPHIL # BLD AUTO: 0.09 THOUSAND/ΜL (ref 0–0.61)
EOSINOPHIL NFR BLD AUTO: 1 % (ref 0–6)
ERYTHROCYTE [DISTWIDTH] IN BLOOD BY AUTOMATED COUNT: 15.8 % (ref 11.6–15.1)
HBV CORE AB SER QL: NORMAL
HBV CORE IGM SER QL: NORMAL
HBV SURFACE AG SER QL: NORMAL
HCT VFR BLD AUTO: 36.1 % (ref 34.8–46.1)
HCV AB SER QL: NORMAL
HGB BLD-MCNC: 11.1 G/DL (ref 11.5–15.4)
IMM GRANULOCYTES # BLD AUTO: 0.04 THOUSAND/UL (ref 0–0.2)
IMM GRANULOCYTES NFR BLD AUTO: 0 % (ref 0–2)
LYMPHOCYTES # BLD AUTO: 1.79 THOUSANDS/ΜL (ref 0.6–4.47)
LYMPHOCYTES NFR BLD AUTO: 15 % (ref 14–44)
MCH RBC QN AUTO: 24.2 PG (ref 26.8–34.3)
MCHC RBC AUTO-ENTMCNC: 30.7 G/DL (ref 31.4–37.4)
MCV RBC AUTO: 79 FL (ref 82–98)
MONOCYTES # BLD AUTO: 0.59 THOUSAND/ΜL (ref 0.17–1.22)
MONOCYTES NFR BLD AUTO: 5 % (ref 4–12)
NEUTROPHILS # BLD AUTO: 9.18 THOUSANDS/ΜL (ref 1.85–7.62)
NEUTS SEG NFR BLD AUTO: 78 % (ref 43–75)
NRBC BLD AUTO-RTO: 0 /100 WBCS
PLATELET # BLD AUTO: 280 THOUSANDS/UL (ref 149–390)
PMV BLD AUTO: 11.9 FL (ref 8.9–12.7)
PROLACTIN SERPL-MCNC: 20.6 NG/ML
RBC # BLD AUTO: 4.59 MILLION/UL (ref 3.81–5.12)
RH BLD: NEGATIVE
RUBV IGG SERPL IA-ACNC: >175 IU/ML
SPECIMEN EXPIRATION DATE: NORMAL
T3FREE SERPL-MCNC: 2.35 PG/ML (ref 2.3–4.2)
T4 FREE SERPL-MCNC: 0.88 NG/DL (ref 0.76–1.46)
TSH SERPL DL<=0.05 MIU/L-ACNC: 1.64 UIU/ML (ref 0.36–3.74)
WBC # BLD AUTO: 11.75 THOUSAND/UL (ref 4.31–10.16)

## 2020-03-11 PROCEDURE — 36415 COLL VENOUS BLD VENIPUNCTURE: CPT

## 2020-03-11 PROCEDURE — 83516 IMMUNOASSAY NONANTIBODY: CPT

## 2020-03-11 PROCEDURE — 80081 OBSTETRIC PANEL INC HIV TSTG: CPT

## 2020-03-11 PROCEDURE — 86631 CHLAMYDIA ANTIBODY: CPT

## 2020-03-11 PROCEDURE — 86645 CMV ANTIBODY IGM: CPT

## 2020-03-11 PROCEDURE — 87661 TRICHOMONAS VAGINALIS AMPLIF: CPT

## 2020-03-11 PROCEDURE — 86787 VARICELLA-ZOSTER ANTIBODY: CPT

## 2020-03-11 PROCEDURE — 86632 CHLAMYDIA IGM ANTIBODY: CPT

## 2020-03-11 PROCEDURE — 84481 FREE ASSAY (FT-3): CPT

## 2020-03-11 PROCEDURE — 87591 N.GONORRHOEAE DNA AMP PROB: CPT

## 2020-03-11 PROCEDURE — 87491 CHLMYD TRACH DNA AMP PROBE: CPT

## 2020-03-11 PROCEDURE — 86644 CMV ANTIBODY: CPT

## 2020-03-11 PROCEDURE — 86790 VIRUS ANTIBODY NOS: CPT

## 2020-03-11 PROCEDURE — 84146 ASSAY OF PROLACTIN: CPT

## 2020-03-11 PROCEDURE — 84443 ASSAY THYROID STIM HORMONE: CPT

## 2020-03-11 PROCEDURE — 86705 HEP B CORE ANTIBODY IGM: CPT

## 2020-03-11 PROCEDURE — 84439 ASSAY OF FREE THYROXINE: CPT

## 2020-03-11 PROCEDURE — 86704 HEP B CORE ANTIBODY TOTAL: CPT

## 2020-03-11 PROCEDURE — 86803 HEPATITIS C AB TEST: CPT

## 2020-03-12 LAB
C TRACH DNA SPEC QL NAA+PROBE: NEGATIVE
CMV IGG SERPL IA-ACNC: >10 U/ML (ref 0–0.59)
CMV IGM SERPL IA-ACNC: <30 AU/ML (ref 0–29.9)
HIV 1+2 AB+HIV1 P24 AG SERPL QL IA: NORMAL
HTLV I+II AB SER QL IA: NEGATIVE
N GONORRHOEA DNA SPEC QL NAA+PROBE: NEGATIVE
RPR SER QL: NORMAL
VZV IGG SER IA-ACNC: NORMAL

## 2020-03-13 LAB
C TRACH IGM TITR SER IF: <0.8 INDEX (ref 0–0.7)
CHLAMYDIA IGG SER-ACNC: <0.91 RATIO (ref 0–0.9)

## 2020-03-15 LAB
MIS SERPL-MCNC: 4.19 NG/ML
T VAGINALIS RRNA SPEC QL NAA+PROBE: NEGATIVE

## 2020-03-20 ENCOUNTER — HOSPITAL ENCOUNTER (OUTPATIENT)
Dept: NON INVASIVE DIAGNOSTICS | Facility: HOSPITAL | Age: 35
Discharge: HOME | End: 2020-03-20
Payer: COMMERCIAL

## 2020-03-20 DIAGNOSIS — I10 HYPERTENSION, UNSPECIFIED TYPE: ICD-10-CM

## 2020-03-20 PROCEDURE — 93975 VASCULAR STUDY: CPT

## 2020-03-20 PROCEDURE — 93975 VASCULAR STUDY: CPT | Performed by: SURGERY

## 2020-03-23 DIAGNOSIS — I10 HYPERTENSION, UNSPECIFIED TYPE: ICD-10-CM

## 2020-03-23 RX ORDER — LABETALOL 200 MG/1
200 TABLET, FILM COATED ORAL 2 TIMES DAILY
Qty: 60 TABLET | Refills: 3 | Status: SHIPPED | OUTPATIENT
Start: 2020-03-23 | End: 2020-04-27 | Stop reason: SDUPTHER

## 2020-04-04 DIAGNOSIS — I10 BENIGN ESSENTIAL HYPERTENSION: ICD-10-CM

## 2020-04-06 RX ORDER — METHYLDOPA 250 MG/1
250 TABLET, FILM COATED ORAL 3 TIMES DAILY
Qty: 90 TABLET | Refills: 0 | Status: SHIPPED | OUTPATIENT
Start: 2020-04-06 | End: 2020-04-20 | Stop reason: SDUPTHER

## 2020-04-13 DIAGNOSIS — I10 BENIGN ESSENTIAL HYPERTENSION: ICD-10-CM

## 2020-04-14 RX ORDER — METHYLDOPA 250 MG/1
250 TABLET, FILM COATED ORAL 3 TIMES DAILY
Qty: 90 TABLET | Refills: 0 | OUTPATIENT
Start: 2020-04-14

## 2020-04-20 DIAGNOSIS — I10 BENIGN ESSENTIAL HYPERTENSION: ICD-10-CM

## 2020-04-20 RX ORDER — METHYLDOPA 250 MG/1
250 TABLET, FILM COATED ORAL 3 TIMES DAILY
Qty: 90 TABLET | Refills: 1 | Status: SHIPPED | OUTPATIENT
Start: 2020-04-20 | End: 2020-05-23 | Stop reason: SDUPTHER

## 2020-04-27 DIAGNOSIS — I10 HYPERTENSION, UNSPECIFIED TYPE: ICD-10-CM

## 2020-04-28 RX ORDER — LABETALOL 200 MG/1
200 TABLET, FILM COATED ORAL 2 TIMES DAILY
Qty: 60 TABLET | Refills: 0 | Status: SHIPPED | OUTPATIENT
Start: 2020-04-28 | End: 2020-05-23 | Stop reason: SDUPTHER

## 2020-05-06 ENCOUNTER — TELEPHONE (OUTPATIENT)
Dept: CARDIOLOGY CLINIC | Facility: CLINIC | Age: 35
End: 2020-05-06

## 2020-05-07 ENCOUNTER — OFFICE VISIT (OUTPATIENT)
Dept: CARDIOLOGY CLINIC | Facility: CLINIC | Age: 35
End: 2020-05-07
Payer: COMMERCIAL

## 2020-05-07 VITALS
SYSTOLIC BLOOD PRESSURE: 104 MMHG | WEIGHT: 209.1 LBS | HEART RATE: 86 BPM | HEIGHT: 63 IN | OXYGEN SATURATION: 99 % | DIASTOLIC BLOOD PRESSURE: 64 MMHG | BODY MASS INDEX: 37.05 KG/M2

## 2020-05-07 DIAGNOSIS — E66.09 CLASS 2 OBESITY DUE TO EXCESS CALORIES WITH BODY MASS INDEX (BMI) OF 37.0 TO 37.9 IN ADULT, UNSPECIFIED WHETHER SERIOUS COMORBIDITY PRESENT: ICD-10-CM

## 2020-05-07 DIAGNOSIS — I10 HYPERTENSION, UNSPECIFIED TYPE: Primary | ICD-10-CM

## 2020-05-07 PROCEDURE — 3078F DIAST BP <80 MM HG: CPT | Performed by: NURSE PRACTITIONER

## 2020-05-07 PROCEDURE — 3008F BODY MASS INDEX DOCD: CPT | Performed by: NURSE PRACTITIONER

## 2020-05-07 PROCEDURE — 1036F TOBACCO NON-USER: CPT | Performed by: NURSE PRACTITIONER

## 2020-05-07 PROCEDURE — 3074F SYST BP LT 130 MM HG: CPT | Performed by: NURSE PRACTITIONER

## 2020-05-07 PROCEDURE — 99214 OFFICE O/P EST MOD 30 MIN: CPT | Performed by: NURSE PRACTITIONER

## 2020-05-14 ENCOUNTER — CLINICAL SUPPORT (OUTPATIENT)
Dept: CARDIOLOGY CLINIC | Facility: CLINIC | Age: 35
End: 2020-05-14
Payer: COMMERCIAL

## 2020-05-14 VITALS
TEMPERATURE: 97.5 F | HEART RATE: 82 BPM | BODY MASS INDEX: 36.68 KG/M2 | SYSTOLIC BLOOD PRESSURE: 115 MMHG | WEIGHT: 207 LBS | DIASTOLIC BLOOD PRESSURE: 82 MMHG | HEIGHT: 63 IN

## 2020-05-14 DIAGNOSIS — I10 HYPERTENSION, UNSPECIFIED TYPE: Primary | ICD-10-CM

## 2020-05-14 PROCEDURE — 3079F DIAST BP 80-89 MM HG: CPT

## 2020-05-14 PROCEDURE — 3008F BODY MASS INDEX DOCD: CPT

## 2020-05-14 PROCEDURE — 99211 OFF/OP EST MAY X REQ PHY/QHP: CPT

## 2020-05-14 PROCEDURE — 3074F SYST BP LT 130 MM HG: CPT

## 2020-05-23 DIAGNOSIS — I10 BENIGN ESSENTIAL HYPERTENSION: ICD-10-CM

## 2020-05-23 DIAGNOSIS — I10 HYPERTENSION, UNSPECIFIED TYPE: ICD-10-CM

## 2020-05-26 RX ORDER — LABETALOL 200 MG/1
200 TABLET, FILM COATED ORAL 2 TIMES DAILY
Qty: 60 TABLET | Refills: 0 | Status: SHIPPED | OUTPATIENT
Start: 2020-05-26 | End: 2020-07-01 | Stop reason: SDUPTHER

## 2020-05-26 RX ORDER — METHYLDOPA 250 MG/1
250 TABLET, FILM COATED ORAL 3 TIMES DAILY
Qty: 90 TABLET | Refills: 0 | Status: SHIPPED | OUTPATIENT
Start: 2020-05-26 | End: 2020-10-15

## 2020-06-04 ENCOUNTER — OFFICE VISIT (OUTPATIENT)
Dept: FAMILY MEDICINE CLINIC | Facility: CLINIC | Age: 35
End: 2020-06-04
Payer: COMMERCIAL

## 2020-06-04 VITALS
WEIGHT: 208.2 LBS | SYSTOLIC BLOOD PRESSURE: 140 MMHG | HEIGHT: 63 IN | RESPIRATION RATE: 18 BRPM | OXYGEN SATURATION: 97 % | DIASTOLIC BLOOD PRESSURE: 80 MMHG | BODY MASS INDEX: 36.89 KG/M2 | TEMPERATURE: 98.5 F | HEART RATE: 95 BPM

## 2020-06-04 DIAGNOSIS — E66.9 OBESITY (BMI 30-39.9): ICD-10-CM

## 2020-06-04 DIAGNOSIS — R07.9 CHEST PAIN, UNSPECIFIED TYPE: Primary | ICD-10-CM

## 2020-06-04 DIAGNOSIS — J45.20 ASTHMA IN ADULT, MILD INTERMITTENT, UNCOMPLICATED: ICD-10-CM

## 2020-06-04 DIAGNOSIS — K21.9 GASTROESOPHAGEAL REFLUX DISEASE WITHOUT ESOPHAGITIS: ICD-10-CM

## 2020-06-04 DIAGNOSIS — G43.009 MIGRAINE WITHOUT AURA AND WITHOUT STATUS MIGRAINOSUS, NOT INTRACTABLE: ICD-10-CM

## 2020-06-04 DIAGNOSIS — I10 BENIGN ESSENTIAL HYPERTENSION: ICD-10-CM

## 2020-06-04 PROCEDURE — 93000 ELECTROCARDIOGRAM COMPLETE: CPT | Performed by: FAMILY MEDICINE

## 2020-06-04 PROCEDURE — 99214 OFFICE O/P EST MOD 30 MIN: CPT | Performed by: FAMILY MEDICINE

## 2020-06-04 PROCEDURE — 3008F BODY MASS INDEX DOCD: CPT | Performed by: FAMILY MEDICINE

## 2020-06-04 PROCEDURE — 1036F TOBACCO NON-USER: CPT | Performed by: FAMILY MEDICINE

## 2020-06-04 PROCEDURE — 3079F DIAST BP 80-89 MM HG: CPT | Performed by: FAMILY MEDICINE

## 2020-06-04 PROCEDURE — 3077F SYST BP >= 140 MM HG: CPT | Performed by: FAMILY MEDICINE

## 2020-06-04 RX ORDER — LEVOTHYROXINE SODIUM 0.05 MG/1
TABLET ORAL
COMMUNITY
Start: 2020-05-15 | End: 2020-12-08

## 2020-06-04 RX ORDER — PANTOPRAZOLE SODIUM 40 MG/1
40 TABLET, DELAYED RELEASE ORAL
Qty: 30 TABLET | Refills: 5 | Status: SHIPPED | OUTPATIENT
Start: 2020-06-04 | End: 2020-07-01 | Stop reason: SDUPTHER

## 2020-06-04 RX ORDER — HYDROCHLOROTHIAZIDE 25 MG/1
25 TABLET ORAL DAILY
Qty: 30 TABLET | Refills: 0 | Status: SHIPPED | OUTPATIENT
Start: 2020-06-04 | End: 2020-07-01 | Stop reason: SDUPTHER

## 2020-06-05 ENCOUNTER — APPOINTMENT (OUTPATIENT)
Dept: LAB | Facility: HOSPITAL | Age: 35
End: 2020-06-05
Payer: COMMERCIAL

## 2020-06-05 DIAGNOSIS — R07.9 CHEST PAIN, UNSPECIFIED TYPE: ICD-10-CM

## 2020-06-05 LAB
ALBUMIN SERPL BCP-MCNC: 3.5 G/DL (ref 3.5–5)
ALP SERPL-CCNC: 54 U/L (ref 46–116)
ALT SERPL W P-5'-P-CCNC: 73 U/L (ref 12–78)
ANION GAP SERPL CALCULATED.3IONS-SCNC: 5 MMOL/L (ref 4–13)
AST SERPL W P-5'-P-CCNC: 33 U/L (ref 5–45)
BILIRUB SERPL-MCNC: 0.14 MG/DL (ref 0.2–1)
BUN SERPL-MCNC: 12 MG/DL (ref 5–25)
CALCIUM SERPL-MCNC: 9.3 MG/DL (ref 8.3–10.1)
CHLORIDE SERPL-SCNC: 108 MMOL/L (ref 100–108)
CK SERPL-CCNC: 62 U/L (ref 26–192)
CO2 SERPL-SCNC: 25 MMOL/L (ref 21–32)
CREAT SERPL-MCNC: 1.03 MG/DL (ref 0.6–1.3)
D DIMER PPP FEU-MCNC: 0.47 UG/ML FEU
GFR SERPL CREATININE-BSD FRML MDRD: 81 ML/MIN/1.73SQ M
GLUCOSE P FAST SERPL-MCNC: 122 MG/DL (ref 65–99)
POTASSIUM SERPL-SCNC: 3.7 MMOL/L (ref 3.5–5.3)
PROT SERPL-MCNC: 7 G/DL (ref 6.4–8.2)
SODIUM SERPL-SCNC: 138 MMOL/L (ref 136–145)
TROPONIN I SERPL-MCNC: <0.02 NG/ML
TSH SERPL DL<=0.05 MIU/L-ACNC: 1.13 UIU/ML (ref 0.36–3.74)

## 2020-06-05 PROCEDURE — 80053 COMPREHEN METABOLIC PANEL: CPT | Performed by: FAMILY MEDICINE

## 2020-06-05 PROCEDURE — 84484 ASSAY OF TROPONIN QUANT: CPT

## 2020-06-05 PROCEDURE — 36415 COLL VENOUS BLD VENIPUNCTURE: CPT

## 2020-06-05 PROCEDURE — 84443 ASSAY THYROID STIM HORMONE: CPT

## 2020-06-05 PROCEDURE — 85379 FIBRIN DEGRADATION QUANT: CPT

## 2020-06-05 PROCEDURE — 82550 ASSAY OF CK (CPK): CPT

## 2020-06-22 DIAGNOSIS — F41.9 ANXIETY: ICD-10-CM

## 2020-06-22 RX ORDER — ALPRAZOLAM 0.25 MG/1
0.25 TABLET ORAL
Qty: 20 TABLET | Refills: 0 | Status: SHIPPED | OUTPATIENT
Start: 2020-06-22 | End: 2020-09-01 | Stop reason: SDUPTHER

## 2020-06-25 ENCOUNTER — OFFICE VISIT (OUTPATIENT)
Dept: OBGYN CLINIC | Facility: CLINIC | Age: 35
End: 2020-06-25
Payer: COMMERCIAL

## 2020-06-25 VITALS
DIASTOLIC BLOOD PRESSURE: 76 MMHG | SYSTOLIC BLOOD PRESSURE: 118 MMHG | WEIGHT: 208 LBS | BODY MASS INDEX: 36.85 KG/M2 | TEMPERATURE: 94.6 F

## 2020-06-25 DIAGNOSIS — N76.0 ACUTE VAGINITIS: Primary | ICD-10-CM

## 2020-06-25 PROCEDURE — 87070 CULTURE OTHR SPECIMN AEROBIC: CPT | Performed by: OBSTETRICS & GYNECOLOGY

## 2020-06-25 PROCEDURE — 3078F DIAST BP <80 MM HG: CPT | Performed by: OBSTETRICS & GYNECOLOGY

## 2020-06-25 PROCEDURE — 3074F SYST BP LT 130 MM HG: CPT | Performed by: OBSTETRICS & GYNECOLOGY

## 2020-06-25 PROCEDURE — 87077 CULTURE AEROBIC IDENTIFY: CPT | Performed by: OBSTETRICS & GYNECOLOGY

## 2020-06-25 PROCEDURE — 99214 OFFICE O/P EST MOD 30 MIN: CPT | Performed by: OBSTETRICS & GYNECOLOGY

## 2020-06-25 PROCEDURE — 1036F TOBACCO NON-USER: CPT | Performed by: OBSTETRICS & GYNECOLOGY

## 2020-06-28 LAB
BACTERIA GENITAL AEROBE CULT: ABNORMAL
BACTERIA GENITAL AEROBE CULT: ABNORMAL

## 2020-07-01 DIAGNOSIS — I10 BENIGN ESSENTIAL HYPERTENSION: ICD-10-CM

## 2020-07-01 DIAGNOSIS — K21.9 GASTROESOPHAGEAL REFLUX DISEASE WITHOUT ESOPHAGITIS: ICD-10-CM

## 2020-07-01 DIAGNOSIS — I10 HYPERTENSION, UNSPECIFIED TYPE: ICD-10-CM

## 2020-07-01 RX ORDER — HYDROCHLOROTHIAZIDE 25 MG/1
25 TABLET ORAL DAILY
Qty: 30 TABLET | Refills: 0 | Status: SHIPPED | OUTPATIENT
Start: 2020-07-01 | End: 2020-08-01 | Stop reason: SDUPTHER

## 2020-07-01 RX ORDER — PANTOPRAZOLE SODIUM 40 MG/1
40 TABLET, DELAYED RELEASE ORAL
Qty: 30 TABLET | Refills: 0 | Status: SHIPPED | OUTPATIENT
Start: 2020-07-01 | End: 2020-08-01 | Stop reason: SDUPTHER

## 2020-07-01 RX ORDER — LABETALOL 200 MG/1
200 TABLET, FILM COATED ORAL 2 TIMES DAILY
Qty: 60 TABLET | Refills: 0 | Status: SHIPPED | OUTPATIENT
Start: 2020-07-01 | End: 2020-08-01 | Stop reason: SDUPTHER

## 2020-07-06 DIAGNOSIS — N76.0 ACUTE VAGINITIS: ICD-10-CM

## 2020-07-06 DIAGNOSIS — B37.9 YEAST INFECTION: Primary | ICD-10-CM

## 2020-07-06 RX ORDER — FLUCONAZOLE 150 MG/1
150 TABLET ORAL ONCE
Qty: 1 TABLET | Refills: 1 | Status: SHIPPED | OUTPATIENT
Start: 2020-07-06 | End: 2020-07-06

## 2020-07-06 RX ORDER — AMPICILLIN 500 MG/1
500 CAPSULE ORAL 2 TIMES DAILY
Qty: 10 CAPSULE | Refills: 1 | Status: SHIPPED | OUTPATIENT
Start: 2020-07-06 | End: 2020-07-11

## 2020-07-06 NOTE — TELEPHONE ENCOUNTER
Patient called back, results were given and instructed on the antibiotic use  Patient requested a prescription for Diflucan as well due to a yeast infection after the antibiotics       [facility] to [facility]

## 2020-07-10 ENCOUNTER — LAB REQUISITION (OUTPATIENT)
Dept: LAB | Facility: HOSPITAL | Age: 35
End: 2020-07-10
Payer: COMMERCIAL

## 2020-07-10 ENCOUNTER — HOSPITAL ENCOUNTER (OUTPATIENT)
Dept: RADIOLOGY | Facility: HOSPITAL | Age: 35
Discharge: HOME | End: 2020-07-10
Payer: COMMERCIAL

## 2020-07-10 ENCOUNTER — TRANSCRIBE ORDERS (OUTPATIENT)
Dept: RADIOLOGY | Facility: HOSPITAL | Age: 35
End: 2020-07-10

## 2020-07-10 DIAGNOSIS — N92.6 IRREGULAR MENSTRUATION, UNSPECIFIED: ICD-10-CM

## 2020-07-10 DIAGNOSIS — R07.9 CHEST PAIN, UNSPECIFIED TYPE: ICD-10-CM

## 2020-07-10 PROCEDURE — 88305 TISSUE EXAM BY PATHOLOGIST: CPT | Performed by: PATHOLOGY

## 2020-07-10 PROCEDURE — 71046 X-RAY EXAM CHEST 2 VIEWS: CPT

## 2020-07-15 ENCOUNTER — TRANSCRIBE ORDERS (OUTPATIENT)
Dept: ADMINISTRATIVE | Facility: HOSPITAL | Age: 35
End: 2020-07-15

## 2020-07-15 DIAGNOSIS — D25.9 UTERINE LEIOMYOMA, UNSPECIFIED LOCATION: Primary | ICD-10-CM

## 2020-07-28 ENCOUNTER — HOSPITAL ENCOUNTER (OUTPATIENT)
Dept: NON INVASIVE DIAGNOSTICS | Facility: HOSPITAL | Age: 35
Discharge: HOME | End: 2020-07-28
Payer: COMMERCIAL

## 2020-07-28 DIAGNOSIS — R07.9 CHEST PAIN, UNSPECIFIED TYPE: ICD-10-CM

## 2020-07-28 PROCEDURE — 93351 STRESS TTE COMPLETE: CPT | Performed by: INTERNAL MEDICINE

## 2020-07-28 PROCEDURE — 93350 STRESS TTE ONLY: CPT

## 2020-07-29 LAB
CHEST PAIN STATEMENT: NORMAL
MAX DIASTOLIC BP: 76 MMHG
MAX HEART RATE: 179 BPM
MAX PREDICTED HEART RATE: 184 BPM
MAX. SYSTOLIC BP: 154 MMHG
PROTOCOL NAME: NORMAL
TARGET HR FORMULA: NORMAL
TEST INDICATION: NORMAL
TIME IN EXERCISE PHASE: NORMAL

## 2020-08-01 ENCOUNTER — HOSPITAL ENCOUNTER (OUTPATIENT)
Dept: RADIOLOGY | Facility: HOSPITAL | Age: 35
Discharge: HOME | End: 2020-08-01
Attending: OBSTETRICS & GYNECOLOGY
Payer: COMMERCIAL

## 2020-08-01 DIAGNOSIS — I10 BENIGN ESSENTIAL HYPERTENSION: ICD-10-CM

## 2020-08-01 DIAGNOSIS — D25.9 UTERINE LEIOMYOMA, UNSPECIFIED LOCATION: ICD-10-CM

## 2020-08-01 DIAGNOSIS — K21.9 GASTROESOPHAGEAL REFLUX DISEASE WITHOUT ESOPHAGITIS: ICD-10-CM

## 2020-08-01 DIAGNOSIS — I10 HYPERTENSION, UNSPECIFIED TYPE: ICD-10-CM

## 2020-08-01 PROCEDURE — A9585 GADOBUTROL INJECTION: HCPCS | Performed by: OBSTETRICS & GYNECOLOGY

## 2020-08-01 PROCEDURE — 72197 MRI PELVIS W/O & W/DYE: CPT

## 2020-08-01 RX ADMIN — GADOBUTROL 10 ML: 604.72 INJECTION INTRAVENOUS at 18:26

## 2020-08-03 RX ORDER — LABETALOL 200 MG/1
200 TABLET, FILM COATED ORAL 2 TIMES DAILY
Qty: 60 TABLET | Refills: 0 | Status: SHIPPED | OUTPATIENT
Start: 2020-08-03 | End: 2020-09-01 | Stop reason: SDUPTHER

## 2020-08-03 RX ORDER — PANTOPRAZOLE SODIUM 40 MG/1
40 TABLET, DELAYED RELEASE ORAL
Qty: 30 TABLET | Refills: 0 | Status: SHIPPED | OUTPATIENT
Start: 2020-08-03 | End: 2020-09-01 | Stop reason: SDUPTHER

## 2020-08-03 RX ORDER — HYDROCHLOROTHIAZIDE 25 MG/1
25 TABLET ORAL DAILY
Qty: 30 TABLET | Refills: 0 | Status: SHIPPED | OUTPATIENT
Start: 2020-08-03 | End: 2020-09-01 | Stop reason: SDUPTHER

## 2020-09-01 DIAGNOSIS — K21.9 GASTROESOPHAGEAL REFLUX DISEASE WITHOUT ESOPHAGITIS: ICD-10-CM

## 2020-09-01 DIAGNOSIS — I10 BENIGN ESSENTIAL HYPERTENSION: ICD-10-CM

## 2020-09-01 DIAGNOSIS — F41.9 ANXIETY: ICD-10-CM

## 2020-09-01 DIAGNOSIS — I10 HYPERTENSION, UNSPECIFIED TYPE: ICD-10-CM

## 2020-09-01 RX ORDER — HYDROCHLOROTHIAZIDE 25 MG/1
25 TABLET ORAL DAILY
Qty: 30 TABLET | Refills: 0 | Status: SHIPPED | OUTPATIENT
Start: 2020-09-01 | End: 2020-10-05 | Stop reason: SDUPTHER

## 2020-09-01 RX ORDER — ALPRAZOLAM 0.25 MG/1
0.25 TABLET ORAL
Qty: 20 TABLET | Refills: 0 | Status: SHIPPED | OUTPATIENT
Start: 2020-09-01 | End: 2021-01-21 | Stop reason: SDUPTHER

## 2020-09-01 RX ORDER — LABETALOL 200 MG/1
200 TABLET, FILM COATED ORAL 2 TIMES DAILY
Qty: 60 TABLET | Refills: 0 | Status: SHIPPED | OUTPATIENT
Start: 2020-09-01 | End: 2020-10-05 | Stop reason: SDUPTHER

## 2020-09-01 RX ORDER — PANTOPRAZOLE SODIUM 40 MG/1
40 TABLET, DELAYED RELEASE ORAL
Qty: 30 TABLET | Refills: 0 | Status: SHIPPED | OUTPATIENT
Start: 2020-09-01 | End: 2020-10-05 | Stop reason: SDUPTHER

## 2020-09-01 NOTE — TELEPHONE ENCOUNTER
Medication: XANAX  PDMP   06/22/2020  1  06/22/2020  ALPRAZOLAM 0 25 MG TABLET  20 0  20  [facility]       Active agreement on file -No

## 2020-10-05 DIAGNOSIS — I10 BENIGN ESSENTIAL HYPERTENSION: ICD-10-CM

## 2020-10-05 DIAGNOSIS — M51.26 HERNIATED LUMBAR INTERVERTEBRAL DISC: ICD-10-CM

## 2020-10-05 DIAGNOSIS — K21.9 GASTROESOPHAGEAL REFLUX DISEASE WITHOUT ESOPHAGITIS: ICD-10-CM

## 2020-10-05 DIAGNOSIS — I10 HYPERTENSION, UNSPECIFIED TYPE: ICD-10-CM

## 2020-10-05 RX ORDER — HYDROCHLOROTHIAZIDE 25 MG/1
25 TABLET ORAL DAILY
Qty: 30 TABLET | Refills: 0 | Status: SHIPPED | OUTPATIENT
Start: 2020-10-05 | End: 2020-10-06 | Stop reason: SDUPTHER

## 2020-10-05 RX ORDER — CYCLOBENZAPRINE HCL 10 MG
10 TABLET ORAL 3 TIMES DAILY PRN
Qty: 30 TABLET | Refills: 0 | Status: CANCELLED | OUTPATIENT
Start: 2020-10-05

## 2020-10-05 RX ORDER — LABETALOL 200 MG/1
200 TABLET, FILM COATED ORAL 2 TIMES DAILY
Qty: 60 TABLET | Refills: 0 | Status: SHIPPED | OUTPATIENT
Start: 2020-10-05 | End: 2020-10-06 | Stop reason: SDUPTHER

## 2020-10-05 RX ORDER — PANTOPRAZOLE SODIUM 40 MG/1
40 TABLET, DELAYED RELEASE ORAL
Qty: 30 TABLET | Refills: 0 | Status: SHIPPED | OUTPATIENT
Start: 2020-10-05 | End: 2020-10-06 | Stop reason: SDUPTHER

## 2020-10-06 DIAGNOSIS — I10 HYPERTENSION, UNSPECIFIED TYPE: ICD-10-CM

## 2020-10-06 DIAGNOSIS — K21.9 GASTROESOPHAGEAL REFLUX DISEASE WITHOUT ESOPHAGITIS: ICD-10-CM

## 2020-10-06 DIAGNOSIS — I10 BENIGN ESSENTIAL HYPERTENSION: ICD-10-CM

## 2020-10-06 DIAGNOSIS — M51.26 HERNIATED LUMBAR INTERVERTEBRAL DISC: ICD-10-CM

## 2020-10-06 RX ORDER — HYDROCHLOROTHIAZIDE 25 MG/1
25 TABLET ORAL DAILY
Qty: 90 TABLET | Refills: 1 | Status: SHIPPED | OUTPATIENT
Start: 2020-10-06 | End: 2020-11-09 | Stop reason: SDUPTHER

## 2020-10-06 RX ORDER — PANTOPRAZOLE SODIUM 40 MG/1
40 TABLET, DELAYED RELEASE ORAL
Qty: 90 TABLET | Refills: 1 | Status: SHIPPED | OUTPATIENT
Start: 2020-10-06 | End: 2020-11-09 | Stop reason: SDUPTHER

## 2020-10-06 RX ORDER — CYCLOBENZAPRINE HCL 10 MG
10 TABLET ORAL 3 TIMES DAILY PRN
Qty: 60 TABLET | Refills: 1 | Status: SHIPPED | OUTPATIENT
Start: 2020-10-06

## 2020-10-06 RX ORDER — LABETALOL 200 MG/1
200 TABLET, FILM COATED ORAL 2 TIMES DAILY
Qty: 180 TABLET | Refills: 1 | Status: SHIPPED | OUTPATIENT
Start: 2020-10-06 | End: 2020-11-09 | Stop reason: SDUPTHER

## 2020-10-15 ENCOUNTER — OFFICE VISIT (OUTPATIENT)
Dept: FAMILY MEDICINE CLINIC | Facility: CLINIC | Age: 35
End: 2020-10-15
Payer: COMMERCIAL

## 2020-10-15 VITALS
WEIGHT: 202.6 LBS | RESPIRATION RATE: 18 BRPM | TEMPERATURE: 99.2 F | BODY MASS INDEX: 35.9 KG/M2 | HEART RATE: 88 BPM | SYSTOLIC BLOOD PRESSURE: 134 MMHG | HEIGHT: 63 IN | DIASTOLIC BLOOD PRESSURE: 88 MMHG | OXYGEN SATURATION: 98 %

## 2020-10-15 DIAGNOSIS — R53.82 CHRONIC FATIGUE: Primary | ICD-10-CM

## 2020-10-15 DIAGNOSIS — E55.9 VITAMIN D DEFICIENCY: ICD-10-CM

## 2020-10-15 DIAGNOSIS — R20.0 NUMBNESS AND TINGLING IN BOTH HANDS: ICD-10-CM

## 2020-10-15 DIAGNOSIS — M79.671 RIGHT FOOT PAIN: ICD-10-CM

## 2020-10-15 DIAGNOSIS — K21.9 GASTROESOPHAGEAL REFLUX DISEASE WITHOUT ESOPHAGITIS: ICD-10-CM

## 2020-10-15 DIAGNOSIS — R20.2 NUMBNESS AND TINGLING IN BOTH HANDS: ICD-10-CM

## 2020-10-15 DIAGNOSIS — I10 BENIGN ESSENTIAL HYPERTENSION: ICD-10-CM

## 2020-10-15 DIAGNOSIS — E66.9 OBESITY (BMI 30-39.9): ICD-10-CM

## 2020-10-15 PROCEDURE — 99214 OFFICE O/P EST MOD 30 MIN: CPT | Performed by: FAMILY MEDICINE

## 2020-10-15 RX ORDER — METHYLPREDNISOLONE 4 MG/1
TABLET ORAL
Qty: 21 EACH | Refills: 0 | Status: SHIPPED | OUTPATIENT
Start: 2020-10-15 | End: 2020-12-08

## 2020-10-23 ENCOUNTER — TRANSCRIBE ORDERS (OUTPATIENT)
Dept: LAB | Facility: HOSPITAL | Age: 35
End: 2020-10-23

## 2020-10-23 ENCOUNTER — LAB (OUTPATIENT)
Dept: LAB | Facility: HOSPITAL | Age: 35
End: 2020-10-23
Payer: COMMERCIAL

## 2020-10-23 DIAGNOSIS — R53.82 CHRONIC FATIGUE: ICD-10-CM

## 2020-10-23 DIAGNOSIS — R20.2 NUMBNESS AND TINGLING IN BOTH HANDS: ICD-10-CM

## 2020-10-23 DIAGNOSIS — D50.9 IRON DEFICIENCY ANEMIA, UNSPECIFIED IRON DEFICIENCY ANEMIA TYPE: Primary | ICD-10-CM

## 2020-10-23 DIAGNOSIS — E55.9 VITAMIN D DEFICIENCY: ICD-10-CM

## 2020-10-23 DIAGNOSIS — R20.0 NUMBNESS AND TINGLING IN BOTH HANDS: ICD-10-CM

## 2020-10-23 LAB
ALBUMIN SERPL BCP-MCNC: 3.7 G/DL (ref 3.5–5)
ALP SERPL-CCNC: 56 U/L (ref 46–116)
ALT SERPL W P-5'-P-CCNC: 33 U/L (ref 12–78)
ANION GAP SERPL CALCULATED.3IONS-SCNC: 5 MMOL/L (ref 4–13)
AST SERPL W P-5'-P-CCNC: 26 U/L (ref 5–45)
BASOPHILS # BLD AUTO: 0.05 THOUSANDS/ΜL (ref 0–0.1)
BASOPHILS NFR BLD AUTO: 1 % (ref 0–1)
BILIRUB SERPL-MCNC: 0.28 MG/DL (ref 0.2–1)
BUN SERPL-MCNC: 12 MG/DL (ref 5–25)
CALCIUM SERPL-MCNC: 9.1 MG/DL (ref 8.3–10.1)
CHLORIDE SERPL-SCNC: 106 MMOL/L (ref 100–108)
CO2 SERPL-SCNC: 30 MMOL/L (ref 21–32)
CREAT SERPL-MCNC: 1.01 MG/DL (ref 0.6–1.3)
EOSINOPHIL # BLD AUTO: 0.07 THOUSAND/ΜL (ref 0–0.61)
EOSINOPHIL NFR BLD AUTO: 1 % (ref 0–6)
ERYTHROCYTE [DISTWIDTH] IN BLOOD BY AUTOMATED COUNT: 16 % (ref 11.6–15.1)
GFR SERPL CREATININE-BSD FRML MDRD: 83 ML/MIN/1.73SQ M
GLUCOSE SERPL-MCNC: 114 MG/DL (ref 65–140)
HCT VFR BLD AUTO: 29.7 % (ref 34.8–46.1)
HGB BLD-MCNC: 8.9 G/DL (ref 11.5–15.4)
IMM GRANULOCYTES # BLD AUTO: 0.03 THOUSAND/UL (ref 0–0.2)
IMM GRANULOCYTES NFR BLD AUTO: 0 % (ref 0–2)
IRON SERPL-MCNC: 19 UG/DL (ref 50–170)
LYMPHOCYTES # BLD AUTO: 2.22 THOUSANDS/ΜL (ref 0.6–4.47)
LYMPHOCYTES NFR BLD AUTO: 27 % (ref 14–44)
MCH RBC QN AUTO: 21.4 PG (ref 26.8–34.3)
MCHC RBC AUTO-ENTMCNC: 30 G/DL (ref 31.4–37.4)
MCV RBC AUTO: 71 FL (ref 82–98)
MONOCYTES # BLD AUTO: 0.52 THOUSAND/ΜL (ref 0.17–1.22)
MONOCYTES NFR BLD AUTO: 6 % (ref 4–12)
NEUTROPHILS # BLD AUTO: 5.36 THOUSANDS/ΜL (ref 1.85–7.62)
NEUTS SEG NFR BLD AUTO: 65 % (ref 43–75)
NRBC BLD AUTO-RTO: 0 /100 WBCS
PLATELET # BLD AUTO: 350 THOUSANDS/UL (ref 149–390)
PMV BLD AUTO: 11.1 FL (ref 8.9–12.7)
POTASSIUM SERPL-SCNC: 3.2 MMOL/L (ref 3.5–5.3)
PROT SERPL-MCNC: 7.4 G/DL (ref 6.4–8.2)
RBC # BLD AUTO: 4.16 MILLION/UL (ref 3.81–5.12)
SODIUM SERPL-SCNC: 141 MMOL/L (ref 136–145)
TSH SERPL DL<=0.05 MIU/L-ACNC: 1.26 UIU/ML (ref 0.36–3.74)
VIT B12 SERPL-MCNC: 545 PG/ML (ref 100–900)
WBC # BLD AUTO: 8.25 THOUSAND/UL (ref 4.31–10.16)

## 2020-10-23 PROCEDURE — 83540 ASSAY OF IRON: CPT

## 2020-10-23 PROCEDURE — 82607 VITAMIN B-12: CPT

## 2020-10-23 PROCEDURE — 86038 ANTINUCLEAR ANTIBODIES: CPT | Performed by: FAMILY MEDICINE

## 2020-10-23 PROCEDURE — 80053 COMPREHEN METABOLIC PANEL: CPT | Performed by: FAMILY MEDICINE

## 2020-10-23 PROCEDURE — 36415 COLL VENOUS BLD VENIPUNCTURE: CPT | Performed by: FAMILY MEDICINE

## 2020-10-23 PROCEDURE — 82306 VITAMIN D 25 HYDROXY: CPT

## 2020-10-23 PROCEDURE — 85025 COMPLETE CBC W/AUTO DIFF WBC: CPT | Performed by: FAMILY MEDICINE

## 2020-10-23 PROCEDURE — 84443 ASSAY THYROID STIM HORMONE: CPT

## 2020-10-23 RX ORDER — FERROUS SULFATE TAB EC 324 MG (65 MG FE EQUIVALENT) 324 (65 FE) MG
324 TABLET DELAYED RESPONSE ORAL
Qty: 60 TABLET | Refills: 0 | Status: SHIPPED | OUTPATIENT
Start: 2020-10-23 | End: 2020-12-15 | Stop reason: ALTCHOICE

## 2020-10-24 LAB — 25(OH)D3 SERPL-MCNC: 25.8 NG/ML (ref 30–100)

## 2020-10-26 DIAGNOSIS — E87.6 HYPOKALEMIA: Primary | ICD-10-CM

## 2020-10-26 LAB — RYE IGE QN: NEGATIVE

## 2020-10-26 RX ORDER — POTASSIUM CHLORIDE 750 MG/1
10 CAPSULE, EXTENDED RELEASE ORAL DAILY
Qty: 30 CAPSULE | Refills: 3 | Status: SHIPPED | OUTPATIENT
Start: 2020-10-26 | End: 2020-12-08

## 2020-10-27 ENCOUNTER — APPOINTMENT (OUTPATIENT)
Dept: RADIOLOGY | Facility: AMBULARY SURGERY CENTER | Age: 35
End: 2020-10-27
Attending: ORTHOPAEDIC SURGERY
Payer: COMMERCIAL

## 2020-10-27 ENCOUNTER — CONSULT (OUTPATIENT)
Dept: OBGYN CLINIC | Facility: CLINIC | Age: 35
End: 2020-10-27
Payer: COMMERCIAL

## 2020-10-27 VITALS
BODY MASS INDEX: 33.66 KG/M2 | SYSTOLIC BLOOD PRESSURE: 128 MMHG | WEIGHT: 202 LBS | HEIGHT: 65 IN | HEART RATE: 99 BPM | DIASTOLIC BLOOD PRESSURE: 88 MMHG

## 2020-10-27 DIAGNOSIS — M77.51 OS PERONEUM SYNDROME OF RIGHT FOOT: ICD-10-CM

## 2020-10-27 DIAGNOSIS — M79.671 PAIN IN RIGHT FOOT: Primary | ICD-10-CM

## 2020-10-27 DIAGNOSIS — M79.671 PAIN IN RIGHT FOOT: ICD-10-CM

## 2020-10-27 PROCEDURE — 73630 X-RAY EXAM OF FOOT: CPT

## 2020-10-27 PROCEDURE — 99243 OFF/OP CNSLTJ NEW/EST LOW 30: CPT | Performed by: ORTHOPAEDIC SURGERY

## 2020-11-09 DIAGNOSIS — I10 BENIGN ESSENTIAL HYPERTENSION: ICD-10-CM

## 2020-11-09 DIAGNOSIS — K21.9 GASTROESOPHAGEAL REFLUX DISEASE WITHOUT ESOPHAGITIS: ICD-10-CM

## 2020-11-09 DIAGNOSIS — I10 HYPERTENSION, UNSPECIFIED TYPE: ICD-10-CM

## 2020-11-09 RX ORDER — HYDROCHLOROTHIAZIDE 25 MG/1
25 TABLET ORAL DAILY
Qty: 90 TABLET | Refills: 0 | Status: SHIPPED | OUTPATIENT
Start: 2020-11-09 | End: 2021-02-14 | Stop reason: SDUPTHER

## 2020-11-09 RX ORDER — PANTOPRAZOLE SODIUM 40 MG/1
40 TABLET, DELAYED RELEASE ORAL
Qty: 90 TABLET | Refills: 3 | Status: SHIPPED | OUTPATIENT
Start: 2020-11-09 | End: 2021-02-14 | Stop reason: SDUPTHER

## 2020-11-09 RX ORDER — LABETALOL 200 MG/1
200 TABLET, FILM COATED ORAL 2 TIMES DAILY
Qty: 180 TABLET | Refills: 3 | Status: SHIPPED | OUTPATIENT
Start: 2020-11-09 | End: 2021-02-14 | Stop reason: SDUPTHER

## 2020-11-17 ENCOUNTER — LAB REQUISITION (OUTPATIENT)
Dept: LAB | Facility: HOSPITAL | Age: 35
End: 2020-11-17
Payer: COMMERCIAL

## 2020-11-17 DIAGNOSIS — D64.9 ANEMIA, UNSPECIFIED: ICD-10-CM

## 2020-11-17 LAB
BASOPHILS # BLD AUTO: 0.06 THOUSANDS/ΜL (ref 0–0.1)
BASOPHILS NFR BLD AUTO: 1 % (ref 0–1)
EOSINOPHIL # BLD AUTO: 0.05 THOUSAND/ΜL (ref 0–0.61)
EOSINOPHIL NFR BLD AUTO: 1 % (ref 0–6)
ERYTHROCYTE [DISTWIDTH] IN BLOOD BY AUTOMATED COUNT: 19.7 % (ref 11.6–15.1)
HCT VFR BLD AUTO: 33.8 % (ref 34.8–46.1)
HGB BLD-MCNC: 10.1 G/DL (ref 11.5–15.4)
IMM GRANULOCYTES # BLD AUTO: 0.02 THOUSAND/UL (ref 0–0.2)
IMM GRANULOCYTES NFR BLD AUTO: 0 % (ref 0–2)
LYMPHOCYTES # BLD AUTO: 1.68 THOUSANDS/ΜL (ref 0.6–4.47)
LYMPHOCYTES NFR BLD AUTO: 22 % (ref 14–44)
MCH RBC QN AUTO: 21.1 PG (ref 26.8–34.3)
MCHC RBC AUTO-ENTMCNC: 29.9 G/DL (ref 31.4–37.4)
MCV RBC AUTO: 71 FL (ref 82–98)
MONOCYTES # BLD AUTO: 0.58 THOUSAND/ΜL (ref 0.17–1.22)
MONOCYTES NFR BLD AUTO: 8 % (ref 4–12)
NEUTROPHILS # BLD AUTO: 5.11 THOUSANDS/ΜL (ref 1.85–7.62)
NEUTS SEG NFR BLD AUTO: 68 % (ref 43–75)
NRBC BLD AUTO-RTO: 0 /100 WBCS
PLATELET # BLD AUTO: 449 THOUSANDS/UL (ref 149–390)
PMV BLD AUTO: 11 FL (ref 8.9–12.7)
RBC # BLD AUTO: 4.78 MILLION/UL (ref 3.81–5.12)
WBC # BLD AUTO: 7.5 THOUSAND/UL (ref 4.31–10.16)

## 2020-11-17 PROCEDURE — 85025 COMPLETE CBC W/AUTO DIFF WBC: CPT | Performed by: OBSTETRICS & GYNECOLOGY

## 2020-11-18 ENCOUNTER — OFFICE VISIT (OUTPATIENT)
Dept: OBGYN CLINIC | Facility: CLINIC | Age: 35
End: 2020-11-18
Payer: COMMERCIAL

## 2020-11-18 ENCOUNTER — TELEPHONE (OUTPATIENT)
Dept: SURGICAL ONCOLOGY | Facility: CLINIC | Age: 35
End: 2020-11-18

## 2020-11-18 VITALS
DIASTOLIC BLOOD PRESSURE: 80 MMHG | BODY MASS INDEX: 34.16 KG/M2 | HEIGHT: 65 IN | TEMPERATURE: 97.6 F | WEIGHT: 205 LBS | SYSTOLIC BLOOD PRESSURE: 124 MMHG

## 2020-11-18 DIAGNOSIS — N76.0 ACUTE VAGINITIS: Primary | ICD-10-CM

## 2020-11-18 PROCEDURE — 87481 CANDIDA DNA AMP PROBE: CPT | Performed by: PHYSICIAN ASSISTANT

## 2020-11-18 PROCEDURE — 99214 OFFICE O/P EST MOD 30 MIN: CPT | Performed by: PHYSICIAN ASSISTANT

## 2020-11-18 PROCEDURE — 87801 DETECT AGNT MULT DNA AMPLI: CPT | Performed by: PHYSICIAN ASSISTANT

## 2020-11-18 PROCEDURE — 87661 TRICHOMONAS VAGINALIS AMPLIF: CPT | Performed by: PHYSICIAN ASSISTANT

## 2020-11-19 ENCOUNTER — TELEPHONE (OUTPATIENT)
Dept: OBGYN CLINIC | Facility: CLINIC | Age: 35
End: 2020-11-19

## 2020-11-19 ENCOUNTER — CONSULT (OUTPATIENT)
Dept: HEMATOLOGY ONCOLOGY | Facility: CLINIC | Age: 35
End: 2020-11-19
Payer: COMMERCIAL

## 2020-11-19 VITALS
RESPIRATION RATE: 16 BRPM | OXYGEN SATURATION: 99 % | HEART RATE: 82 BPM | TEMPERATURE: 96.7 F | WEIGHT: 201 LBS | HEIGHT: 65 IN | SYSTOLIC BLOOD PRESSURE: 122 MMHG | DIASTOLIC BLOOD PRESSURE: 80 MMHG | BODY MASS INDEX: 33.49 KG/M2

## 2020-11-19 DIAGNOSIS — R71.8 MICROCYTOSIS: Primary | ICD-10-CM

## 2020-11-19 DIAGNOSIS — D50.0 IRON DEFICIENCY ANEMIA DUE TO CHRONIC BLOOD LOSS: ICD-10-CM

## 2020-11-19 LAB
C GLABRATA DNA VAG QL NAA+PROBE: NEGATIVE
C KRUSEI DNA VAG QL NAA+PROBE: NEGATIVE
CANDIDA SP 6 PNL VAG NAA+PROBE: NEGATIVE
T VAGINALIS DNA VAG QL NAA+PROBE: NEGATIVE
VAGINOSIS/ITIS DNA PNL VAG PROBE+SIG AMP: NEGATIVE

## 2020-11-19 PROCEDURE — 99244 OFF/OP CNSLTJ NEW/EST MOD 40: CPT | Performed by: PHYSICIAN ASSISTANT

## 2020-11-19 RX ORDER — NORETHINDRONE 0.35 MG/1
TABLET ORAL DAILY
COMMUNITY
Start: 2020-11-17 | End: 2020-12-15 | Stop reason: ALTCHOICE

## 2020-11-19 RX ORDER — SODIUM CHLORIDE 9 MG/ML
20 INJECTION, SOLUTION INTRAVENOUS ONCE
Status: CANCELLED | OUTPATIENT
Start: 2020-11-24

## 2020-11-20 ENCOUNTER — CONSULT (OUTPATIENT)
Dept: OBGYN CLINIC | Facility: HOSPITAL | Age: 35
End: 2020-11-20
Payer: COMMERCIAL

## 2020-11-20 VITALS
HEIGHT: 65 IN | DIASTOLIC BLOOD PRESSURE: 81 MMHG | BODY MASS INDEX: 33.49 KG/M2 | HEART RATE: 102 BPM | SYSTOLIC BLOOD PRESSURE: 124 MMHG | WEIGHT: 201 LBS

## 2020-11-20 DIAGNOSIS — R20.0 NUMBNESS AND TINGLING IN BOTH HANDS: Primary | ICD-10-CM

## 2020-11-20 DIAGNOSIS — M65.312 TRIGGER THUMB OF LEFT HAND: ICD-10-CM

## 2020-11-20 DIAGNOSIS — R20.2 NUMBNESS AND TINGLING IN BOTH HANDS: Primary | ICD-10-CM

## 2020-11-20 PROCEDURE — 99244 OFF/OP CNSLTJ NEW/EST MOD 40: CPT | Performed by: SURGERY

## 2020-11-20 PROCEDURE — 20550 NJX 1 TENDON SHEATH/LIGAMENT: CPT | Performed by: SURGERY

## 2020-11-20 RX ORDER — METHYLPREDNISOLONE 4 MG/1
TABLET ORAL
Qty: 1 EACH | Refills: 0 | Status: SHIPPED | OUTPATIENT
Start: 2020-11-20 | End: 2020-12-08

## 2020-11-20 RX ORDER — DEXAMETHASONE SODIUM PHOSPHATE 100 MG/10ML
40 INJECTION INTRAMUSCULAR; INTRAVENOUS
Status: COMPLETED | OUTPATIENT
Start: 2020-11-20 | End: 2020-11-20

## 2020-11-20 RX ORDER — LIDOCAINE HYDROCHLORIDE 10 MG/ML
1 INJECTION, SOLUTION INFILTRATION; PERINEURAL
Status: COMPLETED | OUTPATIENT
Start: 2020-11-20 | End: 2020-11-20

## 2020-11-20 RX ADMIN — LIDOCAINE HYDROCHLORIDE 1 ML: 10 INJECTION, SOLUTION INFILTRATION; PERINEURAL at 15:18

## 2020-11-20 RX ADMIN — DEXAMETHASONE SODIUM PHOSPHATE 40 MG: 100 INJECTION INTRAMUSCULAR; INTRAVENOUS at 15:18

## 2020-11-23 ENCOUNTER — HOSPITAL ENCOUNTER (OUTPATIENT)
Dept: NEUROLOGY | Facility: CLINIC | Age: 35
Discharge: HOME | End: 2020-11-23
Payer: COMMERCIAL

## 2020-11-23 DIAGNOSIS — R20.2 NUMBNESS AND TINGLING IN BOTH HANDS: ICD-10-CM

## 2020-11-23 DIAGNOSIS — R20.0 NUMBNESS AND TINGLING IN BOTH HANDS: ICD-10-CM

## 2020-11-23 PROCEDURE — 95886 MUSC TEST DONE W/N TEST COMP: CPT | Performed by: PSYCHIATRY & NEUROLOGY

## 2020-11-23 PROCEDURE — 95912 NRV CNDJ TEST 11-12 STUDIES: CPT | Performed by: PSYCHIATRY & NEUROLOGY

## 2020-11-24 ENCOUNTER — HOSPITAL ENCOUNTER (OUTPATIENT)
Dept: INFUSION CENTER | Facility: HOSPITAL | Age: 35
Discharge: HOME | End: 2020-11-24
Attending: INTERNAL MEDICINE
Payer: COMMERCIAL

## 2020-11-24 VITALS
DIASTOLIC BLOOD PRESSURE: 70 MMHG | SYSTOLIC BLOOD PRESSURE: 110 MMHG | HEART RATE: 80 BPM | WEIGHT: 199.96 LBS | RESPIRATION RATE: 18 BRPM | BODY MASS INDEX: 33.27 KG/M2 | TEMPERATURE: 97.2 F

## 2020-11-24 DIAGNOSIS — D50.0 IRON DEFICIENCY ANEMIA DUE TO CHRONIC BLOOD LOSS: Primary | ICD-10-CM

## 2020-11-24 LAB — FERRITIN SERPL-MCNC: 6 NG/ML (ref 8–388)

## 2020-11-24 PROCEDURE — 96365 THER/PROPH/DIAG IV INF INIT: CPT

## 2020-11-24 PROCEDURE — 96361 HYDRATE IV INFUSION ADD-ON: CPT

## 2020-11-24 PROCEDURE — 82728 ASSAY OF FERRITIN: CPT | Performed by: INTERNAL MEDICINE

## 2020-11-24 PROCEDURE — 96375 TX/PRO/DX INJ NEW DRUG ADDON: CPT

## 2020-11-24 RX ORDER — FAMOTIDINE 20 MG/50ML
20 INJECTION, SOLUTION INTRAVENOUS ONCE
Status: COMPLETED | OUTPATIENT
Start: 2020-11-24 | End: 2020-11-24

## 2020-11-24 RX ORDER — SODIUM CHLORIDE 9 MG/ML
20 INJECTION, SOLUTION INTRAVENOUS ONCE
Status: CANCELLED | OUTPATIENT
Start: 2020-12-01

## 2020-11-24 RX ORDER — SODIUM CHLORIDE 9 MG/ML
20 INJECTION, SOLUTION INTRAVENOUS ONCE
Status: COMPLETED | OUTPATIENT
Start: 2020-11-24 | End: 2020-11-24

## 2020-11-24 RX ORDER — SODIUM CHLORIDE 9 MG/ML
20 INJECTION, SOLUTION INTRAVENOUS ONCE
Status: CANCELLED | OUTPATIENT
Start: 2020-11-24

## 2020-11-24 RX ORDER — DIPHENHYDRAMINE HYDROCHLORIDE 50 MG/ML
25 INJECTION INTRAMUSCULAR; INTRAVENOUS
Status: ACTIVE | OUTPATIENT
Start: 2020-11-24 | End: 2020-11-24

## 2020-11-24 RX ADMIN — FAMOTIDINE 20 MG: 20 INJECTION, SOLUTION INTRAVENOUS at 10:35

## 2020-11-24 RX ADMIN — HYDROCORTISONE SODIUM SUCCINATE 100 MG: 100 INJECTION, POWDER, FOR SOLUTION INTRAMUSCULAR; INTRAVENOUS at 10:35

## 2020-11-24 RX ADMIN — SODIUM CHLORIDE 500 ML: 0.9 INJECTION, SOLUTION INTRAVENOUS at 10:35

## 2020-11-24 RX ADMIN — FERUMOXYTOL 510 MG: 510 INJECTION INTRAVENOUS at 10:10

## 2020-11-24 RX ADMIN — SODIUM CHLORIDE 20 ML/HR: 0.9 INJECTION, SOLUTION INTRAVENOUS at 10:10

## 2020-11-24 RX ADMIN — DIPHENHYDRAMINE HYDROCHLORIDE 50 MG: 50 INJECTION, SOLUTION INTRAMUSCULAR; INTRAVENOUS at 10:32

## 2020-12-01 ENCOUNTER — HOSPITAL ENCOUNTER (OUTPATIENT)
Dept: INFUSION CENTER | Facility: HOSPITAL | Age: 35
Discharge: HOME | End: 2020-12-01
Attending: INTERNAL MEDICINE
Payer: COMMERCIAL

## 2020-12-01 VITALS
HEART RATE: 80 BPM | TEMPERATURE: 98.6 F | SYSTOLIC BLOOD PRESSURE: 131 MMHG | DIASTOLIC BLOOD PRESSURE: 85 MMHG | RESPIRATION RATE: 18 BRPM

## 2020-12-01 DIAGNOSIS — D50.0 IRON DEFICIENCY ANEMIA DUE TO CHRONIC BLOOD LOSS: Primary | ICD-10-CM

## 2020-12-01 PROCEDURE — 96367 TX/PROPH/DG ADDL SEQ IV INF: CPT

## 2020-12-01 PROCEDURE — 96375 TX/PRO/DX INJ NEW DRUG ADDON: CPT

## 2020-12-01 PROCEDURE — 96365 THER/PROPH/DIAG IV INF INIT: CPT

## 2020-12-01 RX ORDER — SODIUM CHLORIDE 9 MG/ML
20 INJECTION, SOLUTION INTRAVENOUS ONCE
Status: CANCELLED | OUTPATIENT
Start: 2020-12-08

## 2020-12-01 RX ORDER — SODIUM CHLORIDE 9 MG/ML
20 INJECTION, SOLUTION INTRAVENOUS ONCE
Status: COMPLETED | OUTPATIENT
Start: 2020-12-01 | End: 2020-12-01

## 2020-12-01 RX ADMIN — DIPHENHYDRAMINE HYDROCHLORIDE 25 MG: 50 INJECTION, SOLUTION INTRAMUSCULAR; INTRAVENOUS at 11:30

## 2020-12-01 RX ADMIN — IRON SUCROSE 200 MG: 20 INJECTION, SOLUTION INTRAVENOUS at 12:05

## 2020-12-01 RX ADMIN — HYDROCORTISONE SODIUM SUCCINATE 100 MG: 100 INJECTION, POWDER, FOR SOLUTION INTRAMUSCULAR; INTRAVENOUS at 11:30

## 2020-12-01 RX ADMIN — SODIUM CHLORIDE 20 ML/HR: 9 INJECTION, SOLUTION INTRAVENOUS at 11:30

## 2020-12-02 ENCOUNTER — TELEPHONE (OUTPATIENT)
Dept: OBGYN CLINIC | Facility: CLINIC | Age: 35
End: 2020-12-02

## 2020-12-02 DIAGNOSIS — Z01.818 PREOPERATIVE TESTING: Primary | ICD-10-CM

## 2020-12-03 ENCOUNTER — TELEPHONE (OUTPATIENT)
Dept: OBGYN CLINIC | Facility: CLINIC | Age: 35
End: 2020-12-03

## 2020-12-03 DIAGNOSIS — Z01.818 PREOPERATIVE TESTING: ICD-10-CM

## 2020-12-03 DIAGNOSIS — Z01.818 PREOP TESTING: Primary | ICD-10-CM

## 2020-12-03 PROCEDURE — U0003 INFECTIOUS AGENT DETECTION BY NUCLEIC ACID (DNA OR RNA); SEVERE ACUTE RESPIRATORY SYNDROME CORONAVIRUS 2 (SARS-COV-2) (CORONAVIRUS DISEASE [COVID-19]), AMPLIFIED PROBE TECHNIQUE, MAKING USE OF HIGH THROUGHPUT TECHNOLOGIES AS DESCRIBED BY CMS-2020-01-R: HCPCS | Performed by: OBSTETRICS & GYNECOLOGY

## 2020-12-05 LAB — SARS-COV-2 RNA SPEC QL NAA+PROBE: NOT DETECTED

## 2020-12-07 ENCOUNTER — EVALUATION (OUTPATIENT)
Dept: OCCUPATIONAL THERAPY | Facility: CLINIC | Age: 35
End: 2020-12-07
Payer: COMMERCIAL

## 2020-12-07 DIAGNOSIS — R20.2 NUMBNESS AND TINGLING IN BOTH HANDS: ICD-10-CM

## 2020-12-07 DIAGNOSIS — R20.0 NUMBNESS AND TINGLING IN BOTH HANDS: ICD-10-CM

## 2020-12-07 PROCEDURE — 97165 OT EVAL LOW COMPLEX 30 MIN: CPT | Performed by: OCCUPATIONAL THERAPIST

## 2020-12-07 PROCEDURE — 97110 THERAPEUTIC EXERCISES: CPT | Performed by: OCCUPATIONAL THERAPIST

## 2020-12-08 ENCOUNTER — ANESTHESIA EVENT (OUTPATIENT)
Dept: PERIOP | Facility: HOSPITAL | Age: 35
DRG: 743 | End: 2020-12-08
Payer: COMMERCIAL

## 2020-12-08 ENCOUNTER — HOSPITAL ENCOUNTER (OUTPATIENT)
Dept: INFUSION CENTER | Facility: HOSPITAL | Age: 35
Discharge: HOME | End: 2020-12-08
Attending: INTERNAL MEDICINE
Payer: COMMERCIAL

## 2020-12-08 VITALS
TEMPERATURE: 98.2 F | RESPIRATION RATE: 16 BRPM | DIASTOLIC BLOOD PRESSURE: 85 MMHG | HEART RATE: 83 BPM | SYSTOLIC BLOOD PRESSURE: 127 MMHG

## 2020-12-08 DIAGNOSIS — Z01.818 PREOP TESTING: Primary | ICD-10-CM

## 2020-12-08 DIAGNOSIS — D50.0 IRON DEFICIENCY ANEMIA DUE TO CHRONIC BLOOD LOSS: ICD-10-CM

## 2020-12-08 LAB
ANION GAP SERPL CALCULATED.3IONS-SCNC: 6 MMOL/L (ref 4–13)
BASOPHILS # BLD AUTO: 0.04 THOUSANDS/ΜL (ref 0–0.1)
BASOPHILS NFR BLD AUTO: 1 % (ref 0–1)
BUN SERPL-MCNC: 12 MG/DL (ref 5–25)
CALCIUM SERPL-MCNC: 9.4 MG/DL (ref 8.3–10.1)
CHLORIDE SERPL-SCNC: 104 MMOL/L (ref 100–108)
CO2 SERPL-SCNC: 29 MMOL/L (ref 21–32)
CREAT SERPL-MCNC: 1.01 MG/DL (ref 0.6–1.3)
EOSINOPHIL # BLD AUTO: 0.06 THOUSAND/ΜL (ref 0–0.61)
EOSINOPHIL NFR BLD AUTO: 1 % (ref 0–6)
ERYTHROCYTE [DISTWIDTH] IN BLOOD BY AUTOMATED COUNT: 24 % (ref 11.6–15.1)
GFR SERPL CREATININE-BSD FRML MDRD: 83 ML/MIN/1.73SQ M
GLUCOSE SERPL-MCNC: 114 MG/DL (ref 65–140)
HCT VFR BLD AUTO: 31.8 % (ref 34.8–46.1)
HGB BLD-MCNC: 9.8 G/DL (ref 11.5–15.4)
IMM GRANULOCYTES # BLD AUTO: 0.03 THOUSAND/UL (ref 0–0.2)
IMM GRANULOCYTES NFR BLD AUTO: 0 % (ref 0–2)
LYMPHOCYTES # BLD AUTO: 1.89 THOUSANDS/ΜL (ref 0.6–4.47)
LYMPHOCYTES NFR BLD AUTO: 24 % (ref 14–44)
MCH RBC QN AUTO: 22.5 PG (ref 26.8–34.3)
MCHC RBC AUTO-ENTMCNC: 30.8 G/DL (ref 31.4–37.4)
MCV RBC AUTO: 73 FL (ref 82–98)
MONOCYTES # BLD AUTO: 0.4 THOUSAND/ΜL (ref 0.17–1.22)
MONOCYTES NFR BLD AUTO: 5 % (ref 4–12)
NEUTROPHILS # BLD AUTO: 5.34 THOUSANDS/ΜL (ref 1.85–7.62)
NEUTS SEG NFR BLD AUTO: 69 % (ref 43–75)
NRBC BLD AUTO-RTO: 0 /100 WBCS
PLATELET # BLD AUTO: 300 THOUSANDS/UL (ref 149–390)
PMV BLD AUTO: 11.3 FL (ref 8.9–12.7)
POTASSIUM SERPL-SCNC: 3.1 MMOL/L (ref 3.5–5.3)
RBC # BLD AUTO: 4.36 MILLION/UL (ref 3.81–5.12)
SODIUM SERPL-SCNC: 139 MMOL/L (ref 136–145)
WBC # BLD AUTO: 7.76 THOUSAND/UL (ref 4.31–10.16)

## 2020-12-08 PROCEDURE — 86920 COMPATIBILITY TEST SPIN: CPT

## 2020-12-08 PROCEDURE — 80048 BASIC METABOLIC PNL TOTAL CA: CPT

## 2020-12-08 PROCEDURE — 96375 TX/PRO/DX INJ NEW DRUG ADDON: CPT

## 2020-12-08 PROCEDURE — 85025 COMPLETE CBC W/AUTO DIFF WBC: CPT

## 2020-12-08 PROCEDURE — 96367 TX/PROPH/DG ADDL SEQ IV INF: CPT

## 2020-12-08 PROCEDURE — 96365 THER/PROPH/DIAG IV INF INIT: CPT

## 2020-12-08 RX ORDER — SODIUM CHLORIDE 9 MG/ML
20 INJECTION, SOLUTION INTRAVENOUS ONCE
Status: COMPLETED | OUTPATIENT
Start: 2020-12-08 | End: 2020-12-08

## 2020-12-08 RX ORDER — SODIUM CHLORIDE 9 MG/ML
20 INJECTION, SOLUTION INTRAVENOUS ONCE
Status: CANCELLED | OUTPATIENT
Start: 2020-12-15

## 2020-12-08 RX ADMIN — IRON SUCROSE 200 MG: 20 INJECTION, SOLUTION INTRAVENOUS at 14:46

## 2020-12-08 RX ADMIN — DIPHENHYDRAMINE HYDROCHLORIDE 25 MG: 50 INJECTION, SOLUTION INTRAMUSCULAR; INTRAVENOUS at 14:18

## 2020-12-08 RX ADMIN — SODIUM CHLORIDE 20 ML/HR: 0.9 INJECTION, SOLUTION INTRAVENOUS at 14:14

## 2020-12-08 RX ADMIN — HYDROCORTISONE SODIUM SUCCINATE 100 MG: 100 INJECTION, POWDER, FOR SOLUTION INTRAMUSCULAR; INTRAVENOUS at 14:15

## 2020-12-10 ENCOUNTER — ANESTHESIA (OUTPATIENT)
Dept: PERIOP | Facility: HOSPITAL | Age: 35
DRG: 743 | End: 2020-12-10
Payer: COMMERCIAL

## 2020-12-10 ENCOUNTER — HOSPITAL ENCOUNTER (INPATIENT)
Facility: HOSPITAL | Age: 35
LOS: 2 days | Discharge: HOME | DRG: 743 | End: 2020-12-12
Attending: OBSTETRICS & GYNECOLOGY | Admitting: OBSTETRICS & GYNECOLOGY
Payer: COMMERCIAL

## 2020-12-10 VITALS — HEART RATE: 86 BPM

## 2020-12-10 DIAGNOSIS — N92.0 EXCESSIVE AND FREQUENT MENSTRUATION WITH REGULAR CYCLE: ICD-10-CM

## 2020-12-10 DIAGNOSIS — D25.1 INTRAMURAL LEIOMYOMA OF UTERUS: ICD-10-CM

## 2020-12-10 DIAGNOSIS — D25.0 FIBROIDS, SUBMUCOSAL: Primary | ICD-10-CM

## 2020-12-10 DIAGNOSIS — G89.18 POST-OP PAIN: ICD-10-CM

## 2020-12-10 LAB
ANION GAP SERPL CALCULATED.3IONS-SCNC: 7 MMOL/L (ref 4–13)
BUN SERPL-MCNC: 16 MG/DL (ref 5–25)
CALCIUM SERPL-MCNC: 8.4 MG/DL (ref 8.3–10.1)
CHLORIDE SERPL-SCNC: 103 MMOL/L (ref 100–108)
CO2 SERPL-SCNC: 26 MMOL/L (ref 21–32)
CREAT SERPL-MCNC: 1.13 MG/DL (ref 0.6–1.3)
ERYTHROCYTE [DISTWIDTH] IN BLOOD BY AUTOMATED COUNT: 24.3 % (ref 11.6–15.1)
EXT PREGNANCY TEST URINE: NEGATIVE
EXT. CONTROL: NORMAL
GFR SERPL CREATININE-BSD FRML MDRD: 72 ML/MIN/1.73SQ M
GLUCOSE SERPL-MCNC: 175 MG/DL (ref 65–140)
HCT VFR BLD AUTO: 29.2 % (ref 34.8–46.1)
HGB BLD-MCNC: 8.9 G/DL (ref 11.5–15.4)
INR PPP: 0.98 (ref 0.84–1.19)
MCH RBC QN AUTO: 22.7 PG (ref 26.8–34.3)
MCHC RBC AUTO-ENTMCNC: 30.5 G/DL (ref 31.4–37.4)
MCV RBC AUTO: 75 FL (ref 82–98)
PLATELET # BLD AUTO: 289 THOUSANDS/UL (ref 149–390)
PMV BLD AUTO: 10.5 FL (ref 8.9–12.7)
POTASSIUM SERPL-SCNC: 4.1 MMOL/L (ref 3.5–5.3)
PROTHROMBIN TIME: 13.1 SECONDS (ref 11.6–14.5)
RBC # BLD AUTO: 3.92 MILLION/UL (ref 3.81–5.12)
SODIUM SERPL-SCNC: 136 MMOL/L (ref 136–145)
WBC # BLD AUTO: 14.98 THOUSAND/UL (ref 4.31–10.16)

## 2020-12-10 PROCEDURE — 80048 BASIC METABOLIC PNL TOTAL CA: CPT | Performed by: OBSTETRICS & GYNECOLOGY

## 2020-12-10 PROCEDURE — 81025 URINE PREGNANCY TEST: CPT | Performed by: OBSTETRICS & GYNECOLOGY

## 2020-12-10 PROCEDURE — 85027 COMPLETE CBC AUTOMATED: CPT | Performed by: OBSTETRICS & GYNECOLOGY

## 2020-12-10 PROCEDURE — 0UB90ZZ EXCISION OF UTERUS, OPEN APPROACH: ICD-10-PCS | Performed by: OBSTETRICS & GYNECOLOGY

## 2020-12-10 PROCEDURE — 94760 N-INVAS EAR/PLS OXIMETRY 1: CPT

## 2020-12-10 PROCEDURE — C1765 ADHESION BARRIER: HCPCS | Performed by: OBSTETRICS & GYNECOLOGY

## 2020-12-10 PROCEDURE — 85610 PROTHROMBIN TIME: CPT | Performed by: NURSE PRACTITIONER

## 2020-12-10 PROCEDURE — 88305 TISSUE EXAM BY PATHOLOGIST: CPT | Performed by: PATHOLOGY

## 2020-12-10 RX ORDER — MAGNESIUM HYDROXIDE 1200 MG/15ML
LIQUID ORAL AS NEEDED
Status: DISCONTINUED | OUTPATIENT
Start: 2020-12-10 | End: 2020-12-10 | Stop reason: HOSPADM

## 2020-12-10 RX ORDER — IBUPROFEN 600 MG/1
600 TABLET ORAL EVERY 6 HOURS PRN
Status: DISCONTINUED | OUTPATIENT
Start: 2020-12-10 | End: 2020-12-12 | Stop reason: HOSPADM

## 2020-12-10 RX ORDER — MORPHINE SULFATE 10 MG/ML
2 INJECTION, SOLUTION INTRAMUSCULAR; INTRAVENOUS
Status: DISCONTINUED | OUTPATIENT
Start: 2020-12-10 | End: 2020-12-12 | Stop reason: HOSPADM

## 2020-12-10 RX ORDER — HYDROMORPHONE HCL/PF 1 MG/ML
0.25 SYRINGE (ML) INJECTION
Status: COMPLETED | OUTPATIENT
Start: 2020-12-10 | End: 2020-12-10

## 2020-12-10 RX ORDER — ALPRAZOLAM 0.25 MG/1
0.25 TABLET ORAL
Status: DISCONTINUED | OUTPATIENT
Start: 2020-12-10 | End: 2020-12-12 | Stop reason: HOSPADM

## 2020-12-10 RX ORDER — CEFAZOLIN SODIUM 2 G/50ML
SOLUTION INTRAVENOUS AS NEEDED
Status: DISCONTINUED | OUTPATIENT
Start: 2020-12-10 | End: 2020-12-10

## 2020-12-10 RX ORDER — DEXAMETHASONE SODIUM PHOSPHATE 4 MG/ML
4 INJECTION, SOLUTION INTRA-ARTICULAR; INTRALESIONAL; INTRAMUSCULAR; INTRAVENOUS; SOFT TISSUE ONCE AS NEEDED
Status: DISCONTINUED | OUTPATIENT
Start: 2020-12-10 | End: 2020-12-10 | Stop reason: HOSPADM

## 2020-12-10 RX ORDER — OXYCODONE HYDROCHLORIDE 10 MG/1
10 TABLET ORAL EVERY 4 HOURS PRN
Status: DISCONTINUED | OUTPATIENT
Start: 2020-12-10 | End: 2020-12-12 | Stop reason: HOSPADM

## 2020-12-10 RX ORDER — OXYCODONE HYDROCHLORIDE 5 MG/1
5 TABLET ORAL EVERY 4 HOURS PRN
Status: DISCONTINUED | OUTPATIENT
Start: 2020-12-10 | End: 2020-12-12 | Stop reason: HOSPADM

## 2020-12-10 RX ORDER — HYDROMORPHONE HCL/PF 1 MG/ML
0.5 SYRINGE (ML) INJECTION
Status: DISCONTINUED | OUTPATIENT
Start: 2020-12-10 | End: 2020-12-10 | Stop reason: HOSPADM

## 2020-12-10 RX ORDER — FENTANYL CITRATE/PF 50 MCG/ML
25 SYRINGE (ML) INJECTION
Status: DISCONTINUED | OUTPATIENT
Start: 2020-12-10 | End: 2020-12-10 | Stop reason: HOSPADM

## 2020-12-10 RX ORDER — DEXTROSE, SODIUM CHLORIDE, AND POTASSIUM CHLORIDE 5; .9; .15 G/100ML; G/100ML; G/100ML
125 INJECTION INTRAVENOUS CONTINUOUS
Status: DISPENSED | OUTPATIENT
Start: 2020-12-10 | End: 2020-12-11

## 2020-12-10 RX ORDER — ONDANSETRON 2 MG/ML
4 INJECTION INTRAMUSCULAR; INTRAVENOUS EVERY 6 HOURS PRN
Status: DISCONTINUED | OUTPATIENT
Start: 2020-12-10 | End: 2020-12-12 | Stop reason: HOSPADM

## 2020-12-10 RX ORDER — SENNOSIDES 8.6 MG
1 TABLET ORAL DAILY
Status: DISCONTINUED | OUTPATIENT
Start: 2020-12-10 | End: 2020-12-12 | Stop reason: HOSPADM

## 2020-12-10 RX ORDER — METOCLOPRAMIDE HYDROCHLORIDE 5 MG/ML
10 INJECTION INTRAMUSCULAR; INTRAVENOUS EVERY 6 HOURS PRN
Status: DISCONTINUED | OUTPATIENT
Start: 2020-12-10 | End: 2020-12-12 | Stop reason: HOSPADM

## 2020-12-10 RX ORDER — ALBUTEROL SULFATE 2.5 MG/3ML
2.5 SOLUTION RESPIRATORY (INHALATION) EVERY 6 HOURS PRN
Status: DISCONTINUED | OUTPATIENT
Start: 2020-12-10 | End: 2020-12-12 | Stop reason: HOSPADM

## 2020-12-10 RX ORDER — HYDROMORPHONE HCL/PF 1 MG/ML
SYRINGE (ML) INJECTION AS NEEDED
Status: DISCONTINUED | OUTPATIENT
Start: 2020-12-10 | End: 2020-12-10

## 2020-12-10 RX ORDER — FENTANYL CITRATE 50 UG/ML
INJECTION, SOLUTION INTRAMUSCULAR; INTRAVENOUS AS NEEDED
Status: DISCONTINUED | OUTPATIENT
Start: 2020-12-10 | End: 2020-12-10

## 2020-12-10 RX ORDER — MIDAZOLAM HYDROCHLORIDE 2 MG/2ML
INJECTION, SOLUTION INTRAMUSCULAR; INTRAVENOUS AS NEEDED
Status: DISCONTINUED | OUTPATIENT
Start: 2020-12-10 | End: 2020-12-10

## 2020-12-10 RX ORDER — PROPOFOL 10 MG/ML
INJECTION, EMULSION INTRAVENOUS AS NEEDED
Status: DISCONTINUED | OUTPATIENT
Start: 2020-12-10 | End: 2020-12-10

## 2020-12-10 RX ORDER — ALBUMIN, HUMAN INJ 5% 5 %
SOLUTION INTRAVENOUS CONTINUOUS PRN
Status: DISCONTINUED | OUTPATIENT
Start: 2020-12-10 | End: 2020-12-10

## 2020-12-10 RX ORDER — ONDANSETRON 2 MG/ML
INJECTION INTRAMUSCULAR; INTRAVENOUS AS NEEDED
Status: DISCONTINUED | OUTPATIENT
Start: 2020-12-10 | End: 2020-12-10

## 2020-12-10 RX ORDER — SODIUM CHLORIDE, SODIUM LACTATE, POTASSIUM CHLORIDE, CALCIUM CHLORIDE 600; 310; 30; 20 MG/100ML; MG/100ML; MG/100ML; MG/100ML
75 INJECTION, SOLUTION INTRAVENOUS CONTINUOUS
Status: DISPENSED | OUTPATIENT
Start: 2020-12-10 | End: 2020-12-10

## 2020-12-10 RX ORDER — SODIUM CHLORIDE, SODIUM LACTATE, POTASSIUM CHLORIDE, CALCIUM CHLORIDE 600; 310; 30; 20 MG/100ML; MG/100ML; MG/100ML; MG/100ML
INJECTION, SOLUTION INTRAVENOUS CONTINUOUS PRN
Status: DISCONTINUED | OUTPATIENT
Start: 2020-12-10 | End: 2020-12-10

## 2020-12-10 RX ORDER — ROCURONIUM BROMIDE 10 MG/ML
INJECTION, SOLUTION INTRAVENOUS AS NEEDED
Status: DISCONTINUED | OUTPATIENT
Start: 2020-12-10 | End: 2020-12-10

## 2020-12-10 RX ORDER — DEXAMETHASONE SODIUM PHOSPHATE 10 MG/ML
INJECTION, SOLUTION INTRAMUSCULAR; INTRAVENOUS AS NEEDED
Status: DISCONTINUED | OUTPATIENT
Start: 2020-12-10 | End: 2020-12-10

## 2020-12-10 RX ORDER — ALBUTEROL SULFATE 90 UG/1
AEROSOL, METERED RESPIRATORY (INHALATION) AS NEEDED
Status: DISCONTINUED | OUTPATIENT
Start: 2020-12-10 | End: 2020-12-10

## 2020-12-10 RX ORDER — DOCUSATE SODIUM 100 MG/1
100 CAPSULE, LIQUID FILLED ORAL 2 TIMES DAILY
Status: DISCONTINUED | OUTPATIENT
Start: 2020-12-10 | End: 2020-12-12 | Stop reason: HOSPADM

## 2020-12-10 RX ORDER — ALBUTEROL SULFATE 90 UG/1
2 AEROSOL, METERED RESPIRATORY (INHALATION) EVERY 4 HOURS PRN
Status: DISCONTINUED | OUTPATIENT
Start: 2020-12-10 | End: 2020-12-12 | Stop reason: HOSPADM

## 2020-12-10 RX ORDER — LIDOCAINE HYDROCHLORIDE 10 MG/ML
INJECTION, SOLUTION EPIDURAL; INFILTRATION; INTRACAUDAL; PERINEURAL AS NEEDED
Status: DISCONTINUED | OUTPATIENT
Start: 2020-12-10 | End: 2020-12-10

## 2020-12-10 RX ORDER — ONDANSETRON 2 MG/ML
4 INJECTION INTRAMUSCULAR; INTRAVENOUS ONCE AS NEEDED
Status: DISCONTINUED | OUTPATIENT
Start: 2020-12-10 | End: 2020-12-10 | Stop reason: HOSPADM

## 2020-12-10 RX ORDER — POTASSIUM CHLORIDE 14.9 MG/ML
INJECTION INTRAVENOUS CONTINUOUS PRN
Status: DISCONTINUED | OUTPATIENT
Start: 2020-12-10 | End: 2020-12-10

## 2020-12-10 RX ORDER — BUPIVACAINE HYDROCHLORIDE 2.5 MG/ML
INJECTION, SOLUTION EPIDURAL; INFILTRATION; INTRACAUDAL AS NEEDED
Status: DISCONTINUED | OUTPATIENT
Start: 2020-12-10 | End: 2020-12-10 | Stop reason: HOSPADM

## 2020-12-10 RX ADMIN — SODIUM CHLORIDE, SODIUM LACTATE, POTASSIUM CHLORIDE, AND CALCIUM CHLORIDE: .6; .31; .03; .02 INJECTION, SOLUTION INTRAVENOUS at 07:36

## 2020-12-10 RX ADMIN — ROCURONIUM BROMIDE 30 MG: 10 SOLUTION INTRAVENOUS at 07:54

## 2020-12-10 RX ADMIN — POTASSIUM CHLORIDE: 14.9 INJECTION, SOLUTION INTRAVENOUS at 07:47

## 2020-12-10 RX ADMIN — OXYCODONE HYDROCHLORIDE 10 MG: 10 TABLET ORAL at 19:07

## 2020-12-10 RX ADMIN — PHENYLEPHRINE HYDROCHLORIDE 200 MCG: 10 INJECTION INTRAVENOUS at 09:11

## 2020-12-10 RX ADMIN — MIDAZOLAM HYDROCHLORIDE 2 MG: 1 INJECTION, SOLUTION INTRAMUSCULAR; INTRAVENOUS at 07:30

## 2020-12-10 RX ADMIN — CEFAZOLIN SODIUM 2000 MG: 2 SOLUTION INTRAVENOUS at 07:33

## 2020-12-10 RX ADMIN — PHENYLEPHRINE HYDROCHLORIDE 100 MCG: 10 INJECTION INTRAVENOUS at 07:53

## 2020-12-10 RX ADMIN — FENTANYL CITRATE 25 MCG: 50 INJECTION INTRAMUSCULAR; INTRAVENOUS at 10:37

## 2020-12-10 RX ADMIN — METOCLOPRAMIDE HYDROCHLORIDE 10 MG: 5 INJECTION INTRAMUSCULAR; INTRAVENOUS at 17:52

## 2020-12-10 RX ADMIN — LIDOCAINE HYDROCHLORIDE 50 MG: 10 INJECTION, SOLUTION EPIDURAL; INFILTRATION; INTRACAUDAL at 07:39

## 2020-12-10 RX ADMIN — OXYCODONE HYDROCHLORIDE 10 MG: 10 TABLET ORAL at 22:44

## 2020-12-10 RX ADMIN — HYDROMORPHONE HYDROCHLORIDE: 10 INJECTION, SOLUTION INTRAMUSCULAR; INTRAVENOUS; SUBCUTANEOUS at 13:12

## 2020-12-10 RX ADMIN — ALBUMIN (HUMAN): 12.5 INJECTION, SOLUTION INTRAVENOUS at 08:46

## 2020-12-10 RX ADMIN — ONDANSETRON 4 MG: 2 INJECTION INTRAMUSCULAR; INTRAVENOUS at 07:47

## 2020-12-10 RX ADMIN — FENTANYL CITRATE 100 MCG: 50 INJECTION, SOLUTION INTRAMUSCULAR; INTRAVENOUS at 07:39

## 2020-12-10 RX ADMIN — PHENYLEPHRINE HYDROCHLORIDE 200 MCG: 10 INJECTION INTRAVENOUS at 09:08

## 2020-12-10 RX ADMIN — HYDROMORPHONE HYDROCHLORIDE 1 MG: 1 INJECTION, SOLUTION INTRAMUSCULAR; INTRAVENOUS; SUBCUTANEOUS at 09:15

## 2020-12-10 RX ADMIN — ROCURONIUM BROMIDE 20 MG: 10 SOLUTION INTRAVENOUS at 08:41

## 2020-12-10 RX ADMIN — ONDANSETRON 4 MG: 2 INJECTION INTRAMUSCULAR; INTRAVENOUS at 09:34

## 2020-12-10 RX ADMIN — HYDROMORPHONE HYDROCHLORIDE 0.25 MG: 1 INJECTION, SOLUTION INTRAMUSCULAR; INTRAVENOUS; SUBCUTANEOUS at 11:20

## 2020-12-10 RX ADMIN — ALBUTEROL SULFATE 4 PUFF: 90 AEROSOL, METERED RESPIRATORY (INHALATION) at 09:34

## 2020-12-10 RX ADMIN — HYDROMORPHONE HYDROCHLORIDE 0.25 MG: 1 INJECTION, SOLUTION INTRAMUSCULAR; INTRAVENOUS; SUBCUTANEOUS at 11:33

## 2020-12-10 RX ADMIN — SUGAMMADEX 200 MG: 100 INJECTION, SOLUTION INTRAVENOUS at 09:29

## 2020-12-10 RX ADMIN — ALBUTEROL SULFATE 6 PUFF: 90 AEROSOL, METERED RESPIRATORY (INHALATION) at 07:46

## 2020-12-10 RX ADMIN — ALBUMIN (HUMAN): 12.5 INJECTION, SOLUTION INTRAVENOUS at 08:10

## 2020-12-10 RX ADMIN — PHENYLEPHRINE HYDROCHLORIDE 50 MCG: 10 INJECTION INTRAVENOUS at 08:57

## 2020-12-10 RX ADMIN — PHENYLEPHRINE HYDROCHLORIDE 100 MCG: 10 INJECTION INTRAVENOUS at 09:03

## 2020-12-10 RX ADMIN — OXYCODONE HYDROCHLORIDE 10 MG: 10 TABLET ORAL at 16:30

## 2020-12-10 RX ADMIN — FENTANYL CITRATE 25 MCG: 50 INJECTION INTRAMUSCULAR; INTRAVENOUS at 10:27

## 2020-12-10 RX ADMIN — DEXTROSE, SODIUM CHLORIDE, AND POTASSIUM CHLORIDE 125 ML/HR: 5; .9; .15 INJECTION INTRAVENOUS at 10:43

## 2020-12-10 RX ADMIN — DEXAMETHASONE SODIUM PHOSPHATE 4 MG: 10 INJECTION, SOLUTION INTRAMUSCULAR; INTRAVENOUS at 07:47

## 2020-12-10 RX ADMIN — OXYCODONE HYDROCHLORIDE 10 MG: 10 TABLET ORAL at 13:10

## 2020-12-10 RX ADMIN — PROPOFOL 150 MG: 10 INJECTION, EMULSION INTRAVENOUS at 07:39

## 2020-12-10 RX ADMIN — PHENYLEPHRINE HYDROCHLORIDE 100 MCG: 10 INJECTION INTRAVENOUS at 08:26

## 2020-12-10 RX ADMIN — PHENYLEPHRINE HYDROCHLORIDE 50 MCG: 10 INJECTION INTRAVENOUS at 09:01

## 2020-12-10 RX ADMIN — ONDANSETRON 4 MG: 2 INJECTION INTRAMUSCULAR; INTRAVENOUS at 15:57

## 2020-12-11 LAB
ANION GAP SERPL CALCULATED.3IONS-SCNC: 7 MMOL/L (ref 4–13)
BUN SERPL-MCNC: 10 MG/DL (ref 5–25)
CALCIUM SERPL-MCNC: 8 MG/DL (ref 8.3–10.1)
CHLORIDE SERPL-SCNC: 108 MMOL/L (ref 100–108)
CO2 SERPL-SCNC: 24 MMOL/L (ref 21–32)
CREAT SERPL-MCNC: 0.92 MG/DL (ref 0.6–1.3)
ERYTHROCYTE [DISTWIDTH] IN BLOOD BY AUTOMATED COUNT: 24.6 % (ref 11.6–15.1)
GFR SERPL CREATININE-BSD FRML MDRD: 93 ML/MIN/1.73SQ M
GLUCOSE SERPL-MCNC: 143 MG/DL (ref 65–140)
HCT VFR BLD AUTO: 27.4 % (ref 34.8–46.1)
HGB BLD-MCNC: 8.1 G/DL (ref 11.5–15.4)
MCH RBC QN AUTO: 22 PG (ref 26.8–34.3)
MCHC RBC AUTO-ENTMCNC: 29.6 G/DL (ref 31.4–37.4)
MCV RBC AUTO: 75 FL (ref 82–98)
PLATELET # BLD AUTO: 262 THOUSANDS/UL (ref 149–390)
PMV BLD AUTO: 10.4 FL (ref 8.9–12.7)
POTASSIUM SERPL-SCNC: 3.8 MMOL/L (ref 3.5–5.3)
RBC # BLD AUTO: 3.68 MILLION/UL (ref 3.81–5.12)
SODIUM SERPL-SCNC: 139 MMOL/L (ref 136–145)
WBC # BLD AUTO: 12.62 THOUSAND/UL (ref 4.31–10.16)

## 2020-12-11 PROCEDURE — 94762 N-INVAS EAR/PLS OXIMTRY CONT: CPT

## 2020-12-11 PROCEDURE — 80048 BASIC METABOLIC PNL TOTAL CA: CPT | Performed by: OBSTETRICS & GYNECOLOGY

## 2020-12-11 PROCEDURE — 85027 COMPLETE CBC AUTOMATED: CPT | Performed by: OBSTETRICS & GYNECOLOGY

## 2020-12-11 PROCEDURE — 94760 N-INVAS EAR/PLS OXIMETRY 1: CPT

## 2020-12-11 RX ADMIN — DOCUSATE SODIUM 100 MG: 100 CAPSULE ORAL at 18:45

## 2020-12-11 RX ADMIN — STANDARDIZED SENNA CONCENTRATE 8.6 MG: 8.6 TABLET ORAL at 10:20

## 2020-12-11 RX ADMIN — OXYCODONE HYDROCHLORIDE 10 MG: 10 TABLET ORAL at 19:30

## 2020-12-11 RX ADMIN — OXYCODONE HYDROCHLORIDE 10 MG: 10 TABLET ORAL at 23:23

## 2020-12-11 RX ADMIN — MORPHINE SULFATE 2 MG: 10 INJECTION INTRAVENOUS at 16:31

## 2020-12-11 RX ADMIN — OXYCODONE HYDROCHLORIDE 10 MG: 10 TABLET ORAL at 02:45

## 2020-12-11 RX ADMIN — MORPHINE SULFATE 2 MG: 10 INJECTION INTRAVENOUS at 13:38

## 2020-12-11 RX ADMIN — METOCLOPRAMIDE HYDROCHLORIDE 10 MG: 5 INJECTION INTRAMUSCULAR; INTRAVENOUS at 20:30

## 2020-12-11 RX ADMIN — MORPHINE SULFATE 2 MG: 10 INJECTION INTRAVENOUS at 20:29

## 2020-12-11 RX ADMIN — OXYCODONE HYDROCHLORIDE 10 MG: 10 TABLET ORAL at 15:47

## 2020-12-11 RX ADMIN — OXYCODONE HYDROCHLORIDE 10 MG: 10 TABLET ORAL at 12:51

## 2020-12-11 RX ADMIN — DOCUSATE SODIUM 100 MG: 100 CAPSULE ORAL at 10:20

## 2020-12-11 RX ADMIN — ONDANSETRON 4 MG: 2 INJECTION INTRAMUSCULAR; INTRAVENOUS at 18:45

## 2020-12-12 VITALS
DIASTOLIC BLOOD PRESSURE: 74 MMHG | BODY MASS INDEX: 35.26 KG/M2 | HEART RATE: 93 BPM | OXYGEN SATURATION: 98 % | HEIGHT: 63 IN | WEIGHT: 199 LBS | SYSTOLIC BLOOD PRESSURE: 145 MMHG | TEMPERATURE: 98.5 F | RESPIRATION RATE: 18 BRPM

## 2020-12-12 LAB
ANION GAP SERPL CALCULATED.3IONS-SCNC: 9 MMOL/L (ref 4–13)
BASOPHILS # BLD AUTO: 0.05 THOUSANDS/ΜL (ref 0–0.1)
BASOPHILS NFR BLD AUTO: 0 % (ref 0–1)
BUN SERPL-MCNC: 7 MG/DL (ref 5–25)
CALCIUM SERPL-MCNC: 9 MG/DL (ref 8.3–10.1)
CHLORIDE SERPL-SCNC: 104 MMOL/L (ref 100–108)
CO2 SERPL-SCNC: 26 MMOL/L (ref 21–32)
CREAT SERPL-MCNC: 0.81 MG/DL (ref 0.6–1.3)
EOSINOPHIL # BLD AUTO: 0.05 THOUSAND/ΜL (ref 0–0.61)
EOSINOPHIL NFR BLD AUTO: 0 % (ref 0–6)
ERYTHROCYTE [DISTWIDTH] IN BLOOD BY AUTOMATED COUNT: 25 % (ref 11.6–15.1)
GFR SERPL CREATININE-BSD FRML MDRD: 108 ML/MIN/1.73SQ M
GLUCOSE SERPL-MCNC: 129 MG/DL (ref 65–140)
HCT VFR BLD AUTO: 27.7 % (ref 34.8–46.1)
HGB BLD-MCNC: 8.1 G/DL (ref 11.5–15.4)
IMM GRANULOCYTES # BLD AUTO: 0.04 THOUSAND/UL (ref 0–0.2)
IMM GRANULOCYTES NFR BLD AUTO: 0 % (ref 0–2)
LYMPHOCYTES # BLD AUTO: 1.73 THOUSANDS/ΜL (ref 0.6–4.47)
LYMPHOCYTES NFR BLD AUTO: 15 % (ref 14–44)
MCH RBC QN AUTO: 22.3 PG (ref 26.8–34.3)
MCHC RBC AUTO-ENTMCNC: 29.2 G/DL (ref 31.4–37.4)
MCV RBC AUTO: 76 FL (ref 82–98)
MONOCYTES # BLD AUTO: 0.94 THOUSAND/ΜL (ref 0.17–1.22)
MONOCYTES NFR BLD AUTO: 8 % (ref 4–12)
NEUTROPHILS # BLD AUTO: 8.4 THOUSANDS/ΜL (ref 1.85–7.62)
NEUTS SEG NFR BLD AUTO: 77 % (ref 43–75)
NRBC BLD AUTO-RTO: 0 /100 WBCS
PLATELET # BLD AUTO: 257 THOUSANDS/UL (ref 149–390)
PMV BLD AUTO: 10.5 FL (ref 8.9–12.7)
POTASSIUM SERPL-SCNC: 3.5 MMOL/L (ref 3.5–5.3)
RBC # BLD AUTO: 3.64 MILLION/UL (ref 3.81–5.12)
SODIUM SERPL-SCNC: 139 MMOL/L (ref 136–145)
WBC # BLD AUTO: 11.21 THOUSAND/UL (ref 4.31–10.16)

## 2020-12-12 PROCEDURE — 85025 COMPLETE CBC W/AUTO DIFF WBC: CPT | Performed by: OBSTETRICS & GYNECOLOGY

## 2020-12-12 PROCEDURE — 80048 BASIC METABOLIC PNL TOTAL CA: CPT | Performed by: OBSTETRICS & GYNECOLOGY

## 2020-12-12 RX ORDER — DOXYCYCLINE 100 MG/1
100 TABLET ORAL 2 TIMES DAILY
Qty: 14 TABLET | Refills: 0 | Status: SHIPPED | OUTPATIENT
Start: 2020-12-12 | End: 2020-12-19

## 2020-12-12 RX ORDER — MEDROXYPROGESTERONE ACETATE 10 MG/1
10 TABLET ORAL DAILY
Qty: 10 TABLET | Refills: 0 | Status: SHIPPED | OUTPATIENT
Start: 2020-12-12 | End: 2020-12-15 | Stop reason: ALTCHOICE

## 2020-12-12 RX ORDER — ESTRADIOL 2 MG/1
2 TABLET ORAL 2 TIMES DAILY
Qty: 60 TABLET | Refills: 0 | Status: SHIPPED | OUTPATIENT
Start: 2020-12-12 | End: 2021-03-09 | Stop reason: ALTCHOICE

## 2020-12-12 RX ORDER — OXYCODONE HYDROCHLORIDE AND ACETAMINOPHEN 5; 325 MG/1; MG/1
1 TABLET ORAL EVERY 4 HOURS PRN
Qty: 21 TABLET | Refills: 0 | Status: SHIPPED | OUTPATIENT
Start: 2020-12-12 | End: 2020-12-22

## 2020-12-12 RX ADMIN — OXYCODONE HYDROCHLORIDE 10 MG: 10 TABLET ORAL at 08:53

## 2020-12-12 RX ADMIN — DOCUSATE SODIUM 100 MG: 100 CAPSULE ORAL at 08:46

## 2020-12-12 RX ADMIN — OXYCODONE HYDROCHLORIDE 10 MG: 10 TABLET ORAL at 02:37

## 2020-12-12 RX ADMIN — STANDARDIZED SENNA CONCENTRATE 8.6 MG: 8.6 TABLET ORAL at 08:46

## 2020-12-12 RX ADMIN — MORPHINE SULFATE 2 MG: 10 INJECTION INTRAVENOUS at 03:16

## 2020-12-13 LAB
ABO GROUP BLD BPU: NORMAL
ABO GROUP BLD BPU: NORMAL
BPU ID: NORMAL
BPU ID: NORMAL
CROSSMATCH: NORMAL
CROSSMATCH: NORMAL
UNIT DISPENSE STATUS: NORMAL
UNIT DISPENSE STATUS: NORMAL
UNIT PRODUCT CODE: NORMAL
UNIT PRODUCT CODE: NORMAL
UNIT RH: NORMAL
UNIT RH: NORMAL

## 2020-12-14 ENCOUNTER — TRANSITIONAL CARE MANAGEMENT (OUTPATIENT)
Dept: FAMILY MEDICINE CLINIC | Facility: CLINIC | Age: 35
End: 2020-12-14

## 2020-12-15 ENCOUNTER — OFFICE VISIT (OUTPATIENT)
Dept: FAMILY MEDICINE CLINIC | Facility: CLINIC | Age: 35
End: 2020-12-15
Payer: COMMERCIAL

## 2020-12-15 VITALS
OXYGEN SATURATION: 100 % | DIASTOLIC BLOOD PRESSURE: 74 MMHG | TEMPERATURE: 97.4 F | BODY MASS INDEX: 32.82 KG/M2 | WEIGHT: 197 LBS | HEART RATE: 87 BPM | SYSTOLIC BLOOD PRESSURE: 122 MMHG | HEIGHT: 65 IN

## 2020-12-15 DIAGNOSIS — G43.009 MIGRAINE WITHOUT AURA AND WITHOUT STATUS MIGRAINOSUS, NOT INTRACTABLE: ICD-10-CM

## 2020-12-15 DIAGNOSIS — Z09 HOSPITAL DISCHARGE FOLLOW-UP: Primary | ICD-10-CM

## 2020-12-15 DIAGNOSIS — D25.0 FIBROIDS, SUBMUCOSAL: ICD-10-CM

## 2020-12-15 DIAGNOSIS — D50.9 IRON DEFICIENCY ANEMIA, UNSPECIFIED IRON DEFICIENCY ANEMIA TYPE: ICD-10-CM

## 2020-12-15 DIAGNOSIS — Z98.890 S/P MYOMECTOMY: ICD-10-CM

## 2020-12-15 PROCEDURE — 99496 TRANSJ CARE MGMT HIGH F2F 7D: CPT | Performed by: FAMILY MEDICINE

## 2020-12-15 RX ORDER — MEDROXYPROGESTERONE ACETATE 10 MG/1
10 TABLET ORAL DAILY
Qty: 10 TABLET | Refills: 0
Start: 2020-12-15 | End: 2021-03-09 | Stop reason: ALTCHOICE

## 2020-12-23 DIAGNOSIS — B37.9 YEAST INFECTION: Primary | ICD-10-CM

## 2020-12-23 RX ORDER — FLUCONAZOLE 150 MG/1
150 TABLET ORAL ONCE
Qty: 1 TABLET | Refills: 0 | Status: SHIPPED | OUTPATIENT
Start: 2020-12-23 | End: 2020-12-23

## 2021-01-07 DIAGNOSIS — D50.0 IRON DEFICIENCY ANEMIA DUE TO CHRONIC BLOOD LOSS: Primary | ICD-10-CM

## 2021-01-07 RX ORDER — SODIUM CHLORIDE 9 MG/ML
20 INJECTION, SOLUTION INTRAVENOUS ONCE
Status: CANCELLED | OUTPATIENT
Start: 2021-01-12

## 2021-01-12 ENCOUNTER — HOSPITAL ENCOUNTER (OUTPATIENT)
Dept: INFUSION CENTER | Facility: HOSPITAL | Age: 36
Discharge: HOME | End: 2021-01-12
Attending: INTERNAL MEDICINE
Payer: COMMERCIAL

## 2021-01-12 VITALS
RESPIRATION RATE: 18 BRPM | TEMPERATURE: 97.8 F | HEART RATE: 74 BPM | SYSTOLIC BLOOD PRESSURE: 128 MMHG | DIASTOLIC BLOOD PRESSURE: 87 MMHG

## 2021-01-12 DIAGNOSIS — D50.0 IRON DEFICIENCY ANEMIA DUE TO CHRONIC BLOOD LOSS: Primary | ICD-10-CM

## 2021-01-12 PROCEDURE — 96365 THER/PROPH/DIAG IV INF INIT: CPT

## 2021-01-12 PROCEDURE — 96367 TX/PROPH/DG ADDL SEQ IV INF: CPT

## 2021-01-12 PROCEDURE — 96375 TX/PRO/DX INJ NEW DRUG ADDON: CPT

## 2021-01-12 RX ORDER — SODIUM CHLORIDE 9 MG/ML
20 INJECTION, SOLUTION INTRAVENOUS ONCE
Status: COMPLETED | OUTPATIENT
Start: 2021-01-12 | End: 2021-01-12

## 2021-01-12 RX ORDER — SODIUM CHLORIDE 9 MG/ML
20 INJECTION, SOLUTION INTRAVENOUS ONCE
Status: CANCELLED | OUTPATIENT
Start: 2021-01-12

## 2021-01-12 RX ADMIN — SODIUM CHLORIDE 20 ML/HR: 0.9 INJECTION, SOLUTION INTRAVENOUS at 13:18

## 2021-01-12 RX ADMIN — IRON SUCROSE 200 MG: 20 INJECTION, SOLUTION INTRAVENOUS at 13:43

## 2021-01-12 RX ADMIN — HYDROCORTISONE SODIUM SUCCINATE 100 MG: 100 INJECTION, POWDER, FOR SOLUTION INTRAMUSCULAR; INTRAVENOUS at 13:18

## 2021-01-12 RX ADMIN — DIPHENHYDRAMINE HYDROCHLORIDE 25 MG: 50 INJECTION, SOLUTION INTRAMUSCULAR; INTRAVENOUS at 13:18

## 2021-01-12 NOTE — PLAN OF CARE
Problem: Potential for Falls  Goal: Patient will remain free of falls  Description: INTERVENTIONS:  - Assess patient frequently for physical needs  -  Identify cognitive and physical deficits and behaviors that affect risk of falls    -  [place] fall precautions as indicated by assessment   - Educate patient/family on patient safety including physical limitations  - Instruct patient to call for assistance with activity based on assessment  - Modify environment to reduce risk of injury  - Consider OT/PT consult to assist with strengthening/mobility  Outcome: Progressing

## 2021-01-21 ENCOUNTER — OFFICE VISIT (OUTPATIENT)
Dept: FAMILY MEDICINE CLINIC | Facility: CLINIC | Age: 36
End: 2021-01-21
Payer: COMMERCIAL

## 2021-01-21 VITALS
BODY MASS INDEX: 32.46 KG/M2 | TEMPERATURE: 97.1 F | HEART RATE: 87 BPM | HEIGHT: 65 IN | SYSTOLIC BLOOD PRESSURE: 130 MMHG | DIASTOLIC BLOOD PRESSURE: 78 MMHG | WEIGHT: 194.8 LBS | OXYGEN SATURATION: 100 %

## 2021-01-21 DIAGNOSIS — I10 BENIGN ESSENTIAL HYPERTENSION: ICD-10-CM

## 2021-01-21 DIAGNOSIS — F41.9 ANXIETY: ICD-10-CM

## 2021-01-21 DIAGNOSIS — Z00.00 ANNUAL PHYSICAL EXAM: Primary | ICD-10-CM

## 2021-01-21 PROCEDURE — 99395 PREV VISIT EST AGE 18-39: CPT | Performed by: FAMILY MEDICINE

## 2021-01-21 RX ORDER — ALPRAZOLAM 0.25 MG/1
0.25 TABLET ORAL
Qty: 20 TABLET | Refills: 0 | Status: SHIPPED | OUTPATIENT
Start: 2021-01-21 | End: 2021-07-30 | Stop reason: SDUPTHER

## 2021-01-21 NOTE — PATIENT INSTRUCTIONS

## 2021-01-21 NOTE — PROGRESS NOTES
[address]    NAME: Ada Kulkarni  AGE: 38 y o  SEX: female  : 1985     DATE: 2021     Assessment and Plan:     Problem List Items Addressed This Visit        Cardiovascular and Mediastinum    Benign essential hypertension    Relevant Orders    Comprehensive metabolic panel      Other Visit Diagnoses     Annual physical exam    -  Primary    Anxiety        Relevant Medications    ALPRAZolam (XANAX) 0 25 mg tablet          Immunizations and preventive care screenings were discussed with patient today  Appropriate education was printed on patient's after visit summary  Counseling:  Alcohol/drug use: discussed moderation in alcohol intake, the recommendations for healthy alcohol use, and avoidance of illicit drug use  Dental Health: discussed importance of regular tooth brushing, flossing, and dental visits  Injury prevention: discussed safety/seat belts, safety helmets, smoke detectors, carbon dioxide detectors, and smoking near bedding or upholstery  Sexual health: discussed sexually transmitted diseases, partner selection, use of condoms, avoidance of unintended pregnancy, and contraceptive alternatives  · Exercise: the importance of regular exercise/physical activity was discussed  Recommend exercise 3-5 times per week for at least 30 minutes  BMI Counseling: Body mass index is 32 22 kg/m²  The BMI is above normal  Nutrition recommendations include decreasing portion sizes and encouraging healthy choices of fruits and vegetables  Exercise recommendations include moderate physical activity 150 minutes/week  No pharmacotherapy was ordered  No follow-ups on file       Chief Complaint:     Chief Complaint   Patient presents with    Physical Exam     Patient is here today for an annual exam       History of Present Illness:     Adult Annual Physical   Patient here for a comprehensive physical exam  The patient reports no problems  Diet and Physical Activity  · Diet/Nutrition: well balanced diet and consuming 3-5 servings of fruits/vegetables daily  · Exercise: walking     going back to the Gym soon   Depression Screening  PHQ-9 Depression Screening    PHQ-9:   Frequency of the following problems over the past two weeks:      Little interest or pleasure in doing things: 0 - not at all  Feeling down, depressed, or hopeless: 0 - not at all  PHQ-2 Score: 0       General Health  · Sleep: trouble falling asleep and staying asleep   · Hearing: normal - bilateral   · Vision: wear glasses  More than 1 year ago   · Dental: regular dental visits and brushes teeth twice daily  /GYN Health  · Last menstrual period:   · Contraceptive method: none  · History of STDs?: no      Review of Systems:     Review of Systems   Constitutional: Negative  HENT: Negative  Eyes: Negative  Respiratory: Negative  Cardiovascular: Negative  Gastrointestinal: Negative  Endocrine: Negative  Genitourinary: Negative  Musculoskeletal: Negative  Skin: Negative  Allergic/Immunologic: Negative  Neurological: Negative  Hematological: Negative  Psychiatric/Behavioral: Negative         Past Medical History:     Past Medical History:   Diagnosis Date    Abdominal pain     Asthma     mild intermittent    GERD (gastroesophageal reflux disease)     History of palpitations     Hypertension     Lower back pain     Migraines     Seasonal allergies     Trace mitral regurgitation by prior echocardiogram       Past Surgical History:     Past Surgical History:   Procedure Laterality Date    CHOLECYSTECTOMY      laparoscopic    HERNIA REPAIR      umbilical    LAPAROTOMY N/A 12/10/2020    Procedure: ABDOMINAL MYOMECTOMY;  Surgeon: Remigio Guillory DO;  [facility];  Service: Gynecology    LUMBAR LAMINECTOMY Right 9/14/2016    Procedure: Right L5/S1 Metryx microdiscectomy;  Surgeon: Danish Oconnell MD;  [facility];  Service:     OR COLONOSCOPY FLX DX W/COLLJ SPEC WHEN PFRMD N/A 7/7/2016    Procedure: COLONOSCOPY;  Surgeon: Chidi Huber DO;  [facility]: [facility];   Service: Gastroenterology    OR HYSTEROSCOPY,W/ENDO BX N/A 3/19/2018    Procedure: HYSTEROSCOPY; MYOMECTOMY; ENDOMETRIAL BIOPSY;  Surgeon: Sybil Varner DO;  [facility];  Service: Gynecology    OR INCISE FINGER TENDON SHEATH Right 2/28/2017    Procedure: THUMB TRIGGER RELEASE ;  Surgeon: Casimer Ormond, DO;  [facility];  Service: Orthopedics    WISDOM TOOTH EXTRACTION        Social History:        Social History     Socioeconomic History    Marital status: /Civil Union     Spouse name: None    Number of children: None    Years of education: None    Highest education level: None   Occupational History    None   Social Needs    Financial resource strain: None    Food insecurity     Worry: None     Inability: None    Transportation needs     Medical: None     Non-medical: None   Tobacco Use    Smoking status: Never Smoker    Smokeless tobacco: Never Used   Substance and Sexual Activity    Alcohol use: Yes     Frequency: 2-4 times a month     Drinks per session: 1 or 2    Drug use: No    Sexual activity: Yes     Partners: Male     Birth control/protection: None   Lifestyle    Physical activity     Days per week: None     Minutes per session: None    Stress: None   Relationships    Social connections     Talks on phone: None     Gets together: None     Attends Nondenominational service: None     Active member of club or organization: None     Attends meetings of clubs or organizations: None     Relationship status: None    Intimate partner violence     Fear of current or ex partner: None     Emotionally abused: None     Physically abused: None     Forced sexual activity: None   Other Topics Concern    None   Social History Narrative    Exercises 3x week    Pet: dog      Family History:     Family History   Problem Relation Age of Onset    Diabetes Mother         type2    Hypertension Mother     Diabetes Father         type2    Hypertension Father     Heart attack Maternal Grandfather         acute MI    Arthritis Maternal Grandfather     Stroke Maternal Grandfather         CVA    Hyperlipidemia Maternal Grandfather     Arthritis Paternal Grandfather     Stroke Paternal Grandfather         CVA    Cancer Paternal Grandfather     Hyperlipidemia Paternal Grandfather     Colon cancer Paternal Grandfather     Heart attack Maternal Uncle         acute MI    Arthritis Family     Stroke Family         CVA    Cancer Family     Hyperlipidemia Family     Stroke Family         CVA    Hyperlipidemia Family     Esophageal cancer Maternal Grandmother       Current Medications:     Current Outpatient Medications   Medication Sig Dispense Refill    albuterol (2 5 mg/3 mL) 0 083 % nebulizer solution Take 1 vial (2 5 mg total) by nebulization every 6 (six) hours as needed for wheezing or shortness of breath 30 vial 3    albuterol (PROAIR HFA) 90 mcg/act inhaler Inhale 2 puffs every 4 (four) hours as needed (When has flare up) 1 Inhaler 0    ALPRAZolam (XANAX) 0 25 mg tablet Take 1 tablet (0 25 mg total) by mouth daily at bedtime as needed for anxiety 20 tablet 0    Cholecalciferol (VITAMIN D3 PO) Take 2,000 mg by mouth daily in the early morning      cyclobenzaprine (FLEXERIL) 10 mg tablet Take 1 tablet (10 mg total) by mouth 3 (three) times a day as needed for muscle spasms 60 tablet 1    hydrochlorothiazide (HYDRODIURIL) 25 mg tablet Take 1 tablet (25 mg total) by mouth daily 90 tablet 0    Iron Sucrose (VENOFER IV) Infuse 200 mg into a venous catheter Four times total      labetalol (NORMODYNE) 200 mg tablet Take 1 tablet (200 mg total) by mouth 2 (two) times a day 180 tablet 3    medroxyPROGESTERone (PROVERA) 10 mg tablet Take 1 tablet (10 mg total) by mouth daily Start provera after Estrace is completely finished  Do not take at same time was estrace  10 tablet 0    meloxicam (MOBIC) 15 mg tablet Take 1 tablet by mouth daily as needed      multivitamin (THERAGRAN) TABS Take 1 tablet by mouth daily after dinner      ondansetron (ZOFRAN-ODT) 4 mg disintegrating tablet Take 1 tablet (4 mg total) by mouth every 6 (six) hours as needed for nausea or vomiting 20 tablet 0    pantoprazole (PROTONIX) 40 mg tablet Take 1 tablet (40 mg total) by mouth daily before breakfast 90 tablet 3    Prenatal Vit-Fe Fumarate-FA (PRENATAL 1+1 PO) Take by mouth daily       SUMAtriptan (IMITREX) 50 mg tablet Take 1 tablet (50 mg total) by mouth once as needed for migraine for up to 1 dose 9 tablet 0    estradiol (ESTRACE) 2 MG tablet Take 1 tablet (2 mg total) by mouth 2 (two) times a day (Patient not taking: Reported on 1/21/2021) 60 tablet 0     No current facility-administered medications for this visit  Allergies:     No Known Allergies   Physical Exam:     /78 (BP Location: Left arm, Patient Position: Sitting, Cuff Size: Adult)   Pulse 87   Temp (!) 97 1 °F (36 2 °C) (Tympanic)   Ht 5' 5 2" (1 656 m)   Wt 88 4 kg (194 lb 12 8 oz)   SpO2 100%   BMI 32 22 kg/m²     Physical Exam  Vitals signs and nursing note reviewed  Constitutional:       General: She is not in acute distress  Appearance: She is well-developed  HENT:      Head: Normocephalic and atraumatic  Right Ear: Tympanic membrane normal       Left Ear: Tympanic membrane normal       Nose: Nose normal       Mouth/Throat:      Mouth: Mucous membranes are moist       Pharynx: No oropharyngeal exudate or posterior oropharyngeal erythema  Eyes:      Conjunctiva/sclera: Conjunctivae normal    Neck:      Musculoskeletal: Neck supple  Cardiovascular:      Rate and Rhythm: Normal rate and regular rhythm  Heart sounds: No murmur  Pulmonary:      Effort: Pulmonary effort is normal  No respiratory distress        Breath sounds: Normal breath sounds  Abdominal:      Palpations: Abdomen is soft  Tenderness: There is no abdominal tenderness  Musculoskeletal: Normal range of motion  Skin:     General: Skin is warm and dry  Capillary Refill: Capillary refill takes less than 2 seconds  Neurological:      Mental Status: She is alert            Haley Dunbar MD   [address] [facility]

## 2021-01-22 ENCOUNTER — ANNUAL EXAM (OUTPATIENT)
Dept: OBGYN CLINIC | Facility: CLINIC | Age: 36
End: 2021-01-22
Payer: COMMERCIAL

## 2021-01-22 VITALS
HEIGHT: 65 IN | WEIGHT: 194.2 LBS | DIASTOLIC BLOOD PRESSURE: 80 MMHG | SYSTOLIC BLOOD PRESSURE: 114 MMHG | BODY MASS INDEX: 32.36 KG/M2

## 2021-01-22 DIAGNOSIS — Z01.419 ENCOUNTER FOR GYNECOLOGICAL EXAMINATION WITHOUT ABNORMAL FINDING: Primary | ICD-10-CM

## 2021-01-22 PROCEDURE — G0145 SCR C/V CYTO,THINLAYER,RESCR: HCPCS | Performed by: PHYSICIAN ASSISTANT

## 2021-01-22 PROCEDURE — 87624 HPV HI-RISK TYP POOLED RSLT: CPT | Performed by: PHYSICIAN ASSISTANT

## 2021-01-22 PROCEDURE — 99395 PREV VISIT EST AGE 18-39: CPT | Performed by: PHYSICIAN ASSISTANT

## 2021-01-22 NOTE — PROGRESS NOTES
Assessment/Plan:    No problem-specific Assessment & Plan notes found for this encounter  Diagnoses and all orders for this visit:    Encounter for gynecological examination without abnormal finding  -     Liquid-based pap, screening        Pap done  F/u with PCP for bowel changes if they continue  F/u with reproductive specialist as planned  If no problems, patient to return in 1 year for routine gyn care  Subjective:      Patient ID: Stephen Elmore is a 38 y o  female  Patient is here for yearly gyn exam   States she is doing well overall  Had myomectomy in mid-December; had 4 fibroids removed  Has f/u hysterosonogram in March, then possible embryo transfer  Patient had a 5 day period January 6  Feels like she is getting her period again now - had dark brown spotting and pelvic cramping  She denies bloating and HA  Has noticed a change in her bowels after eating since her surgery  Denies bladder changes, abdominal pain, n/v, and appetite changes  Patient is performing self-breast exam   Denies new masses, skin changes, nipple discharge, and pain/tenderness  The following portions of the patient's history were reviewed and updated as appropriate: allergies, current medications, past family history, past medical history, past social history, past surgical history and problem list     Review of Systems   Constitutional: Negative for appetite change and unexpected weight change  Cardiovascular:        No masses, skin changes, nipple discharge, and pain/tenderness  Gastrointestinal: Negative for abdominal distention, abdominal pain, constipation, diarrhea, nausea and vomiting  Genitourinary: Negative for difficulty urinating, dysuria, frequency, genital sores, hematuria, menstrual problem, pelvic pain, urgency, vaginal bleeding, vaginal discharge and vaginal pain  Neurological: Negative for headaches           Objective:      /80   Ht 5' 5" (1 651 m)   Wt 88 1 kg (194 lb 3 2 oz)   LMP 01/06/2021   BMI 32 32 kg/m²          Physical Exam  Vitals signs reviewed  Exam conducted with a chaperone present  Constitutional:       Appearance: Normal appearance  She is well-developed  Neck:      Musculoskeletal: Neck supple  Thyroid: No thyromegaly  Pulmonary:      Effort: Pulmonary effort is normal    Chest:      Breasts: Breasts are symmetrical          Right: Normal  No swelling, bleeding, inverted nipple, mass, nipple discharge, skin change or tenderness  Left: Normal  No swelling, bleeding, inverted nipple, mass, nipple discharge, skin change or tenderness  Abdominal:      General: Abdomen is flat  There is no distension  Palpations: Abdomen is soft  Tenderness: There is no abdominal tenderness  Genitourinary:     General: Normal vulva  Pubic Area: No rash  Labia:         Right: No rash, tenderness, lesion or injury  Left: No rash, tenderness, lesion or injury  Vagina: Normal  No vaginal discharge, erythema, tenderness or bleeding  Cervix: Normal       Uterus: Normal        Adnexa: Right adnexa normal and left adnexa normal         Right: No mass, tenderness or fullness  Left: No mass, tenderness or fullness  Lymphadenopathy:      Cervical: No cervical adenopathy  Upper Body:      Right upper body: No supraclavicular or axillary adenopathy  Left upper body: No supraclavicular or axillary adenopathy  Lower Body: No right inguinal adenopathy  No left inguinal adenopathy  Skin:     General: Skin is warm and dry  Neurological:      Mental Status: She is alert and oriented to person, place, and time  Psychiatric:         Mood and Affect: Mood normal          Behavior: Behavior normal  Behavior is cooperative  Thought Content:  Thought content normal          Judgment: Judgment normal

## 2021-01-25 LAB
HPV HR 12 DNA CVX QL NAA+PROBE: NEGATIVE
HPV16 DNA CVX QL NAA+PROBE: NEGATIVE
HPV18 DNA CVX QL NAA+PROBE: NEGATIVE

## 2021-01-28 LAB
LAB AP GYN PRIMARY INTERPRETATION: NORMAL
Lab: NORMAL

## 2021-02-07 ENCOUNTER — IMMUNIZATIONS (OUTPATIENT)
Dept: FAMILY MEDICINE CLINIC | Facility: HOSPITAL | Age: 36
End: 2021-02-07

## 2021-02-07 DIAGNOSIS — Z23 ENCOUNTER FOR IMMUNIZATION: Primary | ICD-10-CM

## 2021-02-07 PROCEDURE — 91300 SARS-COV-2 / COVID-19 MRNA VACCINE (PFIZER-BIONTECH) 30 MCG: CPT

## 2021-02-07 PROCEDURE — 0001A SARS-COV-2 / COVID-19 MRNA VACCINE (PFIZER-BIONTECH) 30 MCG: CPT

## 2021-02-10 ENCOUNTER — TELEPHONE (OUTPATIENT)
Dept: HEMATOLOGY ONCOLOGY | Facility: CLINIC | Age: 36
End: 2021-02-10

## 2021-02-10 NOTE — TELEPHONE ENCOUNTER
SHERYLM advising patient that Ciara Contreras PA-C will be out of the office on 2/25/2021  Therefore, her follow up appointment must be rescheduled  Patient has been rescheduled to Monday 3/8/2021 @ 3:30 PM in the [place] office  Instructed patient to contact the office to reschedule if this new date, time, or location is inconvenient  [facility] number [facility], [phone]

## 2021-02-14 DIAGNOSIS — K21.9 GASTROESOPHAGEAL REFLUX DISEASE WITHOUT ESOPHAGITIS: ICD-10-CM

## 2021-02-14 DIAGNOSIS — J45.909 MILD ASTHMA, UNSPECIFIED WHETHER COMPLICATED, UNSPECIFIED WHETHER PERSISTENT: ICD-10-CM

## 2021-02-14 DIAGNOSIS — I10 HYPERTENSION, UNSPECIFIED TYPE: ICD-10-CM

## 2021-02-14 DIAGNOSIS — I10 BENIGN ESSENTIAL HYPERTENSION: ICD-10-CM

## 2021-02-15 RX ORDER — HYDROCHLOROTHIAZIDE 25 MG/1
25 TABLET ORAL DAILY
Qty: 90 TABLET | Refills: 0 | Status: SHIPPED | OUTPATIENT
Start: 2021-02-15 | End: 2021-05-19 | Stop reason: SDUPTHER

## 2021-02-15 RX ORDER — ALBUTEROL SULFATE 90 UG/1
2 AEROSOL, METERED RESPIRATORY (INHALATION) EVERY 4 HOURS PRN
Qty: 1 INHALER | Refills: 0 | Status: SHIPPED | OUTPATIENT
Start: 2021-02-15

## 2021-02-15 RX ORDER — LABETALOL 200 MG/1
200 TABLET, FILM COATED ORAL 2 TIMES DAILY
Qty: 180 TABLET | Refills: 0 | Status: SHIPPED | OUTPATIENT
Start: 2021-02-15 | End: 2021-05-19 | Stop reason: SDUPTHER

## 2021-02-15 RX ORDER — PANTOPRAZOLE SODIUM 40 MG/1
40 TABLET, DELAYED RELEASE ORAL
Qty: 90 TABLET | Refills: 0 | Status: SHIPPED | OUTPATIENT
Start: 2021-02-15 | End: 2021-05-19 | Stop reason: SDUPTHER

## 2021-02-27 ENCOUNTER — IMMUNIZATIONS (OUTPATIENT)
Dept: FAMILY MEDICINE CLINIC | Facility: HOSPITAL | Age: 36
End: 2021-02-27

## 2021-02-27 DIAGNOSIS — Z23 ENCOUNTER FOR IMMUNIZATION: Primary | ICD-10-CM

## 2021-02-27 PROCEDURE — 0002A SARS-COV-2 / COVID-19 MRNA VACCINE (PFIZER-BIONTECH) 30 MCG: CPT

## 2021-02-27 PROCEDURE — 91300 SARS-COV-2 / COVID-19 MRNA VACCINE (PFIZER-BIONTECH) 30 MCG: CPT

## 2021-03-01 ENCOUNTER — EVALUATION (OUTPATIENT)
Dept: OCCUPATIONAL THERAPY | Facility: CLINIC | Age: 36
End: 2021-03-01
Payer: COMMERCIAL

## 2021-03-01 DIAGNOSIS — R20.2 NUMBNESS AND TINGLING IN BOTH HANDS: Primary | ICD-10-CM

## 2021-03-01 DIAGNOSIS — R20.0 NUMBNESS AND TINGLING IN BOTH HANDS: Primary | ICD-10-CM

## 2021-03-01 PROCEDURE — 97140 MANUAL THERAPY 1/> REGIONS: CPT | Performed by: OCCUPATIONAL THERAPIST

## 2021-03-01 PROCEDURE — L3702 EO W/O JOINTS CF: HCPCS | Performed by: OCCUPATIONAL THERAPIST

## 2021-03-01 PROCEDURE — 97165 OT EVAL LOW COMPLEX 30 MIN: CPT | Performed by: OCCUPATIONAL THERAPIST

## 2021-03-01 NOTE — PROGRESS NOTES
OT Evaluation     Today's date: 3/1/2021  Patient name: Rich Paez  :   MRN: [id number]  Referring provider: Cuca Espinoza MD  Dx:   Encounter Diagnosis     ICD-10-CM    1  Numbness and tingling in both hands  R20 0     R20 2                   Assessment  Assessment details: Doug Saez is a RHD female that is referred for bilateral (left worse than right) digital numbness and tingling  She returns to formal therapy after having a surgery that prevented her from attending after the previous initial evaluation performed in December  Her presentation is consistent with the referred diagnosis of bilateral cubital tunnel syndrome  The symptoms occur in her ring and small fingers only and appear to be positional in nature  Her  is WNL and light touch sensation is normal on both sides (2 83 monofilament)  The LUE is positive for tinels at the elbow  There is no ulnar nerve subluxation noted  She has been using nighttime wrist bracing  She was educated today on ergonomics of the upper extremities to avoid neural compression as well as nerve glide exercises  She was also fabricated a EO to wear at nighttime  See below for a detailed assessment  Impairments: activity intolerance and pain with function  Other impairment: Numbness and tingling in digits    Symptom irritability: lowUnderstanding of Dx/Px/POC: good   Prognosis: good    Goals  STG: Patient will be compliant with  home exercise program in 1 week  STG: Pain will not exceed a 2/10 during appropriate activity and during therapy in 3 weeks  STG: Subjective complaints of numbness and tingling will be less in 4 weeks  LTG: Performance in ADLs and IADLS will be improved to prior level of function with the affected extremity within 6 weeks or discharge  LTG: Performance in work activity will be improved to prior level of function with the affected extremity within 6 weeks or discharge     LTG: FOTO score increase by 5 points within 6 weeks or discharge  Plan  Plan details: Treatment to include modalities, manual therapy, PRE's, HEP, and orthotics as appropriate  Patient would benefit from: skilled OT, OT eval and custom splinting  Planned modality interventions: thermotherapy: hydrocollator packs  Planned therapy interventions: home exercise program, manual therapy, therapeutic exercise, patient education, therapeutic activities, Vargas taping and stretching  Frequency: 1x week  Duration in visits: 10  Plan of Care beginning date: 3/1/2021  Plan of Care expiration date: 2021  Treatment plan discussed with: patient        Subjective Evaluation    History of Present Illness  Mechanism of injury: Elva Villagomez reports in November she experienced a sudden pain in her left elbow followed by the inability to straighten it  Numbness and tingling in her ring and small fingers accompanied this  There was no acute injury or trauma that attributed to the onset of pain  She continues to experience left ring and small finger numbness and tingling as well as elbow pain  She states that between December and now, there was improvement however the left elbow has been exacerbated lately  Symptoms are worst at nighttime  She states similar but less severe symptoms of numbness and tingling of the ring and small fingers on the right side  Not a recurrent problem   Pain  Current pain ratin  At worst pain rating: 10  Location: left elbow    Social Support    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[employer])  Hand dominance: right      Diagnostic Tests  EMG: normal  Treatments  Current treatment: occupational therapy  Patient Goals  Patient goals for therapy: decreased pain  Patient goal: Reduced numbness and tingling        Objective     Observations     Left Wrist/Hand   Negative for atrophy  Right Wrist/Hand   Negative for atrophy  Tenderness     Left Elbow   Tenderness in the cubital tunnel       Right Elbow   No tenderness in the cubital tunnel  Neurological Testing     Sensation     Wrist/Hand   Left   Intact: light touch    Right   Intact: light touch    Comments   Left light touch: 2 83 all digits   Right light touch: 2 83 all digits    Active Range of Motion     Left Elbow   Normal active range of motion    Right Elbow   Normal active range of motion    Left Wrist   Normal active range of motion    Right Wrist   Normal active range of motion    Strength/Myotome Testing     Left Wrist/Hand      (2nd hand position)     Trial 1: 51 3    Thumb Strength  Key/Lateral Pinch     Trial 1: 15 3  Palmar/Three-Point Pinch     Trial 1: 8 4    Right Wrist/Hand      (2nd hand position)     Trial 1: 78 8    Thumb Strength   Key/Lateral Pinch     Trial 1: 15 3  Palmar/Three-Point Pinch     Trial 1: 13 3    Tests   Cervical   Positive lumbar distraction test     Left   Negative Spurling's Test A and Spurling's Test B  Right   Negative Spurling's Test A and Spurling's Test B  Left Shoulder   Positive ULTT1 and ULTT4  Left Elbow   Positive Tinel's sign (cubital tunnel)  Negative elbow flexion  Right Elbow   Negative elbow flexion and Tinel's sign (cubital tunnel)  Left Wrist/Hand   Negative Phalen's sign and Tinel's sign (medial nerve)  Right Wrist/Hand   Negative Phalen's sign and Tinel's sign (medial nerve)                Precautions: Universal      Manuals 3/1            HERMAN, MNG LUE 8'            KT  Ulnar nerve, Left            EO for L Fabricated             IASTM FCU 5'            Cupping Cubital tunnel 3'                                                                                                       Ther Ex             HEP: GHULAM SUTTONG reviewed                                                                                                        Ther Activity                                       Gait Training                                       Modalities             MHP to L elbow

## 2021-03-08 ENCOUNTER — OFFICE VISIT (OUTPATIENT)
Dept: OCCUPATIONAL THERAPY | Facility: CLINIC | Age: 36
End: 2021-03-08
Payer: COMMERCIAL

## 2021-03-08 ENCOUNTER — OFFICE VISIT (OUTPATIENT)
Dept: HEMATOLOGY ONCOLOGY | Facility: CLINIC | Age: 36
End: 2021-03-08
Payer: COMMERCIAL

## 2021-03-08 VITALS
RESPIRATION RATE: 14 BRPM | SYSTOLIC BLOOD PRESSURE: 126 MMHG | WEIGHT: 200 LBS | OXYGEN SATURATION: 99 % | HEART RATE: 81 BPM | TEMPERATURE: 98.2 F | DIASTOLIC BLOOD PRESSURE: 62 MMHG | HEIGHT: 65 IN | BODY MASS INDEX: 33.32 KG/M2

## 2021-03-08 DIAGNOSIS — R20.0 NUMBNESS AND TINGLING IN BOTH HANDS: Primary | ICD-10-CM

## 2021-03-08 DIAGNOSIS — D50.0 IRON DEFICIENCY ANEMIA DUE TO CHRONIC BLOOD LOSS: Primary | ICD-10-CM

## 2021-03-08 DIAGNOSIS — R20.2 NUMBNESS AND TINGLING IN BOTH HANDS: Primary | ICD-10-CM

## 2021-03-08 PROCEDURE — 99213 OFFICE O/P EST LOW 20 MIN: CPT | Performed by: PHYSICIAN ASSISTANT

## 2021-03-08 PROCEDURE — 97140 MANUAL THERAPY 1/> REGIONS: CPT | Performed by: OCCUPATIONAL THERAPIST

## 2021-03-08 NOTE — PROGRESS NOTES
Daily Note     Today's date: 3/8/2021  Patient name: Bella Dalton  :   MRN: [id number]  Referring provider: Latanya Yung MD  Dx:   Encounter Diagnosis     ICD-10-CM    1  Numbness and tingling in both hands  R20 0     R20 2                   Subjective: "It's karma"      Objective: See treatment diary below      Assessment: Reports some easing of symptoms, minimal symptoms with NG  Plan: Continue per plan of care        Precautions: Universal      Manuals 3/1 3/8           HERMAN, MNG LUE 8' 10           KT  Ulnar nerve, Left 5           EO for L Fabricated             IASTM FCU 5' 5 fcu           Cupping Cubital tunnel 3' 5                                                                                                      Ther Ex             HEP: ABHAY SUTTON reviewed                                                                                                        Ther Activity                                       Gait Training                                       Modalities             MHP to L elbow

## 2021-03-08 NOTE — PROGRESS NOTES
Hematology/Oncology Outpatient Follow-up  Latasha Schmitt 38 y o  female 1985 [id number]    Date:  3/8/2021      Assessment and Plan:  1  Iron deficiency anemia due to chronic blood loss  38year old female presents for follow up for iron deficiency anemia  She was seen in consult in Nov 2020  She was having menorrhagia due to fibroid  She had this removed surgically after having IV iron  She did not have labs prior to today's visit  She will have completed and call to review  She also was noted to have significant microcytosis at consult so outstanding labs also include hgb electrophoresis and alpha thalassemia testing too  HPI:  42-year-old female presents for follow up regarding iron deficiency anemia      Past medical history significant for GERD, polycystic ovaries, asthma, trace mitral regurgitation, hypertension, migraines, herniated lumbar disc status post surgical intervention, trigger finger of right thumb, status post surgical intervention, uterine fibroid, female infertility, iron deficiency secondary to chronic blood loss      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      Periods are very heavy for the last 6-7 months  She has her   monthly  On average her period lasts 5 days  Her most recent period lasted for 9 days  She has significant menorrhagia  She changes her pad every 1 hour, sometimes every 2-3 hours and has large clots  She had a recent incident that her bleeding went through to her underwear, pants as well as shirt      She is taking oral iron  She is tolerating this well  Her hemoglobin has improved from 8 9 to 10 1 in 1 month time span  She states that she is following with reproductive endocrinology and is undergoing a myomectomy on 12/10/2020  She stated that she was seen by reproductive endocrinologist this week and she requested iron infusions to be administered prior to surgery    Records are not available for review at this time as provider documentation is out of network      Denies PICA  Denies restless legs  Interval history: patient had IV iron  Symptoms improved  ROS: Review of Systems   Constitutional: Negative for appetite change, chills, fatigue, fever and unexpected weight change  HENT: Negative for mouth sores and nosebleeds  Respiratory: Negative for cough and shortness of breath  Cardiovascular: Negative for chest pain, palpitations and leg swelling  Gastrointestinal: Negative for abdominal pain, blood in stool, constipation, diarrhea, nausea and vomiting  Genitourinary: Negative for difficulty urinating, dysuria and hematuria  Musculoskeletal: Negative for arthralgias  Skin: Negative  Neurological: Negative for dizziness, weakness, light-headedness, numbness and headaches  Hematological: Negative  Psychiatric/Behavioral: Negative  Past Medical History:   Diagnosis Date    Abdominal pain     Asthma     mild intermittent    GERD (gastroesophageal reflux disease)     History of palpitations     Hypertension     Lower back pain     Migraines     Seasonal allergies     Trace mitral regurgitation by prior echocardiogram        Past Surgical History:   Procedure Laterality Date    CHOLECYSTECTOMY      laparoscopic    HERNIA REPAIR      umbilical    LAPAROTOMY N/A 12/10/2020    Procedure: ABDOMINAL MYOMECTOMY;  Surgeon: Mark Anthony Claudio DO;  [facility];  Service: Gynecology    LUMBAR LAMINECTOMY Right 9/14/2016    Procedure: Right L5/S1 Metryx microdiscectomy;  Surgeon: Chad Holden MD;  [facility];  Service:    [facility] [place] COLONOSCOPY FLX DX W/COLLJ SPEC WHEN PFRMD N/A 7/7/2016    Procedure: COLONOSCOPY;  Surgeon: Liane Baca DO;  Location: [facility];   Service: Gastroenterology    KS HYSTEROSCOPY,W/ENDO BX N/A 3/19/2018    Procedure: HYSTEROSCOPY; MYOMECTOMY; ENDOMETRIAL BIOPSY;  Surgeon: Mark Anthony Claudio DO;  [facility];  Service: Gynecology    KS LALAE FINGER TENDON SHEATH Right 2/28/2017    Procedure: THUMB TRIGGER RELEASE ;  Surgeon: Lyndsay Maloney DO;  [facility];  Service: Orthopedics    WISDOM TOOTH EXTRACTION         Social History     Socioeconomic History    Marital status: /Civil Union     Spouse name: None    Number of children: None    Years of education: None    Highest education level: None   Occupational History    None   Social Needs    Financial resource strain: None    Food insecurity     Worry: None     Inability: None    Transportation needs     Medical: None     Non-medical: None   Tobacco Use    Smoking status: Never Smoker    Smokeless tobacco: Never Used   Substance and Sexual Activity    Alcohol use: Yes     Frequency: 2-4 times a month     Drinks per session: 1 or 2    Drug use: No    Sexual activity: Yes     Partners: Male     Birth control/protection: None   Lifestyle    Physical activity     Days per week: None     Minutes per session: None    Stress: None   Relationships    Social connections     Talks on phone: None     Gets together: None     Attends Mandaeism service: None     Active member of club or organization: None     Attends meetings of clubs or organizations: None     Relationship status: None    Intimate partner violence     Fear of current or ex partner: None     Emotionally abused: None     Physically abused: None     Forced sexual activity: None   Other Topics Concern    None   Social History Narrative    Exercises 3x week    Pet: dog       Family History   Problem Relation Age of Onset    Diabetes Mother         type2    Hypertension Mother     Diabetes Father         type2    Hypertension Father     Heart attack Maternal Grandfather         acute MI    Arthritis Maternal Grandfather     Stroke Maternal Grandfather         CVA    Hyperlipidemia Maternal Grandfather     Arthritis Paternal Grandfather     Stroke Paternal Grandfather         CVA    Cancer Paternal Grandfather  Hyperlipidemia Paternal Grandfather     Colon cancer Paternal Grandfather     Heart attack Maternal Uncle         acute MI    Arthritis Family     Stroke Family         CVA    Cancer Family     Hyperlipidemia Family     Stroke Family         CVA    Hyperlipidemia Family     Esophageal cancer Maternal Grandmother        No Known Allergies      Current Outpatient Medications:     albuterol (2 5 mg/3 mL) 0 083 % nebulizer solution, Take 1 vial (2 5 mg total) by nebulization every 6 (six) hours as needed for wheezing or shortness of breath, Disp: 30 vial, Rfl: 3    albuterol (ProAir HFA) 90 mcg/act inhaler, Inhale 2 puffs every 4 (four) hours as needed (When has flare up), Disp: 1 Inhaler, Rfl: 0    ALPRAZolam (XANAX) 0 25 mg tablet, Take 1 tablet (0 25 mg total) by mouth daily at bedtime as needed for anxiety, Disp: 20 tablet, Rfl: 0    Cholecalciferol (VITAMIN D3 PO), Take 2,000 mg by mouth daily in the early morning, Disp: , Rfl:     cyclobenzaprine (FLEXERIL) 10 mg tablet, Take 1 tablet (10 mg total) by mouth 3 (three) times a day as needed for muscle spasms, Disp: 60 tablet, Rfl: 1    hydrochlorothiazide (HYDRODIURIL) 25 mg tablet, Take 1 tablet (25 mg total) by mouth daily, Disp: 90 tablet, Rfl: 0    Iron Sucrose (VENOFER IV), Infuse 200 mg into a venous catheter Four times total, Disp: , Rfl:     labetalol (NORMODYNE) 200 mg tablet, Take 1 tablet (200 mg total) by mouth 2 (two) times a day, Disp: 180 tablet, Rfl: 0    meloxicam (MOBIC) 15 mg tablet, Take 1 tablet by mouth daily as needed, Disp: , Rfl:     Multiple Vitamins-Minerals (Womens Multivitamin) TABS, Take 1 tablet by mouth daily, Disp: , Rfl:     multivitamin (THERAGRAN) TABS, Take 1 tablet by mouth daily after dinner, Disp: , Rfl:     ondansetron (ZOFRAN-ODT) 4 mg disintegrating tablet, Take 1 tablet (4 mg total) by mouth every 6 (six) hours as needed for nausea or vomiting, Disp: 20 tablet, Rfl: 0    pantoprazole (PROTONIX) 40 mg tablet, Take 1 tablet (40 mg total) by mouth daily before breakfast, Disp: 90 tablet, Rfl: 0    SUMAtriptan (IMITREX) 50 mg tablet, Take 1 tablet (50 mg total) by mouth once as needed for migraine for up to 1 dose, Disp: 9 tablet, Rfl: 0    estradiol (ESTRACE) 2 MG tablet, Take 1 tablet (2 mg total) by mouth 2 (two) times a day (Patient not taking: Reported on 1/21/2021), Disp: 60 tablet, Rfl: 0    medroxyPROGESTERone (PROVERA) 10 mg tablet, Take 1 tablet (10 mg total) by mouth daily Start provera after Estrace is completely finished  Do not take at same time was estrace  , Disp: 10 tablet, Rfl: 0    Prenatal Vit-Fe Fumarate-FA (PRENATAL 1+1 PO), Take by mouth daily , Disp: , Rfl:       Physical Exam:  /62 (BP Location: Left arm, Patient Position: Sitting, Cuff Size: Adult)   Pulse 81   Temp 98 2 °F (36 8 °C) (Temporal)   Resp 14   Ht 5' 5" (1 651 m)   Wt 90 7 kg (200 lb)   SpO2 99%   BMI 33 28 kg/m²     Physical Exam  Vitals signs reviewed  Constitutional:       General: She is not in acute distress  Appearance: She is well-developed  She is not ill-appearing  HENT:      Head: Normocephalic and atraumatic  Eyes:      General: No scleral icterus  Conjunctiva/sclera: Conjunctivae normal    Neck:      Musculoskeletal: Normal range of motion and neck supple  Cardiovascular:      Rate and Rhythm: Normal rate and regular rhythm  Heart sounds: Normal heart sounds  No murmur  Pulmonary:      Effort: Pulmonary effort is normal  No respiratory distress  Breath sounds: Normal breath sounds  Abdominal:      Palpations: Abdomen is soft  Tenderness: There is no abdominal tenderness  Musculoskeletal: Normal range of motion  General: No tenderness  Lymphadenopathy:      Cervical: No cervical adenopathy  Skin:     General: Skin is warm and dry  Neurological:      Mental Status: She is alert and oriented to person, place, and time        Cranial Nerves: No cranial nerve deficit  Psychiatric:         Mood and Affect: Mood normal          Behavior: Behavior normal        Labs:  Lab Results   Component Value Date    WBC 11 21 (H) 12/12/2020    HGB 8 1 (L) 12/12/2020    HCT 27 7 (L) 12/12/2020    MCV 76 (L) 12/12/2020     12/12/2020     Patient voiced understanding and agreement in the above discussion  Aware to contact our office with questions/symptoms in the interim  This note has been generated by voice recognition software system  Therefore, there may be spelling, grammar, and or syntax errors  Please contact if questions arise

## 2021-03-09 ENCOUNTER — APPOINTMENT (OUTPATIENT)
Dept: LAB | Facility: HOSPITAL | Age: 36
End: 2021-03-09
Payer: COMMERCIAL

## 2021-03-09 DIAGNOSIS — R71.8 MICROCYTOSIS: ICD-10-CM

## 2021-03-09 DIAGNOSIS — D50.0 IRON DEFICIENCY ANEMIA DUE TO CHRONIC BLOOD LOSS: ICD-10-CM

## 2021-03-09 DIAGNOSIS — E87.6 HYPOKALEMIA: Primary | ICD-10-CM

## 2021-03-09 LAB
ALBUMIN SERPL BCP-MCNC: 4 G/DL (ref 3.5–5)
ALP SERPL-CCNC: 61 U/L (ref 46–116)
ALT SERPL W P-5'-P-CCNC: 25 U/L (ref 12–78)
ANION GAP SERPL CALCULATED.3IONS-SCNC: 7 MMOL/L (ref 4–13)
AST SERPL W P-5'-P-CCNC: 14 U/L (ref 5–45)
BASOPHILS # BLD AUTO: 0.06 THOUSANDS/ΜL (ref 0–0.1)
BASOPHILS NFR BLD AUTO: 1 % (ref 0–1)
BILIRUB SERPL-MCNC: 0.32 MG/DL (ref 0.2–1)
BUN SERPL-MCNC: 9 MG/DL (ref 5–25)
CALCIUM SERPL-MCNC: 9.4 MG/DL (ref 8.3–10.1)
CHLORIDE SERPL-SCNC: 104 MMOL/L (ref 100–108)
CO2 SERPL-SCNC: 28 MMOL/L (ref 21–32)
CREAT SERPL-MCNC: 1.08 MG/DL (ref 0.6–1.3)
EOSINOPHIL # BLD AUTO: 0.07 THOUSAND/ΜL (ref 0–0.61)
EOSINOPHIL NFR BLD AUTO: 1 % (ref 0–6)
ERYTHROCYTE [DISTWIDTH] IN BLOOD BY AUTOMATED COUNT: 16.2 % (ref 11.6–15.1)
FERRITIN SERPL-MCNC: 26 NG/ML (ref 8–388)
GFR SERPL CREATININE-BSD FRML MDRD: 76 ML/MIN/1.73SQ M
GLUCOSE P FAST SERPL-MCNC: 119 MG/DL (ref 65–99)
HCT VFR BLD AUTO: 37.1 % (ref 34.8–46.1)
HGB BLD-MCNC: 11.8 G/DL (ref 11.5–15.4)
IMM GRANULOCYTES # BLD AUTO: 0.04 THOUSAND/UL (ref 0–0.2)
IMM GRANULOCYTES NFR BLD AUTO: 1 % (ref 0–2)
IRON SATN MFR SERPL: 29 %
IRON SERPL-MCNC: 123 UG/DL (ref 50–170)
LYMPHOCYTES # BLD AUTO: 1.6 THOUSANDS/ΜL (ref 0.6–4.47)
LYMPHOCYTES NFR BLD AUTO: 19 % (ref 14–44)
MCH RBC QN AUTO: 24.3 PG (ref 26.8–34.3)
MCHC RBC AUTO-ENTMCNC: 31.8 G/DL (ref 31.4–37.4)
MCV RBC AUTO: 77 FL (ref 82–98)
MONOCYTES # BLD AUTO: 0.45 THOUSAND/ΜL (ref 0.17–1.22)
MONOCYTES NFR BLD AUTO: 5 % (ref 4–12)
NEUTROPHILS # BLD AUTO: 6.27 THOUSANDS/ΜL (ref 1.85–7.62)
NEUTS SEG NFR BLD AUTO: 73 % (ref 43–75)
NRBC BLD AUTO-RTO: 0 /100 WBCS
PLATELET # BLD AUTO: 265 THOUSANDS/UL (ref 149–390)
PMV BLD AUTO: 10.8 FL (ref 8.9–12.7)
POTASSIUM SERPL-SCNC: 3.3 MMOL/L (ref 3.5–5.3)
PROT SERPL-MCNC: 7.7 G/DL (ref 6.4–8.2)
RBC # BLD AUTO: 4.85 MILLION/UL (ref 3.81–5.12)
SODIUM SERPL-SCNC: 139 MMOL/L (ref 136–145)
TIBC SERPL-MCNC: 425 UG/DL (ref 250–450)
WBC # BLD AUTO: 8.49 THOUSAND/UL (ref 4.31–10.16)

## 2021-03-09 PROCEDURE — 83550 IRON BINDING TEST: CPT

## 2021-03-09 PROCEDURE — 81257 HBA1/HBA2 GENE: CPT

## 2021-03-09 PROCEDURE — 85025 COMPLETE CBC W/AUTO DIFF WBC: CPT | Performed by: FAMILY MEDICINE

## 2021-03-09 PROCEDURE — 83020 HEMOGLOBIN ELECTROPHORESIS: CPT

## 2021-03-09 PROCEDURE — 85660 RBC SICKLE CELL TEST: CPT

## 2021-03-09 PROCEDURE — 80053 COMPREHEN METABOLIC PANEL: CPT | Performed by: FAMILY MEDICINE

## 2021-03-09 PROCEDURE — 36415 COLL VENOUS BLD VENIPUNCTURE: CPT | Performed by: FAMILY MEDICINE

## 2021-03-09 PROCEDURE — 83540 ASSAY OF IRON: CPT

## 2021-03-09 PROCEDURE — 82728 ASSAY OF FERRITIN: CPT

## 2021-03-09 RX ORDER — POTASSIUM CHLORIDE 750 MG/1
10 CAPSULE, EXTENDED RELEASE ORAL DAILY
Qty: 30 CAPSULE | Refills: 0 | Status: SHIPPED | OUTPATIENT
Start: 2021-03-09 | End: 2021-07-29 | Stop reason: ALTCHOICE

## 2021-03-13 LAB
HGB A MFR BLD: 2.6 % (ref 1.8–3.2)
HGB A MFR BLD: 62.7 % (ref 96.4–98.8)
HGB F MFR BLD: 0 % (ref 0–2)
HGB FRACT BLD-IMP: ABNORMAL
HGB S BLD QL SOLY: POSITIVE
HGB S MFR BLD: 34.7 %
SL AMB FINAL INTERPRETATION: ABNORMAL

## 2021-03-16 ENCOUNTER — OFFICE VISIT (OUTPATIENT)
Dept: FAMILY MEDICINE CLINIC | Facility: CLINIC | Age: 36
End: 2021-03-16
Payer: COMMERCIAL

## 2021-03-16 VITALS
HEIGHT: 65 IN | OXYGEN SATURATION: 99 % | DIASTOLIC BLOOD PRESSURE: 78 MMHG | TEMPERATURE: 97.6 F | WEIGHT: 201.6 LBS | SYSTOLIC BLOOD PRESSURE: 112 MMHG | BODY MASS INDEX: 33.59 KG/M2 | HEART RATE: 82 BPM

## 2021-03-16 DIAGNOSIS — R19.00 ABDOMINAL WALL BULGE: Primary | ICD-10-CM

## 2021-03-16 DIAGNOSIS — D57.3 SICKLE CELL TRAIT (HCC): ICD-10-CM

## 2021-03-16 DIAGNOSIS — I10 BENIGN ESSENTIAL HYPERTENSION: ICD-10-CM

## 2021-03-16 DIAGNOSIS — K21.9 GASTROESOPHAGEAL REFLUX DISEASE WITHOUT ESOPHAGITIS: ICD-10-CM

## 2021-03-16 DIAGNOSIS — M51.26 HERNIATED LUMBAR INTERVERTEBRAL DISC: ICD-10-CM

## 2021-03-16 DIAGNOSIS — J45.20 ASTHMA IN ADULT, MILD INTERMITTENT, UNCOMPLICATED: ICD-10-CM

## 2021-03-16 PROCEDURE — 99214 OFFICE O/P EST MOD 30 MIN: CPT | Performed by: FAMILY MEDICINE

## 2021-03-16 RX ORDER — MELOXICAM 15 MG/1
15 TABLET ORAL DAILY PRN
Qty: 30 TABLET | Refills: 0 | Status: SHIPPED | OUTPATIENT
Start: 2021-03-16

## 2021-03-16 NOTE — PROGRESS NOTES
Assessment/Plan:    No problem-specific Assessment & Plan notes found for this encounter  Diagnoses and all orders for this visit:    Abdominal wall bulge  -     US abdominal wall; Future    Benign essential hypertension  Comments:  stable  continue to monitor    Gastroesophageal reflux disease without esophagitis    Asthma in adult, mild intermittent, uncomplicated  Comments:  stable  continue current meds     Herniated lumbar intervertebral disc  Comments:  to start PT   Orders:  -     meloxicam (MOBIC) 15 mg tablet; Take 1 tablet (15 mg total) by mouth daily as needed for mild pain  -     Ambulatory referral to Physical Therapy; Future    [facility] ([facility] )  Comments:  stable  continue care per hematologist          Subjective:      Patient ID: Migdalia Gonzalez is a 38 y o  female  Pressure around epigastric area along with bloating on and off for 4 weeks   Had umbilical hernia - repaired done in 2006  She has to take a deep breath to open up the lungs  + h/o hiatal hernia on EGD    Hypertension  This is a chronic problem  The current episode started more than 1 year ago  The problem has been gradually improving since onset  The problem is controlled  Pertinent negatives include no anxiety, blurred vision, chest pain, headaches, malaise/fatigue, neck pain, orthopnea, palpitations, peripheral edema, PND, shortness of breath or sweats  Past treatments include diuretics  The current treatment provides mild improvement  There are no compliance problems  There is no history of CVA or PVD  The following portions of the patient's history were reviewed and updated as appropriate: allergies, current medications, past family history, past medical history, past social history, past surgical history and problem list     Review of Systems   Constitutional: Negative for malaise/fatigue  Eyes: Negative for blurred vision  Respiratory: Negative for shortness of breath      Cardiovascular: Negative for chest pain, palpitations, orthopnea and PND  Musculoskeletal: Negative for neck pain  Neurological: Negative for headaches  Objective:      /78 (BP Location: Left arm, Patient Position: Sitting, Cuff Size: Large)   Pulse 82   Temp 97 6 °F (36 4 °C) (Tympanic)   Ht 5' 5" (1 651 m)   Wt 91 4 kg (201 lb 9 6 oz)   SpO2 99%   BMI 33 55 kg/m²          Physical Exam  Constitutional:       Appearance: Normal appearance  HENT:      Right Ear: Tympanic membrane normal       Left Ear: Tympanic membrane normal       Nose: No congestion or rhinorrhea  Cardiovascular:      Rate and Rhythm: Normal rate and regular rhythm  Pulmonary:      Effort: Pulmonary effort is normal       Breath sounds: Normal breath sounds  Abdominal:      General: Bowel sounds are normal  There is no distension  Palpations: There is no mass  Tenderness: There is no abdominal tenderness  Hernia: No hernia is present  Comments: Mild bulging mid abdomen    Neurological:      General: No focal deficit present  Mental Status: She is alert and oriented to person, place, and time     Psychiatric:         Mood and Affect: Mood normal          Behavior: Behavior normal

## 2021-03-19 LAB — MISCELLANEOUS LAB TEST RESULT: NORMAL

## 2021-03-24 ENCOUNTER — APPOINTMENT (OUTPATIENT)
Dept: OCCUPATIONAL THERAPY | Facility: CLINIC | Age: 36
End: 2021-03-24
Payer: COMMERCIAL

## 2021-03-29 ENCOUNTER — OFFICE VISIT (OUTPATIENT)
Dept: OCCUPATIONAL THERAPY | Facility: CLINIC | Age: 36
End: 2021-03-29
Payer: COMMERCIAL

## 2021-03-29 DIAGNOSIS — R20.0 NUMBNESS AND TINGLING IN BOTH HANDS: Primary | ICD-10-CM

## 2021-03-29 DIAGNOSIS — R20.2 NUMBNESS AND TINGLING IN BOTH HANDS: Primary | ICD-10-CM

## 2021-03-29 PROCEDURE — 97140 MANUAL THERAPY 1/> REGIONS: CPT | Performed by: OCCUPATIONAL THERAPIST

## 2021-03-29 NOTE — PROGRESS NOTES
Daily Note     Today's date: 3/29/2021  Patient name: Gala Drake  :   MRN: [id number]  Referring provider: Kamron Ma MD  Dx:   Encounter Diagnosis     ICD-10-CM    1  Numbness and tingling in both hands  R20 0     R20 2                   Subjective: She states that the last couple of days the left arm has been more painful  She does not note significant improvement in symptoms  Objective: See treatment diary below  Assessment: Soft tissue tightness noted in both upper extremities, however more-so in the LUE  She has been using the nighttime orthotics as best as she can to manage symptoms  Plan: Continue per plan of care        Precautions: Universal      Manuals 3/1 3/8 3/29          ABHAY SUTTON LUE 8' 10 10'          KT  Ulnar nerve, Left 5 L side          EO for L Fabricated             IASTM FCU 5' 5 fcu FCU and cubital tunnel, 5'          Cupping Cubital tunnel 3' 5 5' cubital tunnel                                                                                                      Ther Ex             HEP: ABHAY SUTTON reviewed                                                                                                        Ther Activity                                       Gait Training                                       Modalities             MHP to L elbow

## 2021-04-08 ENCOUNTER — OFFICE VISIT (OUTPATIENT)
Dept: OCCUPATIONAL THERAPY | Facility: CLINIC | Age: 36
End: 2021-04-08
Payer: COMMERCIAL

## 2021-04-08 DIAGNOSIS — R20.2 NUMBNESS AND TINGLING IN BOTH HANDS: Primary | ICD-10-CM

## 2021-04-08 DIAGNOSIS — R20.0 NUMBNESS AND TINGLING IN BOTH HANDS: Primary | ICD-10-CM

## 2021-04-08 PROCEDURE — 97140 MANUAL THERAPY 1/> REGIONS: CPT | Performed by: OCCUPATIONAL THERAPIST

## 2021-04-08 NOTE — PROGRESS NOTES
Daily Note     Today's date: 2021  Patient name: Juma Martinez  :   MRN: [id number]  Referring provider: Ky Hunt MD  Dx:   Encounter Diagnosis     ICD-10-CM    1  Numbness and tingling in both hands  R20 0     R20 2                   Subjective: She states continued left sided symptoms in the UE  Objective: See treatment diary below  Assessment:  Very irritable ulnar nerve  Positive tinels at cubital tunnel  Radiating numbness and tingling with any pressure over the nerve  She has been wearing nighttime brace and been compliant with ulnar nerve gliding  Plan: She is going to return to the MD and resume therapy as needed        Precautions: Universal      Manuals 3/1 3/8 3/29 4/8         ABHAY SUTTON LUE 8' 10 10' 10'         KT  Ulnar nerve, Left 5 L side Held due to reaction          EO for L Fabricated             IASTM FCU 5' 5 fcu FCU and cubital tunnel, 5' FCU and cubital tunnel, 10'         Cupping Cubital tunnel 3' 5 5' cubital tunnel  5' cubital tunnel, L                                                                                                    Ther Ex             HEP: ABHAY SUTTON reviewed                                                                                                        Ther Activity                                       Gait Training                                       Modalities             MHP to L elbow

## 2021-05-19 DIAGNOSIS — K21.9 GASTROESOPHAGEAL REFLUX DISEASE WITHOUT ESOPHAGITIS: ICD-10-CM

## 2021-05-19 DIAGNOSIS — I10 HYPERTENSION, UNSPECIFIED TYPE: ICD-10-CM

## 2021-05-19 DIAGNOSIS — I10 BENIGN ESSENTIAL HYPERTENSION: ICD-10-CM

## 2021-05-20 RX ORDER — LABETALOL 200 MG/1
200 TABLET, FILM COATED ORAL 2 TIMES DAILY
Qty: 180 TABLET | Refills: 0 | Status: SHIPPED | OUTPATIENT
Start: 2021-05-20 | End: 2021-09-30 | Stop reason: SDUPTHER

## 2021-05-20 RX ORDER — PANTOPRAZOLE SODIUM 40 MG/1
40 TABLET, DELAYED RELEASE ORAL
Qty: 90 TABLET | Refills: 0 | Status: SHIPPED | OUTPATIENT
Start: 2021-05-20 | End: 2021-08-31 | Stop reason: SDUPTHER

## 2021-05-20 RX ORDER — HYDROCHLOROTHIAZIDE 25 MG/1
25 TABLET ORAL DAILY
Qty: 90 TABLET | Refills: 0 | Status: SHIPPED | OUTPATIENT
Start: 2021-05-20 | End: 2021-07-29

## 2021-06-01 ENCOUNTER — TRANSCRIBE ORDERS (OUTPATIENT)
Dept: LAB | Facility: HOSPITAL | Age: 36
End: 2021-06-01

## 2021-06-01 ENCOUNTER — APPOINTMENT (OUTPATIENT)
Dept: LAB | Facility: HOSPITAL | Age: 36
End: 2021-06-01

## 2021-06-01 DIAGNOSIS — Z00.8 HEALTH EXAMINATION IN POPULATION SURVEYS: Primary | ICD-10-CM

## 2021-06-01 DIAGNOSIS — Z00.8 HEALTH EXAMINATION IN POPULATION SURVEYS: ICD-10-CM

## 2021-06-01 LAB
CHOLEST SERPL-MCNC: 196 MG/DL (ref 50–200)
EST. AVERAGE GLUCOSE BLD GHB EST-MCNC: 126 MG/DL
HBA1C MFR BLD: 6 %
HDLC SERPL-MCNC: 61 MG/DL
LDLC SERPL CALC-MCNC: 111 MG/DL (ref 0–100)
NONHDLC SERPL-MCNC: 135 MG/DL
TRIGL SERPL-MCNC: 121 MG/DL

## 2021-06-01 PROCEDURE — 80061 LIPID PANEL: CPT

## 2021-06-01 PROCEDURE — 83036 HEMOGLOBIN GLYCOSYLATED A1C: CPT

## 2021-06-01 PROCEDURE — 36415 COLL VENOUS BLD VENIPUNCTURE: CPT

## 2021-06-22 DIAGNOSIS — D50.0 IRON DEFICIENCY ANEMIA DUE TO CHRONIC BLOOD LOSS: Primary | ICD-10-CM

## 2021-06-29 ENCOUNTER — TELEPHONE (OUTPATIENT)
Dept: FAMILY MEDICINE CLINIC | Facility: CLINIC | Age: 36
End: 2021-06-29

## 2021-07-29 ENCOUNTER — OFFICE VISIT (OUTPATIENT)
Dept: FAMILY MEDICINE CLINIC | Facility: CLINIC | Age: 36
End: 2021-07-29
Payer: COMMERCIAL

## 2021-07-29 VITALS
WEIGHT: 200 LBS | DIASTOLIC BLOOD PRESSURE: 84 MMHG | HEIGHT: 65 IN | TEMPERATURE: 98.9 F | OXYGEN SATURATION: 99 % | SYSTOLIC BLOOD PRESSURE: 120 MMHG | RESPIRATION RATE: 14 BRPM | BODY MASS INDEX: 33.32 KG/M2 | HEART RATE: 85 BPM

## 2021-07-29 DIAGNOSIS — K21.9 GASTROESOPHAGEAL REFLUX DISEASE WITHOUT ESOPHAGITIS: ICD-10-CM

## 2021-07-29 DIAGNOSIS — I10 BENIGN ESSENTIAL HYPERTENSION: ICD-10-CM

## 2021-07-29 DIAGNOSIS — E66.9 OBESITY (BMI 30-39.9): ICD-10-CM

## 2021-07-29 DIAGNOSIS — J45.20 ASTHMA IN ADULT, MILD INTERMITTENT, UNCOMPLICATED: ICD-10-CM

## 2021-07-29 DIAGNOSIS — R73.03 PRE-DIABETES: ICD-10-CM

## 2021-07-29 DIAGNOSIS — M77.8 TENDINITIS OF RIGHT SHOULDER: Primary | ICD-10-CM

## 2021-07-29 DIAGNOSIS — G43.009 MIGRAINE WITHOUT AURA AND WITHOUT STATUS MIGRAINOSUS, NOT INTRACTABLE: ICD-10-CM

## 2021-07-29 PROCEDURE — 99214 OFFICE O/P EST MOD 30 MIN: CPT | Performed by: FAMILY MEDICINE

## 2021-07-29 RX ORDER — ESTRADIOL 0.1 MG/D
FILM, EXTENDED RELEASE TRANSDERMAL
COMMUNITY
Start: 2021-05-08 | End: 2021-07-29

## 2021-07-29 RX ORDER — OLMESARTAN MEDOXOMIL 5 MG/1
5 TABLET ORAL DAILY
Qty: 30 TABLET | Refills: 0 | Status: SHIPPED | OUTPATIENT
Start: 2021-07-29 | End: 2021-08-31 | Stop reason: SDUPTHER

## 2021-07-29 NOTE — PROGRESS NOTES
Assessment/Plan:    No problem-specific Assessment & Plan notes found for this encounter  Diagnoses and all orders for this visit:    Tendinitis of right shoulder  Comments:  NSAID prn  PT exercises    Migraine without aura and without status migrainosus, not intractable  Comments:  stable  continue current meds     Gastroesophageal reflux disease without esophagitis  Comments:  pantoprazole is helping   to wean off if she can     Benign essential hypertension  -     CBC and differential  -     Comprehensive metabolic panel  -     olmesartan (BENICAR) 5 mg tablet; Take 1 tablet (5 mg total) by mouth daily    Obesity (BMI 30-39  9)  Comments:  diet and exercise discussed today     Asthma in adult, mild intermittent, uncomplicated    Pre-diabetes  -     Hemoglobin A1C    Other orders  -     Discontinue: estradiol (VIVELLE-DOT) 0 1 MG/24HR;  (Patient not taking: Reported on 7/29/2021)  -     Probiotic Product (PROBIOTIC DAILY PO); Take by mouth      BMI Counseling: Body mass index is 33 28 kg/m²  The BMI is above normal  Nutrition recommendations include decreasing portion sizes and encouraging healthy choices of fruits and vegetables  Exercise recommendations include moderate physical activity 150 minutes/week  No pharmacotherapy was ordered  Subjective:      Patient ID: Lico Gross is a 39 y o  female  Here for follow up  Taking multi vitamin with iron which bothers her stomach   Right shoulder pain for the last 1 month   Has been exercising not sure she hurts during it      Hypertension  This is a chronic problem  The current episode started more than 1 year ago  The problem has been waxing and waning since onset  The problem is controlled  Pertinent negatives include no anxiety, blurred vision, chest pain, headaches, malaise/fatigue, neck pain, orthopnea, palpitations, peripheral edema, PND, shortness of breath or sweats  Risk factors for coronary artery disease include obesity   Past treatments include beta blockers  The current treatment provides mild improvement  There are no compliance problems  There is no history of angina, kidney disease, CAD/MI, CVA, heart failure, left ventricular hypertrophy, PVD or retinopathy  There is no history of chronic renal disease, coarctation of the aorta, hyperaldosteronism, hypercortisolism, hyperparathyroidism, a hypertension causing med, pheochromocytoma, renovascular disease, sleep apnea or a thyroid problem  The following portions of the patient's history were reviewed and updated as appropriate: allergies, current medications, past family history, past medical history, past social history, past surgical history and problem list     Review of Systems   Constitutional: Negative for malaise/fatigue  Eyes: Negative for blurred vision  Respiratory: Negative for shortness of breath  Cardiovascular: Negative for chest pain, palpitations, orthopnea and PND  Musculoskeletal: Negative for neck pain  Neurological: Negative for headaches  Objective:      /84 (BP Location: Left arm, Patient Position: Sitting, Cuff Size: Standard)   Pulse 85   Temp 98 9 °F (37 2 °C) (Tympanic)   Resp 14   Ht 5' 5" (1 651 m)   Wt 90 7 kg (200 lb)   SpO2 99%   BMI 33 28 kg/m²          Physical Exam  Constitutional:       Appearance: Normal appearance  HENT:      Head: Normocephalic and atraumatic  Eyes:      Pupils: Pupils are equal, round, and reactive to light  Cardiovascular:      Pulses: Normal pulses  Pulmonary:      Effort: Pulmonary effort is normal       Breath sounds: Normal breath sounds  Musculoskeletal:         General: Tenderness present  Comments: AC joint   Skin:     Capillary Refill: Capillary refill takes less than 2 seconds  Neurological:      General: No focal deficit present  Mental Status: She is alert and oriented to person, place, and time     Psychiatric:         Mood and Affect: Mood normal  Behavior: Behavior normal

## 2021-08-24 ENCOUNTER — TELEPHONE (OUTPATIENT)
Dept: HEMATOLOGY ONCOLOGY | Facility: CLINIC | Age: 36
End: 2021-08-24

## 2021-08-24 NOTE — TELEPHONE ENCOUNTER
Left a message and explained that Chiqui Blanchard will not be in the office on 9/*13/21 and that her appointment was loved to that Friday on 9/17/21  I left the [facility] line number for her to call if this new day did not work  I did let her know that Chiqui Blanchard does have other openings in her schedule that she can be move to

## 2021-08-31 DIAGNOSIS — I10 BENIGN ESSENTIAL HYPERTENSION: ICD-10-CM

## 2021-08-31 DIAGNOSIS — K21.9 GASTROESOPHAGEAL REFLUX DISEASE WITHOUT ESOPHAGITIS: ICD-10-CM

## 2021-08-31 RX ORDER — PANTOPRAZOLE SODIUM 40 MG/1
40 TABLET, DELAYED RELEASE ORAL
Qty: 90 TABLET | Refills: 3 | Status: SHIPPED | OUTPATIENT
Start: 2021-08-31 | End: 2021-12-18 | Stop reason: SDUPTHER

## 2021-08-31 RX ORDER — OLMESARTAN MEDOXOMIL 5 MG/1
5 TABLET ORAL DAILY
Qty: 90 TABLET | Refills: 3 | Status: SHIPPED | OUTPATIENT
Start: 2021-08-31 | End: 2021-09-16

## 2021-09-13 ENCOUNTER — APPOINTMENT (EMERGENCY)
Dept: RADIOLOGY | Facility: HOSPITAL | Age: 36
End: 2021-09-13
Payer: COMMERCIAL

## 2021-09-13 ENCOUNTER — HOSPITAL ENCOUNTER (EMERGENCY)
Facility: HOSPITAL | Age: 36
Discharge: HOME | End: 2021-09-13
Attending: EMERGENCY MEDICINE | Admitting: EMERGENCY MEDICINE
Payer: COMMERCIAL

## 2021-09-13 VITALS
WEIGHT: 200 LBS | SYSTOLIC BLOOD PRESSURE: 168 MMHG | BODY MASS INDEX: 33.28 KG/M2 | RESPIRATION RATE: 18 BRPM | HEART RATE: 66 BPM | DIASTOLIC BLOOD PRESSURE: 78 MMHG | OXYGEN SATURATION: 99 %

## 2021-09-13 DIAGNOSIS — R07.9 CHEST PAIN, UNSPECIFIED TYPE: Primary | ICD-10-CM

## 2021-09-13 LAB
ALBUMIN SERPL BCP-MCNC: 3.6 G/DL (ref 3.5–5)
ALP SERPL-CCNC: 55 U/L (ref 46–116)
ALT SERPL W P-5'-P-CCNC: 26 U/L (ref 12–78)
ANION GAP SERPL CALCULATED.3IONS-SCNC: 8 MMOL/L (ref 4–13)
AST SERPL W P-5'-P-CCNC: 11 U/L (ref 5–45)
ATRIAL RATE: 67 BPM
BASOPHILS # BLD AUTO: 0.05 THOUSANDS/ΜL (ref 0–0.1)
BASOPHILS NFR BLD AUTO: 1 % (ref 0–1)
BILIRUB SERPL-MCNC: 0.18 MG/DL (ref 0.2–1)
BUN SERPL-MCNC: 11 MG/DL (ref 5–25)
CALCIUM SERPL-MCNC: 8.7 MG/DL (ref 8.3–10.1)
CHLORIDE SERPL-SCNC: 105 MMOL/L (ref 100–108)
CO2 SERPL-SCNC: 23 MMOL/L (ref 21–32)
CREAT SERPL-MCNC: 0.85 MG/DL (ref 0.6–1.3)
EOSINOPHIL # BLD AUTO: 0.1 THOUSAND/ΜL (ref 0–0.61)
EOSINOPHIL NFR BLD AUTO: 1 % (ref 0–6)
ERYTHROCYTE [DISTWIDTH] IN BLOOD BY AUTOMATED COUNT: 16.8 % (ref 11.6–15.1)
GFR SERPL CREATININE-BSD FRML MDRD: 101 ML/MIN/1.73SQ M
GLUCOSE SERPL-MCNC: 99 MG/DL (ref 65–140)
HCT VFR BLD AUTO: 36.2 % (ref 34.8–46.1)
HGB BLD-MCNC: 11.4 G/DL (ref 11.5–15.4)
IMM GRANULOCYTES # BLD AUTO: 0.01 THOUSAND/UL (ref 0–0.2)
IMM GRANULOCYTES NFR BLD AUTO: 0 % (ref 0–2)
LYMPHOCYTES # BLD AUTO: 1.74 THOUSANDS/ΜL (ref 0.6–4.47)
LYMPHOCYTES NFR BLD AUTO: 21 % (ref 14–44)
MCH RBC QN AUTO: 24.3 PG (ref 26.8–34.3)
MCHC RBC AUTO-ENTMCNC: 31.5 G/DL (ref 31.4–37.4)
MCV RBC AUTO: 77 FL (ref 82–98)
MONOCYTES # BLD AUTO: 0.5 THOUSAND/ΜL (ref 0.17–1.22)
MONOCYTES NFR BLD AUTO: 6 % (ref 4–12)
NEUTROPHILS # BLD AUTO: 5.87 THOUSANDS/ΜL (ref 1.85–7.62)
NEUTS SEG NFR BLD AUTO: 71 % (ref 43–75)
NRBC BLD AUTO-RTO: 0 /100 WBCS
P AXIS: 42 DEGREES
PLATELET # BLD AUTO: 257 THOUSANDS/UL (ref 149–390)
PMV BLD AUTO: 11.2 FL (ref 8.9–12.7)
POTASSIUM SERPL-SCNC: 3.6 MMOL/L (ref 3.5–5.3)
PR INTERVAL: 124 MS
PROT SERPL-MCNC: 7.3 G/DL (ref 6.4–8.2)
QRS AXIS: 48 DEGREES
QRSD INTERVAL: 72 MS
QT INTERVAL: 406 MS
QTC INTERVAL: 429 MS
RBC # BLD AUTO: 4.7 MILLION/UL (ref 3.81–5.12)
SARS-COV-2 RNA RESP QL NAA+PROBE: NEGATIVE
SODIUM SERPL-SCNC: 136 MMOL/L (ref 136–145)
T WAVE AXIS: 36 DEGREES
TROPONIN I SERPL-MCNC: <0.02 NG/ML
VENTRICULAR RATE: 67 BPM
WBC # BLD AUTO: 8.27 THOUSAND/UL (ref 4.31–10.16)

## 2021-09-13 PROCEDURE — 93005 ELECTROCARDIOGRAM TRACING: CPT

## 2021-09-13 PROCEDURE — 85025 COMPLETE CBC W/AUTO DIFF WBC: CPT | Performed by: EMERGENCY MEDICINE

## 2021-09-13 PROCEDURE — U0003 INFECTIOUS AGENT DETECTION BY NUCLEIC ACID (DNA OR RNA); SEVERE ACUTE RESPIRATORY SYNDROME CORONAVIRUS 2 (SARS-COV-2) (CORONAVIRUS DISEASE [COVID-19]), AMPLIFIED PROBE TECHNIQUE, MAKING USE OF HIGH THROUGHPUT TECHNOLOGIES AS DESCRIBED BY CMS-2020-01-R: HCPCS | Performed by: EMERGENCY MEDICINE

## 2021-09-13 PROCEDURE — 99285 EMERGENCY DEPT VISIT HI MDM: CPT | Performed by: EMERGENCY MEDICINE

## 2021-09-13 PROCEDURE — 93010 ELECTROCARDIOGRAM REPORT: CPT | Performed by: INTERNAL MEDICINE

## 2021-09-13 PROCEDURE — 84484 ASSAY OF TROPONIN QUANT: CPT | Performed by: EMERGENCY MEDICINE

## 2021-09-13 PROCEDURE — 36415 COLL VENOUS BLD VENIPUNCTURE: CPT | Performed by: EMERGENCY MEDICINE

## 2021-09-13 PROCEDURE — 71045 X-RAY EXAM CHEST 1 VIEW: CPT

## 2021-09-13 PROCEDURE — 80053 COMPREHEN METABOLIC PANEL: CPT | Performed by: EMERGENCY MEDICINE

## 2021-09-13 PROCEDURE — U0005 INFEC AGEN DETEC AMPLI PROBE: HCPCS | Performed by: EMERGENCY MEDICINE

## 2021-09-13 PROCEDURE — 99285 EMERGENCY DEPT VISIT HI MDM: CPT

## 2021-09-13 RX ORDER — ACETAMINOPHEN 325 MG/1
975 TABLET ORAL ONCE
Status: COMPLETED | OUTPATIENT
Start: 2021-09-13 | End: 2021-09-13

## 2021-09-13 RX ADMIN — ACETAMINOPHEN 975 MG: 325 TABLET, FILM COATED ORAL at 15:07

## 2021-09-13 NOTE — ED PROVIDER NOTES
History  Chief Complaint   Patient presents with    Chest Pain     pt c/o chest tightness yesterday worsening today with high blood pressure at work  Belen Gunn is an 39y o  year old female with PMHx significant for asthma, GERD, migraines, who presents to the ED today with chest tightness, shortness of breath, HA for one day  Chest pain is exacerbated by nothing and relieved by nothing  The pain is constant, left-sided, tightness and pressure in quality, does radiate to left shoulder, and currently rated moderate in severity  Has a posterior and left sided HA  Feels like she needs to take deeper breaths  Patient also endorses lightheadedness when arriving in ED, worse with standing  Pt has hx of multiple abdominal surgeries  The patient denies fevers, chills, syncope, nausea, vomiting, vision changes, hearing changes, cough, abdominal pain, changes in usual bowel movements, changes with urination, back pain, numbness or tingling, pain anywhere else in body  ROS otherwise negative  The patient has no sick contacts, recent travel history, new or changing medications  Patient uses marijuana daily  History provided by:  Medical records and patient   used: No    Chest Pain      Prior to Admission Medications   Prescriptions Last Dose Informant Patient Reported? Taking?    ALPRAZolam (XANAX) 0 25 mg tablet   No No   Sig: Take 1 tablet (0 25 mg total) by mouth daily at bedtime as needed for anxiety   Cholecalciferol (VITAMIN D3 PO)  Self Yes No   Sig: Take 2,000 mg by mouth daily in the early morning   Multiple Vitamins-Minerals (Womens Multivitamin) TABS  Self Yes No   Sig: Take 1 tablet by mouth daily   Probiotic Product (PROBIOTIC DAILY PO)  Self Yes No   Sig: Take by mouth   SUMAtriptan (IMITREX) 50 mg tablet  Self No No   Sig: Take 1 tablet (50 mg total) by mouth once as needed for migraine for up to 1 dose   albuterol (2 5 mg/3 mL) 0 083 % nebulizer solution Self No No   Sig: Take 1 vial (2 5 mg total) by nebulization every 6 (six) hours as needed for wheezing or shortness of breath   albuterol (ProAir HFA) 90 mcg/act inhaler  Self No No   Sig: Inhale 2 puffs every 4 (four) hours as needed (When has flare up)   cyclobenzaprine (FLEXERIL) 10 mg tablet  Self No No   Sig: Take 1 tablet (10 mg total) by mouth 3 (three) times a day as needed for muscle spasms   labetalol (NORMODYNE) 200 mg tablet   No No   Sig: Take 1 tablet (200 mg total) by mouth 2 (two) times a day   meloxicam (MOBIC) 15 mg tablet   No No   Sig: Take 1 tablet (15 mg total) by mouth daily as needed for mild pain   olmesartan (BENICAR) 5 mg tablet   No No   Sig: Take 1 tablet (5 mg total) by mouth daily   ondansetron (ZOFRAN-ODT) 4 mg disintegrating tablet  Self No No   Sig: Take 1 tablet (4 mg total) by mouth every 6 (six) hours as needed for nausea or vomiting   pantoprazole (PROTONIX) 40 mg tablet   No No   Sig: Take 1 tablet (40 mg total) by mouth daily before breakfast      Facility-Administered Medications: None       Past Medical History:   Diagnosis Date    Abdominal pain     Asthma     mild intermittent    GERD (gastroesophageal reflux disease)     History of palpitations     Hypertension     Lower back pain     Migraines     Seasonal allergies     Trace mitral regurgitation by prior echocardiogram        Past Surgical History:   Procedure Laterality Date    CHOLECYSTECTOMY      laparoscopic    HERNIA REPAIR      umbilical    LAPAROTOMY N/A 12/10/2020    Procedure: ABDOMINAL MYOMECTOMY;  Surgeon: Emilie Anderson DO;  [facility];  Service: Gynecology    LUMBAR LAMINECTOMY Right 9/14/2016    Procedure: Right L5/S1 Metryx microdiscectomy;  Surgeon: Arnulfo Parsons MD;  [facility];  Service:    [facility] [place] COLONOSCOPY FLX DX W/COLLJ SPEC WHEN PFRMD N/A 7/7/2016    Procedure: COLONOSCOPY;  Surgeon: Carlos Lambert DO;  Location: [facility];   Service: Gastroenterology    OK HYSTEROSCOPY,W/ENDO BX N/A 3/19/2018    Procedure: HYSTEROSCOPY; MYOMECTOMY; ENDOMETRIAL BIOPSY;  Surgeon: Estrada Lovell DO;  [facility];  Service: Gynecology    VA INCISE FINGER TENDON SHEATH Right 2/28/2017    Procedure: THUMB TRIGGER RELEASE ;  Surgeon: Osiel Robert DO;  [facility];  Service: Orthopedics    WISDOM TOOTH EXTRACTION         Family History   Problem Relation Age of Onset    Diabetes Mother         type2    Hypertension Mother     Diabetes Father         type2    Hypertension Father     Heart attack Maternal Grandfather         acute MI    Arthritis Maternal Grandfather     Stroke Maternal Grandfather         CVA    Hyperlipidemia Maternal Grandfather     Arthritis Paternal Grandfather     Stroke Paternal Grandfather         CVA    Cancer Paternal Grandfather     Hyperlipidemia Paternal Grandfather     Colon cancer Paternal Grandfather     Heart attack Maternal Uncle         acute MI    Arthritis Family     Stroke Family         CVA    Cancer Family     Hyperlipidemia Family     Stroke Family         CVA    Hyperlipidemia Family     Esophageal cancer Maternal Grandmother      I have reviewed and agree with the history as documented  E-Cigarette/Vaping    E-Cigarette Use Never User      E-Cigarette/Vaping Substances     Social History     Tobacco Use    Smoking status: Never Smoker    Smokeless tobacco: Never Used   Vaping Use    Vaping Use: Never used   Substance Use Topics    Alcohol use: Not Currently    Drug use: No        Review of Systems   Reason unable to perform ROS: please see above for ROS  All other ROS negative  Cardiovascular: Positive for chest pain         Physical Exam  ED Triage Vitals   Temp Pulse Respirations Blood Pressure SpO2   -- 09/13/21 1247 09/13/21 1247 09/13/21 1247 09/13/21 1247    77 18 (!) 168/119 100 %      Temp src Heart Rate Source Patient Position - Orthostatic VS BP Location FiO2 (%)   -- 09/13/21 1400 09/13/21 1400 09/13/21 1400 --    Monitor Lying Left arm       Pain Score       09/13/21 1247       2             Orthostatic Vital Signs  Vitals:    09/13/21 1247 09/13/21 1400   BP: (!) 168/119 168/78   Pulse: 77 66   Patient Position - Orthostatic VS:  Lying       Physical Exam  Vitals and nursing note reviewed  Constitutional:       General: She is not in acute distress  Appearance: She is well-developed  She is not diaphoretic  HENT:      Head: Normocephalic and atraumatic  Eyes:      General: No scleral icterus  Conjunctiva/sclera: Conjunctivae normal    Neck:      Vascular: No JVD  Trachea: No tracheal deviation  Cardiovascular:      Rate and Rhythm: Normal rate and regular rhythm  Pulmonary:      Effort: Pulmonary effort is normal  No respiratory distress  Breath sounds: No decreased breath sounds, wheezing, rhonchi or rales  Abdominal:      General: There is no distension  Musculoskeletal:         General: No deformity  Cervical back: Neck supple  Right lower leg: No tenderness  No edema  Left lower leg: No tenderness  No edema  Skin:     General: Skin is warm and dry  Neurological:      Mental Status: She is alert     Psychiatric:         Behavior: Behavior normal          ED Medications  Medications   acetaminophen (TYLENOL) tablet 975 mg (975 mg Oral Given 9/13/21 1507)       Diagnostic Studies  Results Reviewed     Procedure Component Value Units Date/Time    Novel Coronavirus (Covid-19),PCR [facility] - 2 Hour Stat [[id number]]  (Normal) Collected: 09/13/21 1421    Lab Status: Final result Specimen: Nares from Nose Updated: 09/13/21 1536     SARS-CoV-2 Negative    Narrative:           Troponin I [[id number]]  (Normal) Collected: 09/13/21 1421    Lab Status: Final result Specimen: Blood from Arm, Left Updated: 09/13/21 1451     Troponin I <0 02 ng/mL     Comprehensive metabolic panel [[id number]]  (Abnormal) Collected: 09/13/21 1421    Lab Status: Final result Specimen: Blood from Arm, Left Updated: 09/13/21 1449     Sodium 136 mmol/L      Potassium 3 6 mmol/L      Chloride 105 mmol/L      CO2 23 mmol/L      ANION GAP 8 mmol/L      BUN 11 mg/dL      Creatinine 0 85 mg/dL      Glucose 99 mg/dL      Calcium 8 7 mg/dL      AST 11 U/L      ALT 26 U/L      Alkaline Phosphatase 55 U/L      Total Protein 7 3 g/dL      Albumin 3 6 g/dL      Total Bilirubin 0 18 mg/dL      eGFR 101 ml/min/1 73sq m     Narrative:      [place] guidelines for Chronic Kidney Disease (CKD):     Stage 1 with normal or high GFR (GFR > 90 mL/min/1 73 square meters)    Stage 2 Mild CKD (GFR = 60-89 mL/min/1 73 square meters)    Stage 3A Moderate CKD (GFR = 45-59 mL/min/1 73 square meters)    Stage 3B Moderate CKD (GFR = 30-44 mL/min/1 73 square meters)    Stage 4 Severe CKD (GFR = 15-29 mL/min/1 73 square meters)    Stage 5 End Stage CKD (GFR <15 mL/min/1 73 square meters)  Note: GFR calculation is accurate only with a steady state creatinine    CBC and differential [[id number]]  (Abnormal) Collected: 09/13/21 1421    Lab Status: Final result Specimen: Blood from Arm, Left Updated: 09/13/21 1428     WBC 8 27 Thousand/uL      RBC 4 70 Million/uL      Hemoglobin 11 4 g/dL      Hematocrit 36 2 %      MCV 77 fL      MCH 24 3 pg      MCHC 31 5 g/dL      RDW 16 8 %      MPV 11 2 fL      Platelets 345 Thousands/uL      nRBC 0 /100 WBCs      Neutrophils Relative 71 %      Immat GRANS % 0 %      Lymphocytes Relative 21 %      Monocytes Relative 6 %      Eosinophils Relative 1 %      Basophils Relative 1 %      Neutrophils Absolute 5 87 Thousands/µL      Immature Grans Absolute 0 01 Thousand/uL      Lymphocytes Absolute 1 74 Thousands/µL      Monocytes Absolute 0 50 Thousand/µL      Eosinophils Absolute 0 10 Thousand/µL      Basophils Absolute 0 05 Thousands/µL                  XR chest 1 view portable   ED Interpretation by Saray Armenta DO (09/13 1428)   CXR reviewed by Johny Liang A Jacob, DO  No evidence of pneumonia   No evidence of pleural effusion  No evidence of rib fracture  No mediastinal widening  No subcutaneous air  Cardiac silhouette of normal size        Final Result by Oliva Rowe MD (09/13 1441)      No acute cardiopulmonary disease  Workstation performed: [id number]               Procedures  Procedures      ED Course  ED Course as of Sep 13 1639   Mon Sep 13, 2021   1454 Troponin I: <0 02                             SBIRT 20yo+      Most Recent Value   SBIRT (23 yo +)   In order to provide better care to our patients, we are screening all of our patients for alcohol and drug use  Would it be okay to ask you these screening questions? No Filed at: 09/13/2021 1354                MDM  Number of Diagnoses or Management Options  Diagnosis management comments: Initial ED diagnostics to include CBC, BMP, troponin, EKG, CXR  PERC 0  Well's negative  Initial ddx includes but is not limited to: viral syndrome, lower respiratory infection, anemia, electrolyte/metabolic disturbance, MSK pain, dehydration  Anticipate ultimate dispo to be d/c with return precautions and PCP f/u if workup negative           Amount and/or Complexity of Data Reviewed  Clinical lab tests: ordered and reviewed  Tests in the radiology section of CPT®: ordered and reviewed  Tests in the medicine section of CPT®: ordered and reviewed  Review and summarize past medical records: yes    Risk of Complications, Morbidity, and/or Mortality  Presenting problems: low  Diagnostic procedures: low  Management options: low    Patient Progress  Patient progress: stable      Disposition  Final diagnoses:   Chest pain, unspecified type     Time reflects when diagnosis was documented in both MDM as applicable and the Disposition within this note     Time User Action Codes Description Comment    9/13/2021  2:55 PM Laura Gill Add [R07 9] Chest pain, unspecified type       ED Disposition ED Disposition Condition Date/Time Comment    Discharge Stable Mon Sep 13, 2021  1:71 PM Everrett Conception discharge to home/self care  Results of completed tests discussed  Return to ER precautions given, verbal and written, and questions answered satisfactorily                  Follow-up Information     Follow up With Specialties Details Why Contact Info    Kelli Toro MD Family Medicine Call in 1 day To recheck symptoms and follow up on your ER visit [address]  [address]  [phone]            Discharge Medication List as of 9/13/2021  2:56 PM      CONTINUE these medications which have NOT CHANGED    Details   albuterol (2 5 mg/3 mL) 0 083 % nebulizer solution Take 1 vial (2 5 mg total) by nebulization every 6 (six) hours as needed for wheezing or shortness of breath, Starting Tue 4/30/2019, Normal      albuterol (ProAir HFA) 90 mcg/act inhaler Inhale 2 puffs every 4 (four) hours as needed (When has flare up), Starting Mon 2/15/2021, Normal      ALPRAZolam (XANAX) 0 25 mg tablet Take 1 tablet (0 25 mg total) by mouth daily at bedtime as needed for anxiety, Starting Fri 7/30/2021, Normal      Cholecalciferol (VITAMIN D3 PO) Take 2,000 mg by mouth daily in the early morning, Historical Med      cyclobenzaprine (FLEXERIL) 10 mg tablet Take 1 tablet (10 mg total) by mouth 3 (three) times a day as needed for muscle spasms, Starting Tue 10/6/2020, Normal      labetalol (NORMODYNE) 200 mg tablet Take 1 tablet (200 mg total) by mouth 2 (two) times a day, Starting u 5/20/2021, Normal      meloxicam (MOBIC) 15 mg tablet Take 1 tablet (15 mg total) by mouth daily as needed for mild pain, Starting Tue 3/16/2021, Normal      Multiple Vitamins-Minerals (Womens Multivitamin) TABS Take 1 tablet by mouth daily, Historical Med      olmesartan (BENICAR) 5 mg tablet Take 1 tablet (5 mg total) by mouth daily, Starting Tue 8/31/2021, Normal      ondansetron (ZOFRAN-ODT) 4 mg disintegrating tablet Take 1 tablet (4 mg total) by mouth every 6 (six) hours as needed for nausea or vomiting, Starting Sat 12/29/2018, Print      pantoprazole (PROTONIX) 40 mg tablet Take 1 tablet (40 mg total) by mouth daily before breakfast, Starting Tue 8/31/2021, Normal      Probiotic Product (PROBIOTIC DAILY PO) Take by mouth, Historical Med      SUMAtriptan (IMITREX) 50 mg tablet Take 1 tablet (50 mg total) by mouth once as needed for migraine for up to 1 dose, Starting Mon 7/23/2018, Normal           No discharge procedures on file  PDMP Review       Value Time User    PDMP Reviewed  Yes 12/12/2020  1:23 PM El Mistry DO           ED Provider  Attending physically available and evaluated Lucilleterrence Sullivan  RAI managed the patient along with the ED Attending      Electronically Signed by         Brennon Millard DO  09/13/21 4461

## 2021-09-13 NOTE — Clinical Note
Tom Niurka was seen and treated in our emergency department on 9/13/2021  Diagnosis:     Justin York    She may return on this date: 09/14/2021         If you have any questions or concerns, please don't hesitate to call        Magi Becerril, DO    ______________________________           _______________          _______________  Hospital Representative                              Date                                Time

## 2021-09-13 NOTE — ED ATTENDING ATTESTATION
9/13/2021  ICheri DO, saw and evaluated the patient  I have discussed the patient with the resident/non-physician practitioner and agree with the resident's/non-physician practitioner's findings, Plan of Care, and MDM as documented in the resident's/non-physician practitioner's note, except where noted  All available labs and Radiology studies were reviewed  I was present for key portions of any procedure(s) performed by the resident/non-physician practitioner and I was immediately available to provide assistance  At this point I agree with the current assessment done in the Emergency Department  I have conducted an independent evaluation of this patient a history and physical is as follows:    39 yof with chest pressure, htn, posterior headache  AVILA  Denies n/v/d cough  No other assoc sx  Onset 2 days ago  bp 180/100   has bronchitis  Exam normal    A/P: ecg/trop  Cxr    covid swab  Likely viral vs anxiety  Doubt acs   [facility]    ED Course         Critical Care Time  Procedures

## 2021-09-13 NOTE — ED PROCEDURE NOTE
Procedure  POC Cardiac US    Date/Time: 9/13/2021 2:28 PM  Performed by: Chere Ahumada, DO  Authorized by: Chere Ahumada, DO     Patient location:  ED  Procedure details:     Exam Type:  Diagnostic    Indications: chest pain      Assessment / Evaluation for: cardiac function and pericardial effusion      Exam Type: initial exam      Image quality: diagnostic      Image availability:  Images available in PACS  Patient Details:     Cardiac Rhythm:  Regular  Cardiac findings:     Echo technique: limited 2D      Views obtained: parasternal long axis and parasternal short axis      Pericardial effusion: absent      Tamponade physiology: absent      Wall motion: normal      LV systolic function: normal      RV dilation: none                       Chere Ahumada, DO  09/13/21 1429

## 2021-09-13 NOTE — DISCHARGE INSTRUCTIONS
You were seen today in the Emergency Department  Your workup included blood tests, an EKG, and a chest x-ray  We will call you with the results of your COVID-19 test results  Please follow up with your Primary Care Provider in the next 1-2 days to recheck your symptoms and to follow up on your visit to the Emergency Department today  Please return to the Emergency Department if you have fevers, chills, chest pain, shortness of breath, are unable to eat or drink, or have any other symptoms that concern you  Please look this over and let your nurse and/or me know if you have any further questions before you leave

## 2021-09-14 ENCOUNTER — TELEPHONE (OUTPATIENT)
Dept: FAMILY MEDICINE CLINIC | Facility: CLINIC | Age: 36
End: 2021-09-14

## 2021-09-14 NOTE — TELEPHONE ENCOUNTER
Patient called and wanted to schedule and ER follow up with you she went there yesterday for chest pain, you only have same days available the rest of this month and the beginning of next month  Please advise where I may get patient in or patient said you can just call her

## 2021-09-16 ENCOUNTER — OFFICE VISIT (OUTPATIENT)
Dept: FAMILY MEDICINE CLINIC | Facility: CLINIC | Age: 36
End: 2021-09-16
Payer: COMMERCIAL

## 2021-09-16 ENCOUNTER — TELEPHONE (OUTPATIENT)
Dept: HEMATOLOGY ONCOLOGY | Facility: HOSPITAL | Age: 36
End: 2021-09-16

## 2021-09-16 ENCOUNTER — APPOINTMENT (OUTPATIENT)
Dept: LAB | Facility: HOSPITAL | Age: 36
End: 2021-09-16
Payer: COMMERCIAL

## 2021-09-16 VITALS
WEIGHT: 199.2 LBS | HEART RATE: 78 BPM | TEMPERATURE: 97 F | BODY MASS INDEX: 33.19 KG/M2 | SYSTOLIC BLOOD PRESSURE: 142 MMHG | OXYGEN SATURATION: 99 % | DIASTOLIC BLOOD PRESSURE: 98 MMHG | RESPIRATION RATE: 16 BRPM | HEIGHT: 65 IN

## 2021-09-16 DIAGNOSIS — J45.20 ASTHMA IN ADULT, MILD INTERMITTENT, UNCOMPLICATED: ICD-10-CM

## 2021-09-16 DIAGNOSIS — R71.8 MICROCYTOSIS: ICD-10-CM

## 2021-09-16 DIAGNOSIS — G43.009 MIGRAINE WITHOUT AURA AND WITHOUT STATUS MIGRAINOSUS, NOT INTRACTABLE: ICD-10-CM

## 2021-09-16 DIAGNOSIS — Z23 FLU VACCINE NEED: ICD-10-CM

## 2021-09-16 DIAGNOSIS — D50.0 IRON DEFICIENCY ANEMIA DUE TO CHRONIC BLOOD LOSS: Primary | ICD-10-CM

## 2021-09-16 DIAGNOSIS — E66.9 OBESITY (BMI 30-39.9): ICD-10-CM

## 2021-09-16 DIAGNOSIS — I10 BENIGN ESSENTIAL HYPERTENSION: Primary | ICD-10-CM

## 2021-09-16 DIAGNOSIS — D50.0 IRON DEFICIENCY ANEMIA DUE TO CHRONIC BLOOD LOSS: ICD-10-CM

## 2021-09-16 LAB
FERRITIN SERPL-MCNC: 16 NG/ML (ref 8–388)
IRON SATN MFR SERPL: 8 % (ref 15–50)
IRON SERPL-MCNC: 34 UG/DL (ref 50–170)
TIBC SERPL-MCNC: 435 UG/DL (ref 250–450)

## 2021-09-16 PROCEDURE — 82728 ASSAY OF FERRITIN: CPT

## 2021-09-16 PROCEDURE — 90471 IMMUNIZATION ADMIN: CPT

## 2021-09-16 PROCEDURE — 36415 COLL VENOUS BLD VENIPUNCTURE: CPT

## 2021-09-16 PROCEDURE — 83540 ASSAY OF IRON: CPT

## 2021-09-16 PROCEDURE — 83550 IRON BINDING TEST: CPT

## 2021-09-16 PROCEDURE — 90682 RIV4 VACC RECOMBINANT DNA IM: CPT

## 2021-09-16 PROCEDURE — 99214 OFFICE O/P EST MOD 30 MIN: CPT | Performed by: FAMILY MEDICINE

## 2021-09-16 RX ORDER — OLMESARTAN MEDOXOMIL AND HYDROCHLOROTHIAZIDE 20/12.5 20; 12.5 MG/1; MG/1
1 TABLET ORAL DAILY
Qty: 90 TABLET | Refills: 0 | Status: SHIPPED | OUTPATIENT
Start: 2021-09-16 | End: 2021-12-18 | Stop reason: SDUPTHER

## 2021-09-16 NOTE — PROGRESS NOTES
Assessment/Plan:    No problem-specific Assessment & Plan notes found for this encounter  Diagnoses and all orders for this visit:    Benign essential hypertension  Comments:  increased her benicar to 20 mg and add HCTZ back to the meds  to see cardiology as well   Orders:  -     olmesartan-hydrochlorothiazide (BENICAR HCT) 20-12 5 MG per tablet; Take 1 tablet by mouth daily  -     Ambulatory referral to Cardiology    Migraine without aura and without status migrainosus, not intractable  Comments:  stable  to start Magnesium 400 mg at bed time   b2 400 mg daily     Asthma in adult, mild intermittent, uncomplicated  Comments:  stable   albuterol PRN     Obesity (BMI 30-39  9)  Comments:  diet and exercise encouraged     Flu vaccine need  -     influenza vaccine, quadrivalent, recombinant, PF, 0 5 mL, for patients 18 yr+ (FLUBLOK)          Subjective:      Patient ID: Americo Bal is a 39 y o  female  Here for follow up  Went to ER this week for elevated BP and headaches  BP runs from 180/105-168/119 at ER  At home - 150/100's in the last few days   Trouble falling asleep and staying asleep for the last couple weeks      Hypertension  This is a chronic problem  The current episode started more than 1 year ago  The problem is controlled  Pertinent negatives include no anxiety, blurred vision, chest pain, headaches, malaise/fatigue, neck pain, orthopnea, palpitations, peripheral edema, PND, shortness of breath or sweats  Past treatments include angiotensin blockers  The current treatment provides mild improvement  There are no compliance problems  The following portions of the patient's history were reviewed and updated as appropriate: allergies, current medications, past family history, past medical history, past social history, past surgical history and problem list     Review of Systems   Constitutional: Negative for malaise/fatigue  Eyes: Negative for blurred vision     Respiratory: Negative for shortness of breath  Cardiovascular: Negative for chest pain, palpitations, orthopnea and PND  Musculoskeletal: Negative for neck pain  Neurological: Negative for headaches  Objective:      /98 (BP Location: Left arm, Patient Position: Sitting, Cuff Size: Adult)   Pulse 78   Temp (!) 97 °F (36 1 °C) (Tympanic)   Resp 16   Ht 5' 5" (1 651 m)   Wt 90 4 kg (199 lb 3 2 oz)   SpO2 99%   BMI 33 15 kg/m²          Physical Exam  Constitutional:       Appearance: Normal appearance  HENT:      Right Ear: Tympanic membrane normal       Left Ear: Tympanic membrane normal       Mouth/Throat:      Mouth: Mucous membranes are moist    Eyes:      Extraocular Movements: Extraocular movements intact  Pupils: Pupils are equal, round, and reactive to light  Cardiovascular:      Rate and Rhythm: Normal rate and regular rhythm  Pulmonary:      Effort: Pulmonary effort is normal       Breath sounds: Normal breath sounds  Musculoskeletal:         General: Normal range of motion  Skin:     General: Skin is warm  Neurological:      General: No focal deficit present  Mental Status: She is alert and oriented to person, place, and time     Psychiatric:         Mood and Affect: Mood normal          Behavior: Behavior normal

## 2021-09-24 ENCOUNTER — OFFICE VISIT (OUTPATIENT)
Dept: HEMATOLOGY ONCOLOGY | Facility: CLINIC | Age: 36
End: 2021-09-24
Payer: COMMERCIAL

## 2021-09-24 VITALS
SYSTOLIC BLOOD PRESSURE: 130 MMHG | HEART RATE: 86 BPM | BODY MASS INDEX: 33.99 KG/M2 | HEIGHT: 65 IN | RESPIRATION RATE: 16 BRPM | OXYGEN SATURATION: 98 % | TEMPERATURE: 97.2 F | WEIGHT: 204 LBS | DIASTOLIC BLOOD PRESSURE: 80 MMHG

## 2021-09-24 DIAGNOSIS — D50.0 IRON DEFICIENCY ANEMIA DUE TO CHRONIC BLOOD LOSS: Primary | ICD-10-CM

## 2021-09-24 DIAGNOSIS — D57.3 SICKLE CELL TRAIT (HCC): ICD-10-CM

## 2021-09-24 PROCEDURE — 99214 OFFICE O/P EST MOD 30 MIN: CPT | Performed by: PHYSICIAN ASSISTANT

## 2021-09-24 RX ORDER — SODIUM CHLORIDE 9 MG/ML
20 INJECTION, SOLUTION INTRAVENOUS ONCE
Status: CANCELLED | OUTPATIENT
Start: 2021-10-01

## 2021-09-24 NOTE — PROGRESS NOTES
Hematology/Oncology Outpatient Follow-up  Americo Bal 39 y o  female 1985 [id number]    Date:  9/24/2021      Assessment and Plan:    1  Iron deficiency anemia due to chronic blood loss  53-year-old female presents for follow-up regarding history of iron deficiency anemia secondary to chronic blood loss  This was due to menorrhagia however this has since been corrected  Periods are normal   Unfortunately her iron studies and hemoglobin have decreased a little bit  She is symptomatic  Reviewed that she will need to complete stool testing at this time  She is agreeable  She will proceed with Venofer 200 mg IV weekly x4  She requires premed secondary to reaction previously  Benadryl and Solu-Cortef has been added  Follow-up in 4 months  If becomes symptomatic prior to this she should contact us for sooner laboratory assessment     - CBC and differential; Future  - Iron Panel (Includes Ferritin, Iron Sat%, Iron, and TIBC); Future  - Occult Blood, Fecal Immunochemical; Future    2  Sickle cell trait [facility])  Patient states she was aware of this  Her partner has been tested and does not carry the trait  They are not pursuing intervention with reproductive endocrinology at this time      - CBC and differential; Future      HPI:  53-year-old female presents for follow up regarding iron deficiency anemia      Past medical history significant for GERD, polycystic ovaries, asthma, trace mitral regurgitation, hypertension, migraines, herniated lumbar disc status post surgical intervention, trigger finger of right thumb, status post surgical intervention, uterine fibroid, female infertility, iron deficiency secondary to chronic blood loss      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      Periods are very heavy for the last 6-7 months  Tha Perales has her  Dona Flake   On average her period lasts 5 days   Her most recent period lasted for 9 days   She has significant menorrhagia  Tha Perales changes her pad every 1 hour, sometimes every 2-3 hours and has large clots   She had a recent incident that her bleeding went through to her underwear, pants as well as shirt      She was taking oral iron   She is tolerating this well   Her hemoglobin has improved from 8 9 to 10 1 in 1 month time span  Kaitlyn Bonner states that she is following with reproductive endocrinology and is undergoing a myomectomy on 12/10/2020   She stated that she was seen by reproductive endocrinologist this week and she requested iron infusions to be administered prior to surgery   Records are not available for review as provider documentation is out of network  Negative alpha-thalassemia  Hemoglobin electrophoresis was consistent with sickle cell trait, heterozygous  Interval history:  SOB better since change in BP meds over the last 1 week     ROS: Review of Systems   Constitutional: Positive for fatigue  Negative for appetite change  Respiratory: Negative for cough  Cardiovascular: Negative for leg swelling  Gastrointestinal: Negative for abdominal pain, constipation, diarrhea, nausea and vomiting  Genitourinary: Negative for difficulty urinating, hematuria and menstrual problem (periods are no longer heavy as they were before)  Musculoskeletal: Negative for arthralgias  Skin: Negative  Neurological: Negative for dizziness, weakness, light-headedness, numbness and headaches  Hematological: Negative  Psychiatric/Behavioral: Negative          Past Medical History:   Diagnosis Date    Abdominal pain     Asthma     mild intermittent    GERD (gastroesophageal reflux disease)     History of palpitations     Hypertension     Lower back pain     Migraines     Seasonal allergies     Trace mitral regurgitation by prior echocardiogram        Past Surgical History:   Procedure Laterality Date    CHOLECYSTECTOMY      laparoscopic    HERNIA REPAIR      umbilical    LAPAROTOMY N/A 12/10/2020    Procedure: ABDOMINAL MYOMECTOMY;  Surgeon: Renny Perea DO;  [facility];  Service: Gynecology    LUMBAR LAMINECTOMY Right 9/14/2016    Procedure: Right L5/S1 Metryx microdiscectomy;  Surgeon: Shellie Herrmann MD;  [facility];  Service:    Peter Nalini AL COLONOSCOPY FLX DX W/COLLJ SPEC WHEN PFRMD N/A 7/7/2016    Procedure: COLONOSCOPY;  Surgeon: Saleem Mandujano DO;  Location: [facility]; Service: Gastroenterology    AL HYSTEROSCOPY,W/ENDO BX N/A 3/19/2018    Procedure: HYSTEROSCOPY; MYOMECTOMY; ENDOMETRIAL BIOPSY;  Surgeon: Renny Perea DO;  [facility];  Service: Gynecology    AL INCISE FINGER TENDON SHEATH Right 2/28/2017    Procedure: THUMB TRIGGER RELEASE ;  Surgeon: Jena Strong DO;  [facility];  Service: Orthopedics    WISDOM TOOTH EXTRACTION         Social History     Socioeconomic History    Marital status: /Civil Union     Spouse name: None    Number of children: None    Years of education: None    Highest education level: None   Occupational History    None   Tobacco Use    Smoking status: Never Smoker    Smokeless tobacco: Never Used   Vaping Use    Vaping Use: Never used   Substance and Sexual Activity    Alcohol use: Not Currently    Drug use: No    Sexual activity: Yes     Partners: Male     Birth control/protection: None   Other Topics Concern    None   Social History Narrative    Exercises 3x week    Pet: dog     Social Determinants of Health     Financial Resource Strain:     Difficulty of Paying Living Expenses:    Food Insecurity:     Worried About Running Out of Food in the Last Year:     Ran Out of Food in the Last Year:    Transportation Needs:     Lack of Transportation (Medical):      Lack of Transportation (Non-Medical):    Physical Activity:     Days of Exercise per Week:     Minutes of Exercise per Session:    Stress:     Feeling of Stress :    Social Connections:     Frequency of Communication with Friends and Family:     Frequency of Social Gatherings with Friends and Family:     Attends Voodoo Services:     Active Member of Clubs or Organizations:     Attends Club or Organization Meetings:     Marital Status:    Intimate Partner Violence:     Fear of Current or Ex-Partner:     Emotionally Abused:     Physically Abused:     Sexually Abused:        Family History   Problem Relation Age of Onset    Diabetes Mother         type2    Hypertension Mother     Diabetes Father         type2    Hypertension Father     Heart attack Maternal Grandfather         acute MI    Arthritis Maternal Grandfather     Stroke Maternal Grandfather         CVA    Hyperlipidemia Maternal Grandfather     Arthritis Paternal Grandfather     Stroke Paternal Grandfather         CVA    Cancer Paternal Grandfather     Hyperlipidemia Paternal Grandfather     Colon cancer Paternal Grandfather     Heart attack Maternal Uncle         acute MI    Arthritis Family     Stroke Family         CVA    Cancer Family     Hyperlipidemia Family     Stroke Family         CVA    Hyperlipidemia Family     Esophageal cancer Maternal Grandmother        No Known Allergies      Current Outpatient Medications:     albuterol (2 5 mg/3 mL) 0 083 % nebulizer solution, Take 1 vial (2 5 mg total) by nebulization every 6 (six) hours as needed for wheezing or shortness of breath, Disp: 30 vial, Rfl: 3    albuterol (ProAir HFA) 90 mcg/act inhaler, Inhale 2 puffs every 4 (four) hours as needed (When has flare up), Disp: 1 Inhaler, Rfl: 0    ALPRAZolam (XANAX) 0 25 mg tablet, Take 1 tablet (0 25 mg total) by mouth daily at bedtime as needed for anxiety, Disp: 20 tablet, Rfl: 0    Cholecalciferol (VITAMIN D3 PO), Take 2,000 mg by mouth daily in the early morning, Disp: , Rfl:     cyclobenzaprine (FLEXERIL) 10 mg tablet, Take 1 tablet (10 mg total) by mouth 3 (three) times a day as needed for muscle spasms, Disp: 60 tablet, Rfl: 1    labetalol (NORMODYNE) 200 mg tablet, Take 1 tablet (200 mg total) by mouth 2 (two) times a day, Disp: 180 tablet, Rfl: 0    meloxicam (MOBIC) 15 mg tablet, Take 1 tablet (15 mg total) by mouth daily as needed for mild pain, Disp: 30 tablet, Rfl: 0    Multiple Vitamins-Minerals (Womens Multivitamin) TABS, Take 1 tablet by mouth daily, Disp: , Rfl:     olmesartan-hydrochlorothiazide (BENICAR HCT) 20-12 5 MG per tablet, Take 1 tablet by mouth daily, Disp: 90 tablet, Rfl: 0    ondansetron (ZOFRAN-ODT) 4 mg disintegrating tablet, Take 1 tablet (4 mg total) by mouth every 6 (six) hours as needed for nausea or vomiting, Disp: 20 tablet, Rfl: 0    pantoprazole (PROTONIX) 40 mg tablet, Take 1 tablet (40 mg total) by mouth daily before breakfast, Disp: 90 tablet, Rfl: 3    Probiotic Product (PROBIOTIC DAILY PO), Take by mouth, Disp: , Rfl:     SUMAtriptan (IMITREX) 50 mg tablet, Take 1 tablet (50 mg total) by mouth once as needed for migraine for up to 1 dose, Disp: 9 tablet, Rfl: 0      Physical Exam:  Pulse 86   Temp (!) 97 2 °F (36 2 °C) (Temporal)   Resp 16   Ht 5' 5" (1 651 m)   Wt 92 5 kg (204 lb)   SpO2 98%   BMI 33 95 kg/m²     Physical Exam  Vitals reviewed  Constitutional:       General: She is not in acute distress  Appearance: She is well-developed  She is not ill-appearing  HENT:      Head: Normocephalic and atraumatic  Eyes:      General: No scleral icterus  Conjunctiva/sclera: Conjunctivae normal    Cardiovascular:      Rate and Rhythm: Normal rate and regular rhythm  Heart sounds: Normal heart sounds  No murmur heard  Pulmonary:      Effort: Pulmonary effort is normal  No respiratory distress  Breath sounds: Normal breath sounds  Abdominal:      Palpations: Abdomen is soft  Tenderness: There is no abdominal tenderness  Musculoskeletal:         General: No tenderness  Normal range of motion  Cervical back: Normal range of motion and neck supple  Right lower leg: No edema        Left lower leg: No edema    Lymphadenopathy:      Cervical: No cervical adenopathy  Skin:     General: Skin is warm and dry  Neurological:      Mental Status: She is alert and oriented to person, place, and time  Cranial Nerves: No cranial nerve deficit  Psychiatric:         Mood and Affect: Mood normal          Behavior: Behavior normal        Labs:  Lab Results   Component Value Date    WBC 8 27 09/13/2021    HGB 11 4 (L) 09/13/2021    HCT 36 2 09/13/2021    MCV 77 (L) 09/13/2021     09/13/2021     Lab Results   Component Value Date     11/17/2015    K 3 6 09/13/2021     09/13/2021    CO2 23 09/13/2021    ANIONGAP 7 11/17/2015    BUN 11 09/13/2021    CREATININE 0 85 09/13/2021    GLUCOSE 85 11/17/2015    GLUF 119 (H) 03/09/2021    CALCIUM 8 7 09/13/2021    AST 11 09/13/2021    ALT 26 09/13/2021    ALKPHOS 55 09/13/2021    PROT 7 6 11/17/2015    BILITOT 0 38 11/17/2015    EGFR 101 09/13/2021       Patient voiced understanding and agreement in the above discussion  Aware to contact our office with questions/symptoms in the interim  This note has been generated by voice recognition software system  Therefore, there may be spelling, grammar, and or syntax errors  Please contact if questions arise

## 2021-09-30 DIAGNOSIS — I10 HYPERTENSION, UNSPECIFIED TYPE: ICD-10-CM

## 2021-10-01 ENCOUNTER — HOSPITAL ENCOUNTER (OUTPATIENT)
Dept: INFUSION CENTER | Facility: HOSPITAL | Age: 36
Discharge: HOME | End: 2021-10-01
Attending: INTERNAL MEDICINE
Payer: COMMERCIAL

## 2021-10-01 VITALS
SYSTOLIC BLOOD PRESSURE: 136 MMHG | TEMPERATURE: 98.4 F | RESPIRATION RATE: 18 BRPM | HEART RATE: 75 BPM | DIASTOLIC BLOOD PRESSURE: 92 MMHG

## 2021-10-01 DIAGNOSIS — D50.0 IRON DEFICIENCY ANEMIA DUE TO CHRONIC BLOOD LOSS: Primary | ICD-10-CM

## 2021-10-01 PROCEDURE — 96375 TX/PRO/DX INJ NEW DRUG ADDON: CPT

## 2021-10-01 PROCEDURE — 96365 THER/PROPH/DIAG IV INF INIT: CPT

## 2021-10-01 PROCEDURE — 96367 TX/PROPH/DG ADDL SEQ IV INF: CPT

## 2021-10-01 RX ORDER — SODIUM CHLORIDE 9 MG/ML
20 INJECTION, SOLUTION INTRAVENOUS ONCE
Status: CANCELLED | OUTPATIENT
Start: 2021-10-08

## 2021-10-01 RX ORDER — SODIUM CHLORIDE 9 MG/ML
20 INJECTION, SOLUTION INTRAVENOUS ONCE
Status: COMPLETED | OUTPATIENT
Start: 2021-10-01 | End: 2021-10-01

## 2021-10-01 RX ORDER — LABETALOL 200 MG/1
200 TABLET, FILM COATED ORAL 2 TIMES DAILY
Qty: 180 TABLET | Refills: 0 | Status: SHIPPED | OUTPATIENT
Start: 2021-10-01 | End: 2022-03-21 | Stop reason: SDUPTHER

## 2021-10-01 RX ADMIN — IRON SUCROSE 200 MG: 20 INJECTION, SOLUTION INTRAVENOUS at 14:22

## 2021-10-01 RX ADMIN — HYDROCORTISONE SODIUM SUCCINATE 100 MG: 100 INJECTION, POWDER, FOR SOLUTION INTRAMUSCULAR; INTRAVENOUS at 14:13

## 2021-10-01 RX ADMIN — SODIUM CHLORIDE 20 ML/HR: 0.9 INJECTION, SOLUTION INTRAVENOUS at 13:57

## 2021-10-01 RX ADMIN — DIPHENHYDRAMINE HYDROCHLORIDE 25 MG: 50 INJECTION, SOLUTION INTRAMUSCULAR; INTRAVENOUS at 13:57

## 2021-10-04 ENCOUNTER — OFFICE VISIT (OUTPATIENT)
Dept: OBGYN CLINIC | Facility: CLINIC | Age: 36
End: 2021-10-04
Payer: COMMERCIAL

## 2021-10-04 VITALS — BODY MASS INDEX: 33.12 KG/M2 | WEIGHT: 199 LBS | DIASTOLIC BLOOD PRESSURE: 82 MMHG | SYSTOLIC BLOOD PRESSURE: 128 MMHG

## 2021-10-04 DIAGNOSIS — N89.8 VAGINAL DISCHARGE: Primary | ICD-10-CM

## 2021-10-04 DIAGNOSIS — N89.8 VAGINAL ODOR: ICD-10-CM

## 2021-10-04 DIAGNOSIS — R30.0 BURNING WITH URINATION: ICD-10-CM

## 2021-10-04 LAB
SL AMB  POCT GLUCOSE, UA: NEGATIVE
SL AMB LEUKOCYTE ESTERASE,UA: POSITIVE
SL AMB POCT BILIRUBIN,UA: NEGATIVE
SL AMB POCT BLOOD,UA: NEGATIVE
SL AMB POCT CLARITY,UA: CLEAR
SL AMB POCT COLOR,UA: YELLOW
SL AMB POCT KETONES,UA: NEGATIVE
SL AMB POCT NITRITE,UA: POSITIVE
SL AMB POCT PH,UA: NEGATIVE
SL AMB POCT URINE PROTEIN: NEGATIVE
SL AMB POCT UROBILINOGEN: NEGATIVE

## 2021-10-04 PROCEDURE — 87186 SC STD MICRODIL/AGAR DIL: CPT | Performed by: OBSTETRICS & GYNECOLOGY

## 2021-10-04 PROCEDURE — 99214 OFFICE O/P EST MOD 30 MIN: CPT | Performed by: OBSTETRICS & GYNECOLOGY

## 2021-10-04 PROCEDURE — 87510 GARDNER VAG DNA DIR PROBE: CPT | Performed by: OBSTETRICS & GYNECOLOGY

## 2021-10-04 PROCEDURE — 87480 CANDIDA DNA DIR PROBE: CPT | Performed by: OBSTETRICS & GYNECOLOGY

## 2021-10-04 PROCEDURE — 87086 URINE CULTURE/COLONY COUNT: CPT | Performed by: OBSTETRICS & GYNECOLOGY

## 2021-10-04 PROCEDURE — 87660 TRICHOMONAS VAGIN DIR PROBE: CPT | Performed by: OBSTETRICS & GYNECOLOGY

## 2021-10-04 PROCEDURE — 81002 URINALYSIS NONAUTO W/O SCOPE: CPT | Performed by: OBSTETRICS & GYNECOLOGY

## 2021-10-04 PROCEDURE — 87077 CULTURE AEROBIC IDENTIFY: CPT | Performed by: OBSTETRICS & GYNECOLOGY

## 2021-10-05 RX ORDER — NITROFURANTOIN 25; 75 MG/1; MG/1
100 CAPSULE ORAL 2 TIMES DAILY
Qty: 14 CAPSULE | Refills: 0 | Status: SHIPPED | OUTPATIENT
Start: 2021-10-05 | End: 2021-10-12

## 2021-10-06 LAB
BACTERIA UR CULT: ABNORMAL
CANDIDA RRNA VAG QL PROBE: NEGATIVE
G VAGINALIS RRNA GENITAL QL PROBE: POSITIVE
T VAGINALIS RRNA GENITAL QL PROBE: NEGATIVE

## 2021-10-08 ENCOUNTER — HOSPITAL ENCOUNTER (OUTPATIENT)
Dept: INFUSION CENTER | Facility: HOSPITAL | Age: 36
Discharge: HOME | End: 2021-10-08
Attending: INTERNAL MEDICINE
Payer: COMMERCIAL

## 2021-10-08 VITALS
SYSTOLIC BLOOD PRESSURE: 138 MMHG | HEART RATE: 80 BPM | DIASTOLIC BLOOD PRESSURE: 91 MMHG | RESPIRATION RATE: 18 BRPM | TEMPERATURE: 97.3 F

## 2021-10-08 DIAGNOSIS — D50.0 IRON DEFICIENCY ANEMIA DUE TO CHRONIC BLOOD LOSS: Primary | ICD-10-CM

## 2021-10-08 PROCEDURE — 96375 TX/PRO/DX INJ NEW DRUG ADDON: CPT

## 2021-10-08 PROCEDURE — 96365 THER/PROPH/DIAG IV INF INIT: CPT

## 2021-10-08 PROCEDURE — 96367 TX/PROPH/DG ADDL SEQ IV INF: CPT

## 2021-10-08 RX ORDER — SODIUM CHLORIDE 9 MG/ML
20 INJECTION, SOLUTION INTRAVENOUS ONCE
Status: COMPLETED | OUTPATIENT
Start: 2021-10-08 | End: 2021-10-08

## 2021-10-08 RX ORDER — SODIUM CHLORIDE 9 MG/ML
20 INJECTION, SOLUTION INTRAVENOUS ONCE
Status: CANCELLED | OUTPATIENT
Start: 2021-10-15

## 2021-10-08 RX ADMIN — HYDROCORTISONE SODIUM SUCCINATE 100 MG: 100 INJECTION, POWDER, FOR SOLUTION INTRAMUSCULAR; INTRAVENOUS at 15:16

## 2021-10-08 RX ADMIN — IRON SUCROSE 200 MG: 20 INJECTION, SOLUTION INTRAVENOUS at 15:40

## 2021-10-08 RX ADMIN — SODIUM CHLORIDE 20 ML/HR: 9 INJECTION, SOLUTION INTRAVENOUS at 15:14

## 2021-10-08 RX ADMIN — DIPHENHYDRAMINE HYDROCHLORIDE 25 MG: 50 INJECTION, SOLUTION INTRAMUSCULAR; INTRAVENOUS at 15:15

## 2021-10-15 ENCOUNTER — HOSPITAL ENCOUNTER (OUTPATIENT)
Dept: INFUSION CENTER | Facility: HOSPITAL | Age: 36
Discharge: HOME | End: 2021-10-15
Attending: INTERNAL MEDICINE

## 2021-10-20 DIAGNOSIS — N76.0 BV (BACTERIAL VAGINOSIS): Primary | ICD-10-CM

## 2021-10-20 DIAGNOSIS — B96.89 BV (BACTERIAL VAGINOSIS): Primary | ICD-10-CM

## 2021-10-20 DIAGNOSIS — B37.9 YEAST INFECTION: ICD-10-CM

## 2021-10-20 RX ORDER — METRONIDAZOLE 500 MG/1
500 TABLET ORAL EVERY 12 HOURS SCHEDULED
Qty: 14 TABLET | Refills: 0 | Status: SHIPPED | OUTPATIENT
Start: 2021-10-20 | End: 2021-10-27

## 2021-10-20 RX ORDER — FLUCONAZOLE 150 MG/1
150 TABLET ORAL ONCE
Qty: 1 TABLET | Refills: 0 | Status: SHIPPED | OUTPATIENT
Start: 2021-10-20 | End: 2021-10-20

## 2021-10-22 ENCOUNTER — HOSPITAL ENCOUNTER (OUTPATIENT)
Dept: INFUSION CENTER | Facility: HOSPITAL | Age: 36
Discharge: HOME | End: 2021-10-22
Attending: INTERNAL MEDICINE
Payer: COMMERCIAL

## 2021-10-22 VITALS
TEMPERATURE: 98.3 F | RESPIRATION RATE: 18 BRPM | SYSTOLIC BLOOD PRESSURE: 122 MMHG | HEART RATE: 72 BPM | DIASTOLIC BLOOD PRESSURE: 80 MMHG

## 2021-10-22 DIAGNOSIS — D50.0 IRON DEFICIENCY ANEMIA DUE TO CHRONIC BLOOD LOSS: Primary | ICD-10-CM

## 2021-10-22 PROCEDURE — 96367 TX/PROPH/DG ADDL SEQ IV INF: CPT

## 2021-10-22 PROCEDURE — 96375 TX/PRO/DX INJ NEW DRUG ADDON: CPT

## 2021-10-22 PROCEDURE — 96365 THER/PROPH/DIAG IV INF INIT: CPT

## 2021-10-22 RX ORDER — SODIUM CHLORIDE 9 MG/ML
20 INJECTION, SOLUTION INTRAVENOUS ONCE
Status: COMPLETED | OUTPATIENT
Start: 2021-10-22 | End: 2021-10-22

## 2021-10-22 RX ORDER — SODIUM CHLORIDE 9 MG/ML
20 INJECTION, SOLUTION INTRAVENOUS ONCE
Status: CANCELLED | OUTPATIENT
Start: 2021-10-29

## 2021-10-22 RX ADMIN — SODIUM CHLORIDE 20 ML/HR: 0.9 INJECTION, SOLUTION INTRAVENOUS at 15:03

## 2021-10-22 RX ADMIN — IRON SUCROSE 200 MG: 20 INJECTION, SOLUTION INTRAVENOUS at 15:36

## 2021-10-22 RX ADMIN — HYDROCORTISONE SODIUM SUCCINATE 100 MG: 100 INJECTION, POWDER, FOR SOLUTION INTRAMUSCULAR; INTRAVENOUS at 15:04

## 2021-10-22 RX ADMIN — DIPHENHYDRAMINE HYDROCHLORIDE 25 MG: 50 INJECTION, SOLUTION INTRAMUSCULAR; INTRAVENOUS at 15:05

## 2021-11-02 ENCOUNTER — OFFICE VISIT (OUTPATIENT)
Dept: FAMILY MEDICINE CLINIC | Facility: CLINIC | Age: 36
End: 2021-11-02
Payer: COMMERCIAL

## 2021-11-02 VITALS
DIASTOLIC BLOOD PRESSURE: 72 MMHG | HEIGHT: 65 IN | SYSTOLIC BLOOD PRESSURE: 100 MMHG | HEART RATE: 73 BPM | BODY MASS INDEX: 32.89 KG/M2 | WEIGHT: 197.4 LBS | TEMPERATURE: 96.2 F | RESPIRATION RATE: 16 BRPM | OXYGEN SATURATION: 98 %

## 2021-11-02 DIAGNOSIS — G43.009 MIGRAINE WITHOUT AURA AND WITHOUT STATUS MIGRAINOSUS, NOT INTRACTABLE: ICD-10-CM

## 2021-11-02 DIAGNOSIS — D50.0 IRON DEFICIENCY ANEMIA DUE TO CHRONIC BLOOD LOSS: ICD-10-CM

## 2021-11-02 DIAGNOSIS — K21.9 GASTROESOPHAGEAL REFLUX DISEASE WITHOUT ESOPHAGITIS: ICD-10-CM

## 2021-11-02 DIAGNOSIS — E66.9 OBESITY (BMI 30-39.9): ICD-10-CM

## 2021-11-02 DIAGNOSIS — R63.8 UNABLE TO LOSE WEIGHT: ICD-10-CM

## 2021-11-02 DIAGNOSIS — I10 BENIGN ESSENTIAL HYPERTENSION: Primary | ICD-10-CM

## 2021-11-02 DIAGNOSIS — J45.20 ASTHMA IN ADULT, MILD INTERMITTENT, UNCOMPLICATED: ICD-10-CM

## 2021-11-02 PROCEDURE — 99214 OFFICE O/P EST MOD 30 MIN: CPT | Performed by: FAMILY MEDICINE

## 2021-11-11 ENCOUNTER — TELEPHONE (OUTPATIENT)
Dept: INFUSION CENTER | Facility: HOSPITAL | Age: 36
End: 2021-11-11

## 2021-11-12 ENCOUNTER — HOSPITAL ENCOUNTER (OUTPATIENT)
Dept: INFUSION CENTER | Facility: HOSPITAL | Age: 36
Discharge: HOME | End: 2021-11-12
Attending: INTERNAL MEDICINE
Payer: COMMERCIAL

## 2021-11-12 VITALS
HEART RATE: 77 BPM | SYSTOLIC BLOOD PRESSURE: 119 MMHG | TEMPERATURE: 97 F | DIASTOLIC BLOOD PRESSURE: 83 MMHG | RESPIRATION RATE: 18 BRPM

## 2021-11-12 DIAGNOSIS — D50.0 IRON DEFICIENCY ANEMIA DUE TO CHRONIC BLOOD LOSS: Primary | ICD-10-CM

## 2021-11-12 PROCEDURE — 96365 THER/PROPH/DIAG IV INF INIT: CPT

## 2021-11-12 PROCEDURE — 96375 TX/PRO/DX INJ NEW DRUG ADDON: CPT

## 2021-11-12 PROCEDURE — 96367 TX/PROPH/DG ADDL SEQ IV INF: CPT

## 2021-11-12 RX ORDER — SODIUM CHLORIDE 9 MG/ML
20 INJECTION, SOLUTION INTRAVENOUS ONCE
Status: COMPLETED | OUTPATIENT
Start: 2021-11-12 | End: 2021-11-12

## 2021-11-12 RX ORDER — SODIUM CHLORIDE 9 MG/ML
20 INJECTION, SOLUTION INTRAVENOUS ONCE
Status: CANCELLED | OUTPATIENT
Start: 2021-11-12

## 2021-11-12 RX ADMIN — HYDROCORTISONE SODIUM SUCCINATE 100 MG: 100 INJECTION, POWDER, FOR SOLUTION INTRAMUSCULAR; INTRAVENOUS at 08:18

## 2021-11-12 RX ADMIN — DIPHENHYDRAMINE HYDROCHLORIDE 25 MG: 50 INJECTION, SOLUTION INTRAMUSCULAR; INTRAVENOUS at 08:19

## 2021-11-12 RX ADMIN — IRON SUCROSE 200 MG: 20 INJECTION, SOLUTION INTRAVENOUS at 08:49

## 2021-11-12 RX ADMIN — SODIUM CHLORIDE 20 ML/HR: 0.9 INJECTION, SOLUTION INTRAVENOUS at 08:19

## 2021-11-19 ENCOUNTER — OFFICE VISIT (OUTPATIENT)
Dept: BARIATRICS | Facility: CLINIC | Age: 36
End: 2021-11-19

## 2021-11-19 DIAGNOSIS — R63.5 ABNORMAL WEIGHT GAIN: ICD-10-CM

## 2021-11-19 DIAGNOSIS — E66.9 OBESITY (BMI 30-39.9): ICD-10-CM

## 2021-11-19 PROCEDURE — RECHECK

## 2021-11-19 PROCEDURE — WMDI30

## 2021-12-11 ENCOUNTER — IMMUNIZATIONS (OUTPATIENT)
Dept: FAMILY MEDICINE CLINIC | Facility: HOSPITAL | Age: 36
End: 2021-12-11

## 2021-12-11 DIAGNOSIS — Z23 ENCOUNTER FOR IMMUNIZATION: Primary | ICD-10-CM

## 2021-12-11 PROCEDURE — 0001A COVID-19 PFIZER VACC 0.3 ML: CPT

## 2021-12-11 PROCEDURE — 91300 COVID-19 PFIZER VACC 0.3 ML: CPT

## 2021-12-18 DIAGNOSIS — F41.9 ANXIETY: ICD-10-CM

## 2021-12-18 DIAGNOSIS — K21.9 GASTROESOPHAGEAL REFLUX DISEASE WITHOUT ESOPHAGITIS: ICD-10-CM

## 2021-12-18 DIAGNOSIS — I10 BENIGN ESSENTIAL HYPERTENSION: ICD-10-CM

## 2021-12-20 RX ORDER — ALPRAZOLAM 0.25 MG/1
0.25 TABLET ORAL
Qty: 20 TABLET | Refills: 0 | Status: SHIPPED | OUTPATIENT
Start: 2021-12-20

## 2021-12-20 RX ORDER — OLMESARTAN MEDOXOMIL AND HYDROCHLOROTHIAZIDE 20/12.5 20; 12.5 MG/1; MG/1
1 TABLET ORAL DAILY
Qty: 90 TABLET | Refills: 3 | Status: SHIPPED | OUTPATIENT
Start: 2021-12-20 | End: 2022-03-21 | Stop reason: SDUPTHER

## 2021-12-20 RX ORDER — PANTOPRAZOLE SODIUM 40 MG/1
40 TABLET, DELAYED RELEASE ORAL
Qty: 90 TABLET | Refills: 3 | Status: SHIPPED | OUTPATIENT
Start: 2021-12-20 | End: 2022-03-21 | Stop reason: SDUPTHER

## 2021-12-21 ENCOUNTER — OFFICE VISIT (OUTPATIENT)
Dept: BARIATRICS | Facility: CLINIC | Age: 36
End: 2021-12-21

## 2021-12-21 VITALS — WEIGHT: 201.17 LBS | HEIGHT: 65 IN | BODY MASS INDEX: 33.52 KG/M2

## 2021-12-21 DIAGNOSIS — R63.5 ABNORMAL WEIGHT GAIN: ICD-10-CM

## 2021-12-21 PROCEDURE — WEIGHT

## 2021-12-21 PROCEDURE — RECHECK

## 2022-01-12 ENCOUNTER — HOSPITAL ENCOUNTER (OUTPATIENT)
Dept: RADIOLOGY | Facility: HOSPITAL | Age: 37
Discharge: HOME | End: 2022-01-12
Payer: COMMERCIAL

## 2022-01-12 DIAGNOSIS — R19.00 ABDOMINAL WALL BULGE: ICD-10-CM

## 2022-01-12 PROCEDURE — 76705 ECHO EXAM OF ABDOMEN: CPT

## 2022-01-16 ENCOUNTER — OFFICE VISIT (OUTPATIENT)
Dept: URGENT CARE | Facility: CLINIC | Age: 37
End: 2022-01-16
Payer: COMMERCIAL

## 2022-01-16 VITALS
HEART RATE: 74 BPM | DIASTOLIC BLOOD PRESSURE: 80 MMHG | HEIGHT: 63 IN | BODY MASS INDEX: 35.61 KG/M2 | OXYGEN SATURATION: 99 % | TEMPERATURE: 98.6 F | WEIGHT: 201 LBS | SYSTOLIC BLOOD PRESSURE: 147 MMHG

## 2022-01-16 DIAGNOSIS — S05.01XA ABRASION OF RIGHT CORNEA, INITIAL ENCOUNTER: Primary | ICD-10-CM

## 2022-01-16 PROBLEM — H57.11 ACUTE RIGHT EYE PAIN: Status: ACTIVE | Noted: 2022-01-16

## 2022-01-16 PROCEDURE — 99213 OFFICE O/P EST LOW 20 MIN: CPT | Performed by: PHYSICIAN ASSISTANT

## 2022-01-16 RX ORDER — ERYTHROMYCIN 5 MG/G
0.5 OINTMENT OPHTHALMIC EVERY 6 HOURS SCHEDULED
Qty: 3.5 G | Refills: 0 | Status: SHIPPED | OUTPATIENT
Start: 2022-01-16 | End: 2022-01-21

## 2022-01-16 RX ORDER — MAGNESIUM 30 MG
30 TABLET ORAL 2 TIMES DAILY
COMMUNITY

## 2022-01-16 RX ORDER — TETRACAINE HYDROCHLORIDE 5 MG/ML
2 SOLUTION OPHTHALMIC ONCE
Status: COMPLETED | OUTPATIENT
Start: 2022-01-16 | End: 2022-01-16

## 2022-01-16 RX ADMIN — TETRACAINE HYDROCHLORIDE 2 DROP: 5 SOLUTION OPHTHALMIC at 12:38

## 2022-01-16 NOTE — PATIENT INSTRUCTIONS
Start erythromycin ointment four times daily x 5 days  F/u with ophthalmology if minimal improvement  Go to ER with change in vision or any distress

## 2022-01-16 NOTE — PROGRESS NOTES
[facility]        NAME: Jeane Wilcox is a 39 y o  female  : 1985    MRN: [id number]  DATE: 2022  TIME: 11:34 AM    Assessment and Plan   Acute right eye pain [H57 11]  1  Acute right eye pain  fluorescein sodium sterile 1 mg ophthalmic strip 1 strip    tetracaine 0 5 % ophthalmic solution 2 drop         Patient Instructions     Patient Instructions   Start erythromycin ointment four times daily x 5 days  F/u with ophthalmology if minimal improvement  Go to ER with change in vision or any distress        Follow up with PCP in 3-5 days  Proceed to  ER if symptoms worsen  Chief Complaint     Chief Complaint   Patient presents with    Eye Pain         History of Present Illness       40 y/o F presents c/o right eye redness, irritation, and watery drainage since awakening yesterday morning  Symptoms are not improving  Can still see when able to open eye but it is painful to do so  +photosensitivity  No fever, chills, n/v, headache  No tx  No known injury or trauma to eye  No purulent drainage  No previous episodes  Left eye is normal, just feels dry    Eye Pain   Associated symptoms include an eye discharge, eye redness and photophobia  Pertinent negatives include no fever or itching  Review of Systems   Review of Systems   Constitutional: Negative for chills and fever  Eyes: Positive for photophobia, pain, discharge and redness  Negative for itching and visual disturbance  Neurological: Negative for headaches           Current Medications       Current Outpatient Medications:     magnesium 30 MG tablet, Take 30 mg by mouth 2 (two) times a day, Disp: , Rfl:     albuterol (2 5 mg/3 mL) 0 083 % nebulizer solution, Take 1 vial (2 5 mg total) by nebulization every 6 (six) hours as needed for wheezing or shortness of breath, Disp: 30 vial, Rfl: 3    albuterol (ProAir HFA) 90 mcg/act inhaler, Inhale 2 puffs every 4 (four) hours as needed (When has flare up), Disp: 1 Inhaler, Rfl: 0    ALPRAZolam (XANAX) 0 25 mg tablet, Take 1 tablet (0 25 mg total) by mouth daily at bedtime as needed for anxiety, Disp: 20 tablet, Rfl: 0    Cholecalciferol (VITAMIN D3 PO), Take 2,000 mg by mouth daily in the early morning, Disp: , Rfl:     cyclobenzaprine (FLEXERIL) 10 mg tablet, Take 1 tablet (10 mg total) by mouth 3 (three) times a day as needed for muscle spasms, Disp: 60 tablet, Rfl: 1    labetalol (NORMODYNE) 200 mg tablet, Take 1 tablet (200 mg total) by mouth 2 (two) times a day, Disp: 180 tablet, Rfl: 0    meloxicam (MOBIC) 15 mg tablet, Take 1 tablet (15 mg total) by mouth daily as needed for mild pain, Disp: 30 tablet, Rfl: 0    Multiple Vitamins-Minerals (Womens Multivitamin) TABS, Take 1 tablet by mouth daily, Disp: , Rfl:     olmesartan-hydrochlorothiazide (BENICAR HCT) 20-12 5 MG per tablet, Take 1 tablet by mouth daily, Disp: 90 tablet, Rfl: 3    ondansetron (ZOFRAN-ODT) 4 mg disintegrating tablet, Take 1 tablet (4 mg total) by mouth every 6 (six) hours as needed for nausea or vomiting, Disp: 20 tablet, Rfl: 0    pantoprazole (PROTONIX) 40 mg tablet, Take 1 tablet (40 mg total) by mouth daily before breakfast, Disp: 90 tablet, Rfl: 3    Probiotic Product (PROBIOTIC DAILY PO), Take by mouth, Disp: , Rfl:     SUMAtriptan (IMITREX) 50 mg tablet, Take 1 tablet (50 mg total) by mouth once as needed for migraine for up to 1 dose, Disp: 9 tablet, Rfl: 0    Current Facility-Administered Medications:     fluorescein sodium sterile 1 mg ophthalmic strip 1 strip, 1 strip, Right Eye, Once, Jose Gurrola PA-C    tetracaine 0 5 % ophthalmic solution 2 drop, 2 drop, Right Eye, Once, Jose Gurrola PA-C    Current Allergies     Allergies as of 01/16/2022    (No Known Allergies)            The following portions of the patient's history were reviewed and updated as appropriate: allergies, current medications, past family history, past medical history, past social history, past surgical history and problem list      Past Medical History:   Diagnosis Date    Abdominal pain     Asthma     mild intermittent    GERD (gastroesophageal reflux disease)     History of palpitations     Hypertension     Lower back pain     Migraines     Seasonal allergies     Trace mitral regurgitation by prior echocardiogram        Past Surgical History:   Procedure Laterality Date    CHOLECYSTECTOMY      laparoscopic    HERNIA REPAIR      umbilical    LAPAROTOMY N/A 12/10/2020    Procedure: ABDOMINAL MYOMECTOMY;  Surgeon: Smith Quintanilla DO;  [facility];  Service: Gynecology    LUMBAR LAMINECTOMY Right 9/14/2016    Procedure: Right L5/S1 Metryx microdiscectomy;  Surgeon: Milady Shipley MD;  [facility];  Service:    Ardeth Needs AL COLONOSCOPY FLX DX W/COLLJ SPEC WHEN PFRMD N/A 7/7/2016    Procedure: COLONOSCOPY;  Surgeon: Ania Obregon DO;  [facility];   Service: Gastroenterology    AL HYSTEROSCOPY,W/ENDO [place] N/A 3/19/2018    Procedure: HYSTEROSCOPY; MYOMECTOMY; ENDOMETRIAL BIOPSY;  Surgeon: Smith Quintanilla DO;  [facility];  Service: Gynecology    AL INCISE FINGER TENDON SHEATH Right 2/28/2017    Procedure: THUMB TRIGGER RELEASE ;  Surgeon: Rochelle Garcia DO;  [facility];  Service: Orthopedics    WISDOM TOOTH EXTRACTION         Family History   Problem Relation Age of Onset    Diabetes Mother         type2    Hypertension Mother     Diabetes Father         type2    Hypertension Father     Heart attack Maternal Grandfather         acute MI    Arthritis Maternal Grandfather     Stroke Maternal Grandfather         CVA    Hyperlipidemia Maternal Grandfather     Arthritis Paternal Grandfather     Stroke Paternal Grandfather         CVA    Cancer Paternal Grandfather     Hyperlipidemia Paternal Grandfather     Colon cancer Paternal Grandfather     Heart attack Maternal Uncle         acute MI    Arthritis Family     Stroke Family         CVA    Cancer Family  Hyperlipidemia Family     Stroke Family         CVA    Hyperlipidemia Family     Esophageal cancer Maternal Grandmother          Medications have been verified  Objective   /80   Pulse 74   Temp 98 6 °F (37 °C)   Ht 5' 3" (1 6 m)   Wt 91 2 kg (201 lb)   SpO2 99%   BMI 35 61 kg/m²   No LMP recorded  Physical Exam     Physical Exam  Constitutional:       Appearance: Normal appearance  Eyes:      General: Lids are everted, no foreign bodies appreciated  Vision grossly intact  Right eye: Discharge (watery) present  No foreign body or hordeolum  Extraocular Movements: Extraocular movements intact  Conjunctiva/sclera:      Right eye: Right conjunctiva is injected  No exudate or hemorrhage  Left eye: Left conjunctiva is not injected  No exudate or hemorrhage  Pupils: Pupils are equal, round, and reactive to light  Neurological:      Mental Status: She is alert

## 2022-01-19 ENCOUNTER — TELEMEDICINE (OUTPATIENT)
Dept: BARIATRICS | Facility: CLINIC | Age: 37
End: 2022-01-19
Payer: COMMERCIAL

## 2022-01-19 VITALS — HEIGHT: 63 IN | BODY MASS INDEX: 35.24 KG/M2 | WEIGHT: 198.9 LBS

## 2022-01-19 DIAGNOSIS — R63.5 ABNORMAL WEIGHT GAIN: ICD-10-CM

## 2022-01-19 DIAGNOSIS — E28.2 PCOS (POLYCYSTIC OVARIAN SYNDROME): ICD-10-CM

## 2022-01-19 DIAGNOSIS — F41.9 ANXIETY: ICD-10-CM

## 2022-01-19 DIAGNOSIS — E66.9 OBESITY, CLASS II, BMI 35-39.9: Primary | ICD-10-CM

## 2022-01-19 DIAGNOSIS — I10 ESSENTIAL HYPERTENSION: ICD-10-CM

## 2022-01-19 DIAGNOSIS — G43.009 MIGRAINE WITHOUT AURA AND WITHOUT STATUS MIGRAINOSUS, NOT INTRACTABLE: ICD-10-CM

## 2022-01-19 PROCEDURE — 99243 OFF/OP CNSLTJ NEW/EST LOW 30: CPT | Performed by: INTERNAL MEDICINE

## 2022-01-19 RX ORDER — METFORMIN HYDROCHLORIDE 500 MG/1
500 TABLET, EXTENDED RELEASE ORAL
Qty: 30 TABLET | Refills: 2 | Status: SHIPPED | OUTPATIENT
Start: 2022-01-19

## 2022-01-19 NOTE — PROGRESS NOTES
Virtual Regular Visit    Verification of patient location:    Patient is located in the following state in which I hold an active license [place]      Assessment/Plan:    Problem List Items Addressed This Visit        Cardiovascular and Mediastinum    Migraine without aura and without status migrainosus, not intractable      Other Visit Diagnoses     Obesity, Class II, BMI 35-39 9    -  Primary    Abnormal weight gain        PCOS (polycystic ovarian syndrome)        Relevant Medications    metFORMIN (GLUCOPHAGE-XR) 500 mg 24 hr tablet    Essential hypertension        Anxiety              Abnormal weight gain  Obesity, Class II, BMI 35-39 9  Continue with 1300 calories,  exercise   Will trial metformin to treat PCOS/obesity  Consider Wellbutrin as well due to given anxiety    Migraine without aura and without status migrainosus, not intractable  Pt gets migraine seldomly  Can consider topamax for weight loss/migraine    PCOS (polycystic ovarian syndrome)  Start metformin- pt previously was on immediate release 2000 mg and had GI SE; will trial metformin XR 500mg and titrate up as tolerated  - metFORMIN (GLUCOPHAGE-XR) 500 mg 24 hr tablet; Take 1 tablet (500 mg total) by mouth daily with dinner  Dispense: 30 tablet; Refill: 2    Essential hypertension  On labetalol, benicar-HCT  Weight loss and increasing activity level should help  Limit sodium intake <2500mg per day    Hx of tachycardia  ?which type  On labetalol  Avoid phentermine     Anxiety  Takes Xanax PRN  May benefit from a maintenance anxiolytic          F/u in 8-10 weeks      Reason for visit is   Chief Complaint   Patient presents with    Consult    Virtual Regular Visit        Encounter provider Olga Baptiste DO    Provider located at [address] [facility]  [address] [place] [id number]      Recent Visits  No visits were found meeting these conditions    Showing recent visits within past 7 days and meeting all other requirements  Today's Visits  Date Type Provider Dept   01/19/22 [address], DO [facility]   Showing today's visits and meeting all other requirements  Future Appointments  No visits were found meeting these conditions  Showing future appointments within next 150 days and meeting all other requirements       The patient was identified by name and date of birth  Brianna Barrios was informed that this is a telemedicine visit and that the visit is being conducted through [address] and patient was informed this is a secure, HIPAA-complaint platform  She agrees to proceed     My office door was closed  No one else was in the room  She acknowledged consent and understanding of privacy and security of the video platform  The patient has agreed to participate and understands they can discontinue the visit at any time  Patient is aware this is a billable service  Subjective  Brianna Barrios is a 39 y o  female Body mass index is 35 23 kg/m²  Saw Isaura GARDNER but felt she needed more help from medical tx  Wt Readings from Last 3 Encounters:   01/19/22 90 2 kg (198 lb 14 4 oz)   01/16/22 91 2 kg (201 lb)   12/21/21 91 3 kg (201 lb 2 7 oz)     Has PCOS, migraine, HTN   Hx of palpitations  Tried Saxenda a few years before for 6 months- had GI SE  Tried calorie deficit diet- 4917-9068 calories  Pt is a pescatarian    B- plant protein shake, or overnight oats with fruit  L- salad, fish  D- same or veggie burger with rice  S- yogurt, fruit, veggies    Water- 36-64 oz  Cranberry juice 1-2 glasses  Workout- twice weights, 5 days cardio  Sleep- interrupted due to anxiety   Work- Neurodiagnostic technologist; works days        Past Medical History:   Diagnosis Date    Abdominal pain     Asthma     mild intermittent    GERD (gastroesophageal reflux disease)     History of palpitations     Hypertension     Lower back pain     Migraines     Seasonal allergies     Trace mitral regurgitation by prior echocardiogram        Past Surgical History:   Procedure Laterality Date    CHOLECYSTECTOMY      laparoscopic    HERNIA REPAIR      umbilical    LAPAROTOMY N/A 12/10/2020    Procedure: ABDOMINAL MYOMECTOMY;  Surgeon: Herberth Samuels DO;  [facility];  Service: Gynecology    LUMBAR LAMINECTOMY Right 9/14/2016    Procedure: Right L5/S1 Metryx microdiscectomy;  Surgeon: Diana Raman MD;  [facility];  Service:    Nathan Abt KS COLONOSCOPY FLX DX W/COLLJ SPEC WHEN PFRMD N/A 7/7/2016    Procedure: COLONOSCOPY;  Surgeon: Yesica Sutton DO;  [facility]: [facility];   Service: Gastroenterology    KS HYSTEROSCOPY,W/ENDO BX N/A 3/19/2018    Procedure: HYSTEROSCOPY; MYOMECTOMY; ENDOMETRIAL BIOPSY;  Surgeon: Herberth Samuels DO;  [facility];  Service: Gynecology    KS INCISE FINGER TENDON SHEATH Right 2/28/2017    Procedure: THUMB TRIGGER RELEASE ;  Surgeon: Maren Dixon DO;  [facility];  Service: Orthopedics    WISDOM TOOTH EXTRACTION         Current Outpatient Medications   Medication Sig Dispense Refill    albuterol (2 5 mg/3 mL) 0 083 % nebulizer solution Take 1 vial (2 5 mg total) by nebulization every 6 (six) hours as needed for wheezing or shortness of breath 30 vial 3    albuterol (ProAir HFA) 90 mcg/act inhaler Inhale 2 puffs every 4 (four) hours as needed (When has flare up) 1 Inhaler 0    ALPRAZolam (XANAX) 0 25 mg tablet Take 1 tablet (0 25 mg total) by mouth daily at bedtime as needed for anxiety 20 tablet 0    Cholecalciferol (VITAMIN D3 PO) Take 2,000 mg by mouth daily in the early morning      cyclobenzaprine (FLEXERIL) 10 mg tablet Take 1 tablet (10 mg total) by mouth 3 (three) times a day as needed for muscle spasms 60 tablet 1    erythromycin (ILOTYCIN) ophthalmic ointment Administer 0 5 inches to the right eye every 6 (six) hours for 5 days 3 5 g 0    labetalol (NORMODYNE) 200 mg tablet Take 1 tablet (200 mg total) by mouth 2 (two) times a day 180 tablet 0    magnesium 30 MG tablet Take 30 mg by mouth 2 (two) times a day      meloxicam (MOBIC) 15 mg tablet Take 1 tablet (15 mg total) by mouth daily as needed for mild pain 30 tablet 0    Multiple Vitamins-Minerals (Womens Multivitamin) TABS Take 1 tablet by mouth daily      olmesartan-hydrochlorothiazide (BENICAR HCT) 20-12 5 MG per tablet Take 1 tablet by mouth daily 90 tablet 3    ondansetron (ZOFRAN-ODT) 4 mg disintegrating tablet Take 1 tablet (4 mg total) by mouth every 6 (six) hours as needed for nausea or vomiting 20 tablet 0    pantoprazole (PROTONIX) 40 mg tablet Take 1 tablet (40 mg total) by mouth daily before breakfast 90 tablet 3    SUMAtriptan (IMITREX) 50 mg tablet Take 1 tablet (50 mg total) by mouth once as needed for migraine for up to 1 dose 9 tablet 0    metFORMIN (GLUCOPHAGE-XR) 500 mg 24 hr tablet Take 1 tablet (500 mg total) by mouth daily with dinner 30 tablet 2    Probiotic Product (PROBIOTIC DAILY PO) Take by mouth (Patient not taking: Reported on 1/19/2022 )       No current facility-administered medications for this visit  No Known Allergies    Review of Systems   Constitutional: Negative for appetite change, chills and fever  HENT: Negative for rhinorrhea and sore throat  Respiratory: Negative for cough, chest tightness and shortness of breath  Cardiovascular: Negative for chest pain and leg swelling  Gastrointestinal: Negative for abdominal pain, constipation, diarrhea, nausea and vomiting  Endocrine: Negative for cold intolerance and heat intolerance  Genitourinary: Negative for difficulty urinating  Musculoskeletal: Positive for back pain  Negative for arthralgias  Skin: Negative for color change  Neurological: Negative for dizziness, numbness and headaches  Psychiatric/Behavioral: Negative for sleep disturbance  The patient is nervous/anxious      All other systems reviewed and are negative  Video Exam    Vitals:    01/19/22 1532   Weight: 90 2 kg (198 lb 14 4 oz)   Height: 5' 3" (1 6 m)       Physical Exam  Constitutional:       Appearance: Normal appearance  She is obese  HENT:      Head: Normocephalic and atraumatic  Eyes:      Extraocular Movements: Extraocular movements intact  Conjunctiva/sclera: Conjunctivae normal    Pulmonary:      Effort: Pulmonary effort is normal    Abdominal:      Comments: obese   Musculoskeletal:         General: Normal range of motion  Skin:     Coloration: Skin is not jaundiced or pale  Neurological:      General: No focal deficit present  Mental Status: She is alert and oriented to person, place, and time  Psychiatric:         Mood and Affect: Mood normal          Judgment: Judgment normal           I spent 30 minutes directly with the patient during this visit    VIRTUAL VISIT [address] verbally agrees to participate in [employer]  Pt is aware that [employer] could be limited without vital signs or the ability to perform a full hands-on physical Savana Banks understands she or the provider may request at any time to terminate the video visit and request the patient to seek care or treatment in person

## 2022-01-20 ENCOUNTER — OFFICE VISIT (OUTPATIENT)
Dept: BARIATRICS | Facility: CLINIC | Age: 37
End: 2022-01-20

## 2022-01-20 VITALS — BODY MASS INDEX: 33.27 KG/M2 | HEIGHT: 65 IN | WEIGHT: 199.7 LBS

## 2022-01-20 DIAGNOSIS — R63.5 ABNORMAL WEIGHT GAIN: ICD-10-CM

## 2022-01-20 PROCEDURE — RECHECK

## 2022-01-20 PROCEDURE — WEIGHT

## 2022-01-25 ENCOUNTER — CONSULT (OUTPATIENT)
Dept: SURGERY | Facility: CLINIC | Age: 37
End: 2022-01-25
Payer: COMMERCIAL

## 2022-01-25 VITALS
HEIGHT: 63 IN | BODY MASS INDEX: 35.08 KG/M2 | WEIGHT: 198 LBS | SYSTOLIC BLOOD PRESSURE: 127 MMHG | HEART RATE: 76 BPM | DIASTOLIC BLOOD PRESSURE: 79 MMHG

## 2022-01-25 DIAGNOSIS — R10.13 EPIGASTRIC PAIN: Primary | ICD-10-CM

## 2022-01-25 PROCEDURE — 99202 OFFICE O/P NEW SF 15 MIN: CPT | Performed by: SURGERY

## 2022-01-25 RX ORDER — CYCLOPENTOLATE HYDROCHLORIDE 10 MG/ML
SOLUTION/ DROPS OPHTHALMIC
COMMUNITY
Start: 2022-01-18 | End: 2022-03-24

## 2022-01-25 RX ORDER — PREDNISOLONE ACETATE 10 MG/ML
SUSPENSION/ DROPS OPHTHALMIC
COMMUNITY
Start: 2022-01-18 | End: 2022-03-24

## 2022-01-25 NOTE — H&P (VIEW-ONLY)
Assessment/Plan:  Patient presents with epigastric abdominal pain  Described it constant pressure  One or 2 times per week epigastric pain become more severe and radiates the bandlike pressure over the upper abdomen  It lasts for 24 hours and then dissipates  She does not know of any triggers  Antacids or positioning from a hill  In addition she complains of abdominal bloating and distention  Her symptoms have been present over the last 1 year, but more progressive over the last 3 months  She denies fevers or chills  Denies nausea or vomiting  Mild loss of appetite  Denies diarrhea or blood in her stool  No history for trauma  She is status post cholecystectomy  Upper endoscopy 3 years ago revealed mild Eduardo's esophagitis  No high risk factors for peptic ulcer disease with stress and for glasses of wine per week  Denies aspirin Advil use, smoking, or history of ulcer disease  Examination reveals her abdomen is soft  Point of maximum tenderness is midway between the xiphoid and umbilicus to the left  No mass at the present  No evidence for herniation  Etiology is speculative  She is presently on a daily PPI  Peptic ulcer disease seems less likely, however an EGD is recommended  This is normal, a CT scan of the abdomen and pelvis would be in order  Eventually a consultation with Gastroenterology need be needed  No high risk factors for motility disorder  Will check LFTs to assess for common bile duct stone  Diagnoses and all orders for this visit:    Epigastric pain  -     CBC and differential; Future  -     Comprehensive metabolic panel; Future  -     Lipase; Future  -     Ambulatory Referral to Gastroenterology; Future    Other orders  -     cyclopentolate (CYCLOGYL) 1 % ophthalmic solution  -     prednisoLONE acetate (PRED FORTE) 1 % ophthalmic suspension        Subjective:      Patient ID: Francisco Gabriel is a 39 y o  female      Patient presents for evaluation of LUQ pain   She has had a bulge and dull pain and pressure LUQ for 3 months  Ultrasound abdominal wall 1/12/2022   IMPRESSION:  No discrete hernia visualized in the upper anterior abdominal wall  If symptoms persist, CT evaluation may also be considered  Abdominal Pain  The current episode started more than 1 month ago  The problem occurs constantly  The problem has been unchanged  The pain is located in the LUQ  The quality of the pain is dull  The abdominal pain does not radiate  Prior diagnostic workup includes ultrasound  The following portions of the patient's history were reviewed and updated as appropriate:     She  has a past medical history of Abdominal pain, Abnormal ECG, Anemia, Asthma, GERD (gastroesophageal reflux disease), History of palpitations, Hypertension, Lower back pain, Migraines, Seasonal allergies, and Trace mitral regurgitation by prior echocardiogram   She  has a past surgical history that includes Cholecystectomy; Hernia repair; pr incise finger tendon sheath (Right, 2/28/2017); Lumbar laminectomy (Right, 9/14/2016); pr colonoscopy flx dx w/collj spec when pfrmd (N/A, 7/7/2016); [place] tooth extraction; pr hysteroscopy,w/endo bx (N/A, 3/19/2018); LAPAROTOMY (N/A, 12/10/2020); and Colonoscopy (2017)  Her family history includes Arthritis in her family, maternal grandfather, and paternal grandfather; Breast cancer in her maternal grandmother; Cancer in her family, maternal grandmother, and paternal grandfather; Colon cancer in her paternal grandfather; Diabetes in her father and mother; Esophageal cancer in her maternal grandmother; Heart attack in her maternal grandfather and maternal uncle; Heart disease in her maternal grandfather; Hyperlipidemia in her family, family, maternal grandfather, and paternal grandfather; Hypertension in her father and mother; Stroke in her family, family, maternal grandfather, and paternal grandfather  She  reports that she has never smoked   She has never used smokeless tobacco  She reports current alcohol use of about 4 0 standard drinks of alcohol per week  She reports that she does not use drugs    Current Outpatient Medications   Medication Sig Dispense Refill    albuterol (2 5 mg/3 mL) 0 083 % nebulizer solution Take 1 vial (2 5 mg total) by nebulization every 6 (six) hours as needed for wheezing or shortness of breath 30 vial 3    albuterol (ProAir HFA) 90 mcg/act inhaler Inhale 2 puffs every 4 (four) hours as needed (When has flare up) 1 Inhaler 0    ALPRAZolam (XANAX) 0 25 mg tablet Take 1 tablet (0 25 mg total) by mouth daily at bedtime as needed for anxiety 20 tablet 0    Cholecalciferol (VITAMIN D3 PO) Take 2,000 mg by mouth daily in the early morning      cyclobenzaprine (FLEXERIL) 10 mg tablet Take 1 tablet (10 mg total) by mouth 3 (three) times a day as needed for muscle spasms 60 tablet 1    cyclopentolate (CYCLOGYL) 1 % ophthalmic solution       labetalol (NORMODYNE) 200 mg tablet Take 1 tablet (200 mg total) by mouth 2 (two) times a day 180 tablet 0    magnesium 30 MG tablet Take 30 mg by mouth 2 (two) times a day      meloxicam (MOBIC) 15 mg tablet Take 1 tablet (15 mg total) by mouth daily as needed for mild pain 30 tablet 0    metFORMIN (GLUCOPHAGE-XR) 500 mg 24 hr tablet Take 1 tablet (500 mg total) by mouth daily with dinner 30 tablet 2    Multiple Vitamins-Minerals (Womens Multivitamin) TABS Take 1 tablet by mouth daily      olmesartan-hydrochlorothiazide (BENICAR HCT) 20-12 5 MG per tablet Take 1 tablet by mouth daily 90 tablet 3    ondansetron (ZOFRAN-ODT) 4 mg disintegrating tablet Take 1 tablet (4 mg total) by mouth every 6 (six) hours as needed for nausea or vomiting 20 tablet 0    pantoprazole (PROTONIX) 40 mg tablet Take 1 tablet (40 mg total) by mouth daily before breakfast 90 tablet 3    prednisoLONE acetate (PRED FORTE) 1 % ophthalmic suspension       SUMAtriptan (IMITREX) 50 mg tablet Take 1 tablet (50 mg total) by mouth once as needed for migraine for up to 1 dose 9 tablet 0     No current facility-administered medications for this visit  She has No Known Allergies       Review of Systems   Constitutional: Negative  Negative for activity change  HENT: Negative  Eyes: Negative  Respiratory: Negative  Cardiovascular: Negative  Gastrointestinal: Positive for abdominal distention and abdominal pain  Endocrine: Negative  Genitourinary: Negative  Musculoskeletal: Negative  Skin: Negative  Allergic/Immunologic: Negative  Neurological: Negative  Psychiatric/Behavioral: Negative for agitation, behavioral problems and confusion  The patient is not nervous/anxious  All other systems reviewed and are negative  Objective:      /79   Pulse 76   Ht 5' 3" (1 6 m)   Wt 89 8 kg (198 lb)   BMI 35 07 kg/m²          Physical Exam  Constitutional:       Appearance: Normal appearance  She is well-developed  HENT:      Head: Normocephalic and atraumatic  Nose: Nose normal    Eyes:      Extraocular Movements: Extraocular movements intact  Conjunctiva/sclera: Conjunctivae normal    Cardiovascular:      Rate and Rhythm: Normal rate and regular rhythm  Heart sounds: Normal heart sounds  Pulmonary:      Effort: Pulmonary effort is normal       Breath sounds: Normal breath sounds  Abdominal:      General: Abdomen is flat  Palpations: Abdomen is soft  Tenderness: There is abdominal tenderness in the epigastric area  There is no guarding  Negative signs include Mendosa's sign  Hernia: No hernia is present  Musculoskeletal:         General: Normal range of motion  Cervical back: Normal range of motion  Skin:     General: Skin is warm and dry  Neurological:      Mental Status: She is alert and oriented to person, place, and time     Psychiatric:         Mood and Affect: Mood normal

## 2022-01-25 NOTE — PROGRESS NOTES
Assessment/Plan:  Patient presents with epigastric abdominal pain  Described it constant pressure  One or 2 times per week epigastric pain become more severe and radiates the bandlike pressure over the upper abdomen  It lasts for 24 hours and then dissipates  She does not know of any triggers  Antacids or positioning from a hill  In addition she complains of abdominal bloating and distention  Her symptoms have been present over the last 1 year, but more progressive over the last 3 months  She denies fevers or chills  Denies nausea or vomiting  Mild loss of appetite  Denies diarrhea or blood in her stool  No history for trauma  She is status post cholecystectomy  Upper endoscopy 3 years ago revealed mild Eduardo's esophagitis  No high risk factors for peptic ulcer disease with stress and for glasses of wine per week  Denies aspirin Advil use, smoking, or history of ulcer disease  Examination reveals her abdomen is soft  Point of maximum tenderness is midway between the xiphoid and umbilicus to the left  No mass at the present  No evidence for herniation  Etiology is speculative  She is presently on a daily PPI  Peptic ulcer disease seems less likely, however an EGD is recommended  This is normal, a CT scan of the abdomen and pelvis would be in order  Eventually a consultation with Gastroenterology need be needed  No high risk factors for motility disorder  Will check LFTs to assess for common bile duct stone  Diagnoses and all orders for this visit:    Epigastric pain  -     CBC and differential; Future  -     Comprehensive metabolic panel; Future  -     Lipase; Future  -     Ambulatory Referral to Gastroenterology; Future    Other orders  -     cyclopentolate (CYCLOGYL) 1 % ophthalmic solution  -     prednisoLONE acetate (PRED FORTE) 1 % ophthalmic suspension        Subjective:      Patient ID: Mary Thomas is a 39 y o  female      Patient presents for evaluation of LUQ pain   She has had a bulge and dull pain and pressure LUQ for 3 months  Ultrasound abdominal wall 1/12/2022   IMPRESSION:  No discrete hernia visualized in the upper anterior abdominal wall  If symptoms persist, CT evaluation may also be considered  Abdominal Pain  The current episode started more than 1 month ago  The problem occurs constantly  The problem has been unchanged  The pain is located in the LUQ  The quality of the pain is dull  The abdominal pain does not radiate  Prior diagnostic workup includes ultrasound  The following portions of the patient's history were reviewed and updated as appropriate:     She  has a past medical history of Abdominal pain, Abnormal ECG, Anemia, Asthma, GERD (gastroesophageal reflux disease), History of palpitations, Hypertension, Lower back pain, Migraines, Seasonal allergies, and Trace mitral regurgitation by prior echocardiogram   She  has a past surgical history that includes Cholecystectomy; Hernia repair; pr incise finger tendon sheath (Right, 2/28/2017); Lumbar laminectomy (Right, 9/14/2016); pr colonoscopy flx dx w/collj spec when pfrmd (N/A, 7/7/2016); [place] tooth extraction; pr hysteroscopy,w/endo bx (N/A, 3/19/2018); LAPAROTOMY (N/A, 12/10/2020); and Colonoscopy (2017)  Her family history includes Arthritis in her family, maternal grandfather, and paternal grandfather; Breast cancer in her maternal grandmother; Cancer in her family, maternal grandmother, and paternal grandfather; Colon cancer in her paternal grandfather; Diabetes in her father and mother; Esophageal cancer in her maternal grandmother; Heart attack in her maternal grandfather and maternal uncle; Heart disease in her maternal grandfather; Hyperlipidemia in her family, family, maternal grandfather, and paternal grandfather; Hypertension in her father and mother; Stroke in her family, family, maternal grandfather, and paternal grandfather  She  reports that she has never smoked   She has never used smokeless tobacco  She reports current alcohol use of about 4 0 standard drinks of alcohol per week  She reports that she does not use drugs    Current Outpatient Medications   Medication Sig Dispense Refill    albuterol (2 5 mg/3 mL) 0 083 % nebulizer solution Take 1 vial (2 5 mg total) by nebulization every 6 (six) hours as needed for wheezing or shortness of breath 30 vial 3    albuterol (ProAir HFA) 90 mcg/act inhaler Inhale 2 puffs every 4 (four) hours as needed (When has flare up) 1 Inhaler 0    ALPRAZolam (XANAX) 0 25 mg tablet Take 1 tablet (0 25 mg total) by mouth daily at bedtime as needed for anxiety 20 tablet 0    Cholecalciferol (VITAMIN D3 PO) Take 2,000 mg by mouth daily in the early morning      cyclobenzaprine (FLEXERIL) 10 mg tablet Take 1 tablet (10 mg total) by mouth 3 (three) times a day as needed for muscle spasms 60 tablet 1    cyclopentolate (CYCLOGYL) 1 % ophthalmic solution       labetalol (NORMODYNE) 200 mg tablet Take 1 tablet (200 mg total) by mouth 2 (two) times a day 180 tablet 0    magnesium 30 MG tablet Take 30 mg by mouth 2 (two) times a day      meloxicam (MOBIC) 15 mg tablet Take 1 tablet (15 mg total) by mouth daily as needed for mild pain 30 tablet 0    metFORMIN (GLUCOPHAGE-XR) 500 mg 24 hr tablet Take 1 tablet (500 mg total) by mouth daily with dinner 30 tablet 2    Multiple Vitamins-Minerals (Womens Multivitamin) TABS Take 1 tablet by mouth daily      olmesartan-hydrochlorothiazide (BENICAR HCT) 20-12 5 MG per tablet Take 1 tablet by mouth daily 90 tablet 3    ondansetron (ZOFRAN-ODT) 4 mg disintegrating tablet Take 1 tablet (4 mg total) by mouth every 6 (six) hours as needed for nausea or vomiting 20 tablet 0    pantoprazole (PROTONIX) 40 mg tablet Take 1 tablet (40 mg total) by mouth daily before breakfast 90 tablet 3    prednisoLONE acetate (PRED FORTE) 1 % ophthalmic suspension       SUMAtriptan (IMITREX) 50 mg tablet Take 1 tablet (50 mg total) by mouth once as needed for migraine for up to 1 dose 9 tablet 0     No current facility-administered medications for this visit  She has No Known Allergies       Review of Systems   Constitutional: Negative  Negative for activity change  HENT: Negative  Eyes: Negative  Respiratory: Negative  Cardiovascular: Negative  Gastrointestinal: Positive for abdominal distention and abdominal pain  Endocrine: Negative  Genitourinary: Negative  Musculoskeletal: Negative  Skin: Negative  Allergic/Immunologic: Negative  Neurological: Negative  Psychiatric/Behavioral: Negative for agitation, behavioral problems and confusion  The patient is not nervous/anxious  All other systems reviewed and are negative  Objective:      /79   Pulse 76   Ht 5' 3" (1 6 m)   Wt 89 8 kg (198 lb)   BMI 35 07 kg/m²          Physical Exam  Constitutional:       Appearance: Normal appearance  She is well-developed  HENT:      Head: Normocephalic and atraumatic  Nose: Nose normal    Eyes:      Extraocular Movements: Extraocular movements intact  Conjunctiva/sclera: Conjunctivae normal    Cardiovascular:      Rate and Rhythm: Normal rate and regular rhythm  Heart sounds: Normal heart sounds  Pulmonary:      Effort: Pulmonary effort is normal       Breath sounds: Normal breath sounds  Abdominal:      General: Abdomen is flat  Palpations: Abdomen is soft  Tenderness: There is abdominal tenderness in the epigastric area  There is no guarding  Negative signs include Mendosa's sign  Hernia: No hernia is present  Musculoskeletal:         General: Normal range of motion  Cervical back: Normal range of motion  Skin:     General: Skin is warm and dry  Neurological:      Mental Status: She is alert and oriented to person, place, and time     Psychiatric:         Mood and Affect: Mood normal

## 2022-01-26 ENCOUNTER — PREP FOR PROCEDURE (OUTPATIENT)
Dept: SURGERY | Facility: CLINIC | Age: 37
End: 2022-01-26

## 2022-01-26 DIAGNOSIS — R10.9 ABDOMINAL PAIN: Primary | ICD-10-CM

## 2022-02-01 ENCOUNTER — ANESTHESIA (OUTPATIENT)
Dept: ANESTHESIOLOGY | Facility: HOSPITAL | Age: 37
End: 2022-02-01

## 2022-02-01 ENCOUNTER — ANESTHESIA EVENT (OUTPATIENT)
Dept: ANESTHESIOLOGY | Facility: HOSPITAL | Age: 37
End: 2022-02-01

## 2022-02-01 ENCOUNTER — ANNUAL EXAM (OUTPATIENT)
Dept: OBGYN CLINIC | Facility: CLINIC | Age: 37
End: 2022-02-01
Payer: COMMERCIAL

## 2022-02-01 ENCOUNTER — LAB (OUTPATIENT)
Dept: LAB | Facility: HOSPITAL | Age: 37
End: 2022-02-01
Payer: COMMERCIAL

## 2022-02-01 VITALS
BODY MASS INDEX: 36.79 KG/M2 | DIASTOLIC BLOOD PRESSURE: 78 MMHG | HEIGHT: 63 IN | WEIGHT: 207.6 LBS | SYSTOLIC BLOOD PRESSURE: 124 MMHG

## 2022-02-01 DIAGNOSIS — Z01.411 ENCOUNTER FOR GYNECOLOGICAL EXAMINATION WITH ABNORMAL FINDING: Primary | ICD-10-CM

## 2022-02-01 DIAGNOSIS — R63.8 UNABLE TO LOSE WEIGHT: ICD-10-CM

## 2022-02-01 DIAGNOSIS — R10.2 PELVIC PAIN: ICD-10-CM

## 2022-02-01 DIAGNOSIS — R10.13 EPIGASTRIC PAIN: ICD-10-CM

## 2022-02-01 DIAGNOSIS — D57.3 SICKLE CELL TRAIT (HCC): ICD-10-CM

## 2022-02-01 DIAGNOSIS — D50.0 IRON DEFICIENCY ANEMIA DUE TO CHRONIC BLOOD LOSS: ICD-10-CM

## 2022-02-01 LAB
ALBUMIN SERPL BCP-MCNC: 3.9 G/DL (ref 3.5–5)
ALP SERPL-CCNC: 55 U/L (ref 46–116)
ALT SERPL W P-5'-P-CCNC: 31 U/L (ref 12–78)
ANION GAP SERPL CALCULATED.3IONS-SCNC: 5 MMOL/L (ref 4–13)
AST SERPL W P-5'-P-CCNC: 11 U/L (ref 5–45)
BASOPHILS # BLD AUTO: 0.06 THOUSANDS/ΜL (ref 0–0.1)
BASOPHILS NFR BLD AUTO: 1 % (ref 0–1)
BILIRUB SERPL-MCNC: 0.38 MG/DL (ref 0.2–1)
BUN SERPL-MCNC: 14 MG/DL (ref 5–25)
CALCIUM SERPL-MCNC: 9.4 MG/DL (ref 8.3–10.1)
CHLORIDE SERPL-SCNC: 104 MMOL/L (ref 100–108)
CO2 SERPL-SCNC: 28 MMOL/L (ref 21–32)
CREAT SERPL-MCNC: 1.09 MG/DL (ref 0.6–1.3)
EOSINOPHIL # BLD AUTO: 0.1 THOUSAND/ΜL (ref 0–0.61)
EOSINOPHIL NFR BLD AUTO: 1 % (ref 0–6)
ERYTHROCYTE [DISTWIDTH] IN BLOOD BY AUTOMATED COUNT: 14.7 % (ref 11.6–15.1)
EST. AVERAGE GLUCOSE BLD GHB EST-MCNC: 128 MG/DL
FERRITIN SERPL-MCNC: 95 NG/ML (ref 8–388)
GFR SERPL CREATININE-BSD FRML MDRD: 65 ML/MIN/1.73SQ M
GLUCOSE P FAST SERPL-MCNC: 138 MG/DL (ref 65–99)
HBA1C MFR BLD: 6.1 %
HCT VFR BLD AUTO: 38 % (ref 34.8–46.1)
HGB BLD-MCNC: 12.3 G/DL (ref 11.5–15.4)
IMM GRANULOCYTES # BLD AUTO: 0.02 THOUSAND/UL (ref 0–0.2)
IMM GRANULOCYTES NFR BLD AUTO: 0 % (ref 0–2)
IRON SATN MFR SERPL: 33 % (ref 15–50)
IRON SERPL-MCNC: 112 UG/DL (ref 50–170)
LIPASE SERPL-CCNC: 133 U/L (ref 73–393)
LYMPHOCYTES # BLD AUTO: 1.77 THOUSANDS/ΜL (ref 0.6–4.47)
LYMPHOCYTES NFR BLD AUTO: 24 % (ref 14–44)
MCH RBC QN AUTO: 25.7 PG (ref 26.8–34.3)
MCHC RBC AUTO-ENTMCNC: 32.4 G/DL (ref 31.4–37.4)
MCV RBC AUTO: 79 FL (ref 82–98)
MONOCYTES # BLD AUTO: 0.41 THOUSAND/ΜL (ref 0.17–1.22)
MONOCYTES NFR BLD AUTO: 5 % (ref 4–12)
NEUTROPHILS # BLD AUTO: 5.18 THOUSANDS/ΜL (ref 1.85–7.62)
NEUTS SEG NFR BLD AUTO: 69 % (ref 43–75)
NRBC BLD AUTO-RTO: 0 /100 WBCS
PLATELET # BLD AUTO: 277 THOUSANDS/UL (ref 149–390)
PMV BLD AUTO: 10.8 FL (ref 8.9–12.7)
POTASSIUM SERPL-SCNC: 3.6 MMOL/L (ref 3.5–5.3)
PROT SERPL-MCNC: 7.6 G/DL (ref 6.4–8.2)
RBC # BLD AUTO: 4.79 MILLION/UL (ref 3.81–5.12)
SODIUM SERPL-SCNC: 137 MMOL/L (ref 136–145)
T3FREE SERPL-MCNC: 3.03 PG/ML (ref 2.3–4.2)
T4 FREE SERPL-MCNC: 0.89 NG/DL (ref 0.76–1.46)
TIBC SERPL-MCNC: 343 UG/DL (ref 250–450)
TSH SERPL DL<=0.05 MIU/L-ACNC: 2.22 UIU/ML (ref 0.36–3.74)
WBC # BLD AUTO: 7.54 THOUSAND/UL (ref 4.31–10.16)

## 2022-02-01 PROCEDURE — 84481 FREE ASSAY (FT-3): CPT

## 2022-02-01 PROCEDURE — G0476 HPV COMBO ASSAY CA SCREEN: HCPCS | Performed by: PHYSICIAN ASSISTANT

## 2022-02-01 PROCEDURE — 36415 COLL VENOUS BLD VENIPUNCTURE: CPT | Performed by: FAMILY MEDICINE

## 2022-02-01 PROCEDURE — 84439 ASSAY OF FREE THYROXINE: CPT

## 2022-02-01 PROCEDURE — 83036 HEMOGLOBIN GLYCOSYLATED A1C: CPT | Performed by: FAMILY MEDICINE

## 2022-02-01 PROCEDURE — 80053 COMPREHEN METABOLIC PANEL: CPT | Performed by: FAMILY MEDICINE

## 2022-02-01 PROCEDURE — 83690 ASSAY OF LIPASE: CPT

## 2022-02-01 PROCEDURE — 84443 ASSAY THYROID STIM HORMONE: CPT

## 2022-02-01 PROCEDURE — S0612 ANNUAL GYNECOLOGICAL EXAMINA: HCPCS | Performed by: PHYSICIAN ASSISTANT

## 2022-02-01 PROCEDURE — 83540 ASSAY OF IRON: CPT

## 2022-02-01 PROCEDURE — 85025 COMPLETE CBC W/AUTO DIFF WBC: CPT | Performed by: FAMILY MEDICINE

## 2022-02-01 PROCEDURE — 82728 ASSAY OF FERRITIN: CPT

## 2022-02-01 PROCEDURE — 83550 IRON BINDING TEST: CPT

## 2022-02-01 PROCEDURE — G0143 SCR C/V CYTO,THINLAYER,RESCR: HCPCS | Performed by: PHYSICIAN ASSISTANT

## 2022-02-01 NOTE — RESULT ENCOUNTER NOTE
Lipase and liver enzymes are normal   This would indicate that there is no stones in the main bile duct  As discussed, an upper scope test is indicated  If this is normal, a CT scan of the abdomen should be completed  If there is a delay in getting upper endoscopy, please call the office and we could order the CT scan earlier  As an aside, please note your hemoglobin A1c elevation    Best regards,

## 2022-02-01 NOTE — PROGRESS NOTES
Assessment/Plan:    No problem-specific Assessment & Plan notes found for this encounter  Diagnoses and all orders for this visit:    Encounter for gynecological examination with abnormal finding  -     Liquid-based pap, screening    Pelvic pain  -     US pelvis complete w transvaginal; Future        Pap done  Order for pelvic U/S entered to evaluate new onset pain; we will call with results  Looks like cyst on R labia mostly resolved; call if symptoms worsen or change  Call if periods worsen or change  If no problems, patient to return in 1 year for routine gyn care  Subjective:      Patient ID: Crescencio Young is a 39 y o  female  Patient is here for yearly gyn exam   States she is doing well overall  Periods are regular every 28-32 days, and bleeding lasts for 5-6 days  Flow has improved since myomectomy  She denies severe cramping  Complains of L sided pelvic pain around ovulation for the last two months  Pain lasts for over a day  Also states she noticed a tender, white bump on her R labia majora last week  Symptoms seem to have mostly resolved  Patient denies bowel/bladder changes, bloating, abdominal pain, n/v, change in appetite, and thyroid disease  Patient is performing self-breast exam   Denies new masses, skin changes, nipple discharge, and pain/tenderness  The following portions of the patient's history were reviewed and updated as appropriate: allergies, current medications, past family history, past medical history, past social history, past surgical history and problem list     Review of Systems   Constitutional: Negative for appetite change and unexpected weight change  Cardiovascular:        No masses, skin changes, nipple discharge, and pain/tenderness  Gastrointestinal: Negative for abdominal distention, abdominal pain, constipation, diarrhea, nausea and vomiting  Genitourinary: Positive for pelvic pain (L sided)   Negative for difficulty urinating, dysuria, frequency, genital sores, hematuria, menstrual problem, urgency, vaginal bleeding, vaginal discharge and vaginal pain  Objective:      /78 (BP Location: Left arm, Patient Position: Sitting, Cuff Size: Standard)   Ht 5' 3" (1 6 m)   Wt 94 2 kg (207 lb 9 6 oz)   LMP 01/25/2022   BMI 36 77 kg/m²          Physical Exam  Vitals reviewed  Exam conducted with a chaperone present  Constitutional:       Appearance: Normal appearance  She is well-developed  She is obese  Neck:      Thyroid: No thyromegaly  Pulmonary:      Effort: Pulmonary effort is normal    Chest:   Breasts: Breasts are symmetrical       Right: Normal  No swelling, bleeding, inverted nipple, mass, nipple discharge, skin change, tenderness, axillary adenopathy or supraclavicular adenopathy  Left: Normal  No swelling, bleeding, inverted nipple, mass, nipple discharge, skin change, tenderness, axillary adenopathy or supraclavicular adenopathy  Abdominal:      General: Abdomen is flat  There is no distension  Palpations: Abdomen is soft  Tenderness: There is no abdominal tenderness  Genitourinary:     General: Normal vulva  Pubic Area: No rash  Labia:         Right: No rash, tenderness, lesion or injury  Left: No rash, tenderness, lesion or injury  Vagina: Normal  No vaginal discharge, erythema, tenderness or bleeding  Cervix: Normal       Uterus: Normal        Adnexa: Right adnexa normal and left adnexa normal         Right: No mass, tenderness or fullness  Left: No mass, tenderness or fullness  Comments: Pin point, raised, white cyst on outer R labia majora  Musculoskeletal:      Cervical back: Neck supple  Lymphadenopathy:      Cervical: No cervical adenopathy  Upper Body:      Right upper body: No supraclavicular or axillary adenopathy  Left upper body: No supraclavicular or axillary adenopathy  Lower Body: No right inguinal adenopathy   No left inguinal adenopathy  Skin:     General: Skin is warm and dry  Neurological:      Mental Status: She is alert and oriented to person, place, and time  Psychiatric:         Mood and Affect: Mood normal          Behavior: Behavior normal  Behavior is cooperative  Thought Content:  Thought content normal          Judgment: Judgment normal

## 2022-02-04 ENCOUNTER — TELEMEDICINE (OUTPATIENT)
Dept: HEMATOLOGY ONCOLOGY | Facility: CLINIC | Age: 37
End: 2022-02-04
Payer: COMMERCIAL

## 2022-02-04 DIAGNOSIS — Z86.39 HISTORY OF IRON DEFICIENCY: Primary | ICD-10-CM

## 2022-02-04 PROCEDURE — 99213 OFFICE O/P EST LOW 20 MIN: CPT | Performed by: PHYSICIAN ASSISTANT

## 2022-02-04 NOTE — PROGRESS NOTES
Virtual Regular Visit    Verification of patient location:    Patient is located in the following state in which I hold an active license [place]      Assessment/Plan:    Problem List Items Addressed This Visit     None             Reason for visit is No chief complaint on file  Encounter provider Kathleen Bee PA-C    Provider located at [address] [address] [phone]    Recent Visits  No visits were found meeting these conditions  Showing recent visits within past 7 days and meeting all other requirements  Future Appointments  No visits were found meeting these conditions  Showing future appointments within next 150 days and meeting all other requirements       The patient was identified by name and date of birth  Effie Valdez was informed that this is a telemedicine visit and that the visit is being conducted through [address] [place] [address] Now and patient was informed that this is a secure, HIPAA-compliant platform  She agrees to proceed     My office door was closed  No one else was in the room  She acknowledged consent and understanding of privacy and security of the video platform  The patient has agreed to participate and understands they can discontinue the visit at any time  Patient is aware this is a billable service  Subjective  Effie Valdez is a 39 y o  female presents for follow up for iron def anemia  Past medical history significant for GERD, polycystic ovaries, asthma, trace mitral regurgitation, hypertension, migraines, herniated lumbar disc status post surgical intervention, trigger finger of right thumb, status post surgical intervention, uterine fibroid, female infertility, iron deficiency secondary to chronic blood loss      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      Periods were very heavy for 6-7 months   She was seeing reproductive endocrinology and underwent myomectomy and bleeding improved a lot  Due to persistent microcytosis she had lab evaluation  Negative alpha-thalassemia  Hemoglobin electrophoresis was consistent with sickle cell trait, heterozygous  Past Medical History:   Diagnosis Date    Abdominal pain     Abnormal ECG     Anemia     Asthma     mild intermittent    GERD (gastroesophageal reflux disease)     History of palpitations     Hypertension     Lower back pain     Migraines     Seasonal allergies     Trace mitral regurgitation by prior echocardiogram        Past Surgical History:   Procedure Laterality Date    CHOLECYSTECTOMY      laparoscopic    COLONOSCOPY  2017    HERNIA REPAIR      umbilical    LAPAROTOMY N/A 12/10/2020    Procedure: ABDOMINAL MYOMECTOMY;  Surgeon: Fritz Ford DO;  [facility];  Service: Gynecology    LUMBAR LAMINECTOMY Right 9/14/2016    Procedure: Right L5/S1 Metryx microdiscectomy;  Surgeon: Mya Curtis MD;  [facility];  Service:    Melanie Roblero AK COLONOSCOPY FLX DX W/COLLJ SPEC WHEN PFRMD N/A 7/7/2016    Procedure: COLONOSCOPY;  Surgeon: Tyler Catherine DO;  Location: [facility];   Service: Gastroenterology    AK HYSTEROSCOPY,W/ENDO BX N/A 3/19/2018    Procedure: HYSTEROSCOPY; MYOMECTOMY; ENDOMETRIAL BIOPSY;  Surgeon: Fritz Ford DO;  [facility];  Service: Gynecology    AK INCISE FINGER TENDON SHEATH Right 2/28/2017    Procedure: THUMB TRIGGER RELEASE ;  Surgeon: Jay Jay Granda DO;  [facility];  Service: Orthopedics    WISDOM TOOTH EXTRACTION         Current Outpatient Medications   Medication Sig Dispense Refill    albuterol (2 5 mg/3 mL) 0 083 % nebulizer solution Take 1 vial (2 5 mg total) by nebulization every 6 (six) hours as needed for wheezing or shortness of breath 30 vial 3    albuterol (ProAir HFA) 90 mcg/act inhaler Inhale 2 puffs every 4 (four) hours as needed (When has flare up) 1 Inhaler 0    ALPRAZolam (XANAX) 0 25 mg tablet Take 1 tablet (0 25 mg total) by mouth daily at bedtime as needed for anxiety 20 tablet 0    Cholecalciferol (VITAMIN D3 PO) Take 2,000 mg by mouth daily in the early morning      cyclobenzaprine (FLEXERIL) 10 mg tablet Take 1 tablet (10 mg total) by mouth 3 (three) times a day as needed for muscle spasms 60 tablet 1    cyclopentolate (CYCLOGYL) 1 % ophthalmic solution       labetalol (NORMODYNE) 200 mg tablet Take 1 tablet (200 mg total) by mouth 2 (two) times a day 180 tablet 0    magnesium 30 MG tablet Take 30 mg by mouth 2 (two) times a day      meloxicam (MOBIC) 15 mg tablet Take 1 tablet (15 mg total) by mouth daily as needed for mild pain 30 tablet 0    metFORMIN (GLUCOPHAGE-XR) 500 mg 24 hr tablet Take 1 tablet (500 mg total) by mouth daily with dinner 30 tablet 2    Multiple Vitamins-Minerals (Womens Multivitamin) TABS Take 1 tablet by mouth daily      olmesartan-hydrochlorothiazide (BENICAR HCT) 20-12 5 MG per tablet Take 1 tablet by mouth daily 90 tablet 3    ondansetron (ZOFRAN-ODT) 4 mg disintegrating tablet Take 1 tablet (4 mg total) by mouth every 6 (six) hours as needed for nausea or vomiting 20 tablet 0    pantoprazole (PROTONIX) 40 mg tablet Take 1 tablet (40 mg total) by mouth daily before breakfast 90 tablet 3    prednisoLONE acetate (PRED FORTE) 1 % ophthalmic suspension       SUMAtriptan (IMITREX) 50 mg tablet Take 1 tablet (50 mg total) by mouth once as needed for migraine for up to 1 dose 9 tablet 0     No current facility-administered medications for this visit  No Known Allergies    Review of Systems   Constitutional: Negative for activity change, appetite change, chills, fatigue and fever  Respiratory: Negative for cough and shortness of breath  Cardiovascular: Negative for chest pain, palpitations and leg swelling  Gastrointestinal: Negative for abdominal pain, constipation, diarrhea, nausea and vomiting     Genitourinary: Negative for difficulty urinating, dysuria, hematuria and menstrual problem  Musculoskeletal: Negative for arthralgias  Skin: Negative  Neurological: Negative for dizziness, weakness, light-headedness, numbness and headaches  Hematological: Negative  Psychiatric/Behavioral: Negative  Video Exam    There were no vitals filed for this visit  Physical Exam  Constitutional:       General: She is not in acute distress  Appearance: She is well-developed  She is not ill-appearing  HENT:      Head: Normocephalic and atraumatic  Eyes:      General: No scleral icterus  Pulmonary:      Effort: Pulmonary effort is normal  No respiratory distress  Abdominal:      Palpations: Abdomen is soft  Tenderness: There is no abdominal tenderness  Musculoskeletal:         General: No tenderness  Normal range of motion  Cervical back: Normal range of motion and neck supple  Right lower leg: No edema  Left lower leg: No edema  Lymphadenopathy:      Cervical: No cervical adenopathy  Skin:     General: Skin is warm and dry  Neurological:      Mental Status: She is alert and oriented to person, place, and time  Cranial Nerves: No cranial nerve deficit  Psychiatric:         Mood and Affect: Mood normal          Behavior: Behavior normal         39year old female presents for follow up for iron def anemia  This was related to menorrhagia which has since been treated with a myomectomy  She no longer has heavy bleeding  Her iron and hgb are very good  She is not symptomatic  She should continue CBC assessment with PCP  Reminded her that she has low MCV chronically due to sickle cell trait; partner does not have sickle cell trait  Follow up PRN      There are no diagnoses linked to this encounter  I spent 16 minutes directly with the patient during this visit    VIRTUAL VISIT [address] verbally agrees to participate in [employer]   Pt is aware that Virtual Care Services could be limited without vital signs or the ability to perform a full hands-on physical Sotero Jean understands she or the provider may request at any time to terminate the video visit and request the patient to seek care or treatment in person

## 2022-02-07 LAB
LAB AP GYN PRIMARY INTERPRETATION: NORMAL
Lab: NORMAL

## 2022-02-14 ENCOUNTER — TELEPHONE (OUTPATIENT)
Dept: GASTROENTEROLOGY | Facility: AMBULARY SURGERY CENTER | Age: 37
End: 2022-02-14

## 2022-02-15 ENCOUNTER — ANESTHESIA (OUTPATIENT)
Dept: GASTROENTEROLOGY | Facility: AMBULARY SURGERY CENTER | Age: 37
End: 2022-02-15

## 2022-02-15 ENCOUNTER — ANESTHESIA EVENT (OUTPATIENT)
Dept: GASTROENTEROLOGY | Facility: AMBULARY SURGERY CENTER | Age: 37
End: 2022-02-15

## 2022-02-15 ENCOUNTER — HOSPITAL ENCOUNTER (OUTPATIENT)
Dept: GASTROENTEROLOGY | Facility: AMBULARY SURGERY CENTER | Age: 37
Setting detail: OUTPATIENT SURGERY
Discharge: HOME | End: 2022-02-15
Attending: SURGERY
Payer: COMMERCIAL

## 2022-02-15 VITALS
RESPIRATION RATE: 20 BRPM | TEMPERATURE: 97.8 F | DIASTOLIC BLOOD PRESSURE: 88 MMHG | BODY MASS INDEX: 37.32 KG/M2 | WEIGHT: 202.82 LBS | OXYGEN SATURATION: 99 % | HEART RATE: 76 BPM | SYSTOLIC BLOOD PRESSURE: 131 MMHG | HEIGHT: 62 IN

## 2022-02-15 DIAGNOSIS — R10.9 ABDOMINAL PAIN: ICD-10-CM

## 2022-02-15 LAB
EXT PREGNANCY TEST URINE: NEGATIVE
EXT. CONTROL: NORMAL

## 2022-02-15 PROCEDURE — 43239 EGD BIOPSY SINGLE/MULTIPLE: CPT | Performed by: SURGERY

## 2022-02-15 PROCEDURE — 88305 TISSUE EXAM BY PATHOLOGIST: CPT | Performed by: PATHOLOGY

## 2022-02-15 PROCEDURE — 81025 URINE PREGNANCY TEST: CPT | Performed by: SURGERY

## 2022-02-15 RX ORDER — FENTANYL CITRATE 50 UG/ML
INJECTION, SOLUTION INTRAMUSCULAR; INTRAVENOUS AS NEEDED
Status: DISCONTINUED | OUTPATIENT
Start: 2022-02-15 | End: 2022-02-15

## 2022-02-15 RX ORDER — PROPOFOL 10 MG/ML
INJECTION, EMULSION INTRAVENOUS AS NEEDED
Status: DISCONTINUED | OUTPATIENT
Start: 2022-02-15 | End: 2022-02-15

## 2022-02-15 RX ORDER — LIDOCAINE HYDROCHLORIDE 10 MG/ML
INJECTION, SOLUTION EPIDURAL; INFILTRATION; INTRACAUDAL; PERINEURAL AS NEEDED
Status: DISCONTINUED | OUTPATIENT
Start: 2022-02-15 | End: 2022-02-15

## 2022-02-15 RX ORDER — PROPOFOL 10 MG/ML
INJECTION, EMULSION INTRAVENOUS CONTINUOUS PRN
Status: DISCONTINUED | OUTPATIENT
Start: 2022-02-15 | End: 2022-02-15

## 2022-02-15 RX ORDER — SODIUM CHLORIDE, SODIUM LACTATE, POTASSIUM CHLORIDE, CALCIUM CHLORIDE 600; 310; 30; 20 MG/100ML; MG/100ML; MG/100ML; MG/100ML
INJECTION, SOLUTION INTRAVENOUS CONTINUOUS PRN
Status: DISCONTINUED | OUTPATIENT
Start: 2022-02-15 | End: 2022-02-15

## 2022-02-15 RX ADMIN — FENTANYL CITRATE 50 MCG: 50 INJECTION, SOLUTION INTRAMUSCULAR; INTRAVENOUS at 14:01

## 2022-02-15 RX ADMIN — PROPOFOL 140 MCG/KG/MIN: 10 INJECTION, EMULSION INTRAVENOUS at 14:01

## 2022-02-15 RX ADMIN — SODIUM CHLORIDE, SODIUM LACTATE, POTASSIUM CHLORIDE, AND CALCIUM CHLORIDE: .6; .31; .03; .02 INJECTION, SOLUTION INTRAVENOUS at 13:15

## 2022-02-15 RX ADMIN — LIDOCAINE HYDROCHLORIDE 50 MG: 10 INJECTION, SOLUTION EPIDURAL; INFILTRATION; INTRACAUDAL at 14:01

## 2022-02-15 RX ADMIN — PROPOFOL 120 MG: 10 INJECTION, EMULSION INTRAVENOUS at 14:01

## 2022-02-15 RX ADMIN — FENTANYL CITRATE 50 MCG: 50 INJECTION, SOLUTION INTRAMUSCULAR; INTRAVENOUS at 14:05

## 2022-02-15 NOTE — ANESTHESIA POSTPROCEDURE EVALUATION
Post-Op Assessment Note    CV Status:  Stable    Pain management: adequate     Mental Status:  Alert and awake   Hydration Status:  Euvolemic   PONV Controlled:  Controlled   Airway Patency:  Patent      Post Op Vitals Reviewed: Yes      Staff: Anesthesiologist, with CRNAs         No complications documented      /61 (02/15/22 1412)    Temp 97 8 °F (36 6 °C) (02/15/22 1412)    Pulse 97 (sr) (02/15/22 1412)   Resp 16 (02/15/22 1412)    SpO2 96 % (02/15/22 1412)

## 2022-02-15 NOTE — ANESTHESIA PREPROCEDURE EVALUATION
Procedure:  EGD    Relevant Problems   CARDIO   (+) Benign essential hypertension   (+) Migraine without aura and without status migrainosus, not intractable   (+) Mitral valve insufficiency   (+) PVC (premature ventricular contraction)      GI/HEPATIC   (+) GERD (gastroesophageal reflux disease)      HEMATOLOGY   (+) Iron deficiency anemia due to chronic blood loss      MUSCULOSKELETAL   (+) Lower back pain      NEURO/PSYCH   (+) Migraine without aura and without status migrainosus, not intractable   (+) Numbness and tingling in both hands      PULMONARY   (+) Asthma in adult, mild intermittent, uncomplicated        Physical Exam    Airway    Mallampati score: II  TM Distance: >3 FB  Neck ROM: full     Dental   No notable dental hx     Cardiovascular  Cardiovascular exam normal    Pulmonary  Pulmonary exam normal     Other Findings      Seasonal asthma  Controlled GERD  Anesthesia Plan  ASA Score- 2     Anesthesia Type- IV sedation with anesthesia with ASA Monitors  Additional Monitors:   Airway Plan:           Plan Factors-Exercise tolerance (METS): >4 METS  Chart reviewed  EKG reviewed  Existing labs reviewed  Patient summary reviewed  Patient is not a current smoker  Induction- intravenous  Postoperative Plan-     Informed Consent- Anesthetic plan and risks discussed with patient

## 2022-02-15 NOTE — INTERVAL H&P NOTE
H&P reviewed  After examining the patient I find no changes in the patients condition since the H&P had been written      Vitals:    02/15/22 1331   BP: 142/66   Pulse: 76   Resp: 15   Temp: (!) 96 9 °F (36 1 °C)   SpO2: 100%

## 2022-02-15 NOTE — DISCHARGE INSTRUCTIONS
Upper Endoscopy   WHAT YOU NEED TO KNOW:   An upper endoscopy is also called an upper gastrointestinal (GI) endoscopy, or an esophagogastroduodenoscopy (EGD)  You may feel bloated, gassy, or have some abdominal discomfort after your procedure  Your throat may be sore for 24 to 36 hours  You may burp or pass gas from air that is still inside your body  DISCHARGE INSTRUCTIONS:   Call 911 for any of the following:   · You have sudden chest pain or trouble breathing  Seek care immediately if:   · You feel dizzy or faint  · You have trouble swallowing  · Your bowel movements are very dark or black  · Your abdomen is hard and firm and you have severe pain  · You vomit blood  Contact your healthcare provider if:   · You feel full or bloated and cannot burp or pass gas  · You have not had a bowel movement for 3 days after your procedure  · You have neck pain  · You have a fever or chills  · You have nausea or are vomiting  · You have a rash or hives  · You have questions or concerns about your endoscopy  Relieve a sore throat:  Suck on throat lozenges or crushed ice  Gargle with a small amount of warm salt water  Mix 1 teaspoon of salt and 1 cup of warm water to make salt water  Relieve gas and discomfort from bloating:  Lie on your right side with a heating pad on your abdomen  Take short walks to help pass gas  Eat small meals until bloating is relieved  Rest after your procedure: You have been given medicine to relax you  Do not  drive or make important decisions until the day after your procedure  Return to your normal activity as directed  You can usually return to work the day after your procedure  Follow up with your healthcare provider as directed:  Write down your questions so you remember to ask them during your visits     © 2017 [address] is for End User's use only and may not be sold, redistributed or otherwise used for commercial purposes  All illustrations and images included in CareNotes® are the copyrighted property of [employer]  or Clarence Santamaria  The above information is an  only  It is not intended as medical advice for individual conditions or treatments  Talk to your doctor, nurse or pharmacist before following any medical regimen to see if it is safe and effective for you

## 2022-02-18 ENCOUNTER — APPOINTMENT (OUTPATIENT)
Dept: LAB | Facility: HOSPITAL | Age: 37
End: 2022-02-18
Payer: COMMERCIAL

## 2022-02-18 ENCOUNTER — OFFICE VISIT (OUTPATIENT)
Dept: OBGYN CLINIC | Facility: CLINIC | Age: 37
End: 2022-02-18
Payer: COMMERCIAL

## 2022-02-18 VITALS — DIASTOLIC BLOOD PRESSURE: 70 MMHG | BODY MASS INDEX: 36.95 KG/M2 | WEIGHT: 202 LBS | SYSTOLIC BLOOD PRESSURE: 108 MMHG

## 2022-02-18 DIAGNOSIS — N89.8 VAGINAL ITCHING: ICD-10-CM

## 2022-02-18 DIAGNOSIS — L29.2 VULVAR ITCHING: Primary | ICD-10-CM

## 2022-02-18 PROCEDURE — 99214 OFFICE O/P EST MOD 30 MIN: CPT | Performed by: OBSTETRICS & GYNECOLOGY

## 2022-02-18 PROCEDURE — 87480 CANDIDA DNA DIR PROBE: CPT | Performed by: OBSTETRICS & GYNECOLOGY

## 2022-02-18 PROCEDURE — 87510 GARDNER VAG DNA DIR PROBE: CPT | Performed by: OBSTETRICS & GYNECOLOGY

## 2022-02-18 PROCEDURE — 87660 TRICHOMONAS VAGIN DIR PROBE: CPT | Performed by: OBSTETRICS & GYNECOLOGY

## 2022-02-18 RX ORDER — TRIAMCINOLONE ACETONIDE 0.25 MG/G
CREAM TOPICAL 2 TIMES DAILY
Qty: 30 G | Refills: 1 | Status: SHIPPED | OUTPATIENT
Start: 2022-02-18 | End: 2022-03-24

## 2022-02-18 NOTE — PROGRESS NOTES
Assessment/Plan:    No problem-specific Assessment & Plan notes found for this encounter  Diagnoses and all orders for this visit:    Vulvar itching  -     triamcinolone (KENALOG) 0 025 % cream; Apply topically 2 (two) times a day    Vaginal itching  -     VAGINOSIS DNA PROBE (AFFIRM)          Subjective:      Patient ID: Chris Driscoll is a 39 y o  female  Patient is presenting to the office today with complaint of four days of vaginal discomfort  She states that starting on Tuesday she started having internal and external itching and discomfort  She has a history of repeated BV and yeast infection that she reports felt similar to this but is worse this time  It has been also a year since her last infection  She denies any vaginal discharge, irregular bleeding, odors, abdominal pain, or changes in urinary or bowel habits  Cultures obtained in the office to test for BV and candida  Patient prescribed Kenalog cream to be used externally BID for inflammation  Patient will be called with the culture results and follow up as need if symptoms persist        Total time of today's visit was 25 minutes of which greater than 50% was spent face-to-face counseling the patient as well as coordination of care, review of chart lab values, physical examination and culture sampling as well as computer entry into the epic medical record system  The following portions of the patient's history were reviewed and updated as appropriate: allergies, current medications, past family history, past medical history, past social history, past surgical history and problem list     Review of Systems   Constitutional: Negative  HENT: Negative  Eyes: Negative  Respiratory: Negative  Cardiovascular: Negative  Gastrointestinal: Negative  Negative for abdominal pain, blood in stool, constipation and diarrhea  Endocrine: Negative  Genitourinary: Positive for vaginal pain   Negative for difficulty urinating, dysuria, frequency, hematuria, menstrual problem, pelvic pain, vaginal bleeding and vaginal discharge  Vaginal and vulvar itching    Musculoskeletal: Negative  Skin: Negative  Neurological: Negative  Psychiatric/Behavioral: Negative  Objective:      /70   Wt 91 6 kg (202 lb)   LMP 01/25/2022   BMI 36 95 kg/m²          Physical Exam  Constitutional:       Appearance: She is well-developed  HENT:      Head: Normocephalic  Eyes:      Pupils: Pupils are equal, round, and reactive to light  Cardiovascular:      Rate and Rhythm: Normal rate and regular rhythm  Pulmonary:      Effort: Pulmonary effort is normal       Breath sounds: Normal breath sounds  Abdominal:      Palpations: Abdomen is soft  Genitourinary:     General: Normal vulva  Exam position: Supine  Labia:         Right: No rash, tenderness or lesion  Left: No rash, tenderness or lesion  Vagina: No vaginal discharge, erythema or tenderness  Cervix: Discharge and cervical bleeding present  Uterus: Normal        Adnexa: Right adnexa normal and left adnexa normal       Comments: Small amount of clear discharge and blood on speculum exam  Musculoskeletal:         General: Normal range of motion  Cervical back: Normal range of motion and neck supple  Skin:     General: Skin is warm and dry  Neurological:      Mental Status: She is alert and oriented to person, place, and time  Psychiatric:         Mood and Affect: Mood normal          Behavior: Behavior normal          Thought Content:  Thought content normal          Judgment: Judgment normal

## 2022-02-18 NOTE — PATIENT INSTRUCTIONS
Patient prescribed Kenalog cream to be applied externally twice daily for inflammation  Warm tea baths were recommended for symptom relief  Cultures were obtained at the visit, patient will be called regarding results  Patient will be seen as needed if symptoms persist     Patient will call office for any problems, concerns, or issues which may arise during the interim

## 2022-02-21 ENCOUNTER — TELEPHONE (OUTPATIENT)
Dept: OBGYN CLINIC | Facility: CLINIC | Age: 37
End: 2022-02-21

## 2022-02-21 NOTE — TELEPHONE ENCOUNTER
----- Message from Nevin Agrawal MD sent at 2/21/2022  8:37 AM EST -----  Vaginal cultures were negative Thanks

## 2022-02-22 ENCOUNTER — TELEPHONE (OUTPATIENT)
Dept: OBGYN CLINIC | Facility: CLINIC | Age: 37
End: 2022-02-22

## 2022-02-22 ENCOUNTER — HOSPITAL ENCOUNTER (OUTPATIENT)
Dept: RADIOLOGY | Facility: HOSPITAL | Age: 37
Discharge: HOME | End: 2022-02-22
Payer: COMMERCIAL

## 2022-02-22 DIAGNOSIS — R10.2 PELVIC PAIN: ICD-10-CM

## 2022-02-22 PROCEDURE — 76856 US EXAM PELVIC COMPLETE: CPT

## 2022-02-22 PROCEDURE — 76830 TRANSVAGINAL US NON-OB: CPT

## 2022-02-22 NOTE — TELEPHONE ENCOUNTER
----- Message from Concetta Montejo MD sent at 2/21/2022  8:37 AM EST -----  Vaginal cultures were negative Thanks

## 2022-02-22 NOTE — TELEPHONE ENCOUNTER
----- Message from Rajeev Keane MD sent at 2/21/2022  8:37 AM EST -----  Vaginal cultures were negative Thanks

## 2022-03-07 ENCOUNTER — TELEPHONE (OUTPATIENT)
Dept: OBGYN CLINIC | Facility: CLINIC | Age: 37
End: 2022-03-07

## 2022-03-07 DIAGNOSIS — N76.0 BV (BACTERIAL VAGINOSIS): Primary | ICD-10-CM

## 2022-03-07 DIAGNOSIS — B96.89 BV (BACTERIAL VAGINOSIS): Primary | ICD-10-CM

## 2022-03-07 DIAGNOSIS — N89.8 VAGINA ITCHING: ICD-10-CM

## 2022-03-07 DIAGNOSIS — R10.2 PELVIC PAIN: ICD-10-CM

## 2022-03-07 RX ORDER — METRONIDAZOLE 500 MG/1
500 TABLET ORAL EVERY 12 HOURS SCHEDULED
Qty: 14 TABLET | Refills: 0 | Status: SHIPPED | OUTPATIENT
Start: 2022-03-07 | End: 2022-03-14

## 2022-03-07 RX ORDER — FLUCONAZOLE 150 MG/1
150 TABLET ORAL ONCE
Qty: 1 TABLET | Refills: 0 | Status: SHIPPED | OUTPATIENT
Start: 2022-03-07 | End: 2022-03-07

## 2022-03-07 NOTE — TELEPHONE ENCOUNTER
Spoke with patient regarding pelvic U/S results  Ovaries WNL  Fibroids in uterus stable  Patient still complaining of constant L sided pelvic pain  Recommended f/u with Dr Miya Hodges; referral entered  Patient to call for appointment  Patient still complaining of vaginal irritation and odor  Rx for metronidazole 500 mg BID x 7 days and Diflucan 150 mg x 1 dose sent to pharmacy    Call if symptoms persist

## 2022-03-21 DIAGNOSIS — K21.9 GASTROESOPHAGEAL REFLUX DISEASE WITHOUT ESOPHAGITIS: ICD-10-CM

## 2022-03-21 DIAGNOSIS — I10 BENIGN ESSENTIAL HYPERTENSION: ICD-10-CM

## 2022-03-21 DIAGNOSIS — I10 HYPERTENSION, UNSPECIFIED TYPE: ICD-10-CM

## 2022-03-22 ENCOUNTER — OFFICE VISIT (OUTPATIENT)
Dept: GASTROENTEROLOGY | Facility: CLINIC | Age: 37
End: 2022-03-22
Payer: COMMERCIAL

## 2022-03-22 VITALS
BODY MASS INDEX: 35.08 KG/M2 | HEART RATE: 77 BPM | HEIGHT: 63 IN | TEMPERATURE: 98.1 F | DIASTOLIC BLOOD PRESSURE: 72 MMHG | WEIGHT: 198 LBS | SYSTOLIC BLOOD PRESSURE: 112 MMHG

## 2022-03-22 DIAGNOSIS — R10.13 EPIGASTRIC PAIN: Primary | ICD-10-CM

## 2022-03-22 PROCEDURE — 99214 OFFICE O/P EST MOD 30 MIN: CPT | Performed by: INTERNAL MEDICINE

## 2022-03-22 RX ORDER — LABETALOL 200 MG/1
200 TABLET, FILM COATED ORAL 2 TIMES DAILY
Qty: 180 TABLET | Refills: 0 | Status: SHIPPED | OUTPATIENT
Start: 2022-03-22 | End: 2022-06-17 | Stop reason: SDUPTHER

## 2022-03-22 RX ORDER — PANTOPRAZOLE SODIUM 40 MG/1
40 TABLET, DELAYED RELEASE ORAL
Qty: 90 TABLET | Refills: 0 | Status: SHIPPED | OUTPATIENT
Start: 2022-03-22 | End: 2022-07-04 | Stop reason: SDUPTHER

## 2022-03-22 RX ORDER — OLMESARTAN MEDOXOMIL AND HYDROCHLOROTHIAZIDE 20/12.5 20; 12.5 MG/1; MG/1
1 TABLET ORAL DAILY
Qty: 90 TABLET | Refills: 0 | Status: SHIPPED | OUTPATIENT
Start: 2022-03-22 | End: 2022-06-17 | Stop reason: SDUPTHER

## 2022-03-22 NOTE — PROGRESS NOTES
[facility] - Outpatient Consultation  Katerina Liz 39 y o  female MRN: [id number]  Encounter: [id number]          ASSESSMENT AND PLAN:        1  Epigastric pain  Will obtain cross sectional imaging to further evaluate for other etiologies of abdominal pain given no improvement with antacids and negative EGD  Recommend attempting low FODMAP diet as well and small, frequent meals  Follow up with gynecology as scheduled as this may be related to her history of fibroids  - Ambulatory Referral to Gastroenterology  - CT abdomen pelvis w contrast; Future        ______________________________________________________________    HPI:  Katerina Liz is a 39y o  year old female who presents to the office for evaluation of epigastric pain  She is having pressure like abdominal discomfort in the abdomen for several months  Pain will come and go, with no known triggers  She has tried antacids with no relief  Also had EGD done with Dr Nirmal Isabel which was negative for h pylori and celiac disease  She had cholecystectomy  No diarrhea or constipation  Has regular bowel movements  She does have history of fibroids and this has remained stable  Has follow up with gynecology next week  REVIEW OF SYSTEMS:    CONSTITUTIONAL: Denies any fever, chills, rigors, and weight loss  HEENT: No earache or tinnitus  Denies hearing loss or visual disturbances  CARDIOVASCULAR: No chest pain or palpitations  RESPIRATORY: Denies any cough, hemoptysis, shortness of breath or dyspnea on exertion  GASTROINTESTINAL: As noted in the History of Present Illness  GENITOURINARY: No problems with urination  Denies any hematuria or dysuria  NEUROLOGIC: No dizziness or vertigo, denies headaches  MUSCULOSKELETAL: Denies any muscle or joint pain  SKIN: Denies skin rashes or itching  ENDOCRINE: Denies excessive thirst  Denies intolerance to heat or cold  PSYCHOSOCIAL: Denies depression or anxiety   Denies any recent memory loss  Historical Information   Past Medical History:   Diagnosis Date    Abdominal pain     Abnormal ECG     Anemia     Asthma     mild intermittent    GERD (gastroesophageal reflux disease)     History of palpitations     Hypertension     Lower back pain     Migraines     Seasonal allergies     Trace mitral regurgitation by prior echocardiogram      Past Surgical History:   Procedure Laterality Date    CHOLECYSTECTOMY      laparoscopic    COLONOSCOPY  2017    HERNIA REPAIR      umbilical    LAPAROTOMY N/A 12/10/2020    Procedure: ABDOMINAL MYOMECTOMY;  Surgeon: Mary Talbot DO;  [facility];  Service: Gynecology    LUMBAR LAMINECTOMY Right 9/14/2016    Procedure: Right L5/S1 Metryx microdiscectomy;  Surgeon: Lamar Shay MD;  [facility];  Service:    Clair Pall MT COLONOSCOPY FLX DX W/COLLJ SPEC WHEN PFRMD N/A 7/7/2016    Procedure: COLONOSCOPY;  Surgeon: Radha Hare DO;  Location: [facility];   Service: Gastroenterology    MT HYSTEROSCOPY,W/ENDO BX N/A 3/19/2018    Procedure: HYSTEROSCOPY; MYOMECTOMY; ENDOMETRIAL BIOPSY;  Surgeon: Mary Talbot DO;  [facility];  Service: Gynecology    MT INCISE FINGER TENDON SHEATH Right 2/28/2017    Procedure: THUMB TRIGGER RELEASE ;  Surgeon: Irwin Keen DO;  [facility];  Service: Orthopedics    WISDOM TOOTH EXTRACTION       Social History   Social History     Substance and Sexual Activity   Alcohol Use Yes    Alcohol/week: 4 0 standard drinks    Types: 4 Glasses of wine per week    Comment: socially      Social History     Substance and Sexual Activity   Drug Use No     Social History     Tobacco Use   Smoking Status Never Smoker   Smokeless Tobacco Never Used     Family History   Problem Relation Age of Onset    Diabetes Mother         type2    Hypertension Mother     Diabetes Father         type2    Hypertension Father     Heart attack Maternal Grandfather         acute MI    Arthritis Maternal Grandfather     Stroke Maternal Grandfather         CVA    Hyperlipidemia Maternal Grandfather     Heart disease Maternal Grandfather     Arthritis Paternal Grandfather     Stroke Paternal Grandfather         CVA    Cancer Paternal Grandfather     Hyperlipidemia Paternal Grandfather     Colon cancer Paternal Grandfather     Heart attack Maternal Uncle         acute MI    Arthritis Family     Stroke Family         CVA    Cancer Family     Hyperlipidemia Family     Stroke Family         CVA    Hyperlipidemia Family     Esophageal cancer Maternal Grandmother     Breast cancer Maternal Grandmother         Maternal great grandmother    Cancer Maternal Grandmother        Meds/Allergies       Current Outpatient Medications:     albuterol (2 5 mg/3 mL) 0 083 % nebulizer solution    albuterol (ProAir HFA) 90 mcg/act inhaler    ALPRAZolam (XANAX) 0 25 mg tablet    Cholecalciferol (VITAMIN D3 PO)    cyclobenzaprine (FLEXERIL) 10 mg tablet    cyclopentolate (CYCLOGYL) 1 % ophthalmic solution    labetalol (NORMODYNE) 200 mg tablet    magnesium 30 MG tablet    meloxicam (MOBIC) 15 mg tablet    metFORMIN (GLUCOPHAGE-XR) 500 mg 24 hr tablet    Multiple Vitamins-Minerals (Womens Multivitamin) TABS    olmesartan-hydrochlorothiazide (BENICAR HCT) 20-12 5 MG per tablet    ondansetron (ZOFRAN-ODT) 4 mg disintegrating tablet    pantoprazole (PROTONIX) 40 mg tablet    prednisoLONE acetate (PRED FORTE) 1 % ophthalmic suspension    SUMAtriptan (IMITREX) 50 mg tablet    triamcinolone (KENALOG) 0 025 % cream    No Known Allergies        Objective     Blood pressure 112/72, pulse 77, temperature 98 1 °F (36 7 °C), temperature source Tympanic, height 5' 3" (1 6 m), weight 89 8 kg (198 lb), unknown if currently breastfeeding  Body mass index is 35 07 kg/m²  PHYSICAL EXAM:      General Appearance:   Alert, cooperative, no distress   HEENT:   Normocephalic, atraumatic, anicteric  Lungs:   Equal chest rise and unlabored breathing, normal cough   Heart:   No visualized JVD   Abdomen:   Soft, non-tender, non-distended; no masses, no organomegaly    Genitalia:   Deferred    Rectal:   Deferred    Extremities:  No cyanosis, clubbing or edema    Pulses:  Musculoskeletal:  2+ and symmetric  Normal range of motion visualized    Skin:  Neuro:  No jaundice, rashes, or lesions   Alert and appropriate       Lab Results:   No visits with results within 1 Day(s) from this visit  Latest known visit with results is:   Office Visit on 02/18/2022   Component Date Value    Candida Species 02/18/2022 Negative     Gardnerella vaginalis 02/18/2022 Negative     Trichomonas vaginalis 02/18/2022 Negative          Radiology Results:   US pelvis complete w transvaginal    Result Date: 2/25/2022  Narrative: PELVIC ULTRASOUND, COMPLETE INDICATION:  The patient is 39years old  R10 2: Pelvic and perineal pain  COMPARISON: Pelvic ultrasound 11/13/2017  MRI pelvis 8/1/2020 TECHNIQUE:   Transabdominal pelvic ultrasound was performed in sagittal and transverse planes with a curvilinear transducer  Additional transvaginal imaging was performed to better evaluate the endometrium and ovaries  Imaging included volumetric sweeps as well as traditional still imaging technique  FINDINGS: UTERUS: The uterus is anteverted in position, measuring 7 9 x 4 4 x 5 7 cm  Rounded well-defined nodule(s) present, likely representing myoma(s), as follows; 1 8 x 1 8 x 1 7 cm intramural/submucosal posterior fibroid, previously measuring 1 9 cm  The cervix appears within normal limits  ENDOMETRIUM:  Normal endometrial AP caliber of 3 0 mm  Homogenous and normal in appearance  OVARIES/ADNEXA: Right ovary:  4 2 x 1 7 x 2 2 cm  8 3 mL No suspicious right ovarian abnormality  Doppler flow within normal limits  Left ovary:  4 6 x 2 8 x 1 6 cm  10 7 mL No suspicious left ovarian abnormality  Dominant follicle   Doppler flow within normal limits  No suspicious adnexal mass or loculated collections  There is small amount of simple free fluid in the pelvis, likely physiologic in this premenopausal female  Impression:  Intramural/submucosal stable fibroid  Other fibroids identified on the pelvic MRI August 2020 are not conspicuous    Workstation performed: [id number]

## 2022-03-24 ENCOUNTER — OFFICE VISIT (OUTPATIENT)
Dept: FAMILY MEDICINE CLINIC | Facility: CLINIC | Age: 37
End: 2022-03-24
Payer: COMMERCIAL

## 2022-03-24 VITALS
OXYGEN SATURATION: 100 % | DIASTOLIC BLOOD PRESSURE: 84 MMHG | TEMPERATURE: 97.6 F | HEIGHT: 64 IN | RESPIRATION RATE: 16 BRPM | HEART RATE: 62 BPM | WEIGHT: 199.2 LBS | BODY MASS INDEX: 34.01 KG/M2 | SYSTOLIC BLOOD PRESSURE: 122 MMHG

## 2022-03-24 DIAGNOSIS — Z00.00 ANNUAL PHYSICAL EXAM: Primary | ICD-10-CM

## 2022-03-24 DIAGNOSIS — I10 BENIGN ESSENTIAL HYPERTENSION: ICD-10-CM

## 2022-03-24 DIAGNOSIS — G43.009 MIGRAINE WITHOUT AURA AND WITHOUT STATUS MIGRAINOSUS, NOT INTRACTABLE: ICD-10-CM

## 2022-03-24 PROBLEM — H57.11 ACUTE RIGHT EYE PAIN: Status: RESOLVED | Noted: 2022-01-16 | Resolved: 2022-03-24

## 2022-03-24 PROCEDURE — 99395 PREV VISIT EST AGE 18-39: CPT | Performed by: FAMILY MEDICINE

## 2022-03-24 NOTE — PROGRESS NOTES
[address] [facility] [address]    NAME: Crescencio Young  AGE: 39 y o  SEX: female  : 1985     DATE: 3/24/2022     Assessment and Plan:     Problem List Items Addressed This Visit        Cardiovascular and Mediastinum    Benign essential hypertension     Doing well on current meds         Migraine without aura and without status migrainosus, not intractable     Stable   Continue current meds            Other Visit Diagnoses     Annual physical exam    -  Primary    Relevant Orders    Lipid Panel with Direct LDL reflex          Immunizations and preventive care screenings were discussed with patient today  Appropriate education was printed on patient's after visit summary  Counseling:  Alcohol/drug use: discussed moderation in alcohol intake, the recommendations for healthy alcohol use, and avoidance of illicit drug use  Dental Health: discussed importance of regular tooth brushing, flossing, and dental visits  Injury prevention: discussed safety/seat belts, safety helmets, smoke detectors, carbon dioxide detectors, and smoking near bedding or upholstery  Sexual health: discussed sexually transmitted diseases, partner selection, use of condoms, avoidance of unintended pregnancy, and contraceptive alternatives  · Exercise: the importance of regular exercise/physical activity was discussed  Recommend exercise 3-5 times per week for at least 30 minutes  Depression Screening and Follow-up Plan: Patient was screened for depression during today's encounter  They screened negative with a PHQ-2 score of 2  No follow-ups on file  Chief Complaint:     Chief Complaint   Patient presents with    Physical Exam     Patient is here today for an annual exam       History of Present Illness:     Adult Annual Physical   Patient here for a comprehensive physical exam  The patient reports no problems      Diet and Physical Activity  · Diet/Nutrition: well balanced diet and consuming 3-5 servings of fruits/vegetables daily  · Exercise: walking  Depression Screening  PHQ-2/9 Depression Screening    Little interest or pleasure in doing things: 1 - several days  Feeling down, depressed, or hopeless: 1 - several days  PHQ-2 Score: 2  PHQ-2 Interpretation: Negative depression screen       General Health  · Sleep: sleeps well and gets 7-8 hours of sleep on average  · Hearing: normal - bilateral   · Vision: goes for regular eye exams  · Dental: regular dental visits and brushes teeth twice daily  /GYN Health  · Last menstrual period: regular periods   · Contraceptive method: none  · History of STDs?: no      Review of Systems:     Review of Systems   Constitutional: Negative  HENT: Negative  Eyes: Negative  Respiratory: Negative  Cardiovascular: Negative  Gastrointestinal: Negative  Endocrine: Negative  Genitourinary: Negative  Musculoskeletal: Negative  Skin: Negative  Allergic/Immunologic: Negative  Neurological: Negative  Hematological: Negative  Psychiatric/Behavioral: Negative         Past Medical History:     Past Medical History:   Diagnosis Date    Abdominal pain     Abnormal ECG     Anemia     Asthma     mild intermittent    GERD (gastroesophageal reflux disease)     History of palpitations     Hypertension     Lower back pain     Migraines     Seasonal allergies     Trace mitral regurgitation by prior echocardiogram       Past Surgical History:     Past Surgical History:   Procedure Laterality Date    CHOLECYSTECTOMY      laparoscopic    COLONOSCOPY  2017    HERNIA REPAIR      umbilical    LAPAROTOMY N/A 12/10/2020    Procedure: ABDOMINAL MYOMECTOMY;  Surgeon: Collin Poon DO;  [facility];  Service: Gynecology    LUMBAR LAMINECTOMY Right 9/14/2016    Procedure: Right L5/S1 Metryx microdiscectomy;  Surgeon: Mariana Lora MD;  [facility];  Service:     NJ COLONOSCOPY FLX DX W/COLLJ SPEC WHEN PFRMD N/A 7/7/2016    Procedure: COLONOSCOPY;  Surgeon: Allan Black DO;  Location: [facility]; Service: Gastroenterology    NJ HYSTEROSCOPY,W/ENDO BX N/A 3/19/2018    Procedure: HYSTEROSCOPY; MYOMECTOMY; ENDOMETRIAL BIOPSY;  Surgeon: Sarah Tavares DO;  [facility];  Service: Gynecology    NJ INCISE FINGER TENDON SHEATH Right 2/28/2017    Procedure: THUMB TRIGGER RELEASE ;  Surgeon: Adiel Olson DO;  [facility];  Service: Orthopedics    WISDOM TOOTH EXTRACTION        Social History:     Social History     Socioeconomic History    Marital status: /Civil Union     Spouse name: Not on file    Number of children: Not on file    Years of education: Not on file    Highest education level: Not on file   Occupational History    Not on file   Tobacco Use    Smoking status: Never Smoker    Smokeless tobacco: Never Used   Vaping Use    Vaping Use: Never used   Substance and Sexual Activity    Alcohol use:  Yes     Alcohol/week: 4 0 standard drinks     Types: 4 Glasses of wine per week     Comment: socially     Drug use: No    Sexual activity: Yes     Partners: Male     Birth control/protection: None   Other Topics Concern    Not on file   Social History Narrative    Exercises 3x week    Pet: dog     Social Determinants of Health     Financial Resource Strain: Not on file   Food Insecurity: Not on file   Transportation Needs: Not on file   Physical Activity: Not on file   Stress: Not on file   Social Connections: Not on file   Intimate Partner Violence: Not on file   Housing Stability: Not on file      Family History:     Family History   Problem Relation Age of Onset    Diabetes Mother         type2    Hypertension Mother     Diabetes Father         type2    Hypertension Father     Heart attack Maternal Grandfather         acute MI    Arthritis Maternal Grandfather     Stroke Maternal Grandfather         CVA    Hyperlipidemia Maternal Grandfather     Heart disease Maternal Grandfather     Arthritis Paternal Grandfather     Stroke Paternal Grandfather         CVA    Cancer Paternal Grandfather     Hyperlipidemia Paternal Grandfather     Colon cancer Paternal Grandfather     Heart attack Maternal Uncle         acute MI    Arthritis Family     Stroke Family         CVA    Cancer Family     Hyperlipidemia Family     Stroke Family         CVA    Hyperlipidemia Family     Esophageal cancer Maternal Grandmother     Breast cancer Maternal Grandmother         Maternal great grandmother    Cancer Maternal Grandmother       Current Medications:     Current Outpatient Medications   Medication Sig Dispense Refill    albuterol (ProAir HFA) 90 mcg/act inhaler Inhale 2 puffs every 4 (four) hours as needed (When has flare up) 1 Inhaler 0    ALPRAZolam (XANAX) 0 25 mg tablet Take 1 tablet (0 25 mg total) by mouth daily at bedtime as needed for anxiety 20 tablet 0    Cholecalciferol (VITAMIN D3 PO) Take 2,000 mg by mouth daily in the early morning      cyclobenzaprine (FLEXERIL) 10 mg tablet Take 1 tablet (10 mg total) by mouth 3 (three) times a day as needed for muscle spasms 60 tablet 1    labetalol (NORMODYNE) 200 mg tablet Take 1 tablet (200 mg total) by mouth 2 (two) times a day 180 tablet 0    magnesium 30 MG tablet Take 30 mg by mouth 2 (two) times a day      meloxicam (MOBIC) 15 mg tablet Take 1 tablet (15 mg total) by mouth daily as needed for mild pain 30 tablet 0    metFORMIN (GLUCOPHAGE-XR) 500 mg 24 hr tablet Take 1 tablet (500 mg total) by mouth daily with dinner 30 tablet 2    Multiple Vitamins-Minerals (Womens Multivitamin) TABS Take 1 tablet by mouth daily      olmesartan-hydrochlorothiazide (BENICAR HCT) 20-12 5 MG per tablet Take 1 tablet by mouth daily 90 tablet 0    ondansetron (ZOFRAN-ODT) 4 mg disintegrating tablet Take 1 tablet (4 mg total) by mouth every 6 (six) hours as needed for nausea or vomiting 20 tablet 0    pantoprazole (PROTONIX) 40 mg tablet Take 1 tablet (40 mg total) by mouth daily before breakfast 90 tablet 0    SUMAtriptan (IMITREX) 50 mg tablet Take 1 tablet (50 mg total) by mouth once as needed for migraine for up to 1 dose 9 tablet 0    albuterol (2 5 mg/3 mL) 0 083 % nebulizer solution Take 1 vial (2 5 mg total) by nebulization every 6 (six) hours as needed for wheezing or shortness of breath (Patient not taking: Reported on 3/24/2022 ) 30 vial 3     No current facility-administered medications for this visit  Allergies:     No Known Allergies   Physical Exam:     /84 (BP Location: Left arm, Patient Position: Sitting, Cuff Size: Adult)   Pulse 62   Temp 97 6 °F (36 4 °C) (Tympanic)   Resp 16   Ht 5' 3 86" (1 622 m)   Wt 90 4 kg (199 lb 3 2 oz)   SpO2 100%   BMI 34 34 kg/m²     Physical Exam  Vitals and nursing note reviewed  Constitutional:       General: She is not in acute distress  Appearance: Normal appearance  She is well-developed  HENT:      Head: Normocephalic and atraumatic  Right Ear: Tympanic membrane normal       Left Ear: Tympanic membrane normal       Nose: Nose normal       Mouth/Throat:      Mouth: Mucous membranes are moist    Eyes:      Conjunctiva/sclera: Conjunctivae normal    Cardiovascular:      Rate and Rhythm: Normal rate and regular rhythm  Heart sounds: No murmur heard  Pulmonary:      Effort: Pulmonary effort is normal  No respiratory distress  Breath sounds: Normal breath sounds  Abdominal:      Palpations: Abdomen is soft  Tenderness: There is no abdominal tenderness  Musculoskeletal:         General: Normal range of motion  Cervical back: Normal range of motion and neck supple  Skin:     General: Skin is warm and dry  Capillary Refill: Capillary refill takes less than 2 seconds  Neurological:      General: No focal deficit present  Mental Status: She is alert     Psychiatric: Mood and Affect: Mood normal          Behavior: Behavior normal           Mark Yanes MD   [address] [facility]

## 2022-03-24 NOTE — PATIENT INSTRUCTIONS
Small, brown, formed stool noted. Pt states she has a history of IBS. Stool sample not sent to lab for C. Diff rule out. Wellness Visit for Adults   AMBULATORY CARE:   A wellness visit  is when you see your healthcare provider to get screened for health problems  Your healthcare provider will also give you advice on how to stay healthy  Write down your questions so you remember to ask them  Ask your healthcare provider how often you should have a wellness visit  What happens at a wellness visit:  Your healthcare provider will ask about your health, and your family history of health problems  This includes high blood pressure, heart disease, and cancer  He or she will ask if you have symptoms that concern you, if you smoke, and about your mood  You may also be asked about your intake of medicines, supplements, food, and alcohol  Any of the following may be done:  · Your weight  will be checked  Your height may also be checked so your body mass index (BMI) can be calculated  Your BMI shows if you are at a healthy weight  · Your blood pressure  and heart rate will be checked  Your temperature may also be checked  · Blood and urine tests  may be done  Blood tests may be done to check your cholesterol levels  Abnormal cholesterol levels increase your risk for heart disease and stroke  You may also need a blood or urine test to check for diabetes if you are at increased risk  Urine tests may be done to look for signs of an infection or kidney disease  · A physical exam  includes checking your heartbeat and lungs with a stethoscope  Your healthcare provider may also check your skin to look for sun damage  · Screening tests  may be recommended  A screening test is done to check for diseases that may not cause symptoms  The screening tests you may need depend on your age, gender, family history, and lifestyle habits  For example, colorectal screening may be recommended if you are 48years old or older  Screening tests you need if you are a woman:   · A Pap smear  is used to screen for cervical cancer   Pap smears are usually done every 3 to 5 years depending on your age  You may need them more often if you have had abnormal Pap smear test results in the past  Ask your healthcare provider how often you should have a Pap smear  · A mammogram  is an x-ray of your breasts to screen for breast cancer  Experts recommend mammograms every 2 years starting at age 48 years  You may need a mammogram at age 52 years or younger if you have an increased risk for breast cancer  Talk to your healthcare provider about when you should start having mammograms and how often you need them  Vaccines you may need:   · Get an influenza vaccine  every year  The influenza vaccine protects you from the flu  Several types of viruses cause the flu  The viruses change over time, so new vaccines are made each year  · Get a tetanus-diphtheria (Td) booster vaccine  every 10 years  This vaccine protects you against tetanus and diphtheria  Tetanus is a severe infection that may cause painful muscle spasms and lockjaw  Diphtheria is a severe bacterial infection that causes a thick covering in the back of your mouth and throat  · Get a human papillomavirus (HPV) vaccine  if you are female and aged 23 to 32 or male 23 to 24 and never received it  This vaccine protects you from HPV infection  HPV is the most common infection spread by sexual contact  HPV may also cause vaginal, penile, and anal cancers  · Get a pneumococcal vaccine  if you are aged 72 years or older  The pneumococcal vaccine is an injection given to protect you from pneumococcal disease  Pneumococcal disease is an infection caused by pneumococcal bacteria  The infection may cause pneumonia, meningitis, or an ear infection  · Get a shingles vaccine  if you are 60 or older, even if you have had shingles before  The shingles vaccine is an injection to protect you from the varicella-zoster virus  This is the same virus that causes chickenpox   Shingles is a painful rash that develops in people who had chickenpox or have been exposed to the virus  How to eat healthy:  My Plate is a model for planning healthy meals  It shows the types and amounts of foods that should go on your plate  Fruits and vegetables make up about half of your plate, and grains and protein make up the other half  A serving of dairy is included on the side of your plate  The amount of calories and serving sizes you need depends on your age, gender, weight, and height  Examples of healthy foods are listed below:  · Eat a variety of vegetables  such as dark green, red, and orange vegetables  You can also include canned vegetables low in sodium (salt) and frozen vegetables without added butter or sauces  · Eat a variety of fresh fruits , canned fruit in 100% juice, frozen fruit, and dried fruit  · Include whole grains  At least half of the grains you eat should be whole grains  Examples include whole-wheat bread, wheat pasta, brown rice, and whole-grain cereals such as oatmeal     · Eat a variety of protein foods such as seafood (fish and shellfish), lean meat, and poultry without skin (turkey and chicken)  Examples of lean meats include pork leg, shoulder, or tenderloin, and beef round, sirloin, tenderloin, and extra lean ground beef  Other protein foods include eggs and egg substitutes, beans, peas, soy products, nuts, and seeds  · Choose low-fat dairy products such as skim or 1% milk or low-fat yogurt, cheese, and cottage cheese  · Limit unhealthy fats  such as butter, hard margarine, and shortening  Exercise:  Exercise at least 30 minutes per day on most days of the week  Some examples of exercise include walking, biking, dancing, and swimming  You can also fit in more physical activity by taking the stairs instead of the elevator or parking farther away from stores  Include muscle strengthening activities 2 days each week  Regular exercise provides many health benefits   It helps you manage your weight, and decreases your risk for type 2 diabetes, heart disease, stroke, and high blood pressure  Exercise can also help improve your mood  Ask your healthcare provider about the best exercise plan for you  General health and safety guidelines:   · Do not smoke  Nicotine and other chemicals in cigarettes and cigars can cause lung damage  Ask your healthcare provider for information if you currently smoke and need help to quit  E-cigarettes or smokeless tobacco still contain nicotine  Talk to your healthcare provider before you use these products  · Limit alcohol  A drink of alcohol is 12 ounces of beer, 5 ounces of wine, or 1½ ounces of liquor  · Lose weight, if needed  Being overweight increases your risk of certain health conditions  These include heart disease, high blood pressure, type 2 diabetes, and certain types of cancer  · Protect your skin  Do not sunbathe or use tanning beds  Use sunscreen with a SPF 15 or higher  Apply sunscreen at least 15 minutes before you go outside  Reapply sunscreen every 2 hours  Wear protective clothing, hats, and sunglasses when you are outside  · Drive safely  Always wear your seatbelt  Make sure everyone in your car wears a seatbelt  A seatbelt can save your life if you are in an accident  Do not use your cell phone when you are driving  This could distract you and cause an accident  Pull over if you need to make a call or send a text message  · Practice safe sex  Use latex condoms if are sexually active and have more than one partner  Your healthcare provider may recommend screening tests for sexually transmitted infections (STIs)  · Wear helmets, lifejackets, and protective gear  Always wear a helmet when you ride a bike or motorcycle, go skiing, or play sports that could cause a head injury  Wear protective equipment when you play sports  Wear a lifejacket when you are on a boat or doing water sports      © Copyright [employer] 2022 Information is for End User's use only and may not be sold, redistributed or otherwise used for commercial purposes  All illustrations and images included in CareNotes® are the copyrighted property of [employer]  or Pedro Wallace  The above information is an  only  It is not intended as medical advice for individual conditions or treatments  Talk to your doctor, nurse or pharmacist before following any medical regimen to see if it is safe and effective for you

## 2022-03-29 ENCOUNTER — CONSULT (OUTPATIENT)
Dept: GYNECOLOGY | Facility: CLINIC | Age: 37
End: 2022-03-29
Payer: COMMERCIAL

## 2022-03-29 VITALS
SYSTOLIC BLOOD PRESSURE: 122 MMHG | BODY MASS INDEX: 33.97 KG/M2 | DIASTOLIC BLOOD PRESSURE: 84 MMHG | HEIGHT: 64 IN | WEIGHT: 199 LBS | HEART RATE: 62 BPM

## 2022-03-29 DIAGNOSIS — D21.9 FIBROIDS: Primary | ICD-10-CM

## 2022-03-29 DIAGNOSIS — R10.2 PELVIC PAIN: ICD-10-CM

## 2022-03-29 PROCEDURE — 99243 OFF/OP CNSLTJ NEW/EST LOW 30: CPT | Performed by: OBSTETRICS & GYNECOLOGY

## 2022-03-29 NOTE — PROGRESS NOTES
Assessment/Plan:         Diagnoses and all orders for this visit:    Fibroids; this fibroid is stable and was seen on prior imaging prior to the multiple myomectomy  The fibroid has been stable and I do not feel this is a cause of her present pain  Pelvic pain/ abdominal discomfort; suspect this is related to adhesive disease  Since the pain is minimal and tolerable patient opts to follow  We did discuss possibility of a diagnostic laparoscopy  -     Ambulatory Referral to Gynecology        Subjective:      Patient ID: Ruthy Santizo is a 39 y o  female  HPI  G0 referred to the office by Jerilyn CARRILLO secondary to fibroid uterus  Patient has had a prior hysteroscopic myomectomy in 2018  In December of 2020 she underwent a multiple myomectomy via laparotomy by Dr Zoraida Joya  She has had 3 unsuccessful IVF cycles  Since the surgery her menses have been regular  She is experiencing some intermittent left lower quadrant ovulation pain  More recently the left lower quadrant discomfort has persisted  She describes this as a dull 9 ache  She denies any dysuria, hematuria urgency or urinary incontinence  Denies any nausea, vomiting, diarrhea or constipation  The pain has not been bad enough that it as required pain medications  She had an ultrasound recently:     PELVIC ULTRASOUND, COMPLETE     INDICATION:  The patient is 39years old  R10 2: Pelvic and perineal pain      COMPARISON: Pelvic ultrasound 11/13/2017  MRI pelvis 8/1/2020     TECHNIQUE:   Transabdominal pelvic ultrasound was performed in sagittal and transverse planes with a curvilinear transducer  Additional transvaginal imaging was performed to better evaluate the endometrium and ovaries  Imaging included volumetric   sweeps as well as traditional still imaging technique      FINDINGS:     UTERUS:  The uterus is anteverted in position, measuring 7 9 x 4 4 x 5 7 cm     Rounded well-defined nodule(s) present, likely representing myoma(s), as follows; 1 8 x 1 8 x 1 7 cm intramural/submucosal posterior fibroid, previously measuring 1 9 cm  The cervix appears within normal limits      ENDOMETRIUM:    Normal endometrial AP caliber of 3 0 mm  Homogenous and normal in appearance      OVARIES/ADNEXA:  Right ovary:  4 2 x 1 7 x 2 2 cm  8 3 mL  No suspicious right ovarian abnormality  Doppler flow within normal limits      Left ovary:  4 6 x 2 8 x 1 6 cm  10 7 mL  No suspicious left ovarian abnormality  Dominant follicle  Doppler flow within normal limits      No suspicious adnexal mass or loculated collections  There is small amount of simple free fluid in the pelvis, likely physiologic in this premenopausal female      IMPRESSION:     Intramural/submucosal stable fibroid  Other fibroids identified on the pelvic MRI August 2020 are not conspicuous          The following portions of the patient's history were reviewed and updated as appropriate:   She  has a past medical history of Abdominal pain, Abnormal ECG, Anemia, Asthma, GERD (gastroesophageal reflux disease), History of palpitations, Hypertension, Lower back pain, Migraines, Seasonal allergies, and Trace mitral regurgitation by prior echocardiogram   She   Patient Active Problem List    Diagnosis Date Noted    Epigastric pain 01/25/2022    Sickle cell trait ([address] ) 03/16/2021    Numbness and tingling in both hands     Iron deficiency anemia due to chronic blood loss 11/19/2020    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    Family history of sudden cardiac death (SCD) 02/21/2020    Migraine without aura and without status migrainosus, not intractable 04/30/2019    Female infertility 09/19/2018    Polycystic ovaries 09/19/2018    GERD (gastroesophageal reflux disease) 07/23/2018    Fibroids, submucosal 03/19/2018    PVC (premature ventricular contraction) 04/13/2017    Trigger finger of right thumb 02/28/2017    Obesity (BMI 30-39 9) 01/17/2017    Benign essential hypertension 12/27/2016    Herniated lumbar intervertebral disc 09/14/2016    Lumbar radiculopathy, acute 09/14/2016    Lower back pain 09/12/2016    Mitral valve insufficiency 03/03/2016    Asthma in adult, mild intermittent, uncomplicated 20/66/6609     She  has a past surgical history that includes Cholecystectomy; Hernia repair; pr incise finger tendon sheath (Right, 2/28/2017); Lumbar laminectomy (Right, 9/14/2016); pr colonoscopy flx dx w/collj spec when pfrmd (N/A, 7/7/2016); [place] tooth extraction; pr hysteroscopy,w/endo bx (N/A, 3/19/2018); LAPAROTOMY (N/A, 12/10/2020); and Colonoscopy (2017)  Her family history includes Arthritis in her family, maternal grandfather, and paternal grandfather; Breast cancer in her maternal grandmother; Cancer in her family, maternal grandmother, and paternal grandfather; Colon cancer in her paternal grandfather; Diabetes in her father and mother; Esophageal cancer in her maternal grandmother; Heart attack in her maternal grandfather and maternal uncle; Heart disease in her maternal grandfather; Hyperlipidemia in her family, family, maternal grandfather, and paternal grandfather; Hypertension in her father and mother; Stroke in her family, family, maternal grandfather, and paternal grandfather  She  reports that she has never smoked  She has never used smokeless tobacco  She reports current alcohol use of about 4 0 standard drinks of alcohol per week  She reports that she does not use drugs    Current Outpatient Medications   Medication Sig Dispense Refill    albuterol (2 5 mg/3 mL) 0 083 % nebulizer solution Take 1 vial (2 5 mg total) by nebulization every 6 (six) hours as needed for wheezing or shortness of breath (Patient not taking: Reported on 3/24/2022 ) 30 vial 3    albuterol (ProAir HFA) 90 mcg/act inhaler Inhale 2 puffs every 4 (four) hours as needed (When has flare up) 1 Inhaler 0    ALPRAZolam (XANAX) 0 25 mg tablet Take 1 tablet (0 25 mg total) by mouth daily at bedtime as needed for anxiety 20 tablet 0    Cholecalciferol (VITAMIN D3 PO) Take 2,000 mg by mouth daily in the early morning      cyclobenzaprine (FLEXERIL) 10 mg tablet Take 1 tablet (10 mg total) by mouth 3 (three) times a day as needed for muscle spasms 60 tablet 1    labetalol (NORMODYNE) 200 mg tablet Take 1 tablet (200 mg total) by mouth 2 (two) times a day 180 tablet 0    magnesium 30 MG tablet Take 30 mg by mouth 2 (two) times a day      meloxicam (MOBIC) 15 mg tablet Take 1 tablet (15 mg total) by mouth daily as needed for mild pain 30 tablet 0    metFORMIN (GLUCOPHAGE-XR) 500 mg 24 hr tablet Take 1 tablet (500 mg total) by mouth daily with dinner 30 tablet 2    Multiple Vitamins-Minerals (Womens Multivitamin) TABS Take 1 tablet by mouth daily      olmesartan-hydrochlorothiazide (BENICAR HCT) 20-12 5 MG per tablet Take 1 tablet by mouth daily 90 tablet 0    ondansetron (ZOFRAN-ODT) 4 mg disintegrating tablet Take 1 tablet (4 mg total) by mouth every 6 (six) hours as needed for nausea or vomiting 20 tablet 0    pantoprazole (PROTONIX) 40 mg tablet Take 1 tablet (40 mg total) by mouth daily before breakfast 90 tablet 0    SUMAtriptan (IMITREX) 50 mg tablet Take 1 tablet (50 mg total) by mouth once as needed for migraine for up to 1 dose 9 tablet 0     No current facility-administered medications for this visit       Current Outpatient Medications on File Prior to Visit   Medication Sig    albuterol (2 5 mg/3 mL) 0 083 % nebulizer solution Take 1 vial (2 5 mg total) by nebulization every 6 (six) hours as needed for wheezing or shortness of breath (Patient not taking: Reported on 3/24/2022 )    albuterol (ProAir HFA) 90 mcg/act inhaler Inhale 2 puffs every 4 (four) hours as needed (When has flare up)    ALPRAZolam (XANAX) 0 25 mg tablet Take 1 tablet (0 25 mg total) by mouth daily at bedtime as needed for anxiety    Cholecalciferol (VITAMIN D3 PO) Take 2,000 mg by mouth daily in the early morning    cyclobenzaprine (FLEXERIL) 10 mg tablet Take 1 tablet (10 mg total) by mouth 3 (three) times a day as needed for muscle spasms    labetalol (NORMODYNE) 200 mg tablet Take 1 tablet (200 mg total) by mouth 2 (two) times a day    magnesium 30 MG tablet Take 30 mg by mouth 2 (two) times a day    meloxicam (MOBIC) 15 mg tablet Take 1 tablet (15 mg total) by mouth daily as needed for mild pain    metFORMIN (GLUCOPHAGE-XR) 500 mg 24 hr tablet Take 1 tablet (500 mg total) by mouth daily with dinner    Multiple Vitamins-Minerals (Womens Multivitamin) TABS Take 1 tablet by mouth daily    olmesartan-hydrochlorothiazide (BENICAR HCT) 20-12 5 MG per tablet Take 1 tablet by mouth daily    ondansetron (ZOFRAN-ODT) 4 mg disintegrating tablet Take 1 tablet (4 mg total) by mouth every 6 (six) hours as needed for nausea or vomiting    pantoprazole (PROTONIX) 40 mg tablet Take 1 tablet (40 mg total) by mouth daily before breakfast    SUMAtriptan (IMITREX) 50 mg tablet Take 1 tablet (50 mg total) by mouth once as needed for migraine for up to 1 dose     No current facility-administered medications on file prior to visit  She has No Known Allergies       Review of Systems   Gastrointestinal: Positive for abdominal pain  Negative for abdominal distention, constipation, diarrhea, nausea and vomiting  Genitourinary: Positive for pelvic pain  Negative for difficulty urinating, dysuria, enuresis, frequency, hematuria, menstrual problem, vaginal bleeding and vaginal discharge  Objective:      /84   Pulse 62   Ht 5' 3 8" (1 621 m)   Wt 90 3 kg (199 lb)   BMI 34 37 kg/m²          Physical Exam  Vitals reviewed  Cardiovascular:      Rate and Rhythm: Normal rate and regular rhythm  Pulses: Normal pulses  Heart sounds: Normal heart sounds  Pulmonary:      Effort: Pulmonary effort is normal       Breath sounds: Normal breath sounds     Abdominal:      General: There is no distension  Palpations: Abdomen is soft  There is no mass  Tenderness: There is abdominal tenderness (Very minimal left lower quadrant discomfort)  There is no guarding or rebound  Hernia: No hernia is present  There is no hernia in the left inguinal area or right inguinal area  Genitourinary:     General: Normal vulva  Labia:         Right: No rash, tenderness or lesion  Left: No rash, tenderness or lesion  Vagina: Normal       Cervix: Normal       Uterus: Normal        Adnexa:         Right: No mass, tenderness or fullness  Left: No mass, tenderness or fullness  Lymphadenopathy:      Lower Body: No right inguinal adenopathy  No left inguinal adenopathy  Neurological:      Mental Status: She is alert

## 2022-04-06 ENCOUNTER — HOSPITAL ENCOUNTER (OUTPATIENT)
Dept: RADIOLOGY | Facility: HOSPITAL | Age: 37
Discharge: HOME | End: 2022-04-06
Attending: INTERNAL MEDICINE
Payer: COMMERCIAL

## 2022-04-06 DIAGNOSIS — R10.13 EPIGASTRIC PAIN: ICD-10-CM

## 2022-04-06 PROCEDURE — 74177 CT ABD & PELVIS W/CONTRAST: CPT

## 2022-04-06 PROCEDURE — G1004 CDSM NDSC: HCPCS

## 2022-04-06 RX ADMIN — IOHEXOL 100 ML: 350 INJECTION, SOLUTION INTRAVENOUS at 07:22

## 2022-04-08 ENCOUNTER — TELEPHONE (OUTPATIENT)
Dept: OBGYN CLINIC | Facility: CLINIC | Age: 37
End: 2022-04-08

## 2022-04-08 DIAGNOSIS — N76.0 BV (BACTERIAL VAGINOSIS): Primary | ICD-10-CM

## 2022-04-08 DIAGNOSIS — B96.89 BV (BACTERIAL VAGINOSIS): Primary | ICD-10-CM

## 2022-04-08 RX ORDER — METRONIDAZOLE 500 MG/1
500 TABLET ORAL EVERY 12 HOURS SCHEDULED
Qty: 14 TABLET | Refills: 0 | Status: SHIPPED | OUTPATIENT
Start: 2022-04-08 | End: 2022-04-15

## 2022-04-14 ENCOUNTER — ULTRASOUND (OUTPATIENT)
Dept: OBGYN CLINIC | Facility: CLINIC | Age: 37
End: 2022-04-14
Payer: COMMERCIAL

## 2022-04-14 ENCOUNTER — OFFICE VISIT (OUTPATIENT)
Dept: OBGYN CLINIC | Facility: CLINIC | Age: 37
End: 2022-04-14
Payer: COMMERCIAL

## 2022-04-14 ENCOUNTER — APPOINTMENT (OUTPATIENT)
Dept: LAB | Facility: HOSPITAL | Age: 37
End: 2022-04-14
Attending: OBSTETRICS & GYNECOLOGY
Payer: COMMERCIAL

## 2022-04-14 VITALS — SYSTOLIC BLOOD PRESSURE: 122 MMHG | WEIGHT: 202 LBS | DIASTOLIC BLOOD PRESSURE: 78 MMHG | BODY MASS INDEX: 34.89 KG/M2

## 2022-04-14 DIAGNOSIS — D36.9 DERMOID CYST: ICD-10-CM

## 2022-04-14 DIAGNOSIS — N83.202 LEFT OVARIAN CYST: Primary | ICD-10-CM

## 2022-04-14 DIAGNOSIS — N83.202 LEFT OVARIAN CYST: ICD-10-CM

## 2022-04-14 DIAGNOSIS — D25.0 FIBROIDS, SUBMUCOSAL: ICD-10-CM

## 2022-04-14 DIAGNOSIS — B37.9 YEAST INFECTION: ICD-10-CM

## 2022-04-14 DIAGNOSIS — N83.202 CYST OF LEFT OVARY: ICD-10-CM

## 2022-04-14 DIAGNOSIS — Z00.00 ANNUAL PHYSICAL EXAM: ICD-10-CM

## 2022-04-14 LAB
CANCER AG125 SERPL-ACNC: 15.1 U/ML (ref 0–30)
CHOLEST SERPL-MCNC: 184 MG/DL
HDLC SERPL-MCNC: 74 MG/DL
LDLC SERPL CALC-MCNC: 91 MG/DL (ref 0–100)
TRIGL SERPL-MCNC: 94 MG/DL

## 2022-04-14 PROCEDURE — 76856 US EXAM PELVIC COMPLETE: CPT | Performed by: OBSTETRICS & GYNECOLOGY

## 2022-04-14 PROCEDURE — 36415 COLL VENOUS BLD VENIPUNCTURE: CPT

## 2022-04-14 PROCEDURE — 86304 IMMUNOASSAY TUMOR CA 125: CPT

## 2022-04-14 PROCEDURE — 99214 OFFICE O/P EST MOD 30 MIN: CPT | Performed by: OBSTETRICS & GYNECOLOGY

## 2022-04-14 PROCEDURE — 80061 LIPID PANEL: CPT

## 2022-04-14 RX ORDER — FLUCONAZOLE 150 MG/1
150 TABLET ORAL ONCE
Qty: 1 TABLET | Refills: 0 | Status: SHIPPED | OUTPATIENT
Start: 2022-04-14 | End: 2022-04-14

## 2022-04-14 NOTE — PROGRESS NOTES
AMB US Pelvic Non OB    Date/Time: 4/14/2022 2:35 PM  Performed by: Rosa Temple  Authorized by: Shahla Oneal MD     Procedure details:     Indications: leiomyoma, ovarian cysts and non-obstetric abdominal pain      Technique:  US Pelvic, Non-OB with complete exam  Uterine findings:     Length (cm): 7 4    Height (cm):  5 5    Width (cm):  6    Uterine adhesions: not identified      Adnexal mass: identified      Location:  Mass left    Polyps: not identified      Myomas: identified      Endometrial stripe: identified      Endometrial hyperplasia: not identified      Endometrium thickness (mm):  7  Left ovary findings:     Left ovary:  Visualized    Cysts: identified      Length (cm): 7 7    Height (cm): 7 1    Width (cm): 5 1  Right ovary findings:     Right ovary:  Visualized    Cysts: not identified      Length (cm): 2 5    Height (cm): 2 3    Width (cm): 1 9  Other findings:     Free pelvic fluid: not identified      Free peritoneal fluid: not identified    Post-Procedure Details:     Impression:  Endo-7mm  Fibroids-ANT=35 x 34 x 28 mm, POST=34 x 29 x 30 mm  LT Complex septated cystic ovary with ? Calc=77 x 71 x 51 mm, some doppler flow within    Tolerance:   Tolerated well, no immediate complications

## 2022-04-15 ENCOUNTER — TELEPHONE (OUTPATIENT)
Dept: GYNECOLOGIC ONCOLOGY | Facility: CLINIC | Age: 37
End: 2022-04-15

## 2022-04-15 NOTE — TELEPHONE ENCOUNTER
Intake Form   Patient Details   Michael Allen     [birth year]     Reason For Appointment   Who is Calling? [facility]   If not patient, Name? Who is the Referring Doctor? Gordon Del Angel MD   What is the diagnosis? Left Ovarian Cyst   Has this diagnosis been confirmed by a biopsy/surgery? If yes, what is the date it was done? No   Biopsy done at [place] [id number]? If not, where was it done? No   Was imaging done, and was it done at [facility]? If not, where was it done? Yes, US, CT AP w/   For 2nd Opinions Only: Are you currently undergoing treatment, or are you scheduled to start treatment? If yes, name of facility, city and state     For "History Of" only: Have you completed treatment? Have you had Genetic Testing done in the past?  If so, advise to bring test results to their visit No   Record Gathering Information   Did you advise to have records faxed to [phone]? In epic   Did you advise to bring discs to office visit? In [id number] [facility]   What is the best call back number?   [phone] does patient have & is an insurance referral required [place], No   If patient is NEW to [facility] a new patient packet (Titled Breast/Gyn) Not new to network   Miscellaneous Information

## 2022-04-15 NOTE — PROGRESS NOTES
Assessment/Plan:    No problem-specific Assessment & Plan notes found for this encounter  Diagnoses and all orders for this visit:    Left ovarian cyst  -     ; Future    Yeast infection  -     fluconazole (DIFLUCAN) 150 mg tablet; Take 1 tablet (150 mg total) by mouth once for 1 dose        Subjective:      Patient ID: Adamaris Alas is a 39 y o  female  Patient is a 24-year-old female who presents today for evaluation of intermittent left lower quadrant discomfort  Patient reports that her menstrual cycles are fairly normal       Recent CT scan showed presence of a left ovarian cyst and at today's visit we obtained a pelvic ultrasound for screening as well  The pelvic ultrasound revealed presence of a left ovarian cyst approximately 7 x 7 cm  Within it was seen possible septation and calcification  Mild Doppler flow was also present as well  Baseline  was ordered for the patient at today's visit     Pending results, we will determine next steps as far as determining possible treatment options for the patient  Patient has seen reproductive endocrinology in the past which included laparotomy and multiple myomectomy for her  Attempts at IVF were unsuccessful to date  Patient currently is not actively pursuing pregnancy  All questions were answered in detail for her during today's visit     Patient will call for any problems, questions, issues or concerns which may arise for her  Total time of today's visit was 25 minutes of which greater than 50% was spent face-to-face counseling the patient as well as coordination of care, review of chart lab values, brief physical evaluation as well as computer entry into the epic medical record system                  The following portions of the patient's history were reviewed and updated as appropriate: allergies, current medications, past family history, past medical history, past social history, past surgical history and problem list     Review of Systems   Constitutional: Negative  Negative for appetite change, diaphoresis, fatigue, fever and unexpected weight change  HENT: Negative  Eyes: Negative  Respiratory: Negative  Followed for asthma   Cardiovascular: Negative  Followed for blood pressure   Gastrointestinal: Negative  Negative for abdominal pain, blood in stool, constipation, diarrhea, nausea and vomiting  Endocrine: Negative  Negative for cold intolerance and heat intolerance  Genitourinary: Negative  Negative for dysuria, frequency, hematuria, urgency, vaginal bleeding, vaginal discharge and vaginal pain  Musculoskeletal: Negative  Skin: Negative  Allergic/Immunologic: Negative  Neurological: Negative  Followed for migraines   Hematological: Negative  Negative for adenopathy  Psychiatric/Behavioral: Negative  Objective:      /78   Wt 91 6 kg (202 lb)   BMI 34 89 kg/m²          Physical Exam  Constitutional:       Appearance: She is well-developed  HENT:      Head: Normocephalic  Eyes:      Pupils: Pupils are equal, round, and reactive to light  Cardiovascular:      Rate and Rhythm: Normal rate  Pulmonary:      Effort: Pulmonary effort is normal    Musculoskeletal:         General: Normal range of motion  Cervical back: Normal range of motion and neck supple  Skin:     General: Skin is warm and dry  Neurological:      General: No focal deficit present  Mental Status: She is alert and oriented to person, place, and time  Psychiatric:         Mood and Affect: Mood normal          Behavior: Behavior normal          Thought Content:  Thought content normal          Judgment: Judgment normal

## 2022-04-15 NOTE — PATIENT INSTRUCTIONS
Topic: Left lower quadrant pain    Pelvic ultrasound today revealed presence of a left ovarian cyst approximately 7 x 7 cm  It contained dary's of calcification as well as septation  Mild Doppler flow also present      Will obtain baseline  at this time     Further treatment and planning will be based upon results     All questions answered for the patient     Patient told to call for any problems, questions, issues or concerns which may arise for her

## 2022-04-19 ENCOUNTER — HOSPITAL ENCOUNTER (OUTPATIENT)
Dept: RADIOLOGY | Facility: HOSPITAL | Age: 37
Discharge: HOME | End: 2022-04-19
Payer: COMMERCIAL

## 2022-04-19 DIAGNOSIS — N83.202 LEFT OVARIAN CYST: ICD-10-CM

## 2022-04-19 PROCEDURE — 76856 US EXAM PELVIC COMPLETE: CPT

## 2022-04-21 NOTE — PROGRESS NOTES
Ultrasound results discussed in detail with the patient at today's visit     In light of the size of the ovary will refer to Dr Wojciech Kaminski for evaluation and therapeutic treatment options     baseline ordered as well

## 2022-04-25 ENCOUNTER — TELEPHONE (OUTPATIENT)
Dept: GYNECOLOGIC ONCOLOGY | Facility: CLINIC | Age: 37
End: 2022-04-25

## 2022-04-25 ENCOUNTER — TELEPHONE (OUTPATIENT)
Dept: OBGYN CLINIC | Facility: CLINIC | Age: 37
End: 2022-04-25

## 2022-04-25 NOTE — TELEPHONE ENCOUNTER
I spoke with the patient and advised her that her ovarian cyst had become smaller     She did not need to follow-up with gyn Onc at this time     Please schedule for follow-up ultrasound for the remaining cyst in approximately 6 weeks     Thanks

## 2022-04-25 NOTE — TELEPHONE ENCOUNTER
Patient was referred to GYN/ONC and they just cancelled her appt  She is not comfortable and is concerned   She wants to know what your thoughts are

## 2022-04-25 NOTE — TELEPHONE ENCOUNTER
Received prior referral from Dr Nat Ron office  Pelvic u/s has recently resulted  At this time, ovarian cyst is improving and of low-suspicion  Likely hemorrhagic cyst  Discussed with patient  Will cancel gyn-onc appt in May  Relayed information to Dr Nat Ron team, to consider 6-8 week f/u ultrasound

## 2022-06-13 ENCOUNTER — TELEMEDICINE (OUTPATIENT)
Dept: FAMILY MEDICINE CLINIC | Facility: CLINIC | Age: 37
End: 2022-06-13
Payer: COMMERCIAL

## 2022-06-13 VITALS — SYSTOLIC BLOOD PRESSURE: 141 MMHG | DIASTOLIC BLOOD PRESSURE: 88 MMHG | HEART RATE: 72 BPM

## 2022-06-13 DIAGNOSIS — J40 CHRONIC SINUSITIS WITH RECURRENT BRONCHITIS: ICD-10-CM

## 2022-06-13 DIAGNOSIS — R05.9 COUGH: Primary | ICD-10-CM

## 2022-06-13 DIAGNOSIS — J32.9 CHRONIC SINUSITIS WITH RECURRENT BRONCHITIS: ICD-10-CM

## 2022-06-13 PROCEDURE — 87636 SARSCOV2 & INF A&B AMP PRB: CPT | Performed by: FAMILY MEDICINE

## 2022-06-13 PROCEDURE — 99213 OFFICE O/P EST LOW 20 MIN: CPT | Performed by: FAMILY MEDICINE

## 2022-06-13 RX ORDER — AZITHROMYCIN 250 MG/1
TABLET, FILM COATED ORAL
Qty: 6 TABLET | Refills: 0 | Status: SHIPPED | OUTPATIENT
Start: 2022-06-13 | End: 2022-06-17

## 2022-06-13 NOTE — PROGRESS NOTES
COVID-19 Outpatient Progress Note    Assessment/Plan:    Problem List Items Addressed This Visit    None     Visit Diagnoses     Cough    -  Primary    Pt stable  Treat supportively with OTC Cough/Cold Preps PRN, rest, & good PO hydration  Note for work  COV-19/Fl PCR ordered  Precautions given; self-isolation  Relevant Medications    azithromycin (ZITHROMAX) 250 mg tablet    Other Relevant Orders    Covid/Flu- Office Collect    Chronic sinusitis with recurrent bronchitis        Azithromycin started as a precaution at this time  Relevant Medications    azithromycin (ZITHROMAX) 250 mg tablet         Disposition:     Recommended patient to come to the office to test for COVID-19/Influenza  I have spent 15 minutes directly with the patient  Greater than 50% of this time was spent in counseling/coordination of care regarding: diagnostic results, prognosis, risks and benefits of treatment options, instructions for management, patient and family education, importance of treatment compliance, risk factor reductions and impressions  Encounter provider Marsh Rubinstein, DO    Provider located at [address] [id number]    Recent Visits  No visits were found meeting these conditions  Showing recent visits within past 7 days and meeting all other requirements  Today's Visits  Date Type Provider Dept   06/13/22 Telemedicine Bam Mcdonald DO  Roman Reese   Showing today's visits and meeting all other requirements  Future Appointments  No visits were found meeting these conditions  Showing future appointments within next 150 days and meeting all other requirements       Patient agrees to participate in a virtual check in via telephone or video visit instead of presenting to the office to address urgent/immediate medical needs  Patient is aware this is a billable service      After connecting through [facility], the patient was identified by name and date of birth  Ena Grant was informed that this was a telemedicine visit and that the exam was being conducted confidentially over secure lines  My office door was closed  No one else was in the room  Ena Grant acknowledged consent and understanding of privacy and security of the telemedicine visit  I informed the patient that I have reviewed her record in Epic and presented the opportunity for her to ask any questions regarding the visit today  The patient agreed to participate  Verification of patient location:  Patient is located in the following state in which I hold an active license: [place]    Subjective:   Ena Grant is a 44 y o  female who is concerned about COVID-19  Patient's symptoms include fever, fatigue, sore throat, cough and myalgias  Patient denies shortness of breath and diarrhea  - Date of symptom onset: 6/12/2022      COVID-19 vaccination status: Fully vaccinated with booster    Exposure:   Contact with a person who is under investigation (PUI) for or who is positive for COVID-19 within the last 14 days?: No    Hospitalized recently for fever and/or lower respiratory symptoms?: No      Currently a healthcare worker that is involved in direct patient care?: No      Works in a special setting where the risk of COVID-19 transmission may be high? (this may include long-term care, correctional and [facility] facilities; homeless shelters; assisted-living facilities and group homes ): No      Resident in a special setting where the risk of COVID-19 transmission may be high? (this may include long-term care, correctional and [facility] facilities; homeless shelters; assisted-living facilities and group homes ): No      Pt works in Neurology in [employer]  Pt tested negative for COV-19 at home  Similar to her regular bronchitis  Not pregnant at this time  Feels "feverish" subjectively      Lab Results   Component Value Date    SARSCOV2 Negative 09/13/2021    SARSCOV2 Not Detected 12/03/2020     Past Medical History:   Diagnosis Date    Abdominal pain     Abnormal ECG     Anemia     Asthma     mild intermittent    GERD (gastroesophageal reflux disease)     History of palpitations     Hypertension     Lower back pain     Migraines     Seasonal allergies     Trace mitral regurgitation by prior echocardiogram      Past Surgical History:   Procedure Laterality Date    CHOLECYSTECTOMY      laparoscopic    COLONOSCOPY  2017    HERNIA REPAIR      umbilical    LAPAROTOMY N/A 12/10/2020    Procedure: ABDOMINAL MYOMECTOMY;  Surgeon: Daryle Littles, DO;  [facility];  Service: Gynecology    LUMBAR LAMINECTOMY Right 09/14/2016    Procedure: Right L5/S1 Metryx microdiscectomy;  Surgeon: Cassandra Winter MD;  [facility];  Service:    [facility] OR COLONOSCOPY FLX DX W/COLLJ SPEC WHEN PFRMD N/A 07/07/2016    Procedure: COLONOSCOPY;  Surgeon: Liane Mendenhall DO;  [facility];   Service: Gastroenterology    OR HYSTEROSCOPY,W/ENDO BX N/A 03/19/2018    Procedure: HYSTEROSCOPY; MYOMECTOMY; ENDOMETRIAL BIOPSY;  Surgeon: Daryle Littles, DO;  [facility];  Service: Gynecology    OR INCISE FINGER TENDON SHEATH Right 02/28/2017    Procedure: THUMB TRIGGER RELEASE ;  Surgeon: Kendrick Schreiber DO;  [facility];  Service: Orthopedics    SPINE SURGERY  9/2016    Microdisectomy    WISDOM TOOTH EXTRACTION       Current Outpatient Medications   Medication Sig Dispense Refill    albuterol (ProAir HFA) 90 mcg/act inhaler Inhale 2 puffs every 4 (four) hours as needed (When has flare up) 1 Inhaler 0    ALPRAZolam (XANAX) 0 25 mg tablet Take 1 tablet (0 25 mg total) by mouth daily at bedtime as needed for anxiety 20 tablet 0    azithromycin (ZITHROMAX) 250 mg tablet Take 2 tablets today then 1 tablet daily x 4 days 6 tablet 0    Cholecalciferol (VITAMIN D3 PO) Take 2,000 mg by mouth daily in the early morning      cyclobenzaprine (FLEXERIL) 10 mg tablet Take 1 tablet (10 mg total) by mouth 3 (three) times a day as needed for muscle spasms 60 tablet 1    labetalol (NORMODYNE) 200 mg tablet Take 1 tablet (200 mg total) by mouth 2 (two) times a day 180 tablet 0    magnesium 30 MG tablet Take 30 mg by mouth 2 (two) times a day      meloxicam (MOBIC) 15 mg tablet Take 1 tablet (15 mg total) by mouth daily as needed for mild pain 30 tablet 0    Multiple Vitamins-Minerals (Womens Multivitamin) TABS Take 1 tablet by mouth daily      olmesartan-hydrochlorothiazide (BENICAR HCT) 20-12 5 MG per tablet Take 1 tablet by mouth daily 90 tablet 0    ondansetron (ZOFRAN-ODT) 4 mg disintegrating tablet Take 1 tablet (4 mg total) by mouth every 6 (six) hours as needed for nausea or vomiting 20 tablet 0    pantoprazole (PROTONIX) 40 mg tablet Take 1 tablet (40 mg total) by mouth daily before breakfast 90 tablet 0    SUMAtriptan (IMITREX) 50 mg tablet Take 1 tablet (50 mg total) by mouth once as needed for migraine for up to 1 dose 9 tablet 0    albuterol (2 5 mg/3 mL) 0 083 % nebulizer solution Take 1 vial (2 5 mg total) by nebulization every 6 (six) hours as needed for wheezing or shortness of breath (Patient not taking: No sig reported) 30 vial 3    metFORMIN (GLUCOPHAGE-XR) 500 mg 24 hr tablet Take 1 tablet (500 mg total) by mouth daily with dinner (Patient not taking: Reported on 6/13/2022) 30 tablet 2     No current facility-administered medications for this visit  No Known Allergies    Review of Systems   Constitutional: Positive for fatigue and fever  HENT: Positive for sore throat and voice change  Respiratory: Positive for cough  Negative for shortness of breath  Gastrointestinal: Negative for diarrhea  Musculoskeletal: Positive for myalgias  Objective:    Vitals:    06/13/22 1606   BP: 141/88   BP Location: Left arm   Cuff Size: Standard   Pulse: 72       Physical Exam  Vitals reviewed  Constitutional:       General: She is not in acute distress  Appearance: Normal appearance  She is not ill-appearing, toxic-appearing or diaphoretic  Comments: Pt a little hoarse, with some nasal congestion - but is non-toxic appearing  She is speaking in full sentences  HENT:      Head: Normocephalic and atraumatic  Nose: Congestion present  Eyes:      General: No scleral icterus  Conjunctiva/sclera: Conjunctivae normal    Pulmonary:      Effort: Pulmonary effort is normal  No respiratory distress  Neurological:      Mental Status: She is alert and oriented to person, place, and time  Psychiatric:         Mood and Affect: Mood normal          Behavior: Behavior normal          Thought Content: Thought content normal          Judgment: Judgment normal           Artice Sarah was seen today for fever, generalized body aches, sore throat, fatigue and covid-19  Diagnoses and all orders for this visit:    Cough  Comments:  Pt stable  Treat supportively with OTC Cough/Cold Preps PRN, rest, & good PO hydration  Note for work  COV-19/Fl PCR ordered  Precautions given; self-isolation  Orders:  -     Covid/Flu- Office Collect  -     azithromycin (ZITHROMAX) 250 mg tablet; Take 2 tablets today then 1 tablet daily x 4 days    Chronic sinusitis with recurrent bronchitis  Comments:  Azithromycin started as a precaution at this time  Orders:  -     azithromycin (ZITHROMAX) 250 mg tablet; Take 2 tablets today then 1 tablet daily x 4 days          VIRTUAL VISIT DISCLAIMER    Jenni Wright verbally agrees to participate in [employer]  Pt is aware that [employer] could be limited without vital signs or the ability to perform a full hands-on physical Miguel Blight understands she or the provider may request at any time to terminate the video visit and request the patient to seek care or treatment in person

## 2022-06-14 LAB
FLUAV RNA RESP QL NAA+PROBE: NEGATIVE
FLUBV RNA RESP QL NAA+PROBE: NEGATIVE
SARS-COV-2 RNA RESP QL NAA+PROBE: NEGATIVE

## 2022-06-17 ENCOUNTER — OFFICE VISIT (OUTPATIENT)
Dept: FAMILY MEDICINE CLINIC | Facility: CLINIC | Age: 37
End: 2022-06-17
Payer: COMMERCIAL

## 2022-06-17 VITALS
WEIGHT: 192.8 LBS | HEART RATE: 72 BPM | BODY MASS INDEX: 32.91 KG/M2 | HEIGHT: 64 IN | OXYGEN SATURATION: 98 % | DIASTOLIC BLOOD PRESSURE: 88 MMHG | RESPIRATION RATE: 16 BRPM | SYSTOLIC BLOOD PRESSURE: 124 MMHG | TEMPERATURE: 96.5 F

## 2022-06-17 DIAGNOSIS — I10 BENIGN ESSENTIAL HYPERTENSION: ICD-10-CM

## 2022-06-17 DIAGNOSIS — E66.9 OBESITY (BMI 30-39.9): ICD-10-CM

## 2022-06-17 DIAGNOSIS — K21.9 GASTROESOPHAGEAL REFLUX DISEASE WITHOUT ESOPHAGITIS: ICD-10-CM

## 2022-06-17 DIAGNOSIS — D57.3 SICKLE CELL TRAIT (HCC): ICD-10-CM

## 2022-06-17 DIAGNOSIS — I10 HYPERTENSION, UNSPECIFIED TYPE: ICD-10-CM

## 2022-06-17 DIAGNOSIS — J45.20 ASTHMA IN ADULT, MILD INTERMITTENT, UNCOMPLICATED: ICD-10-CM

## 2022-06-17 DIAGNOSIS — J45.909 ACUTE ASTHMATIC BRONCHITIS: Primary | ICD-10-CM

## 2022-06-17 PROBLEM — R10.13 EPIGASTRIC PAIN: Status: RESOLVED | Noted: 2022-01-25 | Resolved: 2022-06-17

## 2022-06-17 PROBLEM — M65.311 TRIGGER FINGER OF RIGHT THUMB: Status: RESOLVED | Noted: 2017-02-28 | Resolved: 2022-06-17

## 2022-06-17 PROCEDURE — 99214 OFFICE O/P EST MOD 30 MIN: CPT | Performed by: FAMILY MEDICINE

## 2022-06-17 RX ORDER — PREDNISONE 10 MG/1
TABLET ORAL
Qty: 20 TABLET | Refills: 0 | Status: SHIPPED | OUTPATIENT
Start: 2022-06-17

## 2022-06-17 RX ORDER — LABETALOL 200 MG/1
200 TABLET, FILM COATED ORAL 2 TIMES DAILY
Qty: 180 TABLET | Refills: 0 | Status: SHIPPED | OUTPATIENT
Start: 2022-06-17 | End: 2022-06-17

## 2022-06-17 RX ORDER — DEXAMETHASONE 4 MG/1
2 TABLET ORAL 2 TIMES DAILY
Qty: 12 G | Refills: 0 | Status: SHIPPED | OUTPATIENT
Start: 2022-06-17 | End: 2022-07-17

## 2022-06-17 RX ORDER — LABETALOL 200 MG/1
200 TABLET, FILM COATED ORAL DAILY
Qty: 180 TABLET | Refills: 0
Start: 2022-06-17

## 2022-06-17 RX ORDER — FLUCONAZOLE 150 MG/1
150 TABLET ORAL ONCE
Qty: 1 TABLET | Refills: 0 | Status: SHIPPED | OUTPATIENT
Start: 2022-06-17 | End: 2022-06-17

## 2022-06-17 RX ORDER — OLMESARTAN MEDOXOMIL AND HYDROCHLOROTHIAZIDE 20/12.5 20; 12.5 MG/1; MG/1
1 TABLET ORAL DAILY
Qty: 90 TABLET | Refills: 0 | Status: SHIPPED | OUTPATIENT
Start: 2022-06-17

## 2022-06-17 NOTE — PROGRESS NOTES
Assessment/Plan:    Benign essential hypertension  Cut back on labetalol to once a day   Continue other meds         Obesity (BMI 30-39  9)  Doing well  Lost 10 pounds   Continue diet and exercise     Sickle cell trait ([facility] [address] )  Due for labs   Ordered today    Asthma in adult, mild intermittent, uncomplicated  Added flovent twice a day today       GERD (gastroesophageal reflux disease)  To wean off pantoprazole gradually  To add pepcid as needed  To avoid triggers       Diagnoses and all orders for this visit:    Acute asthmatic bronchitis  -     fluticasone (Flovent HFA) 110 MCG/ACT inhaler; Inhale 2 puffs 2 (two) times a day Rinse mouth after use  -     predniSONE 10 mg tablet; 4 tabs x 2 days, 3 tabs x 2 days, 2 tabs x 2 days, 1 tab x 2 days  -     fluconazole (DIFLUCAN) 150 mg tablet; Take 1 tablet (150 mg total) by mouth once for 1 dose    Benign essential hypertension  -     olmesartan-hydrochlorothiazide (BENICAR HCT) 20-12 5 MG per tablet; Take 1 tablet by mouth daily  -     CBC and differential  -     Comprehensive metabolic panel    Benign essential hypertension  Comments:  stable  sees cardiology as well     Orders:  -     olmesartan-hydrochlorothiazide (BENICAR HCT) 20-12 5 MG per tablet; Take 1 tablet by mouth daily  -     CBC and differential  -     Comprehensive metabolic panel    Hypertension, unspecified type  -     Discontinue: labetalol (NORMODYNE) 200 mg tablet; Take 1 tablet (200 mg total) by mouth 2 (two) times a day  -     labetalol (NORMODYNE) 200 mg tablet; Take 1 tablet (200 mg total) by mouth in the morning    Obesity (BMI 30-39  9)    Sickle cell trait (HCC)    Asthma in adult, mild intermittent, uncomplicated    Gastroesophageal reflux disease without esophagitis      prevnar 20 at next visit       Subjective:      Patient ID: Pepe Parrish is a 44 y o  female  URI   This is a new problem  The current episode started in the past 7 days  The problem has been waxing and waning   There has been no fever  Associated symptoms include coughing, headaches, rhinorrhea, a sore throat and wheezing  Pertinent negatives include no abdominal pain, chest pain, congestion, diarrhea, dysuria, ear pain, joint pain, joint swelling, nausea, neck pain, plugged ear sensation, rash, sinus pain, sneezing, swollen glands or vomiting  The treatment provided mild relief  The following portions of the patient's history were reviewed and updated as appropriate: allergies, current medications, past family history, past medical history, past social history, past surgical history and problem list     Review of Systems   HENT: Positive for rhinorrhea and sore throat  Negative for congestion, ear pain, sinus pain and sneezing  Respiratory: Positive for cough and wheezing  Cardiovascular: Negative for chest pain  Gastrointestinal: Negative for abdominal pain, diarrhea, nausea and vomiting  Genitourinary: Negative for dysuria  Musculoskeletal: Negative for joint pain and neck pain  Skin: Negative for rash  Neurological: Positive for headaches  Objective:      /88 (BP Location: Left arm, Patient Position: Sitting, Cuff Size: Adult)   Pulse 72   Temp (!) 96 5 °F (35 8 °C) (Tympanic)   Resp 16   Ht 5' 3 8" (1 621 m)   Wt 87 5 kg (192 lb 12 8 oz)   SpO2 98%   BMI 33 30 kg/m²          Physical Exam  Constitutional:       Appearance: She is well-developed  HENT:      Head: Normocephalic and atraumatic  Nose: Congestion present  No rhinorrhea  Mouth/Throat:      Pharynx: No posterior oropharyngeal erythema  Neck:      Thyroid: No thyromegaly  Cardiovascular:      Rate and Rhythm: Normal rate and regular rhythm  Heart sounds: Normal heart sounds  Pulmonary:      Effort: Pulmonary effort is normal       Breath sounds: Stridor present  Wheezing present  Lymphadenopathy:      Cervical: No cervical adenopathy  Neurological:      Mental Status: She is alert

## 2022-06-20 ENCOUNTER — ULTRASOUND (OUTPATIENT)
Dept: OBGYN CLINIC | Facility: CLINIC | Age: 37
End: 2022-06-20
Payer: COMMERCIAL

## 2022-06-20 DIAGNOSIS — R10.2 PELVIC PAIN: ICD-10-CM

## 2022-06-20 DIAGNOSIS — N83.292 COMPLEX CYST OF LEFT OVARY: Primary | ICD-10-CM

## 2022-06-20 PROCEDURE — 76856 US EXAM PELVIC COMPLETE: CPT | Performed by: OBSTETRICS & GYNECOLOGY

## 2022-06-20 NOTE — PROGRESS NOTES
AMB US Pelvic Non OB    Date/Time: 6/20/2022 10:03 AM  Performed by: Ivan Salinas  Authorized by: Candace Kawasaki, MD     Procedure details:     Indications: ovarian cysts and non-obstetric abdominal pain      Technique:  US Pelvic, Non-OB with complete exam  Uterine findings:     Length (cm): 8 8    Height (cm):  6    Width (cm):  5 3    Uterine adhesions: not identified      Adnexal mass: identified      Location:  Mass left    Polyps: not identified      Myomas: identified      Endometrial stripe: identified      Endometrial hyperplasia: not identified      Endometrium thickness (mm):  8  Left ovary findings:     Left ovary:  Visualized    Cysts: identified      Length (cm): 4 5    Height (cm): 3 5    Width (cm): 3 2  Right ovary findings:     Right ovary:  Visualized    Cysts: not identified      Length (cm): 2 5    Height (cm): 2 7    Width (cm): 2 3  Other findings:     Free pelvic fluid: not identified      Free peritoneal fluid: not identified    Post-Procedure Details:     Impression:  Endo-8mm  LT septated complex cyst=4 1 x 3 3 x 2 8 cm, decrease size prior study    Tolerance:   Tolerated well, no immediate complications

## 2022-06-23 ENCOUNTER — OFFICE VISIT (OUTPATIENT)
Dept: GASTROENTEROLOGY | Facility: CLINIC | Age: 37
End: 2022-06-23
Payer: COMMERCIAL

## 2022-06-23 VITALS
TEMPERATURE: 98.6 F | WEIGHT: 191 LBS | HEIGHT: 63 IN | HEART RATE: 77 BPM | BODY MASS INDEX: 33.84 KG/M2 | DIASTOLIC BLOOD PRESSURE: 86 MMHG | SYSTOLIC BLOOD PRESSURE: 124 MMHG

## 2022-06-23 DIAGNOSIS — R10.12 LUQ ABDOMINAL PAIN: ICD-10-CM

## 2022-06-23 DIAGNOSIS — R14.0 ABDOMINAL BLOATING: ICD-10-CM

## 2022-06-23 DIAGNOSIS — K21.9 GASTROESOPHAGEAL REFLUX DISEASE, UNSPECIFIED WHETHER ESOPHAGITIS PRESENT: Primary | ICD-10-CM

## 2022-06-23 PROCEDURE — 99214 OFFICE O/P EST MOD 30 MIN: CPT | Performed by: PHYSICIAN ASSISTANT

## 2022-06-23 NOTE — PROGRESS NOTES
[facility] - Outpatient Follow-up Note  Gala Drake 44 y o  female MRN: [id number]  Encounter: [id number]      Assessment and Plan    1  LUQ abdominal pressure  2  Abdominal bloating  3  GERD  The patient was having left upper quadrant abdominal pressure and she had frequent significant abdominal bloating  For evaluation she underwent EGD with stomach and duodenal biopsies which were all normal   She also underwent a CT scan without etiology  She is s/p cholecystectomy  She is on pantoprazole 40 mg once daily which controls her acid reflux well but does not improve her pain at all  She states that since her last visit she has made a lot of dietary changes and her abdominal pressure/bloating has improved  - the patient plans to attempt to titrate off PPI, she will do so and remain at the lowest dose that is effective for her and if able to stop PPI start PRN pepcid   - continue dietary modification   - if symptoms return consider GES, SIBO testing, and low fodmap diet    Follow up as needed    ______________________________________________________________________    History of Present Illness  Gala Drake is a 44 y o  female here for follow up evaluation of abdominal pain/bloating  The patient states that she was having left upper quadrant abdominal pressure and she had frequent significant abdominal bloating  For evaluation she underwent EGD with stomach and duodenal biopsies which were all normal   She also underwent a CT scan without etiology  She is s/p cholecystectomy  She is on pantoprazole 40 mg once daily which controls her acid reflux well but does not improve her pain at all  She states that since her last visit she has made a lot of dietary changes and her abdominal pressure/bloating has improved  Review of Systems   Constitutional: Negative for activity change, appetite change, chills, fatigue, fever and unexpected weight change         Past Medical History  Past Medical History:   Diagnosis Date    Abdominal pain     Abnormal ECG     Anemia     Asthma     mild intermittent    GERD (gastroesophageal reflux disease)     History of palpitations     Hypertension     Lower back pain     Migraines     Seasonal allergies     Trace mitral regurgitation by prior echocardiogram     Trigger finger of right thumb 2/28/2017       Past Social history  Past Surgical History:   Procedure Laterality Date    CHOLECYSTECTOMY      laparoscopic    COLONOSCOPY  2017    HERNIA REPAIR      umbilical    LAPAROTOMY N/A 12/10/2020    Procedure: ABDOMINAL MYOMECTOMY;  Surgeon: Bre Foley DO;  [facility];  Service: Gynecology    LUMBAR LAMINECTOMY Right 09/14/2016    Procedure: Right L5/S1 Metryx microdiscectomy;  Surgeon: Navi Sotomayor MD;  [facility];  Service:    Emely Gonzalez [place] COLONOSCOPY FLX DX W/COLLJ SPEC WHEN PFRMD N/A 07/07/2016    Procedure: COLONOSCOPY;  Surgeon: Krysten Cedillo DO;  [facility];   Service: Gastroenterology    IL HYSTEROSCOPY,W/ENDO BX N/A 03/19/2018    Procedure: HYSTEROSCOPY; MYOMECTOMY; ENDOMETRIAL BIOPSY;  Surgeon: Bre Foley DO;  [facility];  Service: Gynecology    IL INCISE FINGER TENDON SHEATH Right 02/28/2017    Procedure: THUMB TRIGGER RELEASE ;  Surgeon: Sapphire Grayson DO;  [facility];  Service: Orthopedics    SPINE SURGERY  9/2016    Microdisectomy    WISDOM TOOTH EXTRACTION       Social History     Socioeconomic History    Marital status: /Civil Union     Spouse name: Not on file    Number of children: Not on file    Years of education: Not on file    Highest education level: Not on file   Occupational History    Not on file   Tobacco Use    Smoking status: Never Smoker    Smokeless tobacco: Never Used   Vaping Use    Vaping Use: Never used   Substance and Sexual Activity    Alcohol use: Not Currently     Comment: socially     Drug use: No    Sexual activity: Yes     Partners: Male     Birth control/protection: None   Other Topics Concern    Not on file   Social History Narrative    Exercises 3x week    Pet: dog     Social Determinants of Health     Financial Resource Strain: Not on file   Food Insecurity: Not on file   Transportation Needs: Not on file   Physical Activity: Not on file   Stress: Not on file   Social Connections: Not on file   Intimate Partner Violence: Not on file   Housing Stability: Not on file     Social History     Substance and Sexual Activity   Alcohol Use Not Currently    Comment: socially      Social History     Substance and Sexual Activity   Drug Use No     Social History     Tobacco Use   Smoking Status Never Smoker   Smokeless Tobacco Never Used       Past Family History  Family History   Problem Relation Age of Onset    Diabetes Mother         type2    Hypertension Mother     Depression Mother     Thyroid disease Mother     Arthritis Mother     Anxiety disorder Mother     Diabetes Father         type2    Hypertension Father     Alcohol abuse Father     Substance Abuse Father     Arthritis Father     Drug abuse Father     Heart attack Maternal Grandfather         acute MI    Arthritis Maternal Grandfather     Stroke Maternal Grandfather         CVA    Hyperlipidemia Maternal Grandfather     Heart disease Maternal Grandfather     Arthritis Paternal Grandfather     Stroke Paternal Grandfather         CVA    Cancer Paternal Grandfather     Hyperlipidemia Paternal Grandfather     Colon cancer Paternal Grandfather     Heart attack Maternal Uncle         acute MI    Arthritis Family     Stroke Family         CVA    Cancer Family     Hyperlipidemia Family     Stroke Family         CVA    Hyperlipidemia Family     Esophageal cancer Maternal Grandmother     Breast cancer Maternal Grandmother         Maternal great grandmother    Cancer Maternal Grandmother         Pancreatic    Bipolar disorder Maternal Aunt Current Medications  Current Outpatient Medications   Medication Sig Dispense Refill    albuterol (2 5 mg/3 mL) 0 083 % nebulizer solution Take 1 vial (2 5 mg total) by nebulization every 6 (six) hours as needed for wheezing or shortness of breath 30 vial 3    albuterol (ProAir HFA) 90 mcg/act inhaler Inhale 2 puffs every 4 (four) hours as needed (When has flare up) 1 Inhaler 0    ALPRAZolam (XANAX) 0 25 mg tablet Take 1 tablet (0 25 mg total) by mouth daily at bedtime as needed for anxiety 20 tablet 0    Cholecalciferol (VITAMIN D3 PO) Take 2,000 mg by mouth daily in the early morning      cyclobenzaprine (FLEXERIL) 10 mg tablet Take 1 tablet (10 mg total) by mouth 3 (three) times a day as needed for muscle spasms 60 tablet 1    fluticasone (Flovent HFA) 110 MCG/ACT inhaler Inhale 2 puffs 2 (two) times a day Rinse mouth after use   12 g 0    labetalol (NORMODYNE) 200 mg tablet Take 1 tablet (200 mg total) by mouth in the morning 180 tablet 0    magnesium 30 MG tablet Take 30 mg by mouth 2 (two) times a day      meloxicam (MOBIC) 15 mg tablet Take 1 tablet (15 mg total) by mouth daily as needed for mild pain 30 tablet 0    Multiple Vitamins-Minerals (Womens Multivitamin) TABS Take 1 tablet by mouth daily      olmesartan-hydrochlorothiazide (BENICAR HCT) 20-12 5 MG per tablet Take 1 tablet by mouth daily 90 tablet 0    ondansetron (ZOFRAN-ODT) 4 mg disintegrating tablet Take 1 tablet (4 mg total) by mouth every 6 (six) hours as needed for nausea or vomiting 20 tablet 0    pantoprazole (PROTONIX) 40 mg tablet Take 1 tablet (40 mg total) by mouth daily before breakfast 90 tablet 0    predniSONE 10 mg tablet 4 tabs x 2 days, 3 tabs x 2 days, 2 tabs x 2 days, 1 tab x 2 days 20 tablet 0    SUMAtriptan (IMITREX) 50 mg tablet Take 1 tablet (50 mg total) by mouth once as needed for migraine for up to 1 dose 9 tablet 0    metFORMIN (GLUCOPHAGE-XR) 500 mg 24 hr tablet Take 1 tablet (500 mg total) by mouth daily with dinner (Patient not taking: No sig reported) 30 tablet 2     No current facility-administered medications for this visit  Allergies  No Known Allergies      The following portions of the patient's history were reviewed and updated as appropriate: allergies, current medications, past medical history, past social history, past surgical history and problem list       Vitals  There were no vitals filed for this visit  Physical Exam  Constitutional   General appearance: Patient is seated and in no acute distress, well appearing and well nourished  Head and Face   Head and face: Normal     Eyes   Conjunctiva and lids: No erythema, swelling or discharge  Anicteric  Ears, Nose, Mouth, and Throat   Hearing: Normal     Neck: Supple, trachea midline  Pulmonary   Respiratory effort: No increased work of breathing or signs of respiratory distress  Cardiovascular    Examination of extremities for edema and/or varicosities: Normal     Musculoskeletal   Gait and station: Normal    Skin   Skin and subcutaneous tissue: Warm, dry, and intact  No visible jaundice, lesions or rashes  Psychiatric   Judgment and insight: Normal  Recent and remote memory:  Normal  Mood and affect: Normal      Results  No visits with results within 1 Day(s) from this visit  Latest known visit with results is:   Telemedicine on 06/13/2022   Component Date Value    SARS-CoV-2 06/13/2022 Negative     INFLUENZA A PCR 06/13/2022 Negative     INFLUENZA B PCR 06/13/2022 Negative        Radiology Results  No results found  Orders  No orders of the defined types were placed in this encounter

## 2022-06-29 NOTE — PROGRESS NOTES
Significant decrease in size of the left ovarian cyst    Will check follow-up study in 6-8 weeks    Please schedule follow-up ultrasound    Thanks

## 2022-06-30 ENCOUNTER — OFFICE VISIT (OUTPATIENT)
Dept: OBGYN CLINIC | Facility: CLINIC | Age: 37
End: 2022-06-30
Payer: COMMERCIAL

## 2022-06-30 VITALS — DIASTOLIC BLOOD PRESSURE: 76 MMHG | SYSTOLIC BLOOD PRESSURE: 124 MMHG | BODY MASS INDEX: 34.19 KG/M2 | WEIGHT: 193 LBS

## 2022-06-30 DIAGNOSIS — N89.8 VAGINAL DISCHARGE: ICD-10-CM

## 2022-06-30 DIAGNOSIS — N89.8 VAGINAL ITCHING: Primary | ICD-10-CM

## 2022-06-30 PROCEDURE — 87510 GARDNER VAG DNA DIR PROBE: CPT | Performed by: OBSTETRICS & GYNECOLOGY

## 2022-06-30 PROCEDURE — 87480 CANDIDA DNA DIR PROBE: CPT | Performed by: OBSTETRICS & GYNECOLOGY

## 2022-06-30 PROCEDURE — 87660 TRICHOMONAS VAGIN DIR PROBE: CPT | Performed by: OBSTETRICS & GYNECOLOGY

## 2022-06-30 PROCEDURE — 99214 OFFICE O/P EST MOD 30 MIN: CPT | Performed by: OBSTETRICS & GYNECOLOGY

## 2022-06-30 NOTE — PATIENT INSTRUCTIONS
Topic:  Vaginal discharge and vaginal itching     Appropriate cultures were obtained at today's visit     Further treatment and follow-up planning will be based upon final pathology results    All questions were answered    Patient to call for any problems, questions, issues or concerns which may arise for her

## 2022-06-30 NOTE — PROGRESS NOTES
Assessment/Plan:    No problem-specific Assessment & Plan notes found for this encounter  Diagnoses and all orders for this visit:    Vaginal itching  -     VAGINOSIS DNA PROBE (AFFIRM)    Vaginal discharge  -     VAGINOSIS DNA PROBE (AFFIRM)        Subjective:      Patient ID: Roshan Kern is a 44 y o  female  Patient is a 65-year-old female who presents today for evaluation of vaginal discharge and vaginal itching  Patient denies any erratic bleeding at this time  She reports that her menstrual cycles are fairly normal without any excessive bleeding clotting cramping or pelvic or abdominal pain  Patient denies any significant urinary symptoms and denies any fever shakes or chills  She also denies any pelvic or abdominal pain or cramping as well     Physical examination revealed presence of a vaginal cervical discharge  Appropriate cultures were obtained at the visit  All questions were answered in detail for patient during today's visit     Further treatment and follow-up planning will be based upon final pathology results     Patient reported that she had completed a treatment with azithromycin with steroids for bronchitis several weeks ago  Total time of today's visit was 25 minutes of which greater than 50% was spent face-to-face counseling the patient as well as coordination of care, review of chart lab values, physical examination including cultures as well as computer entry into the epic medical record system  The following portions of the patient's history were reviewed and updated as appropriate: allergies, current medications, past family history, past medical history, past social history, past surgical history and problem list     Review of Systems   Constitutional: Negative  Negative for appetite change, diaphoresis, fatigue, fever and unexpected weight change  HENT: Negative  Eyes: Negative  Respiratory: Negative  Cardiovascular: Negative  Followed for blood pressure   Gastrointestinal: Negative  Negative for abdominal pain, blood in stool, constipation, diarrhea, nausea and vomiting  Endocrine: Negative  Negative for cold intolerance and heat intolerance  Genitourinary: Negative  Negative for dysuria, frequency, hematuria, urgency, vaginal bleeding, vaginal discharge and vaginal pain  Musculoskeletal: Negative  Skin: Negative  Allergic/Immunologic: Negative  Neurological: Negative  Hematological: Negative  Negative for adenopathy  Psychiatric/Behavioral: Negative  Objective:      /76   Wt 87 5 kg (193 lb)   BMI 34 19 kg/m²          Physical Exam  Constitutional:       Appearance: She is well-developed  HENT:      Head: Normocephalic  Eyes:      Pupils: Pupils are equal, round, and reactive to light  Cardiovascular:      Rate and Rhythm: Normal rate  Pulmonary:      Effort: Pulmonary effort is normal    Genitourinary:     Labia:         Right: No rash, tenderness, lesion or injury  Left: No rash, tenderness, lesion or injury  Urethra: No urethral swelling or urethral lesion  Vagina: Vaginal discharge present  No erythema, bleeding or lesions  Cervix: Discharge present  No lesion, erythema or cervical bleeding  Uterus: Normal  Not enlarged and not tender  Adnexa: Right adnexa normal and left adnexa normal         Right: No mass, tenderness or fullness  Left: No mass, tenderness or fullness  Comments: Vaginal cervical discharge present   Appropriate cultures taken  Musculoskeletal:         General: Normal range of motion  Cervical back: Normal range of motion and neck supple  Skin:     General: Skin is warm and dry  Neurological:      General: No focal deficit present  Mental Status: She is alert and oriented to person, place, and time     Psychiatric:         Mood and Affect: Mood normal          Behavior: Behavior normal  Thought Content:  Thought content normal          Judgment: Judgment normal

## 2022-07-06 ENCOUNTER — TELEPHONE (OUTPATIENT)
Dept: OBGYN CLINIC | Facility: CLINIC | Age: 37
End: 2022-07-06

## 2022-07-06 NOTE — TELEPHONE ENCOUNTER
Pt informed of negative vag culture results 6/30/2022 - can use H-C cream ext for vag itching  To recall if unimproved

## 2022-07-06 NOTE — TELEPHONE ENCOUNTER
----- Message from Otf Valdivia MD sent at 7/5/2022  1:11 PM EDT -----  Vaginal cultures were negative     Thanks

## 2022-09-09 DIAGNOSIS — I10 HYPERTENSION, UNSPECIFIED TYPE: ICD-10-CM

## 2022-09-09 RX ORDER — LABETALOL 200 MG/1
200 TABLET, FILM COATED ORAL DAILY
Qty: 180 TABLET | Refills: 0 | Status: SHIPPED | OUTPATIENT
Start: 2022-09-09 | End: 2022-10-25

## 2022-09-15 ENCOUNTER — TELEMEDICINE (OUTPATIENT)
Dept: FAMILY MEDICINE CLINIC | Facility: CLINIC | Age: 37
End: 2022-09-15
Payer: COMMERCIAL

## 2022-09-15 VITALS — BODY MASS INDEX: 33.49 KG/M2 | TEMPERATURE: 99.4 F | HEIGHT: 63 IN | WEIGHT: 189 LBS

## 2022-09-15 DIAGNOSIS — J45.20 ASTHMA IN ADULT, MILD INTERMITTENT, UNCOMPLICATED: ICD-10-CM

## 2022-09-15 DIAGNOSIS — U07.1 COVID-19: Primary | ICD-10-CM

## 2022-09-15 DIAGNOSIS — I10 BENIGN ESSENTIAL HYPERTENSION: ICD-10-CM

## 2022-09-15 PROBLEM — R10.12 LUQ ABDOMINAL PAIN: Status: RESOLVED | Noted: 2022-06-23 | Resolved: 2022-09-15

## 2022-09-15 LAB — SARS-COV-2 AG UPPER RESP QL IA: ABNORMAL

## 2022-09-15 PROCEDURE — 87811 SARS-COV-2 COVID19 W/OPTIC: CPT | Performed by: FAMILY MEDICINE

## 2022-09-15 PROCEDURE — 99214 OFFICE O/P EST MOD 30 MIN: CPT | Performed by: FAMILY MEDICINE

## 2022-09-15 RX ORDER — BENZONATATE 200 MG/1
200 CAPSULE ORAL 3 TIMES DAILY PRN
Qty: 20 CAPSULE | Refills: 0 | Status: SHIPPED | OUTPATIENT
Start: 2022-09-15 | End: 2022-09-28 | Stop reason: ALTCHOICE

## 2022-09-15 RX ORDER — NIRMATRELVIR AND RITONAVIR 300-100 MG
3 KIT ORAL 2 TIMES DAILY
Qty: 30 TABLET | Refills: 0 | Status: SHIPPED | OUTPATIENT
Start: 2022-09-15 | End: 2022-09-20

## 2022-09-15 NOTE — PROGRESS NOTES
COVID-19 Outpatient Progress Note    Assessment/Plan:    Problem List Items Addressed This Visit        Respiratory    Asthma in adult, mild intermittent, uncomplicated     Worsening since covid infection   To start flovent 4 puffs twice a day x 14 days             Cardiovascular and Mediastinum    Benign essential hypertension     Stable on current meds            Other Visit Diagnoses     COVID-19    -  Primary    day # 3 from onset of symptoms   paxlovid as directed       Relevant Medications    nirmatrelvir & ritonavir (Paxlovid, 300/100,) tablet therapy pack    benzonatate (TESSALON) 200 MG capsule    Other Relevant Orders    [employer] Covid home test flowsheet    Covid at Home Test Kit (Completed)         Disposition:     Patient has asymptomatic or mild COVID-19 infection  Based off CDC guidelines, they were recommended to isolate for 5 days  If they are asymptomatic or symptoms are improving with no fevers in the past 24 hours, isolation may be ended followed by 5 days of wearing a mask when around othes to minimize risk of infecting others  If still have a fever or other symptoms have not improved, continue to isolate until they improve  Regardless of when they end isolation, avoid being around people who are more likely to get very sick from COVID-19 until at least day 11  Patient with moderate or severe illness or has a weakened immune system  They should isolate from others through at least day 10  Isolation can be ended if symptoms are improving and they are fever free for the past 24 hours  If they still have fever or other symptoms have not improved, continue to isolate until they improve  Regardless of when you isolation is ended, avoid being around people who are more likely to get very sick from COVID-19 until at least day 11  Discussed symptom directed medication options with patient  Discussed vitamin D, vitamin C, and/or zinc supplementation with patient       Patient meets criteria for PAXLOVID and they have been counseled appropriately according to EUA documentation released by the FDA  After discussion, patient agrees to treatment  [address] is an investigational medicine used to treat mild-to-moderate COVID-19 in adults and children (15years of age and older weighing at least 80 pounds (40 kg)) with positive results of direct SARS-CoV-2 viral testing, and who are at high risk for progression to severe COVID-19, including hospitalization or death  PAXLOVID is investigational because it is still being studied  There is limited information about the safety and effectiveness of using PAXLOVID to treat people with mild-to-moderate COVID-19  The FDA has authorized the emergency use of PAXLOVID for the treatment of mild-tomoderate COVID-19 in adults and children (15years of age and older weighing at least 80 pounds (40 kg)) with a positive test for the virus that causes COVID-19, and who are at high risk for progression to severe COVID-19, including hospitalization or death, under an EUA  What should I tell my healthcare provider before I take PAXLOVID? Tell your healthcare provider if you:  - Have any allergies  - Have liver or kidney disease  - Are pregnant or plan to become pregnant  - Are breastfeeding a child  - Have any serious illnesses    Tell your healthcare provider about all the medicines you take, including prescription and over-the-counter medicines, vitamins, and herbal supplements  Some medicines may interact with PAXLOVID and may cause serious side effects  Keep a list of your medicines to show your healthcare provider and pharmacist when you get a new medicine  You can ask your healthcare provider or pharmacist for a list of medicines that interact with PAXLOVID  Do not start taking a new medicine without telling your healthcare provider  Your healthcare provider can tell you if it is safe to take PAXLOVID with other medicines      Tell your healthcare provider if you are taking combined hormonal contraceptive  PAXLOVID may affect how your birth control pills work  Females who are able to become pregnant should use another effective alternative form of contraception or an additional barrier method of contraception  Talk to your healthcare provider if you have any questions about contraceptive methods that might be right for you  How do I take PAXLOVID? PAXLOVID consists of 2 medicines: nirmatrelvir and ritonavir  - Take 2 pink tablets of nirmatrelvir with 1 white tablet of ritonavir by mouth 2 times each day (in the morning and in the evening) for 5 days  For each dose, take all 3 tablets at the same time  - If you have kidney disease, talk to your healthcare provider  You may need a different dose  - Swallow the tablets whole  Do not chew, break, or crush the tablets  - Take PAXLOVID with or without food  - Do not stop taking PAXLOVID without talking to your healthcare provider, even if you feel better  - If you miss a dose of PAXLOVID within 8 hours of the time it is usually taken, take it as soon as you remember  If you miss a dose by more than 8 hours, skip the missed dose and take the next dose at your regular time  Do not take 2 doses of PAXLOVID at the same time  - If you take too much PAXLOVID, call your healthcare provider or go to the nearest hospital emergency room right away  - If you are taking a ritonavir- or cobicistat-containing medicine to treat hepatitis C or Human Immunodeficiency Virus (HIV), you should continue to take your medicine as prescribed by your healthcare provider   - Talk to your healthcare provider if you do not feel better or if you feel worse after 5 days  Who should generally not take PAXLOVID? Do not take PAXLOVID if:  You are allergic to nirmatrelvir, ritonavir, or any of the ingredients in PAXLOVID      You are taking any of the following medicines:  - Alfuzosin  - Pethidine, piroxicam, propoxyphene  - Ranolazine  - Amiodarone, dronedarone, flecainide, propafenone, quinidine  - Colchicine  - Lurasidone, pimozide, clozapine  - Dihydroergotamine, ergotamine, methylergonovine  - Lovastatin, simvastatin  - Sildenafil (Revatio®) for pulmonary arterial hypertension (PAH)  - Triazolam, oral midazolam  - Apalutamide  - Carbamazepine, phenobarbital, phenytoin  - Rifampin  - St  Grupos Wort (hypericum perforatum)    What are the important possible side effects of PAXLOVID? Possible side effects of PAXLOVID are:  - Liver Problems  Tell your healthcare provider right away if you have any of these signs and symptoms of liver problems: loss of appetite, yellowing of your skin and the whites of eyes (jaundice), dark-colored urine, pale colored stools and itchy skin, stomach area (abdominal) pain  - Resistance to HIV Medicines  If you have untreated HIV infection, PAXLOVID may lead to some HIV medicines not working as well in the future  - Other possible side effects include: altered sense of taste, diarrhea, high blood pressure, or muscle aches    These are not all the possible side effects of PAXLOVID  Not many people have taken PAXLOVID  Serious and unexpected side effects may happen  [employer] is still being studied, so it is possible that all of the risks are not known at this time  What other treatment choices are there? Like Viktoriya Many may allow for the emergency use of other medicines to treat people with COVID-19  Go to https://[employer]/ for information on the emergency use of other medicines that are authorized by FDA to treat people with COVID-19  Your healthcare provider may talk with you about clinical trials for which you may be eligible  It is your choice to be treated or not to be treated with PAXLOVID   Should you decide not to receive it or for your child not to receive it, it will not change your standard medical care  What if I am pregnant or breastfeeding? There is no experience treating pregnant women or breastfeeding mothers with PAXLOVID  For a mother and unborn baby, the benefit of taking PAXLOVID may be greater than the risk from the treatment  If you are pregnant, discuss your options and specific situation with your healthcare provider  It is recommended that you use effective barrier contraception or do not have sexual activity while taking PAXLOVID  If you are breastfeeding, discuss your options and specific situation with your healthcare provider  How do I report side effects with PAXLOVID? Contact your healthcare provider if you have any side effects that bother you or do not go away  Report side effects to FDA MedWatch at www [employer] gov/medwatch or call 2-030-TRY1196 or you can report side effects to [employer]  at the contact information provided below  Website Fax number Telephone number   [employer] 4-880-520-883-373-2585 [phone]     How should I store Hunter Tierney? Store PAXLOVID tablets at room temperature between 68°F to 77°F (20°C to 25°C)  Full fact sheet for patients, parents, and caregivers can be found at: http://[employer]/    I have spent 15 minutes directly with the patient  Encounter provider: Leonora Ren MD     Provider located at: [place] [id number] [facility] [place] [id number]     Recent Visits  No visits were found meeting these conditions  Showing recent visits within past 7 days and meeting all other requirements  Today's Visits  Date Type Provider Dept   09/15/22 Telemedicine Leonora Ren MD [address] [facility] [place] today's visits and meeting all other requirements  Future Appointments  No visits were found meeting these conditions    Showing future appointments within next 150 days and meeting all other requirements     This virtual check-in was done via [employer] and patient was informed that this is a secure, HIPAA-compliant platform  She agrees to proceed  Patient agrees to participate in a virtual check in via telephone or video visit instead of presenting to the office to address urgent/immediate medical needs  Patient is aware this is a billable service  She acknowledged consent and understanding of privacy and security of the video platform  The patient has agreed to participate and understands they can discontinue the visit at any time  After connecting through [facility], the patient was identified by name and date of birth  Tony Griffin was informed that this was a telemedicine visit and that the exam was being conducted confidentially over secure lines  My office door was closed  No one else was in the room  Tony Griffin acknowledged consent and understanding of privacy and security of the telemedicine visit  I informed the patient that I have reviewed her record in Epic and presented the opportunity for her to ask any questions regarding the visit today  The patient agreed to participate  Verification of patient location:  Patient is located in the following state in which I hold an active license: [place]    Subjective:   Tony Griffin is a 44 y o  female who has been screened for COVID-19  Symptom change since last report: worsening  Patient's symptoms include fever, chills, cough, shortness of breath, chest tightness and myalgias  Patient denies fatigue, malaise, congestion, rhinorrhea, sore throat, anosmia, loss of taste, abdominal pain, nausea, vomiting, diarrhea and headaches  - Date of symptom onset: 9/12/2022  - Date of positive COVID-19 test: 9/15/2022  Type of test: Home antigen  Patient with typical symptoms of COVID-19 and they attest that they were positive on home rapid antigen testing  Image of positive result is not able to be uploaded into their chart       COVID-19 vaccination status: Fully vaccinated with booster    Lab Results   Component Value Date    SARSCOV2 Negative 06/13/2022    [address] [place] [address] Not Detected 12/03/2020    [address] [facility] Positive (Patient Reported) (A) 09/15/2022       Review of Systems   Constitutional: Positive for chills and fever  Negative for fatigue  HENT: Negative for congestion, rhinorrhea and sore throat  Respiratory: Positive for cough, chest tightness and shortness of breath  Gastrointestinal: Negative for abdominal pain, diarrhea, nausea and vomiting  Musculoskeletal: Positive for myalgias  Neurological: Negative for headaches  Current Outpatient Medications on File Prior to Visit   Medication Sig    albuterol (2 5 mg/3 mL) 0 083 % nebulizer solution Take 1 vial (2 5 mg total) by nebulization every 6 (six) hours as needed for wheezing or shortness of breath    albuterol (ProAir HFA) 90 mcg/act inhaler Inhale 2 puffs every 4 (four) hours as needed (When has flare up)    ALPRAZolam (XANAX) 0 25 mg tablet Take 1 tablet (0 25 mg total) by mouth daily at bedtime as needed for anxiety    Cholecalciferol (VITAMIN D3 PO) Take 2,000 mg by mouth daily in the early morning    cyclobenzaprine (FLEXERIL) 10 mg tablet Take 1 tablet (10 mg total) by mouth 3 (three) times a day as needed for muscle spasms    fluticasone (Flovent HFA) 110 MCG/ACT inhaler Inhale 2 puffs 2 (two) times a day Rinse mouth after use      labetalol (NORMODYNE) 200 mg tablet Take 1 tablet (200 mg total) by mouth in the morning    magnesium 30 MG tablet Take 30 mg by mouth 2 (two) times a day    meloxicam (MOBIC) 15 mg tablet Take 1 tablet (15 mg total) by mouth daily as needed for mild pain    Multiple Vitamins-Minerals (Womens Multivitamin) TABS Take 1 tablet by mouth daily    olmesartan-hydrochlorothiazide (BENICAR HCT) 20-12 5 MG per tablet Take 1 tablet by mouth daily    ondansetron (ZOFRAN-ODT) 4 mg disintegrating tablet Take 1 tablet (4 mg total) by mouth every 6 (six) hours as needed for nausea or vomiting    pantoprazole (PROTONIX) 40 mg tablet Take 1 tablet (40 mg total) by mouth daily before breakfast    predniSONE 10 mg tablet 4 tabs x 2 days, 3 tabs x 2 days, 2 tabs x 2 days, 1 tab x 2 days    SUMAtriptan (IMITREX) 50 mg tablet Take 1 tablet (50 mg total) by mouth once as needed for migraine for up to 1 dose    [DISCONTINUED] metFORMIN (GLUCOPHAGE-XR) 500 mg 24 hr tablet Take 1 tablet (500 mg total) by mouth daily with dinner       Objective:    Temp 99 4 °F (37 4 °C)   Ht 5' 3" (1 6 m)   Wt 85 7 kg (189 lb)   BMI 33 48 kg/m²      Physical Exam  Vitals reviewed  Constitutional:       Appearance: Normal appearance  Pulmonary:      Effort: Pulmonary effort is normal    Neurological:      Mental Status: She is alert     Psychiatric:         Mood and Affect: Mood normal          Behavior: Behavior normal        Scottie Kaiser MD

## 2022-09-28 ENCOUNTER — OFFICE VISIT (OUTPATIENT)
Dept: FAMILY MEDICINE CLINIC | Facility: CLINIC | Age: 37
End: 2022-09-28
Payer: COMMERCIAL

## 2022-09-28 VITALS
DIASTOLIC BLOOD PRESSURE: 90 MMHG | SYSTOLIC BLOOD PRESSURE: 130 MMHG | RESPIRATION RATE: 15 BRPM | HEART RATE: 70 BPM | HEIGHT: 64 IN | TEMPERATURE: 97 F | OXYGEN SATURATION: 99 % | WEIGHT: 192.2 LBS | BODY MASS INDEX: 32.81 KG/M2

## 2022-09-28 DIAGNOSIS — K21.9 GASTROESOPHAGEAL REFLUX DISEASE, UNSPECIFIED WHETHER ESOPHAGITIS PRESENT: ICD-10-CM

## 2022-09-28 DIAGNOSIS — I10 BENIGN ESSENTIAL HYPERTENSION: ICD-10-CM

## 2022-09-28 DIAGNOSIS — G47.10 EXCESSIVE SLEEPINESS: ICD-10-CM

## 2022-09-28 DIAGNOSIS — E66.9 OBESITY (BMI 30-39.9): ICD-10-CM

## 2022-09-28 DIAGNOSIS — D50.0 IRON DEFICIENCY ANEMIA DUE TO CHRONIC BLOOD LOSS: ICD-10-CM

## 2022-09-28 DIAGNOSIS — J45.20 ASTHMA IN ADULT, MILD INTERMITTENT, UNCOMPLICATED: ICD-10-CM

## 2022-09-28 DIAGNOSIS — G43.009 MIGRAINE WITHOUT AURA AND WITHOUT STATUS MIGRAINOSUS, NOT INTRACTABLE: Primary | ICD-10-CM

## 2022-09-28 DIAGNOSIS — Z23 FLU VACCINE NEED: ICD-10-CM

## 2022-09-28 DIAGNOSIS — G43.709 CHRONIC MIGRAINE WITHOUT AURA WITHOUT STATUS MIGRAINOSUS, NOT INTRACTABLE: ICD-10-CM

## 2022-09-28 PROBLEM — R14.0 ABDOMINAL BLOATING: Status: RESOLVED | Noted: 2022-06-23 | Resolved: 2022-09-28

## 2022-09-28 PROCEDURE — 90682 RIV4 VACC RECOMBINANT DNA IM: CPT

## 2022-09-28 PROCEDURE — 99214 OFFICE O/P EST MOD 30 MIN: CPT | Performed by: FAMILY MEDICINE

## 2022-09-28 PROCEDURE — 90471 IMMUNIZATION ADMIN: CPT

## 2022-09-28 RX ORDER — SUMATRIPTAN 50 MG/1
50 TABLET, FILM COATED ORAL ONCE AS NEEDED
Qty: 9 TABLET | Refills: 0 | Status: SHIPPED | OUTPATIENT
Start: 2022-09-28

## 2022-09-28 RX ORDER — OLMESARTAN MEDOXOMIL AND HYDROCHLOROTHIAZIDE 40/12.5 40; 12.5 MG/1; MG/1
1 TABLET ORAL DAILY
Qty: 90 TABLET | Refills: 0 | Status: SHIPPED | OUTPATIENT
Start: 2022-09-28 | End: 2022-10-25

## 2022-09-28 NOTE — PROGRESS NOTES
Name: Rj Raymundo      :       MRN: [id number]  Encounter Provider: Daniel Sue MD  Encounter Date: 2022   Encounter department: [facility]     1  Migraine without aura and without status migrainosus, not intractable  Assessment & Plan:  Flare up  To start imitrex PRN   To f/u with eye specialist to r/o papilledema       2  Chronic migraine without aura without status migrainosus, not intractable  -     SUMAtriptan (IMITREX) 50 mg tablet; Take 1 tablet (50 mg total) by mouth once as needed for migraine for up to 1 dose    3  Obesity (BMI 30-39  9)  Assessment & Plan:  Diet and exercise encouraged       4  Benign essential hypertension  Assessment & Plan:  Not controlled  Increased benicar to 40 mg daily     Orders:  -     olmesartan-hydrochlorothiazide (BENICAR HCT) 40-12 5 MG per tablet; Take 1 tablet by mouth daily  -     Comprehensive metabolic panel  -     CBC and differential    5  Gastroesophageal reflux disease, unspecified whether esophagitis present  Assessment & Plan:  Pantoprazole PRN   Avoid triggers       6  Asthma in adult, mild intermittent, uncomplicated  Assessment & Plan:  Stable  Albuterol PRN       7  Iron deficiency anemia due to chronic blood loss  Assessment & Plan:  Labs ordered today      8  Excessive sleepiness  -     Diagnostic Sleep Study; Future; Expected date: 2022    9  Flu vaccine need  -     influenza vaccine, quadrivalent, recombinant, PF, 0 5 mL, for patients 18 yr+ (FLUBLOK)         Subjective      HPI   Here for follow up  Left sided headaches on and off  for the last 1 month   hasnt tried imitrex since it hasnt been bad enough   Tylenol helps but it comes back after it wears off   No triggers that she could recall   + daytime sleepiness and tired during the day for years    Review of Systems   Constitutional: Negative  HENT: Negative  Eyes: Negative  Respiratory: Negative  Endocrine: Negative  Genitourinary: Negative  Neurological: Positive for headaches  Hematological: Negative  Psychiatric/Behavioral: Negative  Current Outpatient Medications on File Prior to Visit   Medication Sig    albuterol (2 5 mg/3 mL) 0 083 % nebulizer solution Take 1 vial (2 5 mg total) by nebulization every 6 (six) hours as needed for wheezing or shortness of breath    albuterol (ProAir HFA) 90 mcg/act inhaler Inhale 2 puffs every 4 (four) hours as needed (When has flare up)    ALPRAZolam (XANAX) 0 25 mg tablet Take 1 tablet (0 25 mg total) by mouth daily at bedtime as needed for anxiety    Cholecalciferol (VITAMIN D3 PO) Take 2,000 mg by mouth daily in the early morning    cyclobenzaprine (FLEXERIL) 10 mg tablet Take 1 tablet (10 mg total) by mouth 3 (three) times a day as needed for muscle spasms    fluticasone (Flovent HFA) 110 MCG/ACT inhaler Inhale 2 puffs 2 (two) times a day Rinse mouth after use      labetalol (NORMODYNE) 200 mg tablet Take 1 tablet (200 mg total) by mouth in the morning    magnesium 30 MG tablet Take 30 mg by mouth 2 (two) times a day    meloxicam (MOBIC) 15 mg tablet Take 1 tablet (15 mg total) by mouth daily as needed for mild pain    Multiple Vitamins-Minerals (Womens Multivitamin) TABS Take 1 tablet by mouth daily    ondansetron (ZOFRAN-ODT) 4 mg disintegrating tablet Take 1 tablet (4 mg total) by mouth every 6 (six) hours as needed for nausea or vomiting    pantoprazole (PROTONIX) 40 mg tablet Take 1 tablet (40 mg total) by mouth daily before breakfast    [DISCONTINUED] benzonatate (TESSALON) 200 MG capsule Take 1 capsule (200 mg total) by mouth 3 (three) times a day as needed for cough    [DISCONTINUED] olmesartan-hydrochlorothiazide (BENICAR HCT) 20-12 5 MG per tablet Take 1 tablet by mouth daily    [DISCONTINUED] SUMAtriptan (IMITREX) 50 mg tablet Take 1 tablet (50 mg total) by mouth once as needed for migraine for up to 1 dose    [DISCONTINUED] predniSONE 10 mg tablet 4 tabs x 2 days, 3 tabs x 2 days, 2 tabs x 2 days, 1 tab x 2 days (Patient not taking: Reported on 9/28/2022)       Objective     /90 (BP Location: Left arm, Patient Position: Sitting, Cuff Size: Standard)   Pulse 70   Temp (!) 97 °F (36 1 °C) (Core)   Resp 15   Ht 5' 4 13" (1 629 m)   Wt 87 2 kg (192 lb 3 2 oz)   LMP 09/17/2022   SpO2 99%   BMI 32 85 kg/m²     Physical Exam  Vitals reviewed  Constitutional:       Appearance: Normal appearance  HENT:      Right Ear: Tympanic membrane normal       Left Ear: Tympanic membrane normal       Mouth/Throat:      Mouth: Mucous membranes are moist       Pharynx: No oropharyngeal exudate or posterior oropharyngeal erythema  Cardiovascular:      Rate and Rhythm: Normal rate and regular rhythm  Pulses: Normal pulses  Heart sounds: Normal heart sounds  Pulmonary:      Effort: Pulmonary effort is normal       Breath sounds: Normal breath sounds  Musculoskeletal:         General: Normal range of motion  Cervical back: Normal range of motion  Skin:     General: Skin is warm  Neurological:      General: No focal deficit present  Mental Status: She is alert and oriented to person, place, and time     Psychiatric:         Mood and Affect: Mood normal          Behavior: Behavior normal        Dario Swan MD

## 2022-09-30 ENCOUNTER — TELEPHONE (OUTPATIENT)
Dept: NEUROLOGY | Facility: CLINIC | Age: 37
End: 2022-09-30

## 2022-09-30 DIAGNOSIS — G43.009 MIGRAINE WITHOUT AURA AND WITHOUT STATUS MIGRAINOSUS, NOT INTRACTABLE: Primary | ICD-10-CM

## 2022-09-30 RX ORDER — PREDNISONE 10 MG/1
40 TABLET ORAL DAILY
Qty: 20 TABLET | Refills: 0 | Status: SHIPPED | OUTPATIENT
Start: 2022-09-30 | End: 2022-10-05

## 2022-10-18 ENCOUNTER — TELEPHONE (OUTPATIENT)
Dept: SLEEP CENTER | Facility: CLINIC | Age: 37
End: 2022-10-18

## 2022-10-18 NOTE — TELEPHONE ENCOUNTER
----- Message from Amaury Latham MD sent at 10/18/2022 12:27 PM EDT -----  Approved    ----- Message -----  From: Emely Alvarenga  Sent: 03/5/8987  11:04 AM EDT  To: Sleep Medicine [facility] Provider    This diagnostic sleep study needs approval      If approved please sign and return to clerical pool  If denied please include reasons why  Also provide alternative testing if warranted  Please sign and return to clerical pool

## 2022-10-25 ENCOUNTER — OFFICE VISIT (OUTPATIENT)
Dept: CARDIAC SURGERY | Facility: CLINIC | Age: 37
End: 2022-10-25
Payer: COMMERCIAL

## 2022-10-25 VITALS
WEIGHT: 195.1 LBS | HEART RATE: 74 BPM | SYSTOLIC BLOOD PRESSURE: 143 MMHG | OXYGEN SATURATION: 100 % | BODY MASS INDEX: 33.35 KG/M2 | DIASTOLIC BLOOD PRESSURE: 87 MMHG

## 2022-10-25 DIAGNOSIS — U09.9 COVID-19 LONG HAULER MANIFESTING CHRONIC DYSPNEA: ICD-10-CM

## 2022-10-25 DIAGNOSIS — R06.09 DYSPNEA ON EXERTION: ICD-10-CM

## 2022-10-25 DIAGNOSIS — I10 ESSENTIAL HYPERTENSION: Primary | ICD-10-CM

## 2022-10-25 DIAGNOSIS — R06.09 COVID-19 LONG HAULER MANIFESTING CHRONIC DYSPNEA: ICD-10-CM

## 2022-10-25 PROCEDURE — 99214 OFFICE O/P EST MOD 30 MIN: CPT | Performed by: INTERNAL MEDICINE

## 2022-10-25 RX ORDER — NEBIVOLOL 10 MG/1
10 TABLET ORAL DAILY
Qty: 90 TABLET | Refills: 3 | Status: SHIPPED | OUTPATIENT
Start: 2022-10-25

## 2022-10-25 RX ORDER — VALSARTAN 320 MG/1
320 TABLET ORAL DAILY
Qty: 90 TABLET | Refills: 3 | Status: SHIPPED | OUTPATIENT
Start: 2022-10-25

## 2022-10-25 NOTE — PROGRESS NOTES
Cardiology Follow Up Visit     Interventional Cardiology and [address] [place] [address]  0/89/2767  [phone]  [address]  [facility] [id number] [phone]    No diagnosis found  Discussion/Summary:    1  Essential and at times accelerated hypertension    2  Post COVID last month, some shortness of breath and 1 episode of isolated chest discomfort none since    Plan: Nuha Dailey Almost certain/HCTZ  Start valsartan 320 mg daily with nebivolol 10 mg daily  Home blood pressure checks  Regarding shortness of breath and isolated chest discomfort with COVID last month will plan on stress echocardiogram for risk stratification  Will follow-up in 2 months  Interval History:    Patient here for follow-up  Last seen few years ago    Recently blood pressure medicines changed around due to elevated blood pressure    Placed on combination olmesartan hydrochlorothiazide with stopping labetalol  No plans on pregnancy  Blood pressure ranging 442 176 systolic per her report  Feels 1 blood pressure is elevated  Feels a headache  Had COVID last month  Some symptoms of shortness of breath and had chest discomfort at rest a few weeks ago  None since  Patient Active Problem List   Diagnosis   • Herniated lumbar intervertebral disc   • Asthma in adult, mild intermittent, uncomplicated   • Benign essential hypertension   • Mitral valve insufficiency   • Obesity (BMI 30-39  9)   • PVC (premature ventricular contraction)   • Fibroids, submucosal   • GERD (gastroesophageal reflux disease)   • Female infertility   • Polycystic ovaries   • Migraine without aura and without status migrainosus, not intractable   • Family history of sudden cardiac death (SCD)   • Os vesalianum syndrome of right foot   • Iron deficiency anemia due to chronic blood loss   • Sickle cell trait [facility])     Past Medical History:   Diagnosis Date   • Abdominal pain    • Abnormal ECG    • Anemia    • Asthma     mild intermittent   • GERD (gastroesophageal reflux disease)    • History of palpitations    • Hypertension    • Lower back pain    • Migraines    • Seasonal allergies    • Trace mitral regurgitation by prior echocardiogram    • Trigger finger of right thumb 2/28/2017     Social History     Socioeconomic History   • Marital status: /Civil Union     Spouse name: Not on file   • Number of children: Not on file   • Years of education: Not on file   • Highest education level: Not on file   Occupational History   • Not on file   Tobacco Use   • Smoking status: Never Smoker   • Smokeless tobacco: Never Used   Vaping Use   • Vaping Use: Never used   Substance and Sexual Activity   • Alcohol use: Not Currently     Comment: socially    • Drug use: No   • Sexual activity: Yes     Partners: Male     Birth control/protection: None   Other Topics Concern   • Not on file   Social History Narrative    Exercises 3x week    Pet: dog     Social Determinants of Health     Financial Resource Strain: Not on file   Food Insecurity: Not on file   Transportation Needs: Not on file   Physical Activity: Not on file   Stress: Not on file   Social Connections: Not on file   Intimate Partner Violence: Not on file   Housing Stability: Not on file      Family History   Problem Relation Age of Onset   • Diabetes Mother         type2   • Hypertension Mother    • Depression Mother    • Thyroid disease Mother    • Arthritis Mother    • Anxiety disorder Mother    • Diabetes Father         type2   • Hypertension Father    • Alcohol abuse Father    • Substance Abuse Father    • Arthritis Father    • Drug abuse Father    • Dialysis Father    • Esophageal cancer Maternal Grandmother    • Breast cancer Maternal Grandmother         Maternal great grandmother   • Cancer Maternal Grandmother         Pancreatic   • Heart attack Maternal Grandfather         acute MI   • Arthritis Maternal Grandfather    • Stroke Maternal Grandfather         CVA   • Hyperlipidemia Maternal Grandfather    • Heart disease Maternal Grandfather    • Arthritis Paternal Grandfather    • Stroke Paternal Grandfather         CVA   • Cancer Paternal Grandfather    • Hyperlipidemia Paternal Grandfather    • Colon cancer Paternal Grandfather    • Bipolar disorder Maternal Aunt    • Brain cancer Maternal Aunt    • Heart attack Maternal Uncle         acute MI   • Arthritis Family    • Stroke Family         CVA   • Cancer Family    • Hyperlipidemia Family    • Stroke Family         CVA   • Hyperlipidemia Family      Past Surgical History:   Procedure Laterality Date   • CHOLECYSTECTOMY      laparoscopic   • COLONOSCOPY  2017   • HERNIA REPAIR      umbilical   • LAPAROTOMY N/A 12/10/2020    Procedure: ABDOMINAL MYOMECTOMY;  Surgeon: Rhonda Dong DO;  [facility];  Service: Gynecology   • LUMBAR LAMINECTOMY Right 09/14/2016    Procedure: Right L5/S1 Metryx microdiscectomy;  Surgeon: Dayo Guerrero MD;  Location[facility];  Service:    • NY COLONOSCOPY FLX DX W/COLLJ SPEC WHEN PFRMD N/A 07/07/2016    Procedure: COLONOSCOPY;  Surgeon: Travon Livingston DO;  Location: [facility];   Service: Gastroenterology   • NY HYSTEROSCOPY,W/ENDO BX N/A 03/19/2018    Procedure: HYSTEROSCOPY; MYOMECTOMY; ENDOMETRIAL BIOPSY;  Surgeon: Rhonda Dong DO;  [facility];  Service: Gynecology   • NY INCISE FINGER TENDON SHEATH Right 02/28/2017    Procedure: THUMB TRIGGER RELEASE ;  Surgeon: Ganesh Chamberlain DO;  Location[facility];  Service: Orthopedics   • SPINE SURGERY  9/2016    Microdisectomy   • WISDOM TOOTH EXTRACTION         Current Outpatient Medications:   •  albuterol (2 5 mg/3 mL) 0 083 % nebulizer solution, Take 1 vial (2 5 mg total) by nebulization every 6 (six) hours as needed for wheezing or shortness of breath, Disp: 30 vial, Rfl: 3  •  albuterol (ProAir HFA) 90 mcg/act inhaler, Inhale 2 puffs every 4 (four) hours as needed (When has flare up), Disp: 1 Inhaler, Rfl: 0  •  ALPRAZolam (XANAX) 0 25 mg tablet, Take 1 tablet (0 25 mg total) by mouth daily at bedtime as needed for anxiety, Disp: 20 tablet, Rfl: 0  •  Cholecalciferol (VITAMIN D3 PO), Take 2,000 mg by mouth daily in the early morning, Disp: , Rfl:   •  cyclobenzaprine (FLEXERIL) 10 mg tablet, Take 1 tablet (10 mg total) by mouth 3 (three) times a day as needed for muscle spasms, Disp: 60 tablet, Rfl: 1  •  fluticasone (Flovent HFA) 110 MCG/ACT inhaler, Inhale 2 puffs 2 (two) times a day Rinse mouth after use , Disp: 12 g, Rfl: 0  •  magnesium 30 MG tablet, Take 30 mg by mouth 2 (two) times a day, Disp: , Rfl:   •  meloxicam (MOBIC) 15 mg tablet, Take 1 tablet (15 mg total) by mouth daily as needed for mild pain, Disp: 30 tablet, Rfl: 0  •  Multiple Vitamins-Minerals (Womens Multivitamin) TABS, Take 1 tablet by mouth daily, Disp: , Rfl:   •  pantoprazole (PROTONIX) 40 mg tablet, Take 1 tablet (40 mg total) by mouth daily before breakfast, Disp: 90 tablet, Rfl: 3  •  SUMAtriptan (IMITREX) 50 mg tablet, Take 1 tablet (50 mg total) by mouth once as needed for migraine for up to 1 dose, Disp: 9 tablet, Rfl: 0  •  ondansetron (ZOFRAN-ODT) 4 mg disintegrating tablet, Take 1 tablet (4 mg total) by mouth every 6 (six) hours as needed for nausea or vomiting (Patient not taking: Reported on 10/25/2022), Disp: 20 tablet, Rfl: 0  No Known Allergies      Social, Family, Medication history reviewed and updated as necessary      Labs:     Lab Results   Component Value Date     11/17/2015    K 3 6 02/01/2022     02/01/2022    CO2 28 02/01/2022    BUN 14 02/01/2022    CREATININE 1 09 02/01/2022    CREATININE 0 85 09/13/2021    GLUCOSE 85 11/17/2015    CALCIUM 9 4 02/01/2022       Lab Results   Component Value Date    WBC 7 54 02/01/2022    HGB 12 3 02/01/2022    HGB 11 4 (L) 09/13/2021    HCT 38 0 02/01/2022    HCT 36 2 09/13/2021     02/01/2022  09/13/2021       Lab Results   Component Value Date    CHOL 181 06/23/2015    CHOL 187 01/13/2015     Lab Results   Component Value Date    HDL 74 04/14/2022    HDL 61 06/01/2021     Lab Results   Component Value Date    LDLCALC 91 04/14/2022    LDLCALC 111 (H) 06/01/2021     No results found for: LDLDIRECT          Lab Results   Component Value Date    TRIG 94 04/14/2022    TRIG 121 06/01/2021       Lab Results   Component Value Date    ALT 31 02/01/2022    ALT 26 09/13/2021    AST 11 02/01/2022    AST 11 09/13/2021       Lab Results   Component Value Date    INR 0 98 12/10/2020    INR 1 03 09/13/2016       No results found for: NTBNP    Lab Results   Component Value Date    HGBA1C 6 1 (H) 02/01/2022    HGBA1C 6 0 (H) 06/01/2021    HGBA1C 5 4 07/19/2018           Imaging: Reviewed in epic        Review of Systems:  14 systems reviewed and negative with exception of the above        PHYSICAL EXAM:      Vitals:    10/25/22 0825   BP: 143/87   Pulse: 74   SpO2: 100%     Body mass index is 33 35 kg/m²  Weight (last 2 days)     Date/Time Weight    10/25/22 0825 88 5 (195 1)            Gen: No acute distress  HEENT: anicteric, mucous membranes moist  Neck: supple, no jugular venous distention, or carotid bruit  Heart: regular, normal s1 and s2, no murmur/rub or gallop  Lungs :clear to auscultation bilaterally, no rales/rhonchi or wheeze  Abdomen: soft nontender, normoactive bowel sounds, no organomegaly  Ext: warm and perfused, normal femoral pulses, no edema, or clubbing  Skin: warm, no rashes  Neuro: AAO x 3, no focal findings  Psychiatric: normal affect  Musculoskeletal: no obvious joint deformities  This note was completed in part utilizing m-TagMan fluency direct voice recognition software  Grammatical errors, random word insertion, spelling mistakes, and incomplete sentences may be an occasional consequence of the system secondary to software limitations, ambient noise and hardware issues   At the time of dictation, efforts were made to edit, clarify and /or correct errors  Please read the chart carefully and recognize, using context, where substitutions have occurred  If you have any questions or concerns about the context, text or information contained within the body of this dictation, please contact myself, the provider, for further clarification

## 2022-10-26 ENCOUNTER — OFFICE VISIT (OUTPATIENT)
Dept: OBGYN CLINIC | Facility: CLINIC | Age: 37
End: 2022-10-26

## 2022-10-26 VITALS — WEIGHT: 194 LBS | DIASTOLIC BLOOD PRESSURE: 58 MMHG | BODY MASS INDEX: 33.16 KG/M2 | SYSTOLIC BLOOD PRESSURE: 128 MMHG

## 2022-10-26 DIAGNOSIS — N89.8 VAGINAL DISCHARGE: Primary | ICD-10-CM

## 2022-10-26 DIAGNOSIS — N83.202 LEFT OVARIAN CYST: ICD-10-CM

## 2022-10-26 DIAGNOSIS — N89.8 VAGINA ITCHING: ICD-10-CM

## 2022-10-26 NOTE — PROGRESS NOTES
Assessment/Plan:    No problem-specific Assessment & Plan notes found for this encounter  Diagnoses and all orders for this visit:    Vaginal discharge    Vagina itching    Left ovarian cyst    Other orders  -     Riboflavin (VITAMIN B-2 PO); Take by mouth  -     Cyanocobalamin (VITAMIN B-12 PO); Take by mouth        Subjective:      Patient ID: Alfredo Andre is a 44 y o  female  Patient is a 65-year-old female who presents today complaining of intermittent episodes of vaginal discharge  Patient states that it is not accompanied by any odor     She also denies any erratic bleeding or spotting  Patient reports normal menstrual cycles without any excessive pain or cramping as well  Patient denies any urinary symptoms  She also denies any fever shakes or chills  White vaginal discharge was observed on today's pelvic examination and appropriate cultures were obtained  Further treatment and follow-up planning will be based upon results     Patient has a history of left ovarian cyst and appropriate follow-up pelvic ultrasound will be ordered at this time     All questions were answered for the patient during today's visit     Patient knows to call for any problems, questions, issues or concerns which may arise for her       Total time of today's visit was 25 minutes of which greater than 50% was spent face-to-face counseling the patient as well as coordination of care, review of chart lab values, physical examination as well as computer entry into the epic medical record system  The following portions of the patient's history were reviewed and updated as appropriate: allergies, current medications, past family history, past medical history, past social history, past surgical history and problem list     Review of Systems   Constitutional: Negative  Negative for appetite change, diaphoresis, fatigue, fever and unexpected weight change  HENT: Negative  Eyes: Negative  Respiratory: Negative  Cardiovascular: Negative  Followed for blood pressure   Gastrointestinal: Negative  Negative for abdominal pain, blood in stool, constipation, diarrhea, nausea and vomiting  Endocrine: Negative  Negative for cold intolerance and heat intolerance  Genitourinary: Negative  Negative for dysuria, frequency, hematuria, urgency, vaginal bleeding, vaginal discharge and vaginal pain  Musculoskeletal: Negative  Skin: Negative  Allergic/Immunologic: Negative  Neurological: Negative  Hematological: Negative  Negative for adenopathy  Psychiatric/Behavioral: Negative  Objective:      /58   Wt 88 kg (194 lb)   LMP 10/16/2022 (Exact Date)   BMI 33 16 kg/m²          Physical Exam  Constitutional:       Appearance: She is well-developed  HENT:      Head: Normocephalic  Eyes:      Pupils: Pupils are equal, round, and reactive to light  Cardiovascular:      Rate and Rhythm: Normal rate  Pulmonary:      Effort: Pulmonary effort is normal    Abdominal:      Palpations: Abdomen is soft  Genitourinary:     General: Normal vulva  Labia:         Right: No rash, tenderness, lesion or injury  Left: No rash, tenderness, lesion or injury  Urethra: No urethral swelling or urethral lesion  Vagina: Vaginal discharge present  No erythema or lesions  Cervix: Discharge present  No lesion or erythema  Uterus: Not enlarged and not tender  Adnexa: Right adnexa normal and left adnexa normal         Right: No mass, tenderness or fullness  Left: No mass, tenderness or fullness  Comments: Pelvic exam revealed moderate vaginal discharge   Appropriate cultures were obtained  Musculoskeletal:         General: Normal range of motion  Cervical back: Normal range of motion and neck supple  Skin:     General: Skin is warm and dry  Neurological:      General: No focal deficit present        Mental Status: She is alert and oriented to person, place, and time  Psychiatric:         Mood and Affect: Mood normal          Behavior: Behavior normal          Thought Content:  Thought content normal          Judgment: Judgment normal

## 2022-10-26 NOTE — PATIENT INSTRUCTIONS
Topic:  Vaginal discharge with vaginal itching as well as history of left ovarian cyst     Appropriate vaginal cultures obtained at today's visit     Follow-up pelvic ultrasound also ordered for evaluation of the left ovarian cyst     Further treatment and follow-up planning will be based upon results    All questions were answered for the patient     Patient to call for any problems, questions, issues or concerns which may arise for her     Otherwise she will be seen in February for her annual gynecologic medical examination

## 2022-10-27 ENCOUNTER — ULTRASOUND (OUTPATIENT)
Dept: OBGYN CLINIC | Facility: CLINIC | Age: 37
End: 2022-10-27

## 2022-10-27 DIAGNOSIS — R10.2 PELVIC PAIN: Primary | ICD-10-CM

## 2022-10-27 DIAGNOSIS — B37.9 YEAST INFECTION: Primary | ICD-10-CM

## 2022-10-27 DIAGNOSIS — N83.202 CYST OF LEFT OVARY: ICD-10-CM

## 2022-10-27 LAB
C GLABRATA DNA VAG QL NAA+PROBE: NEGATIVE
C KRUSEI DNA VAG QL NAA+PROBE: NEGATIVE
CANDIDA SP 6 PNL VAG NAA+PROBE: POSITIVE
T VAGINALIS DNA VAG QL NAA+PROBE: NEGATIVE
VAGINOSIS/ITIS DNA PNL VAG PROBE+SIG AMP: NEGATIVE

## 2022-10-27 RX ORDER — FLUCONAZOLE 150 MG/1
150 TABLET ORAL ONCE
Qty: 1 TABLET | Refills: 1 | Status: SHIPPED | OUTPATIENT
Start: 2022-10-27 | End: 2022-10-27

## 2022-10-27 NOTE — PROGRESS NOTES
AMB US Pelvic Non OB    Date/Time: 10/27/2022 1:29 PM  Performed by: Alisha James  Authorized by: Haylee Zuniga MD     Procedure details:     Indications: ovarian cysts and non-obstetric abdominal pain      Technique:  US Pelvic, Non-OB with complete exam  Uterine findings:     Length (cm): 7 9    Height (cm):  6 7    Width (cm):  5 5    Uterine adhesions: not identified      Adnexal mass: not identified      Polyps: not identified      Myomas: identified      Endometrial stripe: identified      Endometrial hyperplasia: not identified      Endometrium thickness (mm):  7  Left ovary findings:     Left ovary:  Visualized    Cysts: not identified      Length (cm): 3 2    Height (cm): 2 5    Width (cm): 2 8  Right ovary findings:     Right ovary:  Visualized    Cysts: not identified      Length (cm): 2 6    Height (cm): 2 7    Width (cm): 2 7  Other findings:     Free pelvic fluid: identified      Free peritoneal fluid: not identified    Post-Procedure Details:     Impression:  Anteverted uterus  Endo-7mm  Lt cyst=20 x 21 x 22 mm  Small amt free fluid CDS    Tolerance: Tolerated well, no immediate complications  Additional Procedure Comments:          Dr Lucy Otoole MD  [address] [facility] [address]  [address]  [phone]

## 2022-11-03 ENCOUNTER — OFFICE VISIT (OUTPATIENT)
Dept: OBGYN CLINIC | Facility: CLINIC | Age: 37
End: 2022-11-03

## 2022-11-03 VITALS
SYSTOLIC BLOOD PRESSURE: 124 MMHG | BODY MASS INDEX: 33.8 KG/M2 | HEIGHT: 64 IN | WEIGHT: 198 LBS | DIASTOLIC BLOOD PRESSURE: 80 MMHG

## 2022-11-03 DIAGNOSIS — B37.31 YEAST INFECTION OF THE VAGINA: Primary | ICD-10-CM

## 2022-11-03 DIAGNOSIS — N89.8 VAGINAL DISCHARGE: ICD-10-CM

## 2022-11-03 RX ORDER — NYSTATIN AND TRIAMCINOLONE ACETONIDE 100000; 1 [USP'U]/G; MG/G
OINTMENT TOPICAL 2 TIMES DAILY
Qty: 30 G | Refills: 3 | Status: SHIPPED | OUTPATIENT
Start: 2022-11-03

## 2022-11-03 RX ORDER — FLUCONAZOLE 150 MG/1
TABLET ORAL
COMMUNITY
Start: 2022-10-27

## 2022-11-23 ENCOUNTER — OFFICE VISIT (OUTPATIENT)
Dept: OBGYN CLINIC | Facility: CLINIC | Age: 37
End: 2022-11-23

## 2022-11-23 VITALS
WEIGHT: 203 LBS | DIASTOLIC BLOOD PRESSURE: 70 MMHG | HEIGHT: 63 IN | BODY MASS INDEX: 35.97 KG/M2 | SYSTOLIC BLOOD PRESSURE: 112 MMHG

## 2022-11-23 DIAGNOSIS — N76.0 ACUTE VAGINITIS: Primary | ICD-10-CM

## 2022-11-23 DIAGNOSIS — Z11.3 SCREENING FOR STD (SEXUALLY TRANSMITTED DISEASE): ICD-10-CM

## 2022-11-23 NOTE — PATIENT INSTRUCTIONS
Cultures taken today  Will call with results  Patient will call office for any problems, concerns, or issues which may arise during the interim    Pt to f/u if worsening symptoms or development of fevers, chills, severe pelvic/abdominal pain

## 2022-11-23 NOTE — PROGRESS NOTES
Assessment/Plan:    No problem-specific Assessment & Plan notes found for this encounter  Diagnoses and all orders for this visit:    Acute vaginitis  -     Molecular Vaginal Panel  -     Chlamydia/GC amplified DNA by PCR  -     Trichomonas vaginalis/Mycoplasma genitalium PCR    Screening for STD (sexually transmitted disease)  -     Molecular Vaginal Panel  -     Chlamydia/GC amplified DNA by PCR  -     Trichomonas vaginalis/Mycoplasma genitalium PCR        Subjective:      Patient ID: Micaela Neither is a 44 y o  female  Pt presents with change in vaginal discharge, burning, and itching  Pt also endorses some pelvic pain and flank pain  Pt has hx of recurrent vaginal infections  Pt states her periods are regular with no issues  No concern for STDs  Denies any dysuria, hematuria, change in bowel habits, fevers, chills, SOB, chest pain, headaches, vision changes  Time of today's visit was 25 minutes of which greater than 50% was spent face-to-face counseling the patient as well as coordination of care, review of chart and laboratory values, physical examination including cultures as well as computer entry into the epic medical record system      The following portions of the patient's history were reviewed and updated as appropriate: allergies, current medications, past family history, past medical history, past social history, past surgical history and problem list     Review of Systems   Constitutional: Negative  Negative for activity change, appetite change, chills, diaphoresis, fatigue, fever and unexpected weight change  HENT: Negative  Eyes: Negative  Negative for visual disturbance  Respiratory: Negative  Negative for cough and shortness of breath  Cardiovascular: Negative  Negative for chest pain, palpitations and leg swelling  Gastrointestinal: Negative  Negative for abdominal distention, abdominal pain, blood in stool, constipation, diarrhea, nausea and vomiting  Endocrine: Negative  Negative for cold intolerance and heat intolerance  Genitourinary: Positive for flank pain, pelvic pain, vaginal discharge and vaginal pain  Negative for dyspareunia, dysuria, hematuria, menstrual problem, urgency and vaginal bleeding  Skin: Negative  Allergic/Immunologic: Negative  Neurological: Negative  Negative for light-headedness, numbness and headaches  Hematological: Negative  Psychiatric/Behavioral: Negative  Objective:      /70   Ht 5' 3" (1 6 m)   Wt 92 1 kg (203 lb)   LMP 11/12/2022   BMI 35 96 kg/m²          Physical Exam  Vitals reviewed  Exam conducted with a chaperone present  Constitutional:       General: She is not in acute distress  Appearance: Normal appearance  She is not ill-appearing, toxic-appearing or diaphoretic  HENT:      Head: Normocephalic and atraumatic  Nose: Nose normal       Mouth/Throat:      Mouth: Mucous membranes are moist       Pharynx: Oropharynx is clear  Eyes:      Extraocular Movements: Extraocular movements intact  Conjunctiva/sclera: Conjunctivae normal    Genitourinary:     Pubic Area: No rash  Labia:         Right: Tenderness present  No rash, lesion or injury  Left: Tenderness present  No rash, lesion or injury  Urethra: Urethral pain present  No urethral swelling or urethral lesion  Vagina: No signs of injury  Vaginal discharge, erythema and tenderness present  No bleeding or lesions  Skin:     General: Skin is warm and dry  Neurological:      General: No focal deficit present  Mental Status: She is alert and oriented to person, place, and time  Gait: Gait normal    Psychiatric:         Mood and Affect: Mood normal          Behavior: Behavior normal          Thought Content:  Thought content normal          Judgment: Judgment normal

## 2022-11-24 LAB
C GLABRATA DNA VAG QL NAA+PROBE: NEGATIVE
C KRUSEI DNA VAG QL NAA+PROBE: NEGATIVE
C TRACH DNA SPEC QL NAA+PROBE: NEGATIVE
CANDIDA SP 6 PNL VAG NAA+PROBE: NEGATIVE
M GENITALIUM DNA SPEC QL NAA+PROBE: NEGATIVE
N GONORRHOEA DNA SPEC QL NAA+PROBE: NEGATIVE
T VAGINALIS DNA SPEC QL NAA+PROBE: NEGATIVE
T VAGINALIS DNA VAG QL NAA+PROBE: NEGATIVE
VAGINOSIS/ITIS DNA PNL VAG PROBE+SIG AMP: NEGATIVE

## 2022-11-30 ENCOUNTER — TELEPHONE (OUTPATIENT)
Dept: CARDIOLOGY CLINIC | Facility: CLINIC | Age: 37
End: 2022-11-30

## 2022-11-30 DIAGNOSIS — I10 BENIGN ESSENTIAL HYPERTENSION: Primary | ICD-10-CM

## 2022-11-30 DIAGNOSIS — I49.3 PVC (PREMATURE VENTRICULAR CONTRACTION): ICD-10-CM

## 2022-11-30 NOTE — TELEPHONE ENCOUNTER
----- Message from Ravinder Manzanares DO sent at 11/25/2022  7:07 AM EST -----  Regarding: FW: BP meds   Contact: [phone]  1) carvedilol 25mg bid in place of nebivolol  2) chlorthalidone 25mg daily (water pill)  3) bmp in 2 weeks  4) continue Valsartan  ----- Message -----  From: Alfredo Tomlin  Sent: 11/23/2022  10:54 AM EST  To: Ravinder Manzanares DO  Subject: FW: BP meds                                        ----- Message -----  From: Alex Bolivar"  Sent: 11/23/2022  10:44 AM EST  [facility]  Subject: BP meds                                          I've been on these 2 new meds for awhile now and I don't feel good on them  I keep feeling short of breath, irregular heart beat and I'm putting on weight quickly   Can we readjust?

## 2022-11-30 NOTE — TELEPHONE ENCOUNTER
Spoke to patient directly-per Dr Poonam Norwood due to pt having symptoms with meds will d/c Nevibolol and order Carvedilol 25mg bid and start Chlorthalidone 25mg qd and continue Valsartan  Pt checks BP at home, will continue monitoring  To also have BMP in 2 wks-order placed and patient to have done at [facility]    Rx's to be sent to [facility]

## 2022-11-30 NOTE — TELEPHONE ENCOUNTER
----- Message from Chika Cosby DO sent at 11/25/2022  7:07 AM EST -----  Regarding: FW: BP meds   Contact: [phone]  1) carvedilol 25mg bid in place of nebivolol  2) chlorthalidone 25mg daily (water pill)  3) bmp in 2 weeks  4) continue Valsartan  ----- Message -----  From: Gisselle Bonds  Sent: 11/23/2022  10:54 AM EST  To: Chika Cosby DO  Subject: FW: BP meds                                        ----- Message -----  From: Jean Paul Perez"  Sent: 11/23/2022  10:44 AM EST  To: Cardiology [place] [facility]  Subject: BP meds                                          I've been on these 2 new meds for awhile now and I don't feel good on them  I keep feeling short of breath, irregular heart beat and I'm putting on weight quickly   Can we readjust?

## 2022-12-01 DIAGNOSIS — K21.9 GASTROESOPHAGEAL REFLUX DISEASE WITHOUT ESOPHAGITIS: ICD-10-CM

## 2022-12-01 RX ORDER — CHLORTHALIDONE 25 MG/1
25 TABLET ORAL DAILY
Qty: 30 TABLET | Refills: 11 | Status: CANCELLED | OUTPATIENT
Start: 2022-12-01 | End: 2023-12-01

## 2022-12-01 RX ORDER — CARVEDILOL 25 MG/1
25 TABLET ORAL 2 TIMES DAILY WITH MEALS
Qty: 60 TABLET | Refills: 11 | Status: CANCELLED | OUTPATIENT
Start: 2022-12-01 | End: 2023-12-01

## 2022-12-01 RX ORDER — PANTOPRAZOLE SODIUM 40 MG/1
40 TABLET, DELAYED RELEASE ORAL
Qty: 90 TABLET | Refills: 0 | Status: SHIPPED | OUTPATIENT
Start: 2022-12-01

## 2022-12-01 NOTE — TELEPHONE ENCOUNTER
Requested medication(s) are due for refill today: Yes  Patient has already received a courtesy refill: No  Other reason request has been forwarded to provider: Statement Selected

## 2022-12-02 ENCOUNTER — HOSPITAL ENCOUNTER (OUTPATIENT)
Dept: SLEEP CENTER | Facility: CLINIC | Age: 37
Discharge: HOME | End: 2022-12-02

## 2022-12-02 DIAGNOSIS — G47.10 EXCESSIVE SLEEPINESS: ICD-10-CM

## 2022-12-03 NOTE — PROGRESS NOTES
Sleep Study Documentation    Pre-Sleep Study       Sleep testing procedure explained to patient:YES    Patient napped prior to study:NO    [address] [place] worker after 12PM   Caffeine use:NO    Alcohol:Dayshift workers after 5PM: Alcohol use:NO    Typical day for patient:YES       Study Documentation    Sleep Study Indications:  Snoring, BMI>30, EDS  Sleep Study: Diagnostic   Snore: Moderate  Supplemental O2: no    O2 flow rate (L/min) range 0  O2 flow rate (L/min) final 0  Minimum SaO2  92 0 %  Baseline SaO2  97 9 %            EKG abnormalities: no     EEG abnormalities: yes:  ECG artifact throughout study  Averaged Ms  Sleep Study Recorded < 2 hours: N/A    Sleep Study Recorded > 2 hours but incomplete study: N/A    Sleep Study Recorded 6 hours but no sleep obtained: NO    Patient classification: employed       Post-Sleep Study    Medication used at bedtime or during sleep study:YES over the counter sleep aid    Patient reports time it took to fall asleep:less than 20 minutes    Patient reports waking up during study:1 to 2 times  Patient reports returning to sleep in 10 to 30 minutes  Patient reports sleeping 6 to 8 hours without dreaming  Patient reports sleep during study:typical    Patient rated sleepiness: Somewhat sleepy or tired    PAP treatment:no

## 2022-12-07 ENCOUNTER — TELEPHONE (OUTPATIENT)
Dept: OBGYN CLINIC | Facility: CLINIC | Age: 37
End: 2022-12-07

## 2022-12-07 NOTE — TELEPHONE ENCOUNTER
----- Message from Дмитрий Yoo MD sent at 12/7/2022  9:15 AM EST -----  All culture results were negative!

## 2022-12-08 ENCOUNTER — HOSPITAL ENCOUNTER (OUTPATIENT)
Dept: NON INVASIVE DIAGNOSTICS | Facility: HOSPITAL | Age: 37
Discharge: HOME | End: 2022-12-08
Attending: INTERNAL MEDICINE

## 2022-12-08 VITALS
HEART RATE: 65 BPM | WEIGHT: 203 LBS | HEIGHT: 63 IN | DIASTOLIC BLOOD PRESSURE: 78 MMHG | OXYGEN SATURATION: 100 % | SYSTOLIC BLOOD PRESSURE: 118 MMHG | BODY MASS INDEX: 35.97 KG/M2

## 2022-12-08 DIAGNOSIS — R06.09 COVID-19 LONG HAULER MANIFESTING CHRONIC DYSPNEA: ICD-10-CM

## 2022-12-08 DIAGNOSIS — U09.9 COVID-19 LONG HAULER MANIFESTING CHRONIC DYSPNEA: ICD-10-CM

## 2022-12-08 DIAGNOSIS — I10 ESSENTIAL HYPERTENSION: ICD-10-CM

## 2022-12-08 DIAGNOSIS — R06.09 DYSPNEA ON EXERTION: ICD-10-CM

## 2022-12-08 LAB
BASELINE ST DEPRESSION: 0 MM
CHEST PAIN STATEMENT: NORMAL
E WAVE DECELERATION TIME: 206 MS
MAX DIASTOLIC BP: 84 MMHG
MAX HEART RATE: 151 BPM
MAX HR PERCENT: 82 %
MAX HR: 151 BPM
MAX PREDICTED HEART RATE: 182 BPM
MAX. SYSTOLIC BP: 174 MMHG
MV PEAK A VEL: 0.62 M/S
MV PEAK E VEL: 61 CM/S
MV STENOSIS PRESSURE HALF TIME: 60 MS
MV VALVE AREA P 1/2 METHOD: 3.67 CM2
PROTOCOL NAME: NORMAL
RATE PRESSURE PRODUCT: NORMAL
REASON FOR TERMINATION: NORMAL
SL CV LV EF: 55
SL CV STRESS RECOVERY BP: NORMAL MMHG
SL CV STRESS RECOVERY HR: 92 BPM
SL CV STRESS RECOVERY O2 SAT: 99 %
SL CV STRESS STAGE REACHED: 4
STRESS ANGINA INDEX: 0
STRESS BASELINE BP: NORMAL MMHG
STRESS BASELINE HR: 65 BPM
STRESS DUKE TREADMILL SCORE: 9
STRESS O2 SAT REST: 100 %
STRESS PEAK HR: 155 BPM
STRESS POST ESTIMATED WORKLOAD: 10.8 METS
STRESS POST EXERCISE DUR MIN: 9 MIN
STRESS POST EXERCISE DUR SEC: 26 SEC
STRESS POST O2 SAT PEAK: 98 %
STRESS POST PEAK BP: 174 MMHG
STRESS ST DEPRESSION: 0 MM
TARGET HR FORMULA: NORMAL
TEST INDICATION: NORMAL
TIME IN EXERCISE PHASE: NORMAL

## 2022-12-08 NOTE — PATIENT INSTRUCTIONS
Topic: Vaginal irritation and minimal discharge    Appropriate vaginal cultures taken    Further treatment and follow-up planning will be based upon final pathology results    Patient to call for any problems, questions, issues or concerns which may arise for her

## 2022-12-08 NOTE — PROGRESS NOTES
Assessment/Plan:    No problem-specific Assessment & Plan notes found for this encounter  Diagnoses and all orders for this visit:    Yeast infection of the vagina  -     nystatin-triamcinolone (MYCOLOG-II) ointment; Apply topically 2 (two) times a day  -     Cancel: Molecular Vaginal Panel; Future  -     Molecular Vaginal Panel    Vaginal discharge    Other orders  -     fluconazole (DIFLUCAN) 150 mg tablet;  (Patient not taking: Reported on 11/23/2022)        Subjective:      Patient ID: Neeru Negro is a 44 y o  female  Patient is a 41-year-old female who presents today for evaluation of charge and irritation    Patient had taken Monistat as well as Diflucan and still complaining of mild discharge and irritation    She denies any abnormal bleeding at this time    She also denies any significant pelvic or abdominal pain    Patient reports normal bowel and bladder habits    She denies any fever shakes or chills    Pelvic exam was essentially unremarkable except for minimal discharge  Appropriate vaginal cultures obtained    Further treatment and follow-up planning will be based upon final pathology results    All questions answered    Patient to call for any problems, questions, issues or concerns which may arise for her      Total time of today's visit was 25 minutes of which greater than 50% was spent face-to-face counseling the patient as well as coordination of care, review of chart laboratory values, physical examination including cultures as well as computer entry into the epic medical record system  The following portions of the patient's history were reviewed and updated as appropriate: allergies, current medications, past family history, past medical history, past social history, past surgical history and problem list     Review of Systems   Constitutional: Negative  Negative for appetite change, diaphoresis, fatigue, fever and unexpected weight change  HENT: Negative      Eyes: Negative  Respiratory: Negative  Cardiovascular: Negative  Gastrointestinal: Negative  Negative for abdominal pain, blood in stool, constipation, diarrhea, nausea and vomiting  Endocrine: Negative  Negative for cold intolerance and heat intolerance  Genitourinary: Negative  Negative for dysuria, frequency, hematuria, urgency, vaginal bleeding, vaginal discharge and vaginal pain  Musculoskeletal: Negative  Skin: Negative  Allergic/Immunologic: Negative  Neurological: Negative  Hematological: Negative  Negative for adenopathy  Psychiatric/Behavioral: Negative  Objective:      /80   Ht 5' 4" (1 626 m)   Wt 89 8 kg (198 lb)   LMP 10/16/2022 (Exact Date)   BMI 33 99 kg/m²          Physical Exam  Constitutional:       Appearance: She is well-developed  HENT:      Head: Normocephalic  Eyes:      Pupils: Pupils are equal, round, and reactive to light  Cardiovascular:      Rate and Rhythm: Normal rate  Pulmonary:      Effort: Pulmonary effort is normal    Abdominal:      Palpations: Abdomen is soft  Genitourinary:     General: Normal vulva  Labia:         Right: No rash, tenderness or lesion  Left: No rash, tenderness, lesion or injury  Urethra: No urethral swelling or urethral lesion  Vagina: Vaginal discharge present  No erythema or lesions  Cervix: No discharge, lesion or erythema  Uterus: Normal        Adnexa: Right adnexa normal and left adnexa normal       Comments: Very mild discharge noted  Appropriate cultures obtained  Musculoskeletal:         General: Normal range of motion  Cervical back: Normal range of motion and neck supple  Skin:     General: Skin is warm and dry  Neurological:      General: No focal deficit present  Mental Status: She is alert and oriented to person, place, and time     Psychiatric:         Mood and Affect: Mood normal          Behavior: Behavior normal          Thought Content: Thought content normal          Judgment: Judgment normal

## 2022-12-20 ENCOUNTER — APPOINTMENT (OUTPATIENT)
Dept: LAB | Facility: CLINIC | Age: 37
End: 2022-12-20

## 2022-12-20 DIAGNOSIS — E87.6 HYPOKALEMIA: Primary | ICD-10-CM

## 2022-12-20 LAB
ALBUMIN SERPL BCP-MCNC: 3.6 G/DL (ref 3.5–5)
ALP SERPL-CCNC: 51 U/L (ref 46–116)
ALT SERPL W P-5'-P-CCNC: 21 U/L (ref 12–78)
ANION GAP SERPL CALCULATED.3IONS-SCNC: 5 MMOL/L (ref 4–13)
AST SERPL W P-5'-P-CCNC: 11 U/L (ref 5–45)
BASOPHILS # BLD AUTO: 0.07 THOUSANDS/ÂΜL (ref 0–0.1)
BASOPHILS NFR BLD AUTO: 1 % (ref 0–1)
BILIRUB SERPL-MCNC: 0.44 MG/DL (ref 0.2–1)
BUN SERPL-MCNC: 17 MG/DL (ref 5–25)
CALCIUM SERPL-MCNC: 9.6 MG/DL (ref 8.3–10.1)
CHLORIDE SERPL-SCNC: 100 MMOL/L (ref 96–108)
CO2 SERPL-SCNC: 30 MMOL/L (ref 21–32)
CREAT SERPL-MCNC: 1.04 MG/DL (ref 0.6–1.3)
EOSINOPHIL # BLD AUTO: 0.09 THOUSAND/ÂΜL (ref 0–0.61)
EOSINOPHIL NFR BLD AUTO: 1 % (ref 0–6)
ERYTHROCYTE [DISTWIDTH] IN BLOOD BY AUTOMATED COUNT: 14.2 % (ref 11.6–15.1)
GFR SERPL CREATININE-BSD FRML MDRD: 68 ML/MIN/1.73SQ M
GLUCOSE P FAST SERPL-MCNC: 142 MG/DL (ref 65–99)
HCT VFR BLD AUTO: 40.7 % (ref 34.8–46.1)
HGB BLD-MCNC: 12.9 G/DL (ref 11.5–15.4)
IMM GRANULOCYTES # BLD AUTO: 0.03 THOUSAND/UL (ref 0–0.2)
IMM GRANULOCYTES NFR BLD AUTO: 0 % (ref 0–2)
LYMPHOCYTES # BLD AUTO: 1.88 THOUSANDS/ÂΜL (ref 0.6–4.47)
LYMPHOCYTES NFR BLD AUTO: 22 % (ref 14–44)
MCH RBC QN AUTO: 25.1 PG (ref 26.8–34.3)
MCHC RBC AUTO-ENTMCNC: 31.7 G/DL (ref 31.4–37.4)
MCV RBC AUTO: 79 FL (ref 82–98)
MONOCYTES # BLD AUTO: 0.47 THOUSAND/ÂΜL (ref 0.17–1.22)
MONOCYTES NFR BLD AUTO: 6 % (ref 4–12)
NEUTROPHILS # BLD AUTO: 5.92 THOUSANDS/ÂΜL (ref 1.85–7.62)
NEUTS SEG NFR BLD AUTO: 70 % (ref 43–75)
NRBC BLD AUTO-RTO: 0 /100 WBCS
PLATELET # BLD AUTO: 278 THOUSANDS/UL (ref 149–390)
PMV BLD AUTO: 11.2 FL (ref 8.9–12.7)
POTASSIUM SERPL-SCNC: 3.2 MMOL/L (ref 3.5–5.3)
PROT SERPL-MCNC: 7.4 G/DL (ref 6.4–8.4)
RBC # BLD AUTO: 5.13 MILLION/UL (ref 3.81–5.12)
SODIUM SERPL-SCNC: 135 MMOL/L (ref 135–147)
WBC # BLD AUTO: 8.46 THOUSAND/UL (ref 4.31–10.16)

## 2022-12-20 RX ORDER — POTASSIUM CHLORIDE 750 MG/1
10 CAPSULE, EXTENDED RELEASE ORAL DAILY
Qty: 30 CAPSULE | Refills: 0 | Status: SHIPPED | OUTPATIENT
Start: 2022-12-20 | End: 2023-02-16 | Stop reason: SDUPTHER

## 2022-12-22 ENCOUNTER — TELEPHONE (OUTPATIENT)
Dept: SLEEP CENTER | Facility: CLINIC | Age: 37
End: 2022-12-22

## 2022-12-22 NOTE — TELEPHONE ENCOUNTER
Reviewed results with patient and scheduled consult with [place] in [facility]

## 2022-12-22 NOTE — TELEPHONE ENCOUNTER
Sleep study results were provided to patient via [employer] message by PCP Dr Edward Jacob on 12/7/22  Dr Edward Jacob requests patient follow up with sleep specialist     Left message for patient to call office to schedule sleep consultation  Phone number provided in message

## 2022-12-28 ENCOUNTER — TELEPHONE (OUTPATIENT)
Dept: FAMILY MEDICINE CLINIC | Facility: CLINIC | Age: 37
End: 2022-12-28

## 2022-12-28 NOTE — TELEPHONE ENCOUNTER
Nurse Practitioner Santosh Tillman from [facility] wants to speak with you regarding this patient  I was given no other info from Yamilet at the office       [phone]

## 2022-12-29 ENCOUNTER — TELEPHONE (OUTPATIENT)
Dept: FAMILY MEDICINE CLINIC | Facility: CLINIC | Age: 37
End: 2022-12-29

## 2022-12-29 NOTE — TELEPHONE ENCOUNTER
Pre Encounter had called asking for office notes or an office visit  and reasoning for the Order placed For the MRI of the Brain ordered under Dr Geovanni Mackey and informed them the patient is not and as far as I can see has not been seen here,

## 2022-12-30 ENCOUNTER — HOSPITAL ENCOUNTER (OUTPATIENT)
Dept: RADIOLOGY | Age: 37
Discharge: HOME | End: 2022-12-30

## 2022-12-30 DIAGNOSIS — G93.2 PSEUDOTUMOR CEREBRI: ICD-10-CM

## 2022-12-30 RX ADMIN — GADOBUTROL 9 ML: 604.72 INJECTION INTRAVENOUS at 10:42

## 2023-01-03 ENCOUNTER — TELEPHONE (OUTPATIENT)
Dept: FAMILY MEDICINE CLINIC | Facility: CLINIC | Age: 38
End: 2023-01-03

## 2023-01-03 ENCOUNTER — HOSPITAL ENCOUNTER (EMERGENCY)
Facility: HOSPITAL | Age: 38
Discharge: HOME | End: 2023-01-03
Attending: EMERGENCY MEDICINE

## 2023-01-03 ENCOUNTER — APPOINTMENT (EMERGENCY)
Dept: RADIOLOGY | Facility: HOSPITAL | Age: 38
End: 2023-01-03

## 2023-01-03 VITALS
DIASTOLIC BLOOD PRESSURE: 88 MMHG | TEMPERATURE: 98.2 F | SYSTOLIC BLOOD PRESSURE: 149 MMHG | OXYGEN SATURATION: 95 % | HEART RATE: 64 BPM | RESPIRATION RATE: 18 BRPM

## 2023-01-03 DIAGNOSIS — H47.10 PAPILLEDEMA OF RIGHT EYE: Primary | ICD-10-CM

## 2023-01-03 DIAGNOSIS — R51.9 HEADACHE: ICD-10-CM

## 2023-01-03 PROBLEM — G93.2 ELEVATED INTRACRANIAL PRESSURE: Status: ACTIVE | Noted: 2023-01-03

## 2023-01-03 LAB
APPEARANCE CSF: NORMAL
EXT PREGNANCY TEST URINE: NEGATIVE
EXT. CONTROL: NORMAL
GLUCOSE CSF-MCNC: 70 MG/DL (ref 50–80)
INR PPP: 0.92 (ref 0.84–1.19)
PLATELET # BLD AUTO: 255 THOUSANDS/UL (ref 149–390)
PMV BLD AUTO: 11.3 FL (ref 8.9–12.7)
PROT CSF-MCNC: 35 MG/DL (ref 15–45)
PROTHROMBIN TIME: 12.5 SECONDS (ref 11.6–14.5)
RBC # CSF MANUAL: 2 UL (ref 0–10)
TOTAL CELLS COUNTED BLD: NO
TUBE # CSF: 4
WBC # CSF AUTO: 0 /UL (ref 0–5)

## 2023-01-03 RX ORDER — GABAPENTIN 100 MG/1
100 CAPSULE ORAL
Qty: 30 CAPSULE | Refills: 0 | Status: SHIPPED | OUTPATIENT
Start: 2023-01-03

## 2023-01-03 RX ORDER — ALPRAZOLAM 0.25 MG/1
0.25 TABLET ORAL ONCE
Status: COMPLETED | OUTPATIENT
Start: 2023-01-03 | End: 2023-01-03

## 2023-01-03 RX ORDER — LIDOCAINE HYDROCHLORIDE 10 MG/ML
5 INJECTION, SOLUTION EPIDURAL; INFILTRATION; INTRACAUDAL; PERINEURAL
Status: COMPLETED | OUTPATIENT
Start: 2023-01-03 | End: 2023-01-03

## 2023-01-03 RX ADMIN — ALPRAZOLAM 0.25 MG: 0.25 TABLET ORAL at 14:51

## 2023-01-03 RX ADMIN — LIDOCAINE HYDROCHLORIDE 5 ML: 10 INJECTION, SOLUTION EPIDURAL; INFILTRATION; INTRACAUDAL; PERINEURAL at 16:35

## 2023-01-03 NOTE — ASSESSMENT & PLAN NOTE
Patient has a history of migraines without auras and ocular migraines  At this time, she does not report having any migraines  She takes Imitrex sometimes but most resolve with Tylenol

## 2023-01-03 NOTE — CONSULTS
NEUROLOGY RESIDENCY CONSULT NOTE     Name: Joseph Turner   Age & Sex: 44 y o  female   MRN: [id number]  Unit/Bed#: QCE   Encounter: [id number]  Length of Stay: 0    ASSESSMENT & PLAN     * Papilledema  Assessment & Plan  43 yo female with history of migraines (last one in Oct 22), microdiscectomy 6 months ago after which pressure in her L eye along with the brown curtain upon awakening  The curtain resolves when the patient turns on the bathroom nights  Her PCP sent patient to ophthalmology who noticed papilledema in the left eye and told patient to come to the ED  MRI brain with and without contrast was unremarkable  In the ED, patient was HD stable, had an unremarkable neuro exam and received a lumbar puncture with IR with opening pressure 13  Impression:  Patient is a 34-year-old female who presented to the ED for left eye pressure  At this time, there are no clear focal deficits on neurological examination; on fundoscopic exam- unable to properly visualize  Initially, there is concern for elevated ICP versus demyelination versus eye pathology  If her CSF results are bland, will consider starting patient on medications for pressure sensation may be due to peripheral pathic pain  At this point, there is low concern for a subacute episode of demyelination; high ICP ruled out given LP  Plan:   - CSF routine labs pending  - Starting medications after CSF results : gabapentin v/s indomethacin v/s cyproheptadine - discuss w/ patient once studies are available  - Monitor for worsening symptoms   - Monitor vitals signs  - Patient advised to take rest after the LP procedure  - F/u with Dr Antelmo Lobo outpatient in clinic    Migraine without aura and without status migrainosus, not intractable  Assessment & Plan  Patient has a history of migraines without auras and ocular migraines  At this time, she does not report having any migraines  She takes Imitrex sometimes but most resolve with Tylenol      Joanna Males Abi Rodríguez will need follow up with Dr Nicolette Olivo outpatient  She will not require outpatient neurological testing  Pending for discharge: CSF studies    SUBJECTIVE     Reason for Consult / Principal Problem: Increased ICP  Hx and PE limited by: None    HPI: Luisa Arellano is a 44 y o  right handed female with a history of hypertension who presents with 6 months of constant left sided head pressure and occasional brown discoloration of her vision  She presented following an ophthalmology evaluation, which showed papilledema  Her ophthalmologist recommended she be evaluated in the ED  Her symptoms began 6 months ago following a lumbar microdiscectomy  The left-sided head pressure has been unremitting and does not improve with any medication, positional changes, or time of day  The pressure is not painful and is a 0/10 on the pain scale  It is described as an odd sensation rather than a pain  The pressure is located behind and around the left eye, stopping posteriorly approximately at the auricle  The visual disturbance is described as a "brown filter" over her vision  There are no dots, waviness, or lines, and the disturbance is purely in color  This disturbance often occurs after waking up at night and will subside by the time she turns on the lights and goes to the restroom  Accompanying symptoms include occasional tinnitus and very mild dizziness  The patient contracted COVID on Sept 15th and received antivirals  Following resolution of symptoms, she entered status migrainosus and had a migraine that lasted several days  This episode resolved with steroids  The patient also has two separate types of migraine that are distinct from these symptoms  One type of migraine is poorly localized and diffuse  It is a pounding headache without an aura  The second type of migraine is described as an ocular migraine with throbbing pain and pressure behind the eyes   This type of migraine will come with various visual auras, but is distinct from the brown filter over the vision  Both types of migraines are usually relieved after several hours of rest in a dark room with ibuprofen  The frequency of the migraines varies, and the patient usually gets 1 migraine per 2 months  The types seem to alternate randomly and do not have specific triggers  Her first migraines occurred at the time of menarche and coincided with her menstrual cycle, but they no longer coincide  Consult to neurology  Consult performed by: Lorene Crawford DO  Consult ordered by: Raman Cortes MD        Historical Information   Past Medical History:   Diagnosis Date   • Abdominal pain    • Abnormal ECG    • Anemia    • Asthma     mild intermittent   • GERD (gastroesophageal reflux disease)    • History of palpitations    • Hypertension    • Lower back pain    • Migraines    • Seasonal allergies    • Trace mitral regurgitation by prior echocardiogram    • Trigger finger of right thumb 2/28/2017     Past Surgical History:   Procedure Laterality Date   • CHOLECYSTECTOMY      laparoscopic   • COLONOSCOPY  2017   • HERNIA REPAIR      umbilical   • LAPAROTOMY N/A 12/10/2020    Procedure: ABDOMINAL MYOMECTOMY;  Surgeon: Moni Davalos DO;  Location[facility];  Service: Gynecology   • LUMBAR LAMINECTOMY Right 09/14/2016    Procedure: Right L5/S1 Metryx microdiscectomy;  Surgeon: Jory Don MD;  Location: [facility];  Service:    • SD COLONOSCOPY FLX DX W/COLLJ SPEC WHEN PFRMD N/A 07/07/2016    Procedure: COLONOSCOPY;  Surgeon: Teri Richardson DO;  Location: [facility];   Service: Gastroenterology   • SD HYSTEROSCOPY BX ENDOMETRIUM&/POLYPC W/WO D&C N/A 03/19/2018    Procedure: HYSTEROSCOPY; MYOMECTOMY; ENDOMETRIAL BIOPSY;  Surgeon: Moni Davalos DO;  [facility];  Service: Gynecology   • SD TENDON SHEATH INCISION Right 02/28/2017    Procedure: THUMB TRIGGER RELEASE ;  Surgeon: Kylie Garcia DO;  Location[facility];  Service: Orthopedics   • SPINE SURGERY  9/2016    Microdisectomy   • WISDOM TOOTH EXTRACTION       Social History   Social History     Substance and Sexual Activity   Alcohol Use Yes    Comment: socially      Social History     Substance and Sexual Activity   Drug Use Yes   • Types: Marijuana     E-Cigarette/Vaping   • E-Cigarette Use Never User      E-Cigarette/Vaping Substances   • Nicotine No    • THC No    • CBD No    • Flavoring No    • Other No    • Unknown No      Social History     Tobacco Use   Smoking Status Never   Smokeless Tobacco Never     Family History:   Family History   Problem Relation Age of Onset   • Diabetes Mother         type2   • Hypertension Mother    • Depression Mother    • Thyroid disease Mother    • Arthritis Mother    • Anxiety disorder Mother    • Diabetes Father         type2   • Hypertension Father    • Alcohol abuse Father    • Substance Abuse Father    • Arthritis Father    • Drug abuse Father    • Dialysis Father    • Esophageal cancer Maternal Grandmother    • Breast cancer Maternal Grandmother         Maternal great grandmother   • Cancer Maternal Grandmother         Pancreatic   • Heart attack Maternal Grandfather         acute MI   • Arthritis Maternal Grandfather    • Stroke Maternal Grandfather         CVA   • Hyperlipidemia Maternal Grandfather    • Heart disease Maternal Grandfather    • Arthritis Paternal Grandfather    • Stroke Paternal Grandfather         CVA   • Cancer Paternal Grandfather    • Hyperlipidemia Paternal Grandfather    • Colon cancer Paternal Grandfather    • Bipolar disorder Maternal Aunt    • Brain cancer Maternal Aunt    • Heart attack Maternal Uncle         acute MI   • Arthritis Family    • Stroke Family         CVA   • Cancer Family    • Hyperlipidemia Family    • Stroke Family         CVA   • Hyperlipidemia Family      Meds/Allergies   all current active meds have been reviewed  No Known Allergies    Review of previous medical records was completed         Review of Systems   Constitutional: Negative for chills and fever  HENT: Positive for sinus pressure and tinnitus  Eyes: Positive for visual disturbance  Respiratory: Negative for cough and shortness of breath  Gastrointestinal: Negative for constipation, diarrhea, nausea and vomiting  Musculoskeletal: Positive for neck pain  Neurological: Positive for dizziness and light-headedness  Negative for weakness and numbness  Psychiatric/Behavioral: Negative for confusion  OBJECTIVE     Patient ID: Kenn Lee is a 44 y o  female  Vitals:   Vitals:    23 1211 23 1500   BP: 138/81 149/88   BP Location: Left arm Right arm   Pulse: 78 64   Resp: 18 18   Temp: 98 2 °F (36 8 °C)    TempSrc: Tympanic    SpO2: 97% 95%      There is no height or weight on file to calculate BMI  No intake or output data in the 24 hours ending 23 1759    Temperature:   Temp (24hrs), Av 2 °F (36 8 °C), Min:98 2 °F (36 8 °C), Max:98 2 °F (36 8 °C)    Temperature: 98 2 °F (36 8 °C)    Invasive Devices: Invasive Devices     Peripheral Intravenous Line  Duration           Peripheral IV 23 Left Antecubital <1 day                Physical Exam  Constitutional:       General: She is not in acute distress  Appearance: Normal appearance  HENT:      Head: Normocephalic  Mouth/Throat:      Mouth: Mucous membranes are moist    Eyes:      Extraocular Movements: Extraocular movements intact and EOM normal       Conjunctiva/sclera: Conjunctivae normal       Pupils: Pupils are equal, round, and reactive to light  Cardiovascular:      Rate and Rhythm: Normal rate  Pulmonary:      Effort: Pulmonary effort is normal    Musculoskeletal:         General: No tenderness  Normal range of motion  Cervical back: Normal range of motion  Right lower leg: No edema  Left lower leg: No edema  Skin:     General: Skin is warm  Coloration: Skin is jaundiced     Neurological:      Mental Status: She is alert and oriented to person, place, and time  Cranial Nerves: Cranial nerves 2-12 are intact  Motor: Motor strength is normal       Coordination: Finger-Nose-Finger Test and Heel to NIX [facility] Test normal       Deep Tendon Reflexes:      Reflex Scores:       Tricep reflexes are 2+ on the right side and 2+ on the left side  Bicep reflexes are 2+ on the right side and 2+ on the left side  Brachioradialis reflexes are 2+ on the right side and 2+ on the left side  Patellar reflexes are 2+ on the right side and 2+ on the left side  Achilles reflexes are 2+ on the right side and 2+ on the left side  Psychiatric:         Mood and Affect: Mood normal          Speech: Speech normal          Behavior: Behavior normal          Thought Content: Thought content normal          Judgment: Judgment normal           Neurologic Exam     Mental Status   Oriented to person, place, and time  Attention: normal  Concentration: normal    Speech: speech is normal   Level of consciousness: alert  Knowledge: good  Cranial Nerves   Cranial nerves II through XII intact  CN II   Visual fields full to confrontation  CN III, IV, VI   Pupils are equal, round, and reactive to light  Extraocular motions are normal      CN V   Facial sensation intact  CN VII   Facial expression full, symmetric  CN VIII   CN VIII normal      CN IX, X   CN IX normal    CN X normal      CN XI   CN XI normal      CN XII   CN XII normal      Motor Exam   Muscle bulk: normal  Overall muscle tone: normal  Right arm pronator drift: absent  Left arm pronator drift: absent    Strength   Strength 5/5 throughout       Sensory Exam   Light touch normal      Gait, Coordination, and Reflexes     Coordination   Finger to nose coordination: normal  Heel to shin coordination: normal    Tremor   Resting tremor: absent  Intention tremor: absent  Action tremor: absent    Reflexes   Right brachioradialis: 2+  Left brachioradialis: 2+  Right biceps: 2+  Left biceps: 2+  Right triceps: 2+  Left triceps: 2+  Right patellar: 2+  Left patellar: 2+  Right achilles: 2+  Left achilles: 2+           LABORATORY DATA     Labs: I have personally reviewed pertinent reports  Results from last 7 days   Lab Units 01/03/23  1450   PLATELETS Thousands/uL 255          Invalid input(s): LABALBU           Results from last 7 days   Lab Units 01/03/23  1450   INR  0 92               IMAGING & DIAGNOSTIC TESTING     Radiology Results: I have personally reviewed pertinent reports  FL IN-patient lumbar puncture    (Results Pending)       Other Diagnostic Testing: I have personally reviewed pertinent reports  ACTIVE MEDICATIONS     No current facility-administered medications for this encounter  Prior to Admission medications    Medication Sig Start Date End Date Taking?  Authorizing Provider   albuterol (2 5 mg/3 mL) 0 083 % nebulizer solution Take 1 vial (2 5 mg total) by nebulization every 6 (six) hours as needed for wheezing or shortness of breath 4/30/19   Imani Rhodes MD   albuterol (ProAir HFA) 90 mcg/act inhaler Inhale 2 puffs every 4 (four) hours as needed (When has flare up) 2/15/21   Imani Rhodes MD   ALPRAZolam (XANAX) 0 25 mg tablet Take 1 tablet (0 25 mg total) by mouth daily at bedtime as needed for anxiety 12/20/21   Imani Rhodes MD   carvedilol (COREG) 25 mg tablet Take 1 tablet (25 mg total) by mouth 2 (two) times a day with meals 12/6/22   Mary Carmen Gill DO   chlorthalidone 25 mg tablet Take 1 tablet (25 mg total) by mouth daily 12/6/22   Mary Carmen Gill DO   Cholecalciferol (VITAMIN D3 PO) Take 2,000 mg by mouth daily in the early morning    Historical Provider, MD   Cyanocobalamin (VITAMIN B-12 PO) Take by mouth    Historical Provider, MD   cyclobenzaprine (FLEXERIL) 10 mg tablet Take 1 tablet (10 mg total) by mouth 3 (three) times a day as needed for muscle spasms 10/6/20   Daniel Sue MD   fluconazole (DIFLUCAN) 150 mg tablet  10/27/22   Historical Provider, MD   fluticasone (Flovent HFA) 110 MCG/ACT inhaler Inhale 2 puffs 2 (two) times a day Rinse mouth after use  6/17/22 11/23/22  Imani Rhodes MD   magnesium 30 MG tablet Take 30 mg by mouth 2 (two) times a day    Historical Provider, MD   meloxicam (MOBIC) 15 mg tablet Take 1 tablet (15 mg total) by mouth daily as needed for mild pain 3/16/21   Imani Rhodes MD   Multiple Vitamins-Minerals (Womens Multivitamin) TABS Take 1 tablet by mouth daily    Historical Provider, MD   nystatin-triamcinolone (MYCOLOG-II) ointment Apply topically 2 (two) times a day 11/3/22   Radha Anthony MD   ondansetron (ZOFRAN-ODT) 4 mg disintegrating tablet Take 1 tablet (4 mg total) by mouth every 6 (six) hours as needed for nausea or vomiting 12/29/18   Harrison Kothari DO   pantoprazole (PROTONIX) 40 mg tablet Take 1 tablet (40 mg total) by mouth daily before breakfast 12/1/22   Imani Rhodes MD   potassium chloride (MICRO-K) 10 MEQ CR capsule Take 1 capsule (10 mEq total) by mouth daily 12/20/22   Imani Rhoeds MD   Riboflavin (VITAMIN B-2 PO) Take by mouth    Historical Provider, MD   SUMAtriptan (IMITREX) 50 mg tablet Take 1 tablet (50 mg total) by mouth once as needed for migraine for up to 1 dose 9/28/22   Imani Rhodes MD   valsartan (DIOVAN) 320 MG tablet Take 1 tablet (320 mg total) by mouth daily 10/25/22   Lashonda Brown DO         CODE STATUS & ADVANCED DIRECTIVES     Code Status: Prior  Advance Directive and Living Will: Yes    Power of :    POLST:        VTE Pharmacologic Prophylaxis: As per primary team  VTE Mechanical Prophylaxis: reason for no mechanical VTE prophylaxis As per primary team    ==  [facility], PGY-1

## 2023-01-03 NOTE — TELEPHONE ENCOUNTER
Spoke with her eye specialist and she needs urgent work up and recommending her to go through ER and possibly get admitted for full work up   I left her a voice mail as well    Dr William Khan

## 2023-01-03 NOTE — ASSESSMENT & PLAN NOTE
45 yo female with history of migraines (last one in Oct 22), microdiscectomy 6 months ago after which pressure in her L eye along with the brown curtain upon awakening  The curtain resolves when the patient turns on the bathroom nights  Her PCP sent patient to ophthalmology who noticed papilledema in the left eye and told patient to come to the ED  MRI brain with and without contrast was unremarkable  In the ED, patient was HD stable, had an unremarkable neuro exam and received a lumbar puncture with IR with opening pressure 13  Impression:  Patient is a 34-year-old female who presented to the ED for left eye pressure  At this time, there are no clear focal deficits on neurological examination; on fundoscopic exam- unable to properly visualize  Initially, there is concern for elevated ICP versus demyelination versus eye pathology  If her CSF results are bland, will consider starting patient on medications for pressure sensation may be due to peripheral pathic pain  At this point, there is low concern for a subacute episode of demyelination; high ICP ruled out given LP  Plan:   - CSF routine labs pending  - Starting medications after CSF results : gabapentin v/s indomethacin v/s cyproheptadine - discuss w/ patient once studies are available     - Monitor for worsening symptoms   - Monitor vitals signs  - Patient advised to take rest after the LP procedure  - F/u with Dr Khushboo Suggs outpatient in clinic

## 2023-01-03 NOTE — ED ATTENDING ATTESTATION
1/3/2023   IDinorah MD, saw and evaluated the patient  I have discussed the patient with the resident/non-physician practitioner and agree with the resident's/non-physician practitioner's findings, Plan of Care, and MDM as documented in the resident's/non-physician practitioner's note, except where noted  All available labs and Radiology studies were reviewed  I was present for key portions of any procedure(s) performed by the resident/non-physician practitioner and I was immediately available to provide assistance  At this point I agree with the current assessment done in the Emergency Department  I have conducted an independent evaluation of this patient a history and physical is as follows:    Chief Complaint   Patient presents with   • Medical Problem     Pt referred to ED due to abnormal MRI showing increased intracranial pressure; pt reports pressure in L head, feeling like there is a brown shade over L eye, HTN     44 y o  female with past medical history of of mild intermittent asthma, anemia, hypertension, migraines including ocular migraines, presenting for further evaluation for possible idiopathic intracranial hypertension  Over the past 6 months, patient has had left-sided head pressure associated with intermittent left eye vision changes  Patient notes that upon waking up, she has a visual change, as though there is a brown filter over her left eye  When the light is turned on, this visual change resolves  It is only present whenever patient wakes up  Patient has not had any recent migraines, last migraine was in October  She has had persistent pressure on the left that does not appear to be positional in nature  There has been no double vision  Other than the brown "filter"-like effect in the morning, there has been no color changes of visualized objects and no halos around objects   No focal neurologic deficits such as difficulty with speech, weakness, or numbness in any of the extremities  Patient has not required anything for her left-sided head pressure since it has not been painful and does not interfere with activities of daily living  Physical Exam  /81 (BP Location: Left arm)   Pulse 78   Temp 98 2 °F (36 8 °C) (Tympanic)   Resp 18   LMP 12/12/2022   SpO2 97%      Vital signs and nursing notes reviewed    =CONSTITUTIONAL: female appearing stated age resting in bed, in no acute distress  HEENT: atraumatic, normocephalic  Sclera anicteric, conjunctiva are not injected  Moist oral mucosa  CARDIOVASCULAR/CHEST: RRR, no M/R/G  2+ radial pulses  PULMONARY: Breathing comfortably on RA  Breath sounds are equal and clear to auscultation  ABDOMEN: non-distended  MSK: moves all extremities, no deformities, no peripheral edema, no calf asymmetry  NEURO: Awake, alert, and oriented x 3  Pupils 2 mm and reactive bilaterally, no RAPD  EOMI  Fundoscopic exam is unfortunately limited  Face symmetric  Tongue protrudes midline  Normal shoulder shrug  5/5 strength in BUE and BLE     SKIN: Warm, appears well-perfused  MENTAL STATUS: Normal affect      Labs and Imaging  Labs Reviewed   PROTEIN TOTAL, CSF - Normal       Result Value Ref Range Status    Protein, CSF 35  15 - 45 mg/dL Final   GLUCOSE, CSF - Normal    Glucose, CSF 70  50 - 80 mg/dL Final   RED CELL COUNT, CSF - Normal    RBC, CSF 2  0 - 10 uL Final   PLATELET COUNT - Normal    Platelets 528  466 - 390 Thousands/uL Final    MPV 11 3  8 9 - 12 7 fL Final   PROTIME-INR - Normal    Protime 12 5  11 6 - 14 5 seconds Final    INR 0 92  0 84 - 1 19 Final   POCT PREGNANCY, URINE - Normal    EXT Preg Test, Ur Negative   Final    Control Valid   Final   OSIEL, BODY FLUID   CSF WBC COUNT WITH DIFFERENTIAL    Appearance, CSF Clear and colorless   Final    Tube Number, CSF 4   Final    WBC, CSF 0  0 - 5 /uL Final    Xanthochromia No  No Final   LYME TOTAL AB PROFILE W/REFLEX TO WB   MS PANEL, CSF/SERUM   RED TOP - MS PANEL       FL IN-patient lumbar puncture    (Results Pending)         Procedures  Procedures        ED Course  Medications   ALPRAZolam (XANAX) tablet 0 25 mg (0 25 mg Oral Given 1/3/23 7507)   lidocaine (PF) (XYLOCAINE-MPF) 1 % injection 5 mL (5 mL Injection Given by Other 1/3/23 3687)     44 y o  female presenting with persistent left frontal head pressure as well as intermittent left visual changes, referred by outpatient physician for further work up of possible idiopathic intracranial hypertension versus another CNS pathology  Most recent outpatient visits as well as results of recent Brain MRI reviewed  Thankfully, MRI of brain and orbits wo and w contrast dated 12/30/22 reveals no obstructive hydrocephalus, no increase in the CSF space surrounding the optic nerves or mass effect upon the optic disk, no MRI evidence of papilledema  Given that patient is being referred for further evaluation of her neurologic symptoms, we will continue with the work up, which includes LP for opening pressure and CSF studies as well as discussion with Neurology  Patient declines to have resident physician involvement during lumbar puncture, we will reach out to IR for assistance  We are also reaching out to Neurology for consultation

## 2023-01-04 ENCOUNTER — TELEPHONE (OUTPATIENT)
Dept: NEUROLOGY | Facility: CLINIC | Age: 38
End: 2023-01-04

## 2023-01-04 LAB — B BURGDOR IGG+IGM SER-ACNC: 0.3 AI

## 2023-01-04 NOTE — DISCHARGE INSTRUCTIONS
You were seen for a headache  Your lumbar puncture results were unremarkable  You can try the prescribed medication for your headache  Follow up with the neurology office

## 2023-01-04 NOTE — TELEPHONE ENCOUNTER
LMOM for the patient to call us back to reschedule her consult to Dr Glenford Brunner per his request to see her within the next 4 weeks   If the patient calls back please cancel her March appt with [facility] and offer Jan 26th at 2pm

## 2023-01-05 NOTE — TELEPHONE ENCOUNTER
Second attempt to contact the patient to schedule a sooner appt per Dr Teri Corey request  If the patient calls back please schedule Jan 26th at 2pm in the [facility] office

## 2023-01-05 NOTE — TELEPHONE ENCOUNTER
HFU: [facility]/MIGRAINE/CAPITALSTLUKES ON 1/26/23 AT 2 PM WITH DR Rosi Ott IN [place]    F/u with Dr Louisa Sarah outpatient in [facility]

## 2023-01-06 LAB — BACTERIA CSF CULT: NO GROWTH

## 2023-01-19 ENCOUNTER — TELEPHONE (OUTPATIENT)
Dept: CARDIAC SURGERY | Facility: CLINIC | Age: 38
End: 2023-01-19

## 2023-01-19 LAB — ANTINUCLEAR AB IGG ELISA FLUID: NEGATIVE EU/ML

## 2023-01-24 ENCOUNTER — OFFICE VISIT (OUTPATIENT)
Dept: SLEEP CENTER | Facility: CLINIC | Age: 38
End: 2023-01-24

## 2023-01-24 VITALS
BODY MASS INDEX: 34.91 KG/M2 | SYSTOLIC BLOOD PRESSURE: 120 MMHG | DIASTOLIC BLOOD PRESSURE: 78 MMHG | OXYGEN SATURATION: 96 % | HEIGHT: 63 IN | WEIGHT: 197 LBS | HEART RATE: 78 BPM

## 2023-01-24 DIAGNOSIS — G47.8 NON-RESTORATIVE SLEEP: ICD-10-CM

## 2023-01-24 DIAGNOSIS — F51.12 BEHAVIORALLY INDUCED INSUFFICIENT SLEEP SYNDROME: ICD-10-CM

## 2023-01-24 DIAGNOSIS — G47.20 CIRCADIAN RHYTHM SLEEP DISORDER: Primary | ICD-10-CM

## 2023-01-24 NOTE — PROGRESS NOTES
Consultation - Ev Bearden 14  Kiet Shukla, [birth year], MRN: [id number]    1/24/2023        Reason for Consult / Principal Problem:  Sleep difficulties  Non-restorative sleep       Thank you for the opportunity of participating in the evaluation and care of this patient in the Sleep Clinic at [facility]  Subjective:     HPI: Kiet Shukla is a 44y o  year old female  She presents with her  for a consultation regarding concern of non-restful sleep and excessive daytime sleepiness  She reports that she has always had sleep problems, difficulty falling asleep, staying asleep and feeling rested  She noticed symptoms worsened around age 27  At that point, she was working a variety of shifts, including evening and night shifts  She then began working day shift hours  She feels that sleep never corrected itself after that point  She feels she is a "night owl"  She and her  enjoy staying up late  Currently she has nights when she can't sleep at all and mornings when she feels like she can't wake up  Most days she feels exhausted, no matter how much sleep she gets  She took 100mg of gabapentin for papilledema and stopped taking it, due to feeling groggy the following day  Comorbid conditions:  Obesity  HTN  Asthma     Sleep Study Results:  Results of the diagnostic sleep study, completed on 12/3/22, are as follows:  IMPRESSION:   1  This test does not support the diagnosis of obstructive sleep apnea as the apnea hypopnea index (AHI) was normal (Normal is less than 5)  2  Normal baseline oxygen saturation  3  Sleep efficiency was normal  Stage N1 sleep (light sleep) was normal  Stage N3 sleep (deep sleep) was decreased   REM sleep percentage was decreased on this test   Decreased or absent REM sleep or a prolonged REM sleep latency can be effect of certain medications including many antidepressants, stimulants, substances including marijuana, or other causes  Clinical correlation is required  4  No evidence of periodic limb movements during sleep  5  EKG showed normal sinus rhythm  Employment:  She currently works full time as a neurodiagnostic technician, working between the hours of 7: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-4:00pm    Sleep Schedule:       Bedtime:  12:30-1:00am and 1:00-3:00am on weekends      Latency:  20-30 minutes, sometimes immediately      Wakeup time:  Between 6:30am-7:30am on week days and 10:00am on weekends    Awakenings:       Frequency:  Some nights she doesn't wake up at all and other nights she wakes up every hour      Causes:  Unknown reasons      Duration:  A few minutes    Daytime Sleepiness / Inappropriate Sleep:       Most severe:  After lunch       Naps :  avg 3 days per week      Time:  Between 5-7:00pm      Duration:  20 minutes to an hour       Inappropriate drowsiness / sleep:  She is able to maintain wakefulness with sedentary activities  Sometimes dozes when watching a movie at night  Snoring:  Occasional light snoring, depending on body positioning    Apnea: No witnessed apnea    Change in Weight:  Weight has been stable with some fluctuation of 5-10 lbs  Restless Leg Syndrome:  no clinical symptoms consistent with this diagnosis     Other Complaints:  Occasional talking in her sleep  No reports of sleep walking, hallucinations surrounding sleep  She had sleep paralysis in the past   She first noticed that frequently in her teen and 19's  She has not had any episodes for at least the past 5 years  She occasionally awakens with headaches  She has a history of ocular migraines  No reports of bruxism but she clenches and uses an oral appliance for this  No symptoms consistent with cataplexy  Social History:      Caffeine:  Rare coffee       Tobacco:   reports that she has never smoked   She has never used smokeless tobacco      E-cig/Vaping:    E-Cigarette/Vaping   • E-Cigarette Use Never User       E-Cigarette/Vaping Substances   • Nicotine No    • THC No    • CBD No    • Flavoring No    • Other No    • Unknown No          Alcohol:   reports current alcohol use  1-2 glasses per week      Drugs:   reports current drug use  Drug: Marijuana  At bedtime       The review of systems and following portions of the patient's history were reviewed and updated as appropriate: allergies, current medications, past family history, past medical history, past social history, past surgical history and problem list         Objective:       Vitals:    01/24/23 0851   BP: 120/78   Pulse: 78   SpO2: 96%   Weight: 89 4 kg (197 lb)   Height: 5' 3" (1 6 m)     Body mass index is 34 9 kg/m²  [place] Sleepiness Scale:  Total score: 8      Current Outpatient Medications:   •  albuterol (2 5 mg/3 mL) 0 083 % nebulizer solution, Take 1 vial (2 5 mg total) by nebulization every 6 (six) hours as needed for wheezing or shortness of breath, Disp: 30 vial, Rfl: 3  •  albuterol (ProAir HFA) 90 mcg/act inhaler, Inhale 2 puffs every 4 (four) hours as needed (When has flare up), Disp: 1 Inhaler, Rfl: 0  •  ALPRAZolam (XANAX) 0 25 mg tablet, Take 1 tablet (0 25 mg total) by mouth daily at bedtime as needed for anxiety, Disp: 20 tablet, Rfl: 0  •  carvedilol (COREG) 25 mg tablet, Take 1 tablet (25 mg total) by mouth 2 (two) times a day with meals, Disp: 180 tablet, Rfl: 3  •  chlorthalidone 25 mg tablet, Take 1 tablet (25 mg total) by mouth daily, Disp: 90 tablet, Rfl: 3  •  Cholecalciferol (VITAMIN D3 PO), Take 2,000 mg by mouth daily in the early morning, Disp: , Rfl:   •  Cyanocobalamin (VITAMIN B-12 PO), Take by mouth, Disp: , Rfl:   •  cyclobenzaprine (FLEXERIL) 10 mg tablet, Take 1 tablet (10 mg total) by mouth 3 (three) times a day as needed for muscle spasms, Disp: 60 tablet, Rfl: 1  •  gabapentin (Neurontin) 100 mg capsule, Take 1 capsule (100 mg total) by mouth daily at bedtime, Disp: 30 capsule, Rfl: 0  •  magnesium 30 MG tablet, Take 30 mg by mouth 2 (two) times a day, Disp: , Rfl:   •  meloxicam (MOBIC) 15 mg tablet, Take 1 tablet (15 mg total) by mouth daily as needed for mild pain, Disp: 30 tablet, Rfl: 0  •  Multiple Vitamins-Minerals (Womens Multivitamin) TABS, Take 1 tablet by mouth daily, Disp: , Rfl:   •  nystatin-triamcinolone (MYCOLOG-II) ointment, Apply topically 2 (two) times a day, Disp: 30 g, Rfl: 3  •  ondansetron (ZOFRAN-ODT) 4 mg disintegrating tablet, Take 1 tablet (4 mg total) by mouth every 6 (six) hours as needed for nausea or vomiting, Disp: 20 tablet, Rfl: 0  •  pantoprazole (PROTONIX) 40 mg tablet, Take 1 tablet (40 mg total) by mouth daily before breakfast, Disp: 90 tablet, Rfl: 0  •  potassium chloride (MICRO-K) 10 MEQ CR capsule, Take 1 capsule (10 mEq total) by mouth daily, Disp: 30 capsule, Rfl: 0  •  Riboflavin (VITAMIN B-2 PO), Take by mouth, Disp: , Rfl:   •  SUMAtriptan (IMITREX) 50 mg tablet, Take 1 tablet (50 mg total) by mouth once as needed for migraine for up to 1 dose, Disp: 9 tablet, Rfl: 0  •  valsartan (DIOVAN) 320 MG tablet, Take 1 tablet (320 mg total) by mouth daily, Disp: 90 tablet, Rfl: 3  •  fluconazole (DIFLUCAN) 150 mg tablet, , Disp: , Rfl:   •  fluticasone (Flovent HFA) 110 MCG/ACT inhaler, Inhale 2 puffs 2 (two) times a day Rinse mouth after use , Disp: 12 g, Rfl: 0    Physical Exam  General Appearance:   Alert, cooperative, no distress, appears stated age, obese     Head:   Normocephalic, without obvious abnormality, atraumatic     Eyes:   Conjunctiva/corneas clear          Nose:  Nares normal, septum midline, mucosa normal, no drainage or sinus tenderness           Throat:  Lips, teeth and gums normal; tongue normal in size and midline in position; mucosa moist and redundant bilaterally, uvula normal, tonsils not visualized, Mallampati class 3       Neck:  Supple, short, symmetrical, trachea midline, no adenopathy; no thyromegaly noted, no carotid bruit or JVD     Lungs: Clear to auscultation bilaterally, respirations unlabored     Heart:   Regular rate and rhythm, S1 and S2 normal, no murmur, rub or gallop       Extremities:  Extremities normal, atraumatic, no cyanosis or edema       Skin:  Skin color, texture, turgor normal, no rashes or lesions       Neurologic:  No focal deficits noted  ASSESSMENT / PLAN     1  Circadian rhythm sleep disorder      delayed phase      2  Behaviorally induced insufficient sleep syndrome        3  Non-restorative sleep              Counseling / Coordination of Care  Total clinic time spent today 55 minutes  Greater than 50% of total time was spent with the patient and / or family counseling and / or coordination of care  A description of the counseling / coordination of care:     diagnostic results, instructions for management, risk factor reductions, prognosis, patient and family education, impressions, risks and benefits of treatment options and importance of compliance with treatment    Today we reviewed the results of the recent diagnostic sleep study  The AHI was less than 5, which is not diagnostic for sleep apnea  The lowest oxygen saturation during the study was not less than 92%  There was some mild snoring noted, which is not classified as sleep apnea  Treatment with CPAP is not indicated  It is recommended that lifestyle changes regarding diet and exercise be considered, which can help with symptoms of snoring  Breathe Right Nasal Strips can sometimes be beneficial with mild snoring  We reviewed her typical sleep/wake schedule in detail  We discussed that she is not getting enough sleep  She prefers to be awake later in the evening, but needs to wake up early for work  Her  prefers the same schedule  We discussed that this is likely contributing to her excessive daytime sleepiness  We discussed how to slowly adjust this to her comfort level to see if daytime wakefulness improves    We discussed that while she is sleepy and wants to nap in the evening, naps will be detrimental in adjusting her sleep/wake schedule  The importance of consistency in her sleep/wake schedule was also stressed  We discussed the use of melatonin, including appropriate timing, to help her realign her circadian rhythm with her current work hours  It was noted that she had reduced N3 and REM sleep during the sleep study  We discussed that while marijuana may be helpful in treating her anxiety, it is likely worsening her sleep quality  She may consider other options for anxiety treatment  She will schedule follow up in 3-4 months, which will give her some time to work on the above recommendations  If symptoms persist after appropriate changes, further testing, including a diagnostic sleep study with MSLT may be considered  She was advised to call with any questions or concerns prior to her next visit, as needed  The following instructions have been given to the patient today:    Patient Instructions   SLEEP - Maintain a regular sleep schedule: Adults need at least 7-8 hours of uninterrupted a night  Maintain good sleep hygiene:  Going to bed and waking up at consistent times, avoiding excessive daytime naps, avoiding caffeinated beverages in the evening, avoid excessive stimulation in the evening and generally using bed primarily for sleeping  One hour before bedtime would recommend turning lights down lower, decreasing your activity (may read quietly, listen to music at a low volume)  When you get into bed, should eliminate all technology (no texting, emailing, playing with your phone, iPad or tablet in bed)  1   You can take melatonin 1/2 to one tablet daily (2 5-5 mg) 9-10 hours before your wake up time  2  Keep wake up time consistent daily  3  Go to sleep when you feel sleepy  4  Avoid use of alcohol  5  Medical marijuana can decrease slow wave sleep and REM sleep, which can leave you feeling non-rested  6   Avoid taking naps  7  Avoid driving or operating machinery if feeling sleepy/drowsy  8  Consider keeping a sleep log  9  Schedule follow up in 3-4 months  Nursing Support:  When: Monday through Friday 7A-5PM except holidays  Where: Our direct line is [phone]  If you are having a true emergency please call 911  In the event that the line is busy or it is after hours please leave a voice message and we will return your call  Please speak clearly, leaving your full name, birth date, best number to reach you and the reason for your call  Medication refills: We will need the name of the medication, the dosage, the ordering provider, whether you get a 30 or 90 day refill, and the pharmacy name and address  Medications will be ordered by the provider only  Nurses cannot call in prescriptions  Please allow 7 days for medication refills  Physician requested updates: If your provider requested that you call with an update after starting medication, please be ready to provide us the medication and dosage, what time you take your medication, the time you attempt to fall asleep, time you fall asleep, when you wake up, and what time you get out of bed  Sleep Study Results: We will contact you with sleep study results and/or next steps after the physician has reviewed your testing  Hernan Murdock, [address] [place] [address]      Portions of the record may have been created with voice recognition software  Occasional wrong word or "sound a like" substitutions may have occurred due to the inherent limitations of voice recognition software  Read the chart carefully and recognize, using context, where substitutions have occurred

## 2023-01-24 NOTE — PATIENT INSTRUCTIONS
SLEEP - Maintain a regular sleep schedule: Adults need at least 7-8 hours of uninterrupted a night  Maintain good sleep hygiene:  Going to bed and waking up at consistent times, avoiding excessive daytime naps, avoiding caffeinated beverages in the evening, avoid excessive stimulation in the evening and generally using bed primarily for sleeping  One hour before bedtime would recommend turning lights down lower, decreasing your activity (may read quietly, listen to music at a low volume)  When you get into bed, should eliminate all technology (no texting, emailing, playing with your phone, iPad or tablet in bed)  You can take melatonin 1/2 to one tablet daily (2 5-5 mg) 9-10 hours before your wake up time  Keep wake up time consistent daily  Go to sleep when you feel sleepy  Avoid use of alcohol  Medical marijuana can decrease slow wave sleep and REM sleep, which can leave you feeling non-rested  Avoid taking naps  Avoid driving or operating machinery if feeling sleepy/drowsy  Consider keeping a sleep log  Schedule follow up in 3-4 months  Nursing Support:  When: Monday through Friday 7A-5PM except holidays  Where: Our direct line is [phone]  If you are having a true emergency please call 911  In the event that the line is busy or it is after hours please leave a voice message and we will return your call  Please speak clearly, leaving your full name, birth date, best number to reach you and the reason for your call  Medication refills: We will need the name of the medication, the dosage, the ordering provider, whether you get a 30 or 90 day refill, and the pharmacy name and address  Medications will be ordered by the provider only  Nurses cannot call in prescriptions  Please allow 7 days for medication refills  Physician requested updates:  If your provider requested that you call with an update after starting medication, please be ready to provide us the medication and dosage, what time you take your medication, the time you attempt to fall asleep, time you fall asleep, when you wake up, and what time you get out of bed  Sleep Study Results: We will contact you with sleep study results and/or next steps after the physician has reviewed your testing

## 2023-01-25 ENCOUNTER — OFFICE VISIT (OUTPATIENT)
Dept: CARDIAC SURGERY | Facility: CLINIC | Age: 38
End: 2023-01-25

## 2023-01-25 VITALS
WEIGHT: 201 LBS | SYSTOLIC BLOOD PRESSURE: 114 MMHG | OXYGEN SATURATION: 100 % | HEIGHT: 63 IN | HEART RATE: 64 BPM | DIASTOLIC BLOOD PRESSURE: 59 MMHG | BODY MASS INDEX: 35.61 KG/M2

## 2023-01-25 DIAGNOSIS — I10 ESSENTIAL HYPERTENSION: Primary | ICD-10-CM

## 2023-01-25 NOTE — PROGRESS NOTES
Cardiology Follow Up Visit     Interventional Cardiology and [address] [place] [address]  9/[birth year]/4691  [id number]  [address] [facility] [address] [id number] [phone]    1  Essential hypertension  POCT ECG    Basic metabolic panel    Lipid panel            Discussion/Summary:    1   improved essential hypertension on current regimen  2  Hypokalemia, on thiazide diuretic    Plan: Instructed to resume potassium 10 mEq daily  Blood pressure well controlled  Encouraged sensible low-salt Mediterranean type diet daily activity and exercise  Recheck potassium/bmp and lipids  No med changes or testing required my standpoint  Stress testing performed December 2022 normal LV function  No ischemia  Interval History:    42-year-old female here for essential hypertension follow-up    Echocardiogram December 2022 performed for vague shortness of breath was without evidence of ischemia  Normal LV function    Potassium 3 2 on thiazide diuretic  Not taking potassium regularly  Blood pressure has been well controlled on current regimen  Occasional episodic lightheadedness with change in position  Patient Active Problem List   Diagnosis   • Herniated lumbar intervertebral disc   • Asthma in adult, mild intermittent, uncomplicated   • Benign essential hypertension   • Mitral valve insufficiency   • Obesity (BMI 30-39  9)   • PVC (premature ventricular contraction)   • Fibroids, submucosal   • GERD (gastroesophageal reflux disease)   • Female infertility   • Polycystic ovaries   • Migraine without aura and without status migrainosus, not intractable   • Family history of sudden cardiac death (SCD)   • Os vesalianum syndrome of right foot   • Iron deficiency anemia due to chronic blood loss   • Sickle cell trait (HCC)   • Excessive sleepiness   • Papilledema   • Circadian rhythm sleep disorder   • Behaviorally induced insufficient sleep syndrome   • Non-restorative sleep     Past Medical History:   Diagnosis Date   • Abdominal pain    • Abnormal ECG    • Anemia    • Asthma     mild intermittent   • GERD (gastroesophageal reflux disease)    • History of palpitations    • Hypertension    • Lower back pain    • Migraines    • Seasonal allergies    • Trace mitral regurgitation by prior echocardiogram    • Trigger finger of right thumb 2/28/2017     Social History     Socioeconomic History   • Marital status: /Civil Union     Spouse name: Not on file   • Number of children: Not on file   • Years of education: Not on file   • Highest education level: Not on file   Occupational History   • Not on file   Tobacco Use   • Smoking status: Never   • Smokeless tobacco: Never   Vaping Use   • Vaping Use: Never used   Substance and Sexual Activity   • Alcohol use: Yes     Comment: socially    • Drug use: Yes     Types: Marijuana   • Sexual activity: Yes     Partners: Male     Birth control/protection: None   Other Topics Concern   • Not on file   Social History Narrative    Exercises 3x week    Pet: dog     Social Determinants of Health     Financial Resource Strain: Not on file   Food Insecurity: Not on file   Transportation Needs: Not on file   Physical Activity: Not on file   Stress: Not on file   Social Connections: Not on file   Intimate Partner Violence: Not on file   Housing Stability: Not on file      Family History   Problem Relation Age of Onset   • Diabetes Mother         type2   • Hypertension Mother    • Depression Mother    • Thyroid disease Mother    • Arthritis Mother    • Anxiety disorder Mother    • Diabetes Father         type2   • Hypertension Father    • Alcohol abuse Father    • Substance Abuse Father    • Arthritis Father    • Drug abuse Father    • Dialysis Father    • Esophageal cancer Maternal Grandmother    • Breast cancer Maternal Grandmother         Maternal great grandmother   • Cancer Maternal Grandmother         Pancreatic   • Heart attack Maternal Grandfather         acute MI   • Arthritis Maternal Grandfather    • Stroke Maternal Grandfather         CVA   • Hyperlipidemia Maternal Grandfather    • Heart disease Maternal Grandfather    • Arthritis Paternal Grandfather    • Stroke Paternal Grandfather         CVA   • Cancer Paternal Grandfather    • Hyperlipidemia Paternal Grandfather    • Colon cancer Paternal Grandfather    • Bipolar disorder Maternal Aunt    • Brain cancer Maternal Aunt    • Heart attack Maternal Uncle         acute MI   • Arthritis Family    • Stroke Family         CVA   • Cancer Family    • Hyperlipidemia Family    • Stroke Family         CVA   • Hyperlipidemia Family      Past Surgical History:   Procedure Laterality Date   • CHOLECYSTECTOMY      laparoscopic   • COLONOSCOPY  2017   • FL LUMBAR PUNCTURE DIAGNOSTIC  1/3/2023   • HERNIA REPAIR      umbilical   • LAPAROTOMY N/A 12/10/2020    Procedure: ABDOMINAL MYOMECTOMY;  Surgeon: Avis Rubi DO;  Location[facility];  Service: Gynecology   • LUMBAR LAMINECTOMY Right 09/14/2016    Procedure: Right L5/S1 Metryx microdiscectomy;  Surgeon: Marti Pak MD;  Location: [facility];  Service:    • MA COLONOSCOPY FLX DX W/COLLJ SPEC WHEN PFRMD N/A 07/07/2016    Procedure: COLONOSCOPY;  Surgeon: Torri Henderson DO;  Location: [facility];   Service: Gastroenterology   • MA HYSTEROSCOPY BX ENDOMETRIUM&/POLYPC W/WO D&C N/A 03/19/2018    Procedure: HYSTEROSCOPY; MYOMECTOMY; ENDOMETRIAL BIOPSY;  Surgeon: Avis Rubi DO;  [facility];  Service: Gynecology   • MA TENDON SHEATH INCISION Right 02/28/2017    Procedure: THUMB TRIGGER RELEASE ;  Surgeon: Paulo Aguilar DO;  Location[facility];  Service: Orthopedics   • [address]  9/2016    Microdisectomy   • WISDOM TOOTH EXTRACTION         Current Outpatient Medications:   •  albuterol (2 5 mg/3 mL) 0 083 % nebulizer solution, Take 1 vial (2 5 mg total) by nebulization every 6 (six) hours as needed for wheezing or shortness of breath, Disp: 30 vial, Rfl: 3  •  albuterol (ProAir HFA) 90 mcg/act inhaler, Inhale 2 puffs every 4 (four) hours as needed (When has flare up), Disp: 1 Inhaler, Rfl: 0  •  ALPRAZolam (XANAX) 0 25 mg tablet, Take 1 tablet (0 25 mg total) by mouth daily at bedtime as needed for anxiety, Disp: 20 tablet, Rfl: 0  •  carvedilol (COREG) 25 mg tablet, Take 1 tablet (25 mg total) by mouth 2 (two) times a day with meals, Disp: 180 tablet, Rfl: 3  •  chlorthalidone 25 mg tablet, Take 1 tablet (25 mg total) by mouth daily, Disp: 90 tablet, Rfl: 3  •  Cholecalciferol (VITAMIN D3 PO), Take 2,000 mg by mouth daily in the early morning, Disp: , Rfl:   •  Cyanocobalamin (VITAMIN B-12 PO), Take by mouth, Disp: , Rfl:   •  cyclobenzaprine (FLEXERIL) 10 mg tablet, Take 1 tablet (10 mg total) by mouth 3 (three) times a day as needed for muscle spasms, Disp: 60 tablet, Rfl: 1  •  fluconazole (DIFLUCAN) 150 mg tablet, , Disp: , Rfl:   •  gabapentin (Neurontin) 100 mg capsule, Take 1 capsule (100 mg total) by mouth daily at bedtime, Disp: 30 capsule, Rfl: 0  •  magnesium 30 MG tablet, Take 30 mg by mouth 2 (two) times a day, Disp: , Rfl:   •  meloxicam (MOBIC) 15 mg tablet, Take 1 tablet (15 mg total) by mouth daily as needed for mild pain, Disp: 30 tablet, Rfl: 0  •  Multiple Vitamins-Minerals (Womens Multivitamin) TABS, Take 1 tablet by mouth daily, Disp: , Rfl:   •  nystatin-triamcinolone (MYCOLOG-II) ointment, Apply topically 2 (two) times a day, Disp: 30 g, Rfl: 3  •  ondansetron (ZOFRAN-ODT) 4 mg disintegrating tablet, Take 1 tablet (4 mg total) by mouth every 6 (six) hours as needed for nausea or vomiting, Disp: 20 tablet, Rfl: 0  •  pantoprazole (PROTONIX) 40 mg tablet, Take 1 tablet (40 mg total) by mouth daily before breakfast, Disp: 90 tablet, Rfl: 0  •  potassium chloride (MICRO-K) 10 MEQ CR capsule, Take 1 capsule (10 mEq total) by mouth daily, Disp: 30 capsule, Rfl: 0  •  Riboflavin (VITAMIN B-2 PO), Take by mouth, Disp: , Rfl:   •  SUMAtriptan (IMITREX) 50 mg tablet, Take 1 tablet (50 mg total) by mouth once as needed for migraine for up to 1 dose, Disp: 9 tablet, Rfl: 0  •  valsartan (DIOVAN) 320 MG tablet, Take 1 tablet (320 mg total) by mouth daily, Disp: 90 tablet, Rfl: 3  •  fluticasone (Flovent HFA) 110 MCG/ACT inhaler, Inhale 2 puffs 2 (two) times a day Rinse mouth after use , Disp: 12 g, Rfl: 0  No Known Allergies      Social, Family, Medication history reviewed and updated as necessary      Labs:     Lab Results   Component Value Date     11/17/2015    K 3 2 (L) 12/20/2022     12/20/2022    CO2 30 12/20/2022    BUN 17 12/20/2022    CREATININE 1 04 12/20/2022    CREATININE 1 09 02/01/2022    GLUCOSE 85 11/17/2015    CALCIUM 9 6 12/20/2022       Lab Results   Component Value Date    WBC 8 46 12/20/2022    HGB 12 9 12/20/2022    HGB 12 3 02/01/2022    HCT 40 7 12/20/2022    HCT 38 0 02/01/2022     01/03/2023     12/20/2022       Lab Results   Component Value Date    CHOL 181 06/23/2015    CHOL 187 01/13/2015     Lab Results   Component Value Date    HDL 74 04/14/2022    HDL 61 06/01/2021     Lab Results   Component Value Date    LDLCALC 91 04/14/2022    LDLCALC 111 (H) 06/01/2021     No results found for: LDLDIRECT          Lab Results   Component Value Date    TRIG 94 04/14/2022    TRIG 121 06/01/2021       Lab Results   Component Value Date    ALT 21 12/20/2022    ALT 31 02/01/2022    AST 11 12/20/2022    AST 11 02/01/2022       Lab Results   Component Value Date    INR 0 92 01/03/2023    INR 0 98 12/10/2020       No results found for: NTBNP    Lab Results   Component Value Date    HGBA1C 6 1 (H) 02/01/2022    HGBA1C 6 0 (H) 06/01/2021    HGBA1C 5 4 07/19/2018           Imaging: Reviewed in epic        Review of Systems:  14 systems reviewed and negative with exception of the above        PHYSICAL EXAM:      Vitals:    01/25/23 1402   BP: 114/59   Pulse: 64   SpO2: 100%     Body mass index is 35 61 kg/m²  Weight (last 2 days)     Date/Time Weight    01/25/23 1402 91 2 (201)            Gen: No acute distress  HEENT: anicteric, mucous membranes moist  Neck: supple, no jugular venous distention, or carotid bruit  Heart: regular, normal s1 and s2, no murmur/rub or gallop  Lungs :clear to auscultation bilaterally, no rales/rhonchi or wheeze  Abdomen: soft nontender, normoactive bowel sounds, no organomegaly  Ext: warm and perfused, normal femoral pulses, no edema, or clubbing  Skin: warm, no rashes  Neuro: AAO x 3, no focal findings  Psychiatric: normal affect  Musculoskeletal: no obvious joint deformities  This note was completed in part utilizing m-[employer] direct voice recognition software  Grammatical errors, random word insertion, spelling mistakes, and incomplete sentences may be an occasional consequence of the system secondary to software limitations, ambient noise and hardware issues  At the time of dictation, efforts were made to edit, clarify and /or correct errors  Please read the chart carefully and recognize, using context, where substitutions have occurred  If you have any questions or concerns about the context, text or information contained within the body of this dictation, please contact myself, the provider, for further clarification

## 2023-01-26 ENCOUNTER — OFFICE VISIT (OUTPATIENT)
Dept: NEUROLOGY | Facility: CLINIC | Age: 38
End: 2023-01-26

## 2023-01-26 VITALS
HEART RATE: 67 BPM | BODY MASS INDEX: 35.33 KG/M2 | DIASTOLIC BLOOD PRESSURE: 64 MMHG | TEMPERATURE: 97.1 F | SYSTOLIC BLOOD PRESSURE: 113 MMHG | WEIGHT: 199.4 LBS | HEIGHT: 63 IN

## 2023-01-26 DIAGNOSIS — H47.10 PAPILLEDEMA: ICD-10-CM

## 2023-01-26 DIAGNOSIS — G43.009 MIGRAINE WITHOUT AURA AND WITHOUT STATUS MIGRAINOSUS, NOT INTRACTABLE: Primary | ICD-10-CM

## 2023-01-26 NOTE — PROGRESS NOTES
Patient ID: King Ruth is a 44 y o  female who presents to the [address] [place] [address]    Assessment/Plan:   Patient Instructions   Migraine headaches: Pema Che presents for a follow-up evaluation with regard to migraine headaches  She was seen previously in the emergency room and at that time it was noted that she has 2 distinct migraine types including ocular migraines affecting the lateral vision on her left hand side as well as more generalized migraines  She also reports some hypertensive headaches  Her lumbar puncture in the emergency room had an opening pressure of 13 which significantly decreases the suggestion of idiopathic intracranial hypertension, furthermore she did not note any clear symptom improvement following her lumbar puncture although she has had limited to no migraines since her visit in the emergency room  -At this point for ongoing prevention we will continue to monitor clinically  I would like for her to get adequate sleep as much as possible, regular exercise is very helpful at preventing migraine, and it is important that she remain well-hydrated  -I would like for her to keep track of when breakthrough migraines occur and what type of migraine she experiences using either a calendar or application on her phone as she sees fit so that we can detect any particular patterns  -She can use the sumatriptan 50 mg tablets that she has and take 1 or 2 tablets if needed as early as possible for migraine  This can be taken with either Tylenol or ibuprofen  If need be sumatriptan can be repeated every 2 hours with an absolute maximum of 200 mg in any 24-hour timeframe    If she feels unwell or has any side effects on sumatriptan including significant high blood pressure, palpitations or chest pain she should make sure to contact our office right away and not use the medication further    Papilledema: She reportedly has some evidence of papilledema on funduscopic exam   At this time my suspicion for idiopathic intracranial hypertension is quite low  She continues to experience a brown film that is present over her eye upon awakening approximately 4 days out of the week  I would like for her to continue to follow-up with the ophthalmology group in this regard and I have a low threshold to have her see the neuro-ophthalmologist if necessary  From my standpoint she is doing exceptionally well  She will return to the office in 6 months time to see me directly but I would be happy to see her sooner if the need should arise  Diagnoses and all orders for this visit:    Migraine without aura and without status migrainosus, not intractable    Papilledema        Subjective:    LACY    King Ruth is a 44 y o  who presents for evaluation of headache and papilledema  She was previously seen by this provider in the hospital     With regard to migraine, her initial migraines began around the time of menarche and previously had been associated with her menstrual cycle but now they occur on a more sporadic basis  She actually has 3 distinct headache subtypes  She experiences more typical migraine headaches, and intermittently she will experience ocular migraines which include the loss of superior and inferior lateral vision in her left eye only  She also notes that she exhibits some headache when her blood pressure is too high  She notes that she has had no breakthrough headaches since her hospitalization  She did trial the gabapentin that we discussed in the hospital but found that it was making her too tired, particularly sometimes when she was on-call  Please see the hospital consultation for a more full description of her constellation of symptoms  With regard to her papilledema, she continues to follow-up with ophthalmology    She awakens with a visual disturbance like a brown film over her left eye up to 4 times in a given week with no clear response to using eyedrops the night before  She will continue to work with the ophthalmologist in this regard  I reviewed her lumbar puncture results which were quite normal   Her MRI did not show any evidence of multiple sclerosis per se and the symptoms as described are not truly consistent with optic neuritis  Per lumbar puncture in the hospital showed normal pressure  She did not derive immediate relief or some other immediate symptom change after the lumbar puncture which would argue against her lack of headache speak related to the lumbar puncture itself  I did confirm for her that if she experiences consistent stereotyped transient left eye vision loss as has occurred many times with her ocular migraines she may not need to seek care in the emergency room however if she were to experience any strokelike symptoms with or without that stereotyped vision loss she should be seen at the nearest emergency room immediately      Past Medical History:   Diagnosis Date   • Abdominal pain    • Abnormal ECG    • Anemia    • Asthma     mild intermittent   • GERD (gastroesophageal reflux disease)    • History of palpitations    • Hypertension    • Lower back pain    • Migraines    • Seasonal allergies    • Trace mitral regurgitation by prior echocardiogram    • Trigger finger of right thumb 2/28/2017         Current Outpatient Medications:   •  albuterol (2 5 mg/3 mL) 0 083 % nebulizer solution, Take 1 vial (2 5 mg total) by nebulization every 6 (six) hours as needed for wheezing or shortness of breath, Disp: 30 vial, Rfl: 3  •  albuterol (ProAir HFA) 90 mcg/act inhaler, Inhale 2 puffs every 4 (four) hours as needed (When has flare up), Disp: 1 Inhaler, Rfl: 0  •  ALPRAZolam (XANAX) 0 25 mg tablet, Take 1 tablet (0 25 mg total) by mouth daily at bedtime as needed for anxiety, Disp: 20 tablet, Rfl: 0  •  carvedilol (COREG) 25 mg tablet, Take 1 tablet (25 mg total) by mouth 2 (two) times a day with meals, Disp: 180 tablet, Rfl: 3  •  chlorthalidone 25 mg tablet, Take 1 tablet (25 mg total) by mouth daily, Disp: 90 tablet, Rfl: 3  •  Cholecalciferol (VITAMIN D3 PO), Take 2,000 mg by mouth daily in the early morning, Disp: , Rfl:   •  Cyanocobalamin (VITAMIN B-12 PO), Take by mouth, Disp: , Rfl:   •  magnesium 30 MG tablet, Take 30 mg by mouth 2 (two) times a day, Disp: , Rfl:   •  meloxicam (MOBIC) 15 mg tablet, Take 1 tablet (15 mg total) by mouth daily as needed for mild pain, Disp: 30 tablet, Rfl: 0  •  Multiple Vitamins-Minerals (Womens Multivitamin) TABS, Take 1 tablet by mouth daily, Disp: , Rfl:   •  nystatin-triamcinolone (MYCOLOG-II) ointment, Apply topically 2 (two) times a day, Disp: 30 g, Rfl: 3  •  ondansetron (ZOFRAN-ODT) 4 mg disintegrating tablet, Take 1 tablet (4 mg total) by mouth every 6 (six) hours as needed for nausea or vomiting, Disp: 20 tablet, Rfl: 0  •  pantoprazole (PROTONIX) 40 mg tablet, Take 1 tablet (40 mg total) by mouth daily before breakfast, Disp: 90 tablet, Rfl: 0  •  potassium chloride (MICRO-K) 10 MEQ CR capsule, Take 1 capsule (10 mEq total) by mouth daily, Disp: 30 capsule, Rfl: 0  •  Riboflavin (VITAMIN B-2 PO), Take by mouth, Disp: , Rfl:   •  SUMAtriptan (IMITREX) 50 mg tablet, Take 1 tablet (50 mg total) by mouth once as needed for migraine for up to 1 dose, Disp: 9 tablet, Rfl: 0  •  valsartan (DIOVAN) 320 MG tablet, Take 1 tablet (320 mg total) by mouth daily, Disp: 90 tablet, Rfl: 3  •  cyclobenzaprine (FLEXERIL) 10 mg tablet, Take 1 tablet (10 mg total) by mouth 3 (three) times a day as needed for muscle spasms, Disp: 60 tablet, Rfl: 1  •  fluconazole (DIFLUCAN) 150 mg tablet, , Disp: , Rfl:   •  fluticasone (Flovent HFA) 110 MCG/ACT inhaler, Inhale 2 puffs 2 (two) times a day Rinse mouth after use , Disp: 12 g, Rfl: 0       Objective:    Blood pressure 113/64, pulse 67, temperature (!) 97 1 °F (36 2 °C), temperature source Temporal, height 5' 3" (1 6 m), weight 90 4 kg (199 lb 6 4 oz), unknown if currently breastfeeding  Physical Exam    Neurological Exam    At the time of my examination she was awake, alert, and in no distress  She is an excellent historian with no dysarthria or aphasia  Cranial nerves II through XII are symmetrically intact bilaterally  Motor testing reveals symmetric strength throughout the bilateral upper and lower extremities  There is no color desaturation in either eye  She is able to rise easily without assistance and her gait was stable  ROS:    Review of Systems   Constitutional: Negative  Negative for appetite change and fever  HENT: Negative  Negative for hearing loss, tinnitus, trouble swallowing and voice change  Eyes: Positive for visual disturbance (left eye visual changes)  Negative for photophobia and pain  Respiratory: Negative  Negative for shortness of breath  Cardiovascular: Negative  Negative for palpitations  Gastrointestinal: Negative  Negative for nausea and vomiting  Endocrine: Negative  Negative for cold intolerance  Genitourinary: Negative  Negative for dysuria, frequency and urgency  Musculoskeletal: Negative  Negative for gait problem, myalgias and neck pain  Skin: Negative  Negative for rash  Allergic/Immunologic: Negative  Neurological: Negative  Negative for dizziness, tremors, seizures, syncope, facial asymmetry, speech difficulty, weakness, light-headedness, numbness and headaches  Hematological: Negative  Does not bruise/bleed easily  Psychiatric/Behavioral: Negative  Negative for confusion, hallucinations and sleep disturbance

## 2023-01-26 NOTE — PATIENT INSTRUCTIONS
Migraine headaches: Isabel Carcamo presents for a follow-up evaluation with regard to migraine headaches  She was seen previously in the emergency room and at that time it was noted that she has 2 distinct migraine types including ocular migraines affecting the lateral vision on her left hand side as well as more generalized migraines  She also reports some hypertensive headaches  Her lumbar puncture in the emergency room had an opening pressure of 13 which significantly decreases the suggestion of idiopathic intracranial hypertension, furthermore she did not note any clear symptom improvement following her lumbar puncture although she has had limited to no migraines since her visit in the emergency room  -At this point for ongoing prevention we will continue to monitor clinically  I would like for her to get adequate sleep as much as possible, regular exercise is very helpful at preventing migraine, and it is important that she remain well-hydrated  -I would like for her to keep track of when breakthrough migraines occur and what type of migraine she experiences using either a calendar or application on her phone as she sees fit so that we can detect any particular patterns  -She can use the sumatriptan 50 mg tablets that she has and take 1 or 2 tablets if needed as early as possible for migraine  This can be taken with either Tylenol or ibuprofen  If need be sumatriptan can be repeated every 2 hours with an absolute maximum of 200 mg in any 24-hour timeframe  If she feels unwell or has any side effects on sumatriptan including significant high blood pressure, palpitations or chest pain she should make sure to contact our office right away and not use the medication further    Papilledema: She reportedly has some evidence of papilledema on funduscopic exam   At this time my suspicion for idiopathic intracranial hypertension is quite low    She continues to experience a brown film that is present over her eye upon awakening approximately 4 days out of the week  I would like for her to continue to follow-up with the ophthalmology group in this regard and I have a low threshold to have her see the neuro-ophthalmologist if necessary  From my standpoint she is doing exceptionally well  She will return to the office in 6 months time to see me directly but I would be happy to see her sooner if the need should arise

## 2023-01-30 DIAGNOSIS — I10 ESSENTIAL HYPERTENSION: ICD-10-CM

## 2023-01-31 RX ORDER — VALSARTAN 320 MG/1
320 TABLET ORAL DAILY
Qty: 90 TABLET | Refills: 3 | Status: SHIPPED | OUTPATIENT
Start: 2023-01-31

## 2023-02-14 ENCOUNTER — ANNUAL EXAM (OUTPATIENT)
Dept: OBGYN CLINIC | Facility: CLINIC | Age: 38
End: 2023-02-14

## 2023-02-14 VITALS
WEIGHT: 191 LBS | SYSTOLIC BLOOD PRESSURE: 120 MMHG | HEIGHT: 63 IN | BODY MASS INDEX: 33.84 KG/M2 | DIASTOLIC BLOOD PRESSURE: 74 MMHG

## 2023-02-14 DIAGNOSIS — Z01.419 ENCOUNTER FOR GYNECOLOGICAL EXAMINATION WITHOUT ABNORMAL FINDING: Primary | ICD-10-CM

## 2023-02-14 DIAGNOSIS — Z01.419 PAP SMEAR, AS PART OF ROUTINE GYNECOLOGICAL EXAMINATION: ICD-10-CM

## 2023-02-14 NOTE — PROGRESS NOTES
Assessment/Plan:    No problem-specific Assessment & Plan notes found for this encounter  Diagnoses and all orders for this visit:    Encounter for gynecological examination without abnormal finding  -     Liquid-based pap, screening    Pap smear, as part of routine gynecological examination          Normal gynecological physical examination  Self-breast examination stressed  Discussed regular exercise, healthy diet, importance of vitamin D and calcium supplements  Discussed importance of sun block use during periods of prolonged sun exposure  Patient will be seen in 1 year for routine gynecologic and medical examination  Patient will call office for any problems, concerns, or issues which may arise during the interim  Subjective:      Og Gan is a [id number] female with a PMH of asthma, GERD, HTN, migraines, and trace mitral regurgitation presenting to the office for an annual exam  She noticed a small "pimple-like" cyst on the left side of entrance to her vagina about a week ago  She said it was tender, but seems to have subsided now  She gets frequent migraines, but follows with a neurologist  She is sexually active and denies dyspareunia  She denies fever, chills, chest pain, SOB, abdominal pain, pelvic pain  HPI    Patient ID: Brandon Harman is a 44 y o  female who presents today for her annual gynecologic and medical examination    Menstrual status: Patient reports that her menstrual cycles are fairly regular without any erratic bleeding, spotting or cramping  She denies any bleeding or spotting between her cycles as well      Vasomotor symptoms: none      Patient reports normal appetite    Patient reports normal bowel and bladder habits    Patient denies any significant pelvic or abdominal pain    Patient denies any headaches, chest pain, shortness of breath fever shakes or chills    Patient denies any COVID 19 symptoms including cough or loss of taste or smell    COVID vaccine status: Patient is vaccinated against covid-19  Medical problems: asthma, GERD, HTN, migraines, and trace mitral regurgitation     Colonoscopy status: N/A    Mammogram status: N/A    The following portions of the patient's history were reviewed and updated as appropriate: allergies, current medications, past family history, past medical history, past social history, past surgical history and problem list     Review of Systems   Constitutional: Negative  Negative for appetite change, diaphoresis, fatigue, fever and unexpected weight change  HENT: Negative  Eyes: Negative  Respiratory: Negative  Negative for shortness of breath  Cardiovascular: Negative  Negative for chest pain  Gastrointestinal: Negative  Negative for abdominal pain, blood in stool, constipation, diarrhea, nausea and vomiting  Endocrine: Negative  Negative for cold intolerance and heat intolerance  Genitourinary: Negative  Negative for dysuria, frequency, hematuria, urgency, vaginal bleeding, vaginal discharge and vaginal pain  Musculoskeletal: Negative  Skin: Negative  Allergic/Immunologic: Negative  Neurological: Negative  Hematological: Negative  Negative for adenopathy  Psychiatric/Behavioral: Negative  Objective:      /74   Ht 5' 3" (1 6 m)   Wt 86 6 kg (191 lb)   LMP 02/05/2023   BMI 33 83 kg/m²          Physical Exam  Constitutional:       General: She is not in acute distress  Appearance: Normal appearance  She is well-developed  She is not diaphoretic  HENT:      Head: Normocephalic and atraumatic  Eyes:      Pupils: Pupils are equal, round, and reactive to light  Cardiovascular:      Rate and Rhythm: Normal rate and regular rhythm  Heart sounds: Normal heart sounds  No murmur heard  No friction rub  No gallop  Pulmonary:      Effort: Pulmonary effort is normal       Breath sounds: Normal breath sounds     Chest:   Breasts:     Breasts are symmetrical  Right: No inverted nipple, mass, nipple discharge, skin change or tenderness  Left: No inverted nipple, mass, nipple discharge, skin change or tenderness  Comments: No breast masses or tenderness  Abdominal:      General: Bowel sounds are normal       Palpations: Abdomen is soft  Genitourinary:     Exam position: Supine  Labia:         Right: No rash or lesion  Left: No rash or lesion  Urethra: No urethral swelling or urethral lesion  Vagina: Normal  No vaginal discharge, erythema, tenderness or bleeding  Cervix: No discharge or friability  Uterus: Not enlarged and not tender  Adnexa:         Right: No mass, tenderness or fullness  Left: No mass, tenderness or fullness  Rectum: Normal  Guaiac result negative  Comments: Good pelvic support  Remnant of a sebaceous inclusion cyst at the left side of the vaginal opening  Musculoskeletal:         General: Normal range of motion  Cervical back: Normal range of motion and neck supple  Lymphadenopathy:      Cervical: No cervical adenopathy  Upper Body:      Right upper body: No supraclavicular adenopathy  Left upper body: No supraclavicular adenopathy  Skin:     General: Skin is warm and dry  Findings: No rash  Neurological:      General: No focal deficit present  Mental Status: She is alert and oriented to person, place, and time  Psychiatric:         Mood and Affect: Mood normal          Speech: Speech normal          Behavior: Behavior normal          Thought Content:  Thought content normal          Judgment: Judgment normal

## 2023-02-16 DIAGNOSIS — E87.6 HYPOKALEMIA: ICD-10-CM

## 2023-02-16 RX ORDER — POTASSIUM CHLORIDE 750 MG/1
10 CAPSULE, EXTENDED RELEASE ORAL DAILY
Qty: 30 CAPSULE | Refills: 0 | Status: SHIPPED | OUTPATIENT
Start: 2023-02-16

## 2023-02-21 LAB
LAB AP GYN PRIMARY INTERPRETATION: NORMAL
LAB AP LMP: NORMAL
Lab: NORMAL

## 2023-03-09 DIAGNOSIS — I10 ESSENTIAL HYPERTENSION: ICD-10-CM

## 2023-03-10 RX ORDER — CHLORTHALIDONE 25 MG/1
25 TABLET ORAL DAILY
Qty: 90 TABLET | Refills: 3 | Status: SHIPPED | OUTPATIENT
Start: 2023-03-10

## 2023-05-18 ENCOUNTER — OFFICE VISIT (OUTPATIENT)
Dept: OBGYN CLINIC | Facility: CLINIC | Age: 38
End: 2023-05-18

## 2023-05-18 VITALS
HEIGHT: 63 IN | DIASTOLIC BLOOD PRESSURE: 64 MMHG | BODY MASS INDEX: 35.75 KG/M2 | SYSTOLIC BLOOD PRESSURE: 102 MMHG | WEIGHT: 201.8 LBS

## 2023-05-18 DIAGNOSIS — N89.8 VAGINAL DISCHARGE: ICD-10-CM

## 2023-05-18 DIAGNOSIS — N89.8 VAGINAL IRRITATION: ICD-10-CM

## 2023-05-18 DIAGNOSIS — Z11.3 SCREENING FOR STD (SEXUALLY TRANSMITTED DISEASE): Primary | ICD-10-CM

## 2023-05-18 NOTE — PROGRESS NOTES
Assessment/Plan:    No problem-specific Assessment & Plan notes found for this encounter  Diagnoses and all orders for this visit:    Screening for STD (sexually transmitted disease)  -     Chlamydia/GC amplified DNA by PCR    Vaginal discharge  -     Molecular Vaginal Panel    Vaginal irritation        Subjective:      Patient ID: Vinny Albright is a 43 y o  female  Patient is a 26-year-old female who presents today complaining of vaginal discharge and irritation  Patient reports that she has noticed increasing vaginal discharge accompanied by vaginal irritation as well    She denies any abnormal bleeding    She denies any change in her menstrual cycles which are fairly normal and not accompanied by any erratic spotting bleeding or any excessive cramping  She does report intermittent episodes of pelvic discomfort and in light of history of ovarian cysts we will check a pelvic ultrasound at this time as well  Patient denies any change in bowel or bladder habits    She also denies any fever shakes or chills as well    All questions were answered for her during today's visit    Further treatment follow-up planning will be based upon final culture results  Patient to call for any problems, questions, issues or concerns which may arise for her      Total time of today's visit was 25 minutes of which greater than 50% was spent face-to-face counseling the patient as well as coordination of care, review of chart and laboratory values, physical examination with cultures as well as computer entry into the epic medical record system  The following portions of the patient's history were reviewed and updated as appropriate: allergies, current medications, past family history, past medical history, past social history, past surgical history and problem list     Review of Systems   Constitutional: Negative  Negative for appetite change, diaphoresis, fatigue, fever and unexpected weight change  "  HENT: Negative  Eyes: Negative  Respiratory: Negative  Cardiovascular: Negative  Follow-up for blood pressure   Gastrointestinal: Negative  Negative for abdominal pain, blood in stool, constipation, diarrhea, nausea and vomiting  Endocrine: Negative  Negative for cold intolerance and heat intolerance  Genitourinary: Negative  Negative for dysuria, frequency, hematuria, urgency, vaginal bleeding, vaginal discharge and vaginal pain  Musculoskeletal: Negative  Skin: Negative  Allergic/Immunologic: Negative  Neurological: Negative  Followed for migraines   Hematological: Negative  Negative for adenopathy  History of sickle cell trait   Psychiatric/Behavioral: Negative  Objective:      /64   Ht 5' 3\" (1 6 m)   Wt 91 5 kg (201 lb 12 8 oz)   LMP 04/24/2023   BMI 35 75 kg/m²          Physical Exam  Constitutional:       Appearance: She is well-developed  HENT:      Head: Normocephalic  Eyes:      Pupils: Pupils are equal, round, and reactive to light  Cardiovascular:      Rate and Rhythm: Normal rate  Pulmonary:      Effort: Pulmonary effort is normal    Abdominal:      Palpations: Abdomen is soft  Genitourinary:     General: Normal vulva  Labia:         Right: No rash, tenderness, lesion or injury  Left: No rash, tenderness, lesion or injury  Urethra: No urethral swelling or urethral lesion  Vagina: Vaginal discharge present  No erythema, bleeding or lesions  Cervix: Discharge present  No lesion or erythema  Uterus: Not enlarged, not tender and no uterine prolapse  Adnexa: Right adnexa normal and left adnexa normal         Right: No mass, tenderness or fullness  Left: No mass, tenderness or fullness  Comments: Mild white discharge seen  Appropriate cultures obtained  Musculoskeletal:         General: Normal range of motion  Cervical back: Normal range of motion and neck supple   " Skin:     General: Skin is warm and dry  Neurological:      General: No focal deficit present  Mental Status: She is alert and oriented to person, place, and time  Psychiatric:         Mood and Affect: Mood normal          Behavior: Behavior normal          Thought Content:  Thought content normal          Judgment: Judgment normal

## 2023-05-18 NOTE — PATIENT INSTRUCTIONS
Topic: Vaginal discharge and irritation    Appropriate vaginal cultures obtained today    Further treatment and follow-up planning will be based upon final results    All questions answered for the patient    Pelvic ultrasound will be ordered to to evaluate episodes of pelvic discomfort as well    Patient will call for any problems, questions, issues or concerns which arise for her

## 2023-05-19 LAB
C GLABRATA DNA VAG QL NAA+PROBE: NEGATIVE
C KRUSEI DNA VAG QL NAA+PROBE: NEGATIVE
C TRACH DNA SPEC QL NAA+PROBE: NEGATIVE
CANDIDA SP 6 PNL VAG NAA+PROBE: POSITIVE
N GONORRHOEA DNA SPEC QL NAA+PROBE: NEGATIVE
T VAGINALIS DNA VAG QL NAA+PROBE: NEGATIVE
VAGINOSIS/ITIS DNA PNL VAG PROBE+SIG AMP: NEGATIVE

## 2023-05-23 DIAGNOSIS — B37.31 YEAST INFECTION OF THE VAGINA: Primary | ICD-10-CM

## 2023-05-23 RX ORDER — FLUCONAZOLE 150 MG/1
150 TABLET ORAL ONCE
Qty: 1 TABLET | Refills: 0 | Status: SHIPPED | OUTPATIENT
Start: 2023-05-23 | End: 2023-05-23

## 2023-05-23 NOTE — TELEPHONE ENCOUNTER
----- Message from María Lawrence MD sent at 5/23/2023 10:44 AM EDT -----  Patient's culture was positive for yeast    Please treat with Diflucan 150 mg  Dispense 1 tablet   take 1 tablet now    Thanks

## 2023-06-01 ENCOUNTER — ULTRASOUND (OUTPATIENT)
Dept: OBGYN CLINIC | Facility: CLINIC | Age: 38
End: 2023-06-01

## 2023-06-01 DIAGNOSIS — N83.202 CYST OF LEFT OVARY: Primary | ICD-10-CM

## 2023-06-01 NOTE — PROGRESS NOTES
AMB US Pelvic Non OB    Date/Time: 6/1/2023 2:45 PM    Performed by: Haydee Rivero  Authorized by: Joann Gan MD    Procedure details:     Indications: ovarian cysts and non-obstetric abdominal pain      Technique:  US Pelvic, Non-OB with complete exam  Uterine findings:     Length (cm): 7 2    Height (cm):  5 2    Width (cm):  5 2    Uterine adhesions: not identified      Adnexal mass: not identified      Polyps: not identified      Myomas: not identified      Endometrial stripe: identified      Endometrial hyperplasia: not identified      Endometrium thickness (mm):  8  Left ovary findings:     Left ovary:  Visualized    Cysts: not identified      Length (cm): 2 2    Height (cm): 1 8    Width (cm): 1 6  Right ovary findings:     Right ovary:  Visualized    Cysts: not identified      Length (cm): 2 1    Height (cm): 2    Width (cm): 1 8  Other findings:     Free pelvic fluid: not identified      Free peritoneal fluid: not identified    Post-Procedure Details:     Impression:  Endo-8 mm,WNL, No cyst seen today    Tolerance: Tolerated well, no immediate complications  Additional Procedure Comments:          Dr Chandler Barnes MD  [address] [facility]  [address]  [phone]

## 2023-06-03 DIAGNOSIS — K21.9 GASTROESOPHAGEAL REFLUX DISEASE WITHOUT ESOPHAGITIS: ICD-10-CM

## 2023-06-03 DIAGNOSIS — E87.6 HYPOKALEMIA: ICD-10-CM

## 2023-06-05 RX ORDER — POTASSIUM CHLORIDE 750 MG/1
10 CAPSULE, EXTENDED RELEASE ORAL DAILY
Qty: 30 CAPSULE | Refills: 0 | Status: SHIPPED | OUTPATIENT
Start: 2023-06-05

## 2023-06-05 RX ORDER — PANTOPRAZOLE SODIUM 40 MG/1
40 TABLET, DELAYED RELEASE ORAL
Qty: 90 TABLET | Refills: 0 | Status: SHIPPED | OUTPATIENT
Start: 2023-06-05

## 2023-06-09 ENCOUNTER — TELEPHONE (OUTPATIENT)
Dept: OBGYN CLINIC | Facility: CLINIC | Age: 38
End: 2023-06-09

## 2023-06-09 NOTE — PROGRESS NOTES
Please let patient know that her ultrasound results were completely normal and no cyst was seen    Patient to follow-up in February 2024 as planned    Please have her call for any problems, questions, issues or concerns which arise for her

## 2023-06-09 NOTE — PROGRESS NOTES
Pelvic ultrasound results reviewed and cyst has resolved    Results discussed with patient    We will see her back in 6 months for follow-up

## 2023-06-09 NOTE — TELEPHONE ENCOUNTER
Patient informed of normal ultrasound results, resolution of ovarian cyst per Dr Kadi Cantrell and follow up with next routine visit

## 2023-06-13 DIAGNOSIS — I10 ESSENTIAL HYPERTENSION: ICD-10-CM

## 2023-06-13 RX ORDER — CHLORTHALIDONE 25 MG/1
25 TABLET ORAL DAILY
Qty: 90 TABLET | Refills: 0 | Status: SHIPPED | OUTPATIENT
Start: 2023-06-13

## 2023-07-10 ENCOUNTER — OFFICE VISIT (OUTPATIENT)
Dept: FAMILY MEDICINE CLINIC | Facility: CLINIC | Age: 38
End: 2023-07-10
Payer: COMMERCIAL

## 2023-07-10 VITALS
HEIGHT: 63 IN | DIASTOLIC BLOOD PRESSURE: 80 MMHG | BODY MASS INDEX: 36.39 KG/M2 | WEIGHT: 205.4 LBS | OXYGEN SATURATION: 100 % | TEMPERATURE: 97.3 F | SYSTOLIC BLOOD PRESSURE: 128 MMHG | HEART RATE: 67 BPM | RESPIRATION RATE: 17 BRPM

## 2023-07-10 DIAGNOSIS — I10 BENIGN ESSENTIAL HYPERTENSION: ICD-10-CM

## 2023-07-10 DIAGNOSIS — D50.0 IRON DEFICIENCY ANEMIA DUE TO CHRONIC BLOOD LOSS: ICD-10-CM

## 2023-07-10 DIAGNOSIS — D57.3 SICKLE CELL TRAIT (HCC): ICD-10-CM

## 2023-07-10 DIAGNOSIS — E66.9 OBESITY (BMI 30-39.9): ICD-10-CM

## 2023-07-10 DIAGNOSIS — R10.13 EPIGASTRIC PAIN: ICD-10-CM

## 2023-07-10 DIAGNOSIS — K21.9 GASTROESOPHAGEAL REFLUX DISEASE, UNSPECIFIED WHETHER ESOPHAGITIS PRESENT: ICD-10-CM

## 2023-07-10 DIAGNOSIS — J45.20 ASTHMA IN ADULT, MILD INTERMITTENT, UNCOMPLICATED: ICD-10-CM

## 2023-07-10 DIAGNOSIS — G43.009 MIGRAINE WITHOUT AURA AND WITHOUT STATUS MIGRAINOSUS, NOT INTRACTABLE: Primary | ICD-10-CM

## 2023-07-10 PROCEDURE — 99214 OFFICE O/P EST MOD 30 MIN: CPT | Performed by: FAMILY MEDICINE

## 2023-07-10 RX ORDER — PEN NEEDLE, DIABETIC 32 GX 1/6"
NEEDLE, DISPOSABLE MISCELLANEOUS DAILY
Qty: 100 EACH | Refills: 0 | Status: SHIPPED | OUTPATIENT
Start: 2023-07-10

## 2023-07-10 NOTE — PROGRESS NOTES
Name: Sofiya Byers      : [birth year]      MRN: [id number]  Encounter Provider: Jemal Michaels MD  Encounter Date: 7/10/2023   Encounter department: [facility]     1. Migraine without aura and without status migrainosus, not intractable  Assessment & Plan:  Stable  imitrex PRN       2. Asthma in adult, mild intermittent, uncomplicated  Assessment & Plan:  Not on flovent   Last use of albuterol was 7 months ago         3. Benign essential hypertension  Assessment & Plan:  Doing well on current meds    Orders:  -     Comprehensive metabolic panel  -     Hemoglobin A1C  -     Lipid panel    4. Obesity (BMI 30-39. 9)  Assessment & Plan:  Food diary   To try saxenda      Orders:  -     liraglutide (SAXENDA) injection; Take 0.6 mg daily and go up 0.6 mg every week until reaches 3 mg  -     Insulin Pen Needle (NovoFine Plus Pen Needle) 32G X 4 MM MISC; Use in the morning    5. Iron deficiency anemia due to chronic blood loss  -     CBC and differential  -     Iron Panel (Includes Ferritin, Iron Sat%, Iron, and TIBC); Future    6. Epigastric pain  -     Ambulatory Referral to Gastroenterology; Future  -     Celiac Disease Antibody Profile    7. Gastroesophageal reflux disease, unspecified whether esophagitis present  Assessment & Plan:  Pantoprazole doesn't help  Continued to have epigastric pain   Referral to GI      8. Sickle cell trait ([address])  Assessment & Plan:  Labs ordered today        BMI Counseling: Body mass index is 36.38 kg/m². The BMI is above normal. Nutrition recommendations include decreasing portion sizes and encouraging healthy choices of fruits and vegetables. Exercise recommendations include moderate physical activity 150 minutes/week. No pharmacotherapy was ordered. Rationale for BMI follow-up plan is due to patient being overweight or obese. Depression Screening and Follow-up Plan: Patient was screened for depression during today's encounter.  They screened negative with a PHQ-2 score of 0. Subjective      HPI   Here for follow up  Feeling well overall    Review of Systems   Constitutional: Negative. HENT: Negative. Eyes: Negative. Respiratory: Negative. Cardiovascular: Negative. Gastrointestinal: Negative. Endocrine: Negative. Genitourinary: Negative. Musculoskeletal: Negative. Skin: Negative. Allergic/Immunologic: Negative. Neurological: Negative. Hematological: Negative. Psychiatric/Behavioral: Negative. Current Outpatient Medications on File Prior to Visit   Medication Sig   • albuterol (2.5 mg/3 mL) 0.083 % nebulizer solution Take 1 vial (2.5 mg total) by nebulization every 6 (six) hours as needed for wheezing or shortness of breath   • albuterol (ProAir HFA) 90 mcg/act inhaler Inhale 2 puffs every 4 (four) hours as needed (When has flare up)   • ALPRAZolam (XANAX) 0.25 mg tablet Take 1 tablet (0.25 mg total) by mouth daily at bedtime as needed for anxiety   • carvedilol (COREG) 25 mg tablet Take 1 tablet (25 mg total) by mouth 2 (two) times a day with meals   • chlorthalidone 25 mg tablet Take 1 tablet (25 mg total) by mouth daily   • Cholecalciferol (VITAMIN D3 PO) Take 2,000 mg by mouth daily in the early morning   • Cyanocobalamin (VITAMIN B-12 PO) Take by mouth   • cyclobenzaprine (FLEXERIL) 10 mg tablet Take 1 tablet (10 mg total) by mouth 3 (three) times a day as needed for muscle spasms   • fluticasone (Flovent HFA) 110 MCG/ACT inhaler Inhale 2 puffs 2 (two) times a day Rinse mouth after use.    • magnesium 30 MG tablet Take 30 mg by mouth 2 (two) times a day   • meloxicam (MOBIC) 15 mg tablet Take 1 tablet (15 mg total) by mouth daily as needed for mild pain   • Multiple Vitamins-Minerals (Womens Multivitamin) TABS Take 1 tablet by mouth daily   • nystatin-triamcinolone (MYCOLOG-II) ointment Apply topically 2 (two) times a day   • ondansetron (ZOFRAN-ODT) 4 mg disintegrating tablet Take 1 tablet (4 mg total) by mouth every 6 (six) hours as needed for nausea or vomiting   • pantoprazole (PROTONIX) 40 mg tablet Take 1 tablet (40 mg total) by mouth daily before breakfast   • potassium chloride (MICRO-K) 10 MEQ CR capsule Take 1 capsule (10 mEq total) by mouth daily   • Riboflavin (VITAMIN B-2 PO) Take by mouth   • SUMAtriptan (IMITREX) 50 mg tablet Take 1 tablet (50 mg total) by mouth once as needed for migraine for up to 1 dose   • valsartan (DIOVAN) 320 MG tablet Take 1 tablet (320 mg total) by mouth daily   • fluconazole (DIFLUCAN) 150 mg tablet  (Patient not taking: Reported on 2/14/2023)       Objective     /80 (BP Location: Left arm, Patient Position: Sitting, Cuff Size: Standard)   Pulse 67   Temp (!) 97.3 °F (36.3 °C) (Tympanic)   Resp 17   Ht 5' 3" (1.6 m)   Wt 93.2 kg (205 lb 6.4 oz)   SpO2 100%   BMI 36.38 kg/m²     Physical Exam  Constitutional:       Appearance: She is well-developed. HENT:      Head: Normocephalic and atraumatic. Nose: Nose normal.   Eyes:      Pupils: Pupils are equal, round, and reactive to light. Neck:      Thyroid: No thyromegaly. Cardiovascular:      Rate and Rhythm: Normal rate and regular rhythm. Heart sounds: No murmur heard. Pulmonary:      Effort: Pulmonary effort is normal.      Breath sounds: Normal breath sounds. Abdominal:      General: Bowel sounds are normal.      Palpations: Abdomen is soft. Musculoskeletal:         General: No deformity. Normal range of motion. Cervical back: Normal range of motion and neck supple. Skin:     General: Skin is warm. Capillary Refill: Capillary refill takes less than 2 seconds. Findings: No erythema or rash. Neurological:      General: No focal deficit present. Mental Status: She is alert and oriented to person, place, and time. Deep Tendon Reflexes: Reflexes are normal and symmetric.    Psychiatric:         Mood and Affect: Mood normal.         Behavior: Behavior normal.       Masoud Mcnulty MD

## 2023-07-25 ENCOUNTER — TELEPHONE (OUTPATIENT)
Dept: FAMILY MEDICINE CLINIC | Facility: CLINIC | Age: 38
End: 2023-07-25

## 2023-07-25 NOTE — TELEPHONE ENCOUNTER
Prior Authorization has been approved for Saxenda and patient is aware that the Rx is available at her local Pharmacy to be picked up.

## 2023-08-09 ENCOUNTER — OFFICE VISIT (OUTPATIENT)
Dept: GASTROENTEROLOGY | Facility: CLINIC | Age: 38
End: 2023-08-09
Payer: COMMERCIAL

## 2023-08-09 VITALS
BODY MASS INDEX: 35.61 KG/M2 | WEIGHT: 201 LBS | TEMPERATURE: 98.6 F | SYSTOLIC BLOOD PRESSURE: 122 MMHG | DIASTOLIC BLOOD PRESSURE: 80 MMHG | HEIGHT: 63 IN

## 2023-08-09 DIAGNOSIS — R10.13 EPIGASTRIC PAIN: ICD-10-CM

## 2023-08-09 DIAGNOSIS — K59.00 CONSTIPATION, UNSPECIFIED CONSTIPATION TYPE: Primary | ICD-10-CM

## 2023-08-09 DIAGNOSIS — R14.0 BLOATING: ICD-10-CM

## 2023-08-09 DIAGNOSIS — R16.0 HEPATOMEGALY: ICD-10-CM

## 2023-08-09 PROCEDURE — 99214 OFFICE O/P EST MOD 30 MIN: CPT | Performed by: INTERNAL MEDICINE

## 2023-08-09 RX ORDER — WHEAT DEXTRIN/ASPARTAME 3 G/6 G
1 POWDER IN PACKET (EA) ORAL 2 TIMES DAILY
Qty: 60 EACH | Refills: 3 | Status: SHIPPED | OUTPATIENT
Start: 2023-08-09

## 2023-08-09 NOTE — PROGRESS NOTES
[facility] - Outpatient Consultation  Radha Blandon 43 y.o. female MRN: [id number]  Encounter: [id number]      PCP: Mo Falcon MD  Referring: Mo Falcon MD  [address],  [address] [place] [address] Drive      ASSESSMENT AND PLAN:    43 yr old F w/ history of HTN, migraine, obesity who presents to GI clinic for follow up of her right upper quadrant abdominal pain. 1.  Abdominal pain  2. Dyspepsia  3. Constipation    She has had EGD which was unremarkable biopsies negative for H. pylori and celiac disease.,  CT imaging reviewed and it appears that she has mild to moderate stool burden and hepatomegaly. · We discussed that her abdominal pain could be related to constipation therefore recommend starting insoluble fiber. She could do a trial of natural acting fibers in kiwis or prunes or start Benefiber twice a day. · For her dyspepsia she should get SIBO test  · She has not started liraglutide yet   · Her abdominal pain could also be related to IBS, if she has no improvement in symptoms with fiber can do a trial of peppermint oil or Robin seeds    4. Hepatomegaly    Patient had incidental finding of hepatomegaly on abdominal imaging. She states that she was previously drinking 1-2 alcoholic drinks a day which she has now stopped. · Discussed alcohol cessation  · Her liver enzymes are within normal limits   · NAFLD Fibrosis Score is: -2.057 which correlates with no to mild fibrosis  · Recommend weight loss    Follow-up in clinic in 3 to 4 months    Juanis Sanchez MD   Gastroenterology Fellow       ______________________________________________________________________    CC:  Chief Complaint   Patient presents with   • Follow-up   • Abdominal Pain     Left sided pain goes to back       HPI:  43 yr old F w/ history of HTN, migraine, obesity who presents to GI clinic for follow up of her epigastric and right upper quadrant abdominal pain.     Patient states that she has left upper quadrant abdominal pain every day it is a constant dull pain which is present. States that it is 2 out of 10 on pain scale intensity. She denies any nausea or vomiting. States that she eats small meals which helps with her symptoms. She does feel that her stomach gets extremely distended and has to take a deep breath to help relieve her symptoms. She has no association of abdominal pain with food. She tried cutting down dairy which helped improve her symptoms for a while. Denies any significant weight loss. Not tried any new medications. Liraglutide (GLP-1 agonist) ordered by PCP but she is still not tried taking it as it is expensive. REVIEW OF SYSTEMS:    CONSTITUTIONAL: Denies any fever, chills, rigors, and weight loss. HEENT: No earache or tinnitus. Denies hearing loss or visual disturbances. CARDIOVASCULAR: No chest pain or palpitations. RESPIRATORY: Denies any cough, hemoptysis, shortness of breath or dyspnea on exertion. GASTROINTESTINAL: As noted in the History of Present Illness. GENITOURINARY: No problems with urination. Denies any hematuria or dysuria. NEUROLOGIC: No dizziness or vertigo, denies headaches. MUSCULOSKELETAL: Denies any muscle or joint pain. SKIN: Denies skin rashes or itching. ENDOCRINE: Denies excessive thirst. Denies intolerance to heat or cold. PSYCHOSOCIAL: Denies depression or anxiety. Denies any recent memory loss.        Historical Information   Past Medical History:   Diagnosis Date   • Abdominal pain    • Abnormal ECG    • Anemia    • Asthma     mild intermittent   • GERD (gastroesophageal reflux disease)    • History of palpitations    • Hypertension    • Lower back pain    • Migraines    • Seasonal allergies    • Trace mitral regurgitation by prior echocardiogram    • Trigger finger of right thumb 2/28/2017     Past Surgical History:   Procedure Laterality Date   • CHOLECYSTECTOMY      laparoscopic   • COLONOSCOPY  2017   • FL LUMBAR PUNCTURE DIAGNOSTIC  1/3/2023   • HERNIA REPAIR      umbilical   • LAPAROTOMY N/A 12/10/2020    Procedure: ABDOMINAL MYOMECTOMY;  Surgeon: Ousmane Evans DO;  Location: [facility];  Service: Gynecology   • LUMBAR LAMINECTOMY Right 09/14/2016    Procedure: Right L5/S1 Metryx microdiscectomy;  Surgeon: Crystal Dobbs MD;  Location: [facility];  Service:    • AL COLONOSCOPY FLX DX W/COLLJ SPEC WHEN PFRMD N/A 07/07/2016    Procedure: COLONOSCOPY;  Surgeon: Kerry Sanches DO;  Location: [facility];   Service: Gastroenterology   • AL HYSTEROSCOPY BX ENDOMETRIUM&/POLYPC W/WO D&C N/A 03/19/2018    Procedure: HYSTEROSCOPY; MYOMECTOMY; ENDOMETRIAL BIOPSY;  Surgeon: Ousmane Evans DO;  [facility];  Service: Gynecology   • AL TENDON SHEATH INCISION Right 02/28/2017    Procedure: THUMB TRIGGER RELEASE ;  Surgeon: Don Medrano DO;  Location[facility];  Service: Orthopedics   • SPINE SURGERY  9/2016    Microdisectomy   • WISDOM TOOTH EXTRACTION       Social History   Social History     Substance and Sexual Activity   Alcohol Use Yes   • Alcohol/week: 4.0 standard drinks of alcohol   • Types: 4 Glasses of wine per week    Comment: socially      Social History     Substance and Sexual Activity   Drug Use Yes   • Types: Marijuana    Comment: Medical     Social History     Tobacco Use   Smoking Status Never   Smokeless Tobacco Never     Family History   Problem Relation Age of Onset   • Diabetes Mother         type2   • Hypertension Mother    • Depression Mother    • Thyroid disease Mother    • Arthritis Mother    • Anxiety disorder Mother    • Diabetes Father         type2   • Hypertension Father    • Alcohol abuse Father    • Substance Abuse Father    • Arthritis Father    • Drug abuse Father    • Dialysis Father    • Esophageal cancer Maternal Grandmother    • Breast cancer Maternal Grandmother         Maternal great grandmother   • Cancer Maternal Grandmother         Pancreatic   • Heart attack Maternal Grandfather         acute MI   • Arthritis Maternal Grandfather    • Stroke Maternal Grandfather         CVA   • Hyperlipidemia Maternal Grandfather    • Heart disease Maternal Grandfather    • Arthritis Paternal Grandfather    • Stroke Paternal Grandfather         CVA   • Cancer Paternal Grandfather    • Hyperlipidemia Paternal Grandfather    • Colon cancer Paternal Grandfather    • Bipolar disorder Maternal Aunt    • Brain cancer Maternal Aunt    • Heart attack Maternal Uncle         acute MI   • Arthritis Family    • Stroke Family         CVA   • Cancer Family    • Hyperlipidemia Family    • Stroke Family         CVA   • Hyperlipidemia Family        Meds/Allergies       Current Outpatient Medications:   •  albuterol (2.5 mg/3 mL) 0.083 % nebulizer solution  •  albuterol (ProAir HFA) 90 mcg/act inhaler  •  ALPRAZolam (XANAX) 0.25 mg tablet  •  carvedilol (COREG) 25 mg tablet  •  chlorthalidone 25 mg tablet  •  Cholecalciferol (VITAMIN D3 PO)  •  Cyanocobalamin (VITAMIN B-12 PO)  •  cyclobenzaprine (FLEXERIL) 10 mg tablet  •  magnesium 30 MG tablet  •  meloxicam (MOBIC) 15 mg tablet  •  Multiple Vitamins-Minerals (Womens Multivitamin) TABS  •  nystatin-triamcinolone (MYCOLOG-II) ointment  •  ondansetron (ZOFRAN-ODT) 4 mg disintegrating tablet  •  pantoprazole (PROTONIX) 40 mg tablet  •  potassium chloride (MICRO-K) 10 MEQ CR capsule  •  Riboflavin (VITAMIN B-2 PO)  •  SUMAtriptan (IMITREX) 50 mg tablet  •  valsartan (DIOVAN) 320 MG tablet  •  fluconazole (DIFLUCAN) 150 mg tablet  •  fluticasone (Flovent HFA) 110 MCG/ACT inhaler  •  Insulin Pen Needle (NovoFine Plus Pen Needle) 32G X 4 MM MISC  •  liraglutide (SAXENDA) injection    No Known Allergies        Objective     Blood pressure 122/80, temperature 98.6 °F (37 °C), temperature source Tympanic, height 5' 3" (1.6 m), weight 91.2 kg (201 lb), unknown if currently breastfeeding. Body mass index is 35.61 kg/m².      PHYSICAL EXAM:      General Appearance:   Alert, cooperative, no distress   HEENT:   Normocephalic, atraumatic, anicteric. Neck:  Supple, symmetrical, trachea midline   Lungs:   Clear to auscultation bilaterally; no rales, rhonchi or wheezing; respirations unlabored    Heart[de-identified]   Regular rate and rhythm; no murmur, rub, or gallop. Abdomen:   Soft, non-tender, non-distended; normal bowel sounds; no masses, no organomegaly    Genitalia:   Deferred    Rectal:   Deferred    Extremities:  No cyanosis, clubbing or edema    Pulses:  2+ and symmetric    Skin:  No jaundice, rashes, or lesions    Lymph nodes:  No palpable cervical lymphadenopathy        Lab Results:     Lab Results   Component Value Date    WBC 8.46 12/20/2022    HGB 12.9 12/20/2022    HCT 40.7 12/20/2022    MCV 79 (L) 12/20/2022     01/03/2023       Lab Results   Component Value Date     11/17/2015    K 3.2 (L) 12/20/2022     12/20/2022    CO2 30 12/20/2022    ANIONGAP 7 11/17/2015    BUN 17 12/20/2022    CREATININE 1.04 12/20/2022    GLUCOSE 85 11/17/2015    GLUF 142 (H) 12/20/2022    CALCIUM 9.6 12/20/2022    AST 11 12/20/2022    ALT 21 12/20/2022    ALKPHOS 51 12/20/2022    PROT 7.6 11/17/2015    BILITOT 0.38 11/17/2015    EGFR 68 12/20/2022       Lab Results   Component Value Date    INR 0.92 01/03/2023    INR 0.98 12/10/2020    INR 1.03 09/13/2016    PROTIME 12.5 01/03/2023    PROTIME 13.1 12/10/2020    PROTIME 13.6 09/13/2016         Radiology Results:   No results found. Portions of the record may have been created with voice recognition software. Occasional wrong word or "sound a like" substitutions may have occurred due to the inherent limitations of voice recognition software. Read the chart carefully and recognize, using context, where substitutions have occurred.     Answers for HPI/ROS submitted by the patient on 8/9/2023  Chronicity: chronic  Onset: more than 1 year ago  Onset quality: gradual  Frequency: every several days  Episode duration: 3 Weeks  Progression since onset: waxing and waning  Pain location: LUQ  Pain - numeric: 8/10  Pain quality: aching, dull, a sensation of fullness  Radiates to: back  anorexia: No  arthralgias: No  belching: Yes  constipation: No  diarrhea: No  dysuria: No  fever: No  flatus: No  frequency: No  headaches: No  hematochezia: No  hematuria: No  melena: No  myalgias: No  nausea:  No  weight loss: No  vomiting: No  Aggravated by: eating  Relieved by: certain positions

## 2023-08-09 NOTE — PATIENT INSTRUCTIONS
Two kiwis or four prunes a day are recommended as examples of dietary insoluble fiber.   Benefiber twice a day      Patient was given a SIBO test    Recall in 3-4 months for follow up

## 2023-08-16 DIAGNOSIS — E87.6 HYPOKALEMIA: ICD-10-CM

## 2023-08-17 RX ORDER — POTASSIUM CHLORIDE 750 MG/1
10 CAPSULE, EXTENDED RELEASE ORAL DAILY
Qty: 30 CAPSULE | Refills: 0 | Status: SHIPPED | OUTPATIENT
Start: 2023-08-17

## 2023-08-25 ENCOUNTER — OFFICE VISIT (OUTPATIENT)
Dept: OBGYN CLINIC | Facility: CLINIC | Age: 38
End: 2023-08-25
Payer: COMMERCIAL

## 2023-08-25 VITALS
DIASTOLIC BLOOD PRESSURE: 78 MMHG | WEIGHT: 201.4 LBS | BODY MASS INDEX: 35.68 KG/M2 | HEIGHT: 63 IN | SYSTOLIC BLOOD PRESSURE: 122 MMHG

## 2023-08-25 DIAGNOSIS — R80.9 PROTEINURIA, UNSPECIFIED TYPE: ICD-10-CM

## 2023-08-25 DIAGNOSIS — N94.9 VAGINAL BURNING: ICD-10-CM

## 2023-08-25 DIAGNOSIS — N89.8 VAGINAL ITCHING: Primary | ICD-10-CM

## 2023-08-25 LAB
SL AMB  POCT GLUCOSE, UA: ABNORMAL
SL AMB LEUKOCYTE ESTERASE,UA: ABNORMAL
SL AMB POCT BILIRUBIN,UA: ABNORMAL
SL AMB POCT BLOOD,UA: ABNORMAL
SL AMB POCT CLARITY,UA: CLEAR
SL AMB POCT COLOR,UA: YELLOW
SL AMB POCT KETONES,UA: ABNORMAL
SL AMB POCT NITRITE,UA: ABNORMAL
SL AMB POCT PH,UA: 5
SL AMB POCT SPECIFIC GRAVITY,UA: 1
SL AMB POCT URINE PROTEIN: 15
SL AMB POCT UROBILINOGEN: 0.2

## 2023-08-25 PROCEDURE — 81514 NFCT DS BV&VAGINITIS DNA ALG: CPT | Performed by: OBSTETRICS & GYNECOLOGY

## 2023-08-25 PROCEDURE — 99214 OFFICE O/P EST MOD 30 MIN: CPT | Performed by: OBSTETRICS & GYNECOLOGY

## 2023-08-25 PROCEDURE — 81002 URINALYSIS NONAUTO W/O SCOPE: CPT | Performed by: OBSTETRICS & GYNECOLOGY

## 2023-08-25 PROCEDURE — 87086 URINE CULTURE/COLONY COUNT: CPT | Performed by: OBSTETRICS & GYNECOLOGY

## 2023-08-25 NOTE — PROGRESS NOTES
Assessment/Plan:    No problem-specific Assessment & Plan notes found for this encounter. Diagnoses and all orders for this visit:    Vaginal itching    Vaginal burning    Proteinuria, unspecified type        Subjective:      Patient ID: Shahnaz Casey is a 43 y.o. female. Patient is a 78-year-old female who presents today complaining of vulvar and vaginal irritation and discomfort. Patient denies any significant vaginal discharge    Also denies any abnormal bleeding at this time    No specific urinary complaints including frequency urgency or hematuria were noted. She denies any fever shakes or chills as well. Patient denies any abdominal or pelvic pain    Urine dipstick was performed and results were positive for some leukocytes and protein. Urine culture will be sent. I advised the patient to increase hydration and also to add cranberry juice. I also encourage patient to utilize warm teabag baths as well. Further treatment and follow-up planning will be based upon final culture results. All questions were answered for patient during today's visit    She was told to feel free to call for any problems, questions, issues or concerns which may arise for her      Total time of today's visit was 25 minutes of which greater than 50% was spent face-to-face counseling the patient as well as coordination of care, review of chart and lab values, physical examination as well as computer entry into the [facility] medical record system. The following portions of the patient's history were reviewed and updated as appropriate: allergies, current medications, past family history, past medical history, past social history, past surgical history and problem list.    Review of Systems   Constitutional: Negative. Negative for appetite change, diaphoresis, fatigue, fever and unexpected weight change. HENT: Negative. Eyes: Negative. Respiratory: Negative. Cardiovascular: Negative. Followed for blood pressure   Gastrointestinal: Negative. Negative for abdominal pain, blood in stool, constipation, diarrhea, nausea and vomiting. Endocrine: Negative. Negative for cold intolerance and heat intolerance. Genitourinary: Negative. Negative for dysuria, frequency, hematuria, urgency, vaginal bleeding, vaginal discharge and vaginal pain. Musculoskeletal: Negative. Skin: Negative. Allergic/Immunologic: Negative. Neurological: Negative. Followed for migraines   Hematological: Negative. Negative for adenopathy. Psychiatric/Behavioral: Negative. Objective:      /78   Ht 5' 3" (1.6 m)   Wt 91.4 kg (201 lb 6.4 oz)   LMP 08/06/2023 (Exact Date)   BMI 35.68 kg/m²          Physical Exam  Constitutional:       Appearance: She is well-developed. HENT:      Head: Normocephalic. Eyes:      Pupils: Pupils are equal, round, and reactive to light. Cardiovascular:      Rate and Rhythm: Normal rate. Pulmonary:      Effort: Pulmonary effort is normal.   Abdominal:      Palpations: Abdomen is soft. Genitourinary:     General: Normal vulva. Labia:         Right: No rash, tenderness, lesion or injury. Left: No rash, tenderness, lesion or injury. Urethra: No urethral swelling or urethral lesion. Vagina: Vaginal discharge present. No erythema, bleeding or lesions. Cervix: Discharge present. No lesion or erythema. Uterus: Not enlarged and not tender. Adnexa: Right adnexa normal and left adnexa normal.        Right: No mass, tenderness or fullness. Left: No mass, tenderness or fullness. Comments: Minimal white discharge seen  No pelvic tenderness noted  Vulvar region was not erythematous  Cultures obtained  Urine dipstick done  Musculoskeletal:         General: Normal range of motion. Cervical back: Normal range of motion and neck supple. Skin:     General: Skin is warm and dry.    Neurological:      General: No focal deficit present. Mental Status: She is alert and oriented to person, place, and time. Psychiatric:         Mood and Affect: Mood normal.         Behavior: Behavior normal.         Thought Content:  Thought content normal.         Judgment: Judgment normal.

## 2023-08-25 NOTE — PATIENT INSTRUCTIONS
Topic: Vulvar and vaginal irritation    No specific findings other than mild discharge noted on physical exam.  Appropriate cultures obtained. Urine dipstick positive for white cells and protein. Hydration with cranberry juice encouraged. Urine culture also sent.     Final treatment will be based upon culture results    All questions answered    Patient to call for any problems, questions, issues or concerns which arise for her

## 2023-08-27 LAB — BACTERIA UR CULT: NORMAL

## 2023-08-28 LAB — BACTERIA UR CULT: ABNORMAL

## 2023-08-29 ENCOUNTER — TELEPHONE (OUTPATIENT)
Dept: OBGYN CLINIC | Facility: CLINIC | Age: 38
End: 2023-08-29

## 2023-08-29 NOTE — TELEPHONE ENCOUNTER
Call complete, informed pt about vaginal cultures and urine. And recommendations per DR. Osmar Roblero

## 2023-08-29 NOTE — TELEPHONE ENCOUNTER
----- Message from Elidia Dickerson MD sent at 8/29/2023  1:17 PM EDT -----  Vaginal cultures were all negative. Urine culture with lactobacillus does not need to be treated.     Encourage patient to eat yogurt and take probiotics    Thanks

## 2023-08-30 ENCOUNTER — APPOINTMENT (OUTPATIENT)
Dept: LAB | Facility: CLINIC | Age: 38
End: 2023-08-30
Payer: COMMERCIAL

## 2023-08-30 DIAGNOSIS — E87.6 HYPOKALEMIA: ICD-10-CM

## 2023-08-30 DIAGNOSIS — D50.0 IRON DEFICIENCY ANEMIA DUE TO CHRONIC BLOOD LOSS: ICD-10-CM

## 2023-08-30 DIAGNOSIS — I10 ESSENTIAL HYPERTENSION: ICD-10-CM

## 2023-08-30 LAB
ALBUMIN SERPL BCP-MCNC: 4.5 G/DL (ref 3.5–5)
ALP SERPL-CCNC: 45 U/L (ref 34–104)
ALT SERPL W P-5'-P-CCNC: 20 U/L (ref 7–52)
ANION GAP SERPL CALCULATED.3IONS-SCNC: 13 MMOL/L
AST SERPL W P-5'-P-CCNC: 17 U/L (ref 13–39)
BASOPHILS # BLD AUTO: 0.05 THOUSANDS/ÂΜL (ref 0–0.1)
BASOPHILS NFR BLD AUTO: 1 % (ref 0–1)
BILIRUB SERPL-MCNC: 0.4 MG/DL (ref 0.2–1)
BUN SERPL-MCNC: 13 MG/DL (ref 5–25)
CALCIUM SERPL-MCNC: 9.7 MG/DL (ref 8.4–10.2)
CHLORIDE SERPL-SCNC: 97 MMOL/L (ref 96–108)
CHOLEST SERPL-MCNC: 212 MG/DL
CO2 SERPL-SCNC: 29 MMOL/L (ref 21–32)
CREAT SERPL-MCNC: 1.04 MG/DL (ref 0.6–1.3)
EOSINOPHIL # BLD AUTO: 0.06 THOUSAND/ÂΜL (ref 0–0.61)
EOSINOPHIL NFR BLD AUTO: 1 % (ref 0–6)
ERYTHROCYTE [DISTWIDTH] IN BLOOD BY AUTOMATED COUNT: 15 % (ref 11.6–15.1)
EST. AVERAGE GLUCOSE BLD GHB EST-MCNC: 134 MG/DL
FERRITIN SERPL-MCNC: 11 NG/ML (ref 11–307)
GFR SERPL CREATININE-BSD FRML MDRD: 67 ML/MIN/1.73SQ M
GLUCOSE P FAST SERPL-MCNC: 121 MG/DL (ref 65–99)
HBA1C MFR BLD: 6.3 %
HCT VFR BLD AUTO: 37.8 % (ref 34.8–46.1)
HDLC SERPL-MCNC: 61 MG/DL
HGB BLD-MCNC: 11.9 G/DL (ref 11.5–15.4)
IMM GRANULOCYTES # BLD AUTO: 0.08 THOUSAND/UL (ref 0–0.2)
IMM GRANULOCYTES NFR BLD AUTO: 1 % (ref 0–2)
IRON SATN MFR SERPL: 10 % (ref 15–50)
IRON SERPL-MCNC: 49 UG/DL (ref 50–212)
LDLC SERPL CALC-MCNC: 131 MG/DL (ref 0–100)
LYMPHOCYTES # BLD AUTO: 1.95 THOUSANDS/ÂΜL (ref 0.6–4.47)
LYMPHOCYTES NFR BLD AUTO: 22 % (ref 14–44)
MCH RBC QN AUTO: 24.6 PG (ref 26.8–34.3)
MCHC RBC AUTO-ENTMCNC: 31.5 G/DL (ref 31.4–37.4)
MCV RBC AUTO: 78 FL (ref 82–98)
MONOCYTES # BLD AUTO: 0.46 THOUSAND/ÂΜL (ref 0.17–1.22)
MONOCYTES NFR BLD AUTO: 5 % (ref 4–12)
NEUTROPHILS # BLD AUTO: 6.46 THOUSANDS/ÂΜL (ref 1.85–7.62)
NEUTS SEG NFR BLD AUTO: 70 % (ref 43–75)
NONHDLC SERPL-MCNC: 151 MG/DL
NRBC BLD AUTO-RTO: 0 /100 WBCS
PLATELET # BLD AUTO: 270 THOUSANDS/UL (ref 149–390)
PMV BLD AUTO: 11.6 FL (ref 8.9–12.7)
POTASSIUM SERPL-SCNC: 2.8 MMOL/L (ref 3.5–5.3)
PROT SERPL-MCNC: 7.6 G/DL (ref 6.4–8.4)
RBC # BLD AUTO: 4.83 MILLION/UL (ref 3.81–5.12)
SODIUM SERPL-SCNC: 139 MMOL/L (ref 135–147)
TIBC SERPL-MCNC: 468 UG/DL (ref 250–450)
TRIGL SERPL-MCNC: 99 MG/DL
UIBC SERPL-MCNC: 419 UG/DL (ref 155–355)
WBC # BLD AUTO: 9.06 THOUSAND/UL (ref 4.31–10.16)

## 2023-08-30 PROCEDURE — 83540 ASSAY OF IRON: CPT

## 2023-08-30 PROCEDURE — 82728 ASSAY OF FERRITIN: CPT

## 2023-08-30 PROCEDURE — 83550 IRON BINDING TEST: CPT

## 2023-08-30 RX ORDER — POTASSIUM CHLORIDE 750 MG/1
20 CAPSULE, EXTENDED RELEASE ORAL DAILY
Qty: 60 CAPSULE | Refills: 3 | Status: SHIPPED | OUTPATIENT
Start: 2023-08-30

## 2023-08-31 LAB
ENDOMYSIUM IGA SER QL: NEGATIVE
GLIADIN PEPTIDE IGA SER-ACNC: 3 UNITS (ref 0–19)
GLIADIN PEPTIDE IGG SER-ACNC: 1 UNITS (ref 0–19)
IGA SERPL-MCNC: 82 MG/DL (ref 87–352)
TTG IGA SER-ACNC: <2 U/ML (ref 0–3)
TTG IGG SER-ACNC: <2 U/ML (ref 0–5)

## 2023-09-10 DIAGNOSIS — I10 ESSENTIAL HYPERTENSION: ICD-10-CM

## 2023-09-10 DIAGNOSIS — K21.9 GASTROESOPHAGEAL REFLUX DISEASE WITHOUT ESOPHAGITIS: ICD-10-CM

## 2023-09-11 RX ORDER — CHLORTHALIDONE 25 MG/1
25 TABLET ORAL DAILY
Qty: 90 TABLET | Refills: 1 | Status: SHIPPED | OUTPATIENT
Start: 2023-09-11

## 2023-09-11 RX ORDER — PANTOPRAZOLE SODIUM 40 MG/1
40 TABLET, DELAYED RELEASE ORAL
Qty: 90 TABLET | Refills: 0 | Status: SHIPPED | OUTPATIENT
Start: 2023-09-11

## 2023-10-03 ENCOUNTER — OFFICE VISIT (OUTPATIENT)
Dept: FAMILY MEDICINE CLINIC | Facility: CLINIC | Age: 38
End: 2023-10-03
Payer: COMMERCIAL

## 2023-10-03 VITALS
HEART RATE: 87 BPM | RESPIRATION RATE: 16 BRPM | DIASTOLIC BLOOD PRESSURE: 82 MMHG | TEMPERATURE: 97.7 F | BODY MASS INDEX: 33.9 KG/M2 | HEIGHT: 64 IN | WEIGHT: 198.6 LBS | OXYGEN SATURATION: 98 % | SYSTOLIC BLOOD PRESSURE: 110 MMHG

## 2023-10-03 DIAGNOSIS — H47.10 PAPILLEDEMA: ICD-10-CM

## 2023-10-03 DIAGNOSIS — I10 BENIGN ESSENTIAL HYPERTENSION: ICD-10-CM

## 2023-10-03 DIAGNOSIS — K21.9 GASTROESOPHAGEAL REFLUX DISEASE, UNSPECIFIED WHETHER ESOPHAGITIS PRESENT: ICD-10-CM

## 2023-10-03 DIAGNOSIS — D50.0 IRON DEFICIENCY ANEMIA DUE TO CHRONIC BLOOD LOSS: ICD-10-CM

## 2023-10-03 DIAGNOSIS — J45.20 ASTHMA IN ADULT, MILD INTERMITTENT, UNCOMPLICATED: ICD-10-CM

## 2023-10-03 DIAGNOSIS — G47.10 EXCESSIVE SLEEPINESS: ICD-10-CM

## 2023-10-03 DIAGNOSIS — E66.9 OBESITY (BMI 30-39.9): ICD-10-CM

## 2023-10-03 DIAGNOSIS — G43.009 MIGRAINE WITHOUT AURA AND WITHOUT STATUS MIGRAINOSUS, NOT INTRACTABLE: ICD-10-CM

## 2023-10-03 DIAGNOSIS — Z00.00 ANNUAL PHYSICAL EXAM: Primary | ICD-10-CM

## 2023-10-03 DIAGNOSIS — E87.6 HYPOKALEMIA: ICD-10-CM

## 2023-10-03 PROBLEM — G47.8 NON-RESTORATIVE SLEEP: Status: RESOLVED | Noted: 2023-01-24 | Resolved: 2023-10-03

## 2023-10-03 PROBLEM — M77.51 OS PERONEUM SYNDROME OF RIGHT FOOT: Status: RESOLVED | Noted: 2020-10-27 | Resolved: 2023-10-03

## 2023-10-03 PROCEDURE — 90471 IMMUNIZATION ADMIN: CPT

## 2023-10-03 PROCEDURE — 99214 OFFICE O/P EST MOD 30 MIN: CPT | Performed by: FAMILY MEDICINE

## 2023-10-03 PROCEDURE — 90686 IIV4 VACC NO PRSV 0.5 ML IM: CPT

## 2023-10-03 PROCEDURE — 99395 PREV VISIT EST AGE 18-39: CPT | Performed by: FAMILY MEDICINE

## 2023-10-03 RX ORDER — POTASSIUM CHLORIDE 750 MG/1
10 CAPSULE, EXTENDED RELEASE ORAL DAILY
Qty: 90 CAPSULE | Refills: 0 | Status: SHIPPED | OUTPATIENT
Start: 2023-10-03

## 2023-10-03 NOTE — PROGRESS NOTES
[address] [facility]    NAME: Lesly Viera  AGE: 43 y.o. SEX: female  : 1985     DATE: 10/3/2023     Assessment and Plan:     Problem List Items Addressed This Visit        Digestive    GERD (gastroesophageal reflux disease)     Stable  protonix is helping            Respiratory    Asthma in adult, mild intermittent, uncomplicated     Stable  Albuterol prn            Cardiovascular and Mediastinum    Benign essential hypertension     Stable on current meds         Migraine without aura and without status migrainosus, not intractable     Stable  No issues recently             Nervous and Auditory    Papilledema     Recent MRI brain was normal   Cleared by neurologist and eye specialist               Other    Obesity (BMI 30-39. 9)     Diet and exercise         Iron deficiency anemia due to chronic blood loss    Relevant Orders    Ambulatory referral to Hematology / Oncology    Excessive sleepiness     Normal sleep study   To try sleep hygiene         Other Visit Diagnoses     Annual physical exam    -  Primary    Hypokalemia        Relevant Medications    potassium chloride (MICRO-K) 10 MEQ CR capsule          Immunizations and preventive care screenings were discussed with patient today. Appropriate education was printed on patient's after visit summary. Counseling:  • Alcohol/drug use: discussed moderation in alcohol intake, the recommendations for healthy alcohol use, and avoidance of illicit drug use. • Dental Health: discussed importance of regular tooth brushing, flossing, and dental visits. • Injury prevention: discussed safety/seat belts, safety helmets, smoke detectors, carbon dioxide detectors, and smoking near bedding or upholstery. • Sexual health: discussed sexually transmitted diseases, partner selection, use of condoms, avoidance of unintended pregnancy, and contraceptive alternatives.   · Exercise: the importance of regular exercise/physical activity was discussed. Recommend exercise 3-5 times per week for at least 30 minutes. No follow-ups on file. Chief Complaint:     Chief Complaint   Patient presents with   • Physical Exam     Patient being seen for annual physical      History of Present Illness:     Adult Annual Physical   Patient here for a comprehensive physical exam. The patient reports problems - trouble sleeping at times. .    Diet and Physical Activity  · Diet/Nutrition: well balanced diet and consuming 3-5 servings of fruits/vegetables daily. · Exercise: walking. Depression Screening  PHQ-2/9 Depression Screening         General Health  · Sleep: sleeps well and gets 7-8 hours of sleep on average. · Hearing: normal - bilateral.  · Vision: goes for regular eye exams. · Dental: regular dental visits and brushes teeth twice daily. /GYN Health  · Last menstrual period:   · Contraceptive method: none. · History of STDs?: no.     Review of Systems:     Review of Systems   Constitutional: Positive for fatigue. HENT: Negative. Eyes: Negative. Respiratory: Negative. Cardiovascular: Negative. Endocrine: Negative. Genitourinary: Negative. Musculoskeletal: Negative. Allergic/Immunologic: Negative. Neurological: Negative. Hematological: Negative. Psychiatric/Behavioral: Negative.        Past Medical History:     Past Medical History:   Diagnosis Date   • Abdominal pain    • Abnormal ECG    • Anemia    • Asthma     mild intermittent   • GERD (gastroesophageal reflux disease)    • History of palpitations    • Hypertension    • Lower back pain    • Migraines    • Seasonal allergies    • Trace mitral regurgitation by prior echocardiogram    • Trigger finger of right thumb 2/28/2017      Past Surgical History:     Past Surgical History:   Procedure Laterality Date   • CHOLECYSTECTOMY      laparoscopic   • COLONOSCOPY  2017   • FL LUMBAR PUNCTURE DIAGNOSTIC  1/3/2023   • HERNIA REPAIR      umbilical   • LAPAROTOMY N/A 12/10/2020    Procedure: ABDOMINAL MYOMECTOMY;  Surgeon: Nathan Santillan DO;  [facility];  Service: Gynecology   • LUMBAR LAMINECTOMY Right 09/14/2016    Procedure: Right L5/S1 Metryx microdiscectomy;  Surgeon: Jimena Atkinson MD;  [facility];  Service:    • ID COLONOSCOPY FLX DX W/COLLJ SPEC WHEN PFRMD N/A 07/07/2016    Procedure: COLONOSCOPY;  Surgeon: Margoth Veronica DO;  Location: [facility]; Service: Gastroenterology   • ID HYSTEROSCOPY BX ENDOMETRIUM&/POLYPC W/WO D&C N/A 03/19/2018    Procedure: HYSTEROSCOPY; MYOMECTOMY; ENDOMETRIAL BIOPSY;  Surgeon: Nathan Santillan DO;  [facility];  Service: Gynecology   • ID TENDON SHEATH INCISION Right 02/28/2017    Procedure: THUMB TRIGGER RELEASE ;  Surgeon: Cali Cowan DO;  [facility];  Service: Orthopedics   • SPINE SURGERY  9/2016    Microdisectomy   • WISDOM TOOTH EXTRACTION        Social History:     Social History     Socioeconomic History   • Marital status: /Civil Union     Spouse name: None   • Number of children: None   • Years of education: None   • Highest education level: None   Occupational History   • None   Tobacco Use   • Smoking status: Never   • Smokeless tobacco: Never   Vaping Use   • Vaping Use: Never used   Substance and Sexual Activity   • Alcohol use:  Yes     Alcohol/week: 4.0 standard drinks of alcohol     Types: 4 Glasses of wine per week     Comment: socially    • Drug use: Yes     Types: Marijuana     Comment: Medical   • Sexual activity: Yes     Partners: Male     Birth control/protection: None   Other Topics Concern   • None   Social History Narrative    Exercises 3x week    Pet: dog     Social Determinants of Health     Financial Resource Strain: Not on file   Food Insecurity: Not on file   Transportation Needs: Not on file   Physical Activity: Not on file   Stress: Not on file   Social Connections: Not on file   Intimate Partner Violence: Not on file   Housing Stability: Not on file      Family History:     Family History   Problem Relation Age of Onset   • Diabetes Mother         type2   • Hypertension Mother    • Depression Mother    • Thyroid disease Mother    • Arthritis Mother    • Anxiety disorder Mother    • Stroke Father    • Diabetes Father         type2   • Hypertension Father    • Alcohol abuse Father    • Substance Abuse Father    • Arthritis Father    • Drug abuse Father    • Dialysis Father    • Esophageal cancer Maternal Grandmother    • Breast cancer Maternal Grandmother         Maternal great grandmother   • Cancer Maternal Grandmother         Pancreatic   • Heart attack Maternal Grandfather         acute MI   • Arthritis Maternal Grandfather    • Stroke Maternal Grandfather         CVA   • Hyperlipidemia Maternal Grandfather    • Heart disease Maternal Grandfather    • Arthritis Paternal Grandfather    • Stroke Paternal Grandfather         CVA   • Cancer Paternal Grandfather    • Hyperlipidemia Paternal Grandfather    • Colon cancer Paternal Grandfather    • Bipolar disorder Maternal Aunt    • Brain cancer Maternal Aunt    • Heart attack Maternal Uncle         acute MI   • Arthritis Family    • Stroke Family         CVA   • Cancer Family    • Hyperlipidemia Family    • Stroke Family         CVA   • Hyperlipidemia Family       Current Medications:     Current Outpatient Medications   Medication Sig Dispense Refill   • albuterol (ProAir HFA) 90 mcg/act inhaler Inhale 2 puffs every 4 (four) hours as needed (When has flare up) 1 Inhaler 0   • carvedilol (COREG) 25 mg tablet Take 1 tablet (25 mg total) by mouth 2 (two) times a day with meals 180 tablet 0   • chlorthalidone 25 mg tablet Take 1 tablet (25 mg total) by mouth daily 90 tablet 1   • Cholecalciferol (VITAMIN D3 PO) Take 2,000 mg by mouth daily in the early morning     • Multiple Vitamins-Minerals (Womens Multivitamin) TABS Take 1 tablet by mouth daily     • pantoprazole (PROTONIX) 40 mg tablet Take 1 tablet (40 mg total) by mouth daily before breakfast 90 tablet 0   • potassium chloride (MICRO-K) 10 MEQ CR capsule Take 1 capsule (10 mEq total) by mouth daily 90 capsule 0     No current facility-administered medications for this visit. Allergies:     No Known Allergies   Physical Exam:     /82 (BP Location: Left arm, Patient Position: Sitting, Cuff Size: Large)   Pulse 87   Temp 97.7 °F (36.5 °C) (Tympanic)   Resp 16   Ht 5' 4.21" (1.631 m)   Wt 90.1 kg (198 lb 9.6 oz)   SpO2 98%   BMI 33.86 kg/m²     Physical Exam  Vitals and nursing note reviewed. Constitutional:       General: She is not in acute distress. Appearance: Normal appearance. She is well-developed. HENT:      Head: Normocephalic and atraumatic. Right Ear: Tympanic membrane normal.      Left Ear: Tympanic membrane normal.      Nose: Nose normal.      Mouth/Throat:      Mouth: Mucous membranes are moist.   Eyes:      Conjunctiva/sclera: Conjunctivae normal.   Cardiovascular:      Rate and Rhythm: Normal rate and regular rhythm. Heart sounds: No murmur heard. Pulmonary:      Effort: Pulmonary effort is normal. No respiratory distress. Breath sounds: Normal breath sounds. Abdominal:      Palpations: Abdomen is soft. Tenderness: There is no abdominal tenderness. Musculoskeletal:         General: No swelling. Cervical back: Normal range of motion and neck supple. Skin:     General: Skin is warm and dry. Capillary Refill: Capillary refill takes less than 2 seconds. Neurological:      General: No focal deficit present. Mental Status: She is alert and oriented to person, place, and time.    Psychiatric:         Mood and Affect: Mood normal.          Stephanie Walker MD   [address] [place] [address]

## 2023-10-05 ENCOUNTER — TELEPHONE (OUTPATIENT)
Dept: HEMATOLOGY ONCOLOGY | Facility: CLINIC | Age: 38
End: 2023-10-05

## 2023-10-09 ENCOUNTER — TELEPHONE (OUTPATIENT)
Dept: HEMATOLOGY ONCOLOGY | Facility: CLINIC | Age: 38
End: 2023-10-09

## 2023-10-09 NOTE — TELEPHONE ENCOUNTER
Left message for patient regarding appointment today needing to be rescheduled, provided direct teams number to call back to confirm message was received.

## 2023-10-10 DIAGNOSIS — I10 ESSENTIAL HYPERTENSION: ICD-10-CM

## 2023-10-10 RX ORDER — CARVEDILOL 25 MG/1
25 TABLET ORAL 2 TIMES DAILY WITH MEALS
Qty: 180 TABLET | Refills: 0 | Status: SHIPPED | OUTPATIENT
Start: 2023-10-10

## 2023-10-30 ENCOUNTER — OFFICE VISIT (OUTPATIENT)
Dept: HEMATOLOGY ONCOLOGY | Facility: CLINIC | Age: 38
End: 2023-10-30
Payer: COMMERCIAL

## 2023-10-30 VITALS
SYSTOLIC BLOOD PRESSURE: 131 MMHG | DIASTOLIC BLOOD PRESSURE: 77 MMHG | OXYGEN SATURATION: 97 % | BODY MASS INDEX: 33.46 KG/M2 | RESPIRATION RATE: 14 BRPM | TEMPERATURE: 97.7 F | WEIGHT: 196 LBS | HEIGHT: 64 IN | HEART RATE: 58 BPM

## 2023-10-30 DIAGNOSIS — Z86.39 HISTORY OF IRON DEFICIENCY: Primary | ICD-10-CM

## 2023-10-30 PROCEDURE — 99213 OFFICE O/P EST LOW 20 MIN: CPT | Performed by: PHYSICIAN ASSISTANT

## 2023-10-30 NOTE — LETTER
October 30, 2023     Jerry Hamlin MD  [address] [place] [address],[facility]  [address] [facility] [address]    Patient: Amanda Antoine   YOB: 1985   Date of Visit: 10/30/2023       Dear Dr. Elis Isidro: Thank you for referring Noah Cullen to me for evaluation. Below are my notes for this consultation. If you have questions, please do not hesitate to call me. I look forward to following your patient along with you. Sincerely,        Magda Gibson        CC: No Recipients    Magda Gibson  10/30/2023  3:54 PM  Sign when Signing Visit  Hematology/Oncology Outpatient Follow-up  Amanda Antoine 43 y.o. female 1985 [id number]    Date:  10/30/2023      Assessment and Plan:  1. History of iron deficiency  69-year-old female presents for follow-up visit regarding history of iron deficiency. This was thought to be secondary to menorrhagia previously which has since resolved. She has recurrent iron deficiency at this time. She has been taking oral iron for approximately 3 weeks and tolerating well. Recommended repeating labs at approximately 1 month time to see if numbers are improving. If not we could discuss iron infusions if she fails oral iron. She understands this. She will message us should she not hear from us. Recommend follow-up with GI due to new iron deficiency without menorrhagia. - CBC and differential; Future  - Ferritin; Future  - CBC and differential; Future  - Ferritin; Future       HPI:  43year old female presents for follow up for iron def anemia. Past medical history significant for GERD, polycystic ovaries, asthma, trace mitral regurgitation, hypertension, migraines, herniated lumbar disc status post surgical intervention, trigger finger of right thumb, status post surgical intervention, uterine fibroid, female infertility, iron deficiency secondary to chronic blood loss. 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 Periods were very heavy for 6-7 months. She was seeing reproductive endocrinology and underwent myomectomy and bleeding improved a lot. Due to persistent microcytosis she had lab evaluation. Negative alpha-thalassemia. Hemoglobin electrophoresis was consistent with sickle cell trait, heterozygous. Interval history:  Last few months heavier bleeding; bleeds 5 days, heavy 3.5 days, changing pad every 4 hours, clots present     Pain in the epigastric going around the left, bloating in stomach     She has been taking protonix 40 mg at least 2 years, for GERD; She has been taking it every other day at this time     50 mg iron PO for the last few weeks, denies constipation     ROS: Review of Systems   Constitutional:  Positive for fatigue. Respiratory:  Negative for cough and shortness of breath. Gastrointestinal:  Positive for abdominal pain and constipation (chronic, goes every other day which is normal for her; not any different with oral iron). Negative for diarrhea, nausea and vomiting. Genitourinary:  Positive for menstrual problem. Musculoskeletal: Negative. Skin: Negative. Neurological:  Negative for dizziness, light-headedness and headaches. Psychiatric/Behavioral: Negative.        Past Medical History:   Diagnosis Date   • Abdominal pain    • Abnormal ECG    • Anemia    • Asthma     mild intermittent   • GERD (gastroesophageal reflux disease)    • History of palpitations    • Hypertension    • Lower back pain    • Migraines    • Seasonal allergies    • Trace mitral regurgitation by prior echocardiogram    • Trigger finger of right thumb 2/28/2017       Past Surgical History:   Procedure Laterality Date   • CHOLECYSTECTOMY      laparoscopic   • COLONOSCOPY  2017   • FL LUMBAR PUNCTURE DIAGNOSTIC  1/3/2023   • HERNIA REPAIR      umbilical   • LAPAROTOMY N/A 12/10/2020    Procedure: ABDOMINAL MYOMECTOMY;  Surgeon: Isatu Buck DO;  [facility];  Service: Gynecology   • LUMBAR LAMINECTOMY Right 09/14/2016    Procedure: Right L5/S1 Metryx microdiscectomy;  Surgeon: Ne Greenberg MD;  [facility];  Service:    • NH COLONOSCOPY FLX DX W/COLLJ SPEC WHEN PFRMD N/A 07/07/2016    Procedure: COLONOSCOPY;  Surgeon: Alma Rosa Angulo DO;  [facility]; Service: Gastroenterology   • NH HYSTEROSCOPY BX ENDOMETRIUM&/POLYPC W/WO D&C N/A 03/19/2018    Procedure: HYSTEROSCOPY; MYOMECTOMY; ENDOMETRIAL BIOPSY;  Surgeon: Snehal Abraham DO;  [facility];  Service: Gynecology   • NH TENDON SHEATH INCISION Right 02/28/2017    Procedure: THUMB TRIGGER RELEASE ;  Surgeon: Elizabeth Monteiro DO;  [facility];  Service: Orthopedics   • SPINE SURGERY  9/2016    Microdisectomy   • WISDOM TOOTH EXTRACTION         Social History     Socioeconomic History   • Marital status: /Civil Union     Spouse name: None   • Number of children: None   • Years of education: None   • Highest education level: None   Occupational History   • None   Tobacco Use   • Smoking status: Never   • Smokeless tobacco: Never   Vaping Use   • Vaping Use: Never used   Substance and Sexual Activity   • Alcohol use:  Yes     Alcohol/week: 4.0 standard drinks of alcohol     Types: 4 Glasses of wine per week     Comment: socially    • Drug use: Yes     Types: Marijuana     Comment: Medical   • Sexual activity: Yes     Partners: Male     Birth control/protection: None   Other Topics Concern   • None   Social History Narrative    Exercises 3x week    Pet: dog     Social Determinants of Health     Financial Resource Strain: Not on file   Food Insecurity: Not on file   Transportation Needs: Not on file   Physical Activity: Not on file   Stress: Not on file   Social Connections: Not on file   Intimate Partner Violence: Not on file   Housing Stability: Not on file       Family History   Problem Relation Age of Onset   • Diabetes Mother         type2   • Hypertension Mother    • Depression Mother    • Thyroid disease Mother    • Arthritis Mother    • Anxiety disorder Mother    • Stroke Father    • Diabetes Father         type2   • Hypertension Father    • Alcohol abuse Father    • Substance Abuse Father    • Arthritis Father    • Drug abuse Father    • Dialysis Father    • Esophageal cancer Maternal Grandmother    • Breast cancer Maternal Grandmother         Maternal great grandmother   • Cancer Maternal Grandmother         Pancreatic   • Heart attack Maternal Grandfather         acute MI   • Arthritis Maternal Grandfather    • Stroke Maternal Grandfather         CVA   • Hyperlipidemia Maternal Grandfather    • Heart disease Maternal Grandfather    • Arthritis Paternal Grandfather    • Stroke Paternal Grandfather         CVA   • Cancer Paternal Grandfather    • Hyperlipidemia Paternal Grandfather    • Colon cancer Paternal Grandfather    • Bipolar disorder Maternal Aunt    • Brain cancer Maternal Aunt    • Heart attack Maternal Uncle         acute MI   • Arthritis Family    • Stroke Family         CVA   • Cancer Family    • Hyperlipidemia Family    • Stroke Family         CVA   • Hyperlipidemia Family        No Known Allergies      Current Outpatient Medications:   •  albuterol (ProAir HFA) 90 mcg/act inhaler, Inhale 2 puffs every 4 (four) hours as needed (When has flare up), Disp: 1 Inhaler, Rfl: 0  •  carvedilol (COREG) 25 mg tablet, Take 1 tablet (25 mg total) by mouth 2 (two) times a day with meals, Disp: 180 tablet, Rfl: 0  •  chlorthalidone 25 mg tablet, Take 1 tablet (25 mg total) by mouth daily, Disp: 90 tablet, Rfl: 1  •  Cholecalciferol (VITAMIN D3 PO), Take 2,000 mg by mouth daily in the early morning, Disp: , Rfl:   •  Multiple Vitamins-Minerals (Womens Multivitamin) TABS, Take 1 tablet by mouth daily, Disp: , Rfl:   •  pantoprazole (PROTONIX) 40 mg tablet, Take 1 tablet (40 mg total) by mouth daily before breakfast, Disp: 90 tablet, Rfl: 0  •  potassium chloride (MICRO-K) 10 MEQ CR capsule, Take 1 capsule (10 mEq total) by mouth daily, Disp: 90 capsule, Rfl: 0    Physical Exam:  /77 (BP Location: Right arm, Patient Position: Sitting, Cuff Size: Large)   Pulse 58   Temp 97.7 °F (36.5 °C) (Temporal)   Resp 14   Ht 5' 4.21" (1.631 m)   Wt 88.9 kg (196 lb)   SpO2 97%   BMI 33.42 kg/m²     Physical Exam  Vitals reviewed. Constitutional:       General: She is not in acute distress. Appearance: She is well-developed. She is not ill-appearing. HENT:      Head: Normocephalic and atraumatic. Eyes:      General: No scleral icterus. Conjunctiva/sclera: Conjunctivae normal.   Cardiovascular:      Rate and Rhythm: Normal rate and regular rhythm. Heart sounds: Normal heart sounds. No murmur heard. Pulmonary:      Effort: Pulmonary effort is normal. No respiratory distress. Breath sounds: Normal breath sounds. Abdominal:      Palpations: Abdomen is soft. Tenderness: There is no abdominal tenderness. Musculoskeletal:         General: No tenderness. Normal range of motion. Cervical back: Normal range of motion and neck supple. Right lower leg: No edema. Left lower leg: No edema. Lymphadenopathy:      Cervical: No cervical adenopathy. Skin:     General: Skin is warm and dry. Neurological:      Mental Status: She is alert and oriented to person, place, and time. Cranial Nerves: No cranial nerve deficit.    Psychiatric:         Mood and Affect: Mood normal.         Behavior: Behavior normal.       Labs:  Lab Results   Component Value Date    WBC 9.06 08/30/2023    HGB 11.9 08/30/2023    HCT 37.8 08/30/2023    MCV 78 (L) 08/30/2023     08/30/2023     I have spent 20 minutes with Patient  today in which greater than 50% of this time was spent in counseling/coordination of care regarding Diagnostic results, Risks and benefits of tx options, Instructions for management, Patient and family education, Impressions, Counseling / Coordination of care, Documenting in the medical record, Reviewing / ordering tests, medicine, procedures  , and Obtaining or reviewing history  . Patient voiced understanding and agreement in the above discussion. Aware to contact our office with questions/symptoms in the interim. This note has been generated by voice recognition software system. Therefore, there may be spelling, grammar, and or syntax errors. Please contact if questions arise.

## 2023-10-30 NOTE — LETTER
October 30, 2023       No Recipients    Patient: Monique Terry   YOB: 1985   Date of Visit: 10/30/2023       Dear Dr. Aleshia Kan Recipients: Thank you for referring Yvette Arias to me for evaluation. Below are my notes for this consultation. If you have questions, please do not hesitate to call me. I look forward to following your patient along with you. Sincerely,        Jayda Magda Roche        CC:   No Recipients    Jayda Roche [place]  10/30/2023  3:54 PM  Incomplete  Hematology/Oncology Outpatient Follow-up  Monique Terry 43 y.o. female 1985 [id number]    Date:  10/30/2023      Assessment and Plan:  1. History of iron deficiency  27-year-old female presents for follow-up visit regarding history of iron deficiency. This was thought to be secondary to menorrhagia previously which has since resolved. She has recurrent iron deficiency at this time. She has been taking oral iron for approximately 3 weeks and tolerating well. Recommended repeating labs at approximately 1 month time to see if numbers are improving. If not we could discuss iron infusions if she fails oral iron. She understands this. She will message us should she not hear from us. Recommend follow-up with GI due to new iron deficiency without menorrhagia. - CBC and differential; Future  - Ferritin; Future  - CBC and differential; Future  - Ferritin; Future       HPI:  43year old female presents for follow up for iron def anemia. Past medical history significant for GERD, polycystic ovaries, asthma, trace mitral regurgitation, hypertension, migraines, herniated lumbar disc status post surgical intervention, trigger finger of right thumb, status post surgical intervention, uterine fibroid, female infertility, iron deficiency secondary to chronic blood loss. 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 Periods were very heavy for 6-7 months. She was seeing reproductive endocrinology and underwent myomectomy and bleeding improved a lot. Due to persistent microcytosis she had lab evaluation. Negative alpha-thalassemia. Hemoglobin electrophoresis was consistent with sickle cell trait, heterozygous. Interval history:  Last few months heavier bleeding; bleeds 5 days, heavy 3.5 days, changing pad every 4 hours, clots present     Pain in the epigastric going around the left, bloating in stomach     She has been taking protonix 40 mg at least 2 years, for GERD; She has been taking it every other day at this time     50 mg iron PO for the last few weeks, denies constipation     ROS: Review of Systems   Constitutional:  Positive for fatigue. Respiratory:  Negative for cough and shortness of breath. Gastrointestinal:  Positive for abdominal pain and constipation (chronic, goes every other day which is normal for her; not any different with oral iron). Negative for diarrhea, nausea and vomiting. Genitourinary:  Positive for menstrual problem. Musculoskeletal: Negative. Skin: Negative. Neurological:  Negative for dizziness, light-headedness and headaches. Psychiatric/Behavioral: Negative.        Past Medical History:   Diagnosis Date   • Abdominal pain    • Abnormal ECG    • Anemia    • Asthma     mild intermittent   • GERD (gastroesophageal reflux disease)    • History of palpitations    • Hypertension    • Lower back pain    • Migraines    • Seasonal allergies    • Trace mitral regurgitation by prior echocardiogram    • Trigger finger of right thumb 2/28/2017       Past Surgical History:   Procedure Laterality Date   • CHOLECYSTECTOMY      laparoscopic   • COLONOSCOPY  2017   • FL LUMBAR PUNCTURE DIAGNOSTIC  1/3/2023   • HERNIA REPAIR      umbilical   • LAPAROTOMY N/A 12/10/2020    Procedure: ABDOMINAL MYOMECTOMY;  Surgeon: Diana Danielle DO;  [facility];  Service: Gynecology   • LUMBAR LAMINECTOMY Right 09/14/2016    Procedure: Right L5/S1 Metryx microdiscectomy;  Surgeon: Jimena Atkinson MD;  [facility];  Service:    • NJ COLONOSCOPY FLX DX W/COLLJ SPEC WHEN PFRMD N/A 07/07/2016    Procedure: COLONOSCOPY;  Surgeon: Margoth Veronica DO;  [facility]: [facility]; Service: Gastroenterology   • NJ HYSTEROSCOPY BX ENDOMETRIUM&/POLYPC W/WO D&C N/A 03/19/2018    Procedure: HYSTEROSCOPY; MYOMECTOMY; ENDOMETRIAL BIOPSY;  Surgeon: Nathan Santillan DO;  [facility];  Service: Gynecology   • NJ TENDON SHEATH INCISION Right 02/28/2017    Procedure: THUMB TRIGGER RELEASE ;  Surgeon: Cali Cowan DO;  [facility];  Service: Orthopedics   • SPINE SURGERY  9/2016    Microdisectomy   • WISDOM TOOTH EXTRACTION         Social History     Socioeconomic History   • Marital status: /Civil Union     Spouse name: None   • Number of children: None   • Years of education: None   • Highest education level: None   Occupational History   • None   Tobacco Use   • Smoking status: Never   • Smokeless tobacco: Never   Vaping Use   • Vaping Use: Never used   Substance and Sexual Activity   • Alcohol use:  Yes     Alcohol/week: 4.0 standard drinks of alcohol     Types: 4 Glasses of wine per week     Comment: socially    • Drug use: Yes     Types: Marijuana     Comment: Medical   • Sexual activity: Yes     Partners: Male     Birth control/protection: None   Other Topics Concern   • None   Social History Narrative    Exercises 3x week    Pet: dog     Social Determinants of Health     Financial Resource Strain: Not on file   Food Insecurity: Not on file   Transportation Needs: Not on file   Physical Activity: Not on file   Stress: Not on file   Social Connections: Not on file   Intimate Partner Violence: Not on file   Housing Stability: Not on file       Family History   Problem Relation Age of Onset   • Diabetes Mother         type2   • Hypertension Mother    • Depression Mother    • Thyroid disease Mother    • Arthritis Mother    • Anxiety disorder Mother    • Stroke Father    • Diabetes Father         type2   • Hypertension Father    • Alcohol abuse Father    • Substance Abuse Father    • Arthritis Father    • Drug abuse Father    • Dialysis Father    • Esophageal cancer Maternal Grandmother    • Breast cancer Maternal Grandmother         Maternal great grandmother   • Cancer Maternal Grandmother         Pancreatic   • Heart attack Maternal Grandfather         acute MI   • Arthritis Maternal Grandfather    • Stroke Maternal Grandfather         CVA   • Hyperlipidemia Maternal Grandfather    • Heart disease Maternal Grandfather    • Arthritis Paternal Grandfather    • Stroke Paternal Grandfather         CVA   • Cancer Paternal Grandfather    • Hyperlipidemia Paternal Grandfather    • Colon cancer Paternal Grandfather    • Bipolar disorder Maternal Aunt    • Brain cancer Maternal Aunt    • Heart attack Maternal Uncle         acute MI   • Arthritis Family    • Stroke Family         CVA   • Cancer Family    • Hyperlipidemia Family    • Stroke Family         CVA   • Hyperlipidemia Family        No Known Allergies      Current Outpatient Medications:   •  albuterol (ProAir HFA) 90 mcg/act inhaler, Inhale 2 puffs every 4 (four) hours as needed (When has flare up), Disp: 1 Inhaler, Rfl: 0  •  carvedilol (COREG) 25 mg tablet, Take 1 tablet (25 mg total) by mouth 2 (two) times a day with meals, Disp: 180 tablet, Rfl: 0  •  chlorthalidone 25 mg tablet, Take 1 tablet (25 mg total) by mouth daily, Disp: 90 tablet, Rfl: 1  •  Cholecalciferol (VITAMIN D3 PO), Take 2,000 mg by mouth daily in the early morning, Disp: , Rfl:   •  Multiple Vitamins-Minerals (Womens Multivitamin) TABS, Take 1 tablet by mouth daily, Disp: , Rfl:   •  pantoprazole (PROTONIX) 40 mg tablet, Take 1 tablet (40 mg total) by mouth daily before breakfast, Disp: 90 tablet, Rfl: 0  •  potassium chloride (MICRO-K) 10 MEQ CR capsule, Take 1 capsule (10 mEq total) by mouth daily, Disp: 90 capsule, Rfl: 0    Physical Exam:  /77 (BP Location: Right arm, Patient Position: Sitting, Cuff Size: Large)   Pulse 58   Temp 97.7 °F (36.5 °C) (Temporal)   Resp 14   Ht 5' 4.21" (1.631 m)   Wt 88.9 kg (196 lb)   SpO2 97%   BMI 33.42 kg/m²     Physical Exam  Vitals reviewed. Constitutional:       General: She is not in acute distress. Appearance: She is well-developed. She is not ill-appearing. HENT:      Head: Normocephalic and atraumatic. Eyes:      General: No scleral icterus. Conjunctiva/sclera: Conjunctivae normal.   Cardiovascular:      Rate and Rhythm: Normal rate and regular rhythm. Heart sounds: Normal heart sounds. No murmur heard. Pulmonary:      Effort: Pulmonary effort is normal. No respiratory distress. Breath sounds: Normal breath sounds. Abdominal:      Palpations: Abdomen is soft. Tenderness: There is no abdominal tenderness. Musculoskeletal:         General: No tenderness. Normal range of motion. Cervical back: Normal range of motion and neck supple. Right lower leg: No edema. Left lower leg: No edema. Lymphadenopathy:      Cervical: No cervical adenopathy. Skin:     General: Skin is warm and dry. Neurological:      Mental Status: She is alert and oriented to person, place, and time. Cranial Nerves: No cranial nerve deficit.    Psychiatric:         Mood and Affect: Mood normal.         Behavior: Behavior normal.       Labs:  Lab Results   Component Value Date    WBC 9.06 08/30/2023    HGB 11.9 08/30/2023    HCT 37.8 08/30/2023    MCV 78 (L) 08/30/2023     08/30/2023     I have spent 20 minutes with Patient  today in which greater than 50% of this time was spent in counseling/coordination of care regarding Diagnostic results, Risks and benefits of tx options, Instructions for management, Patient and family education, Impressions, Counseling / Coordination of care, Documenting in the medical record, Reviewing / ordering tests, medicine, procedures  , and Obtaining or reviewing history  . Patient voiced understanding and agreement in the above discussion. Aware to contact our office with questions/symptoms in the interim. This note has been generated by voice recognition software system. Therefore, there may be spelling, grammar, and or syntax errors. Please contact if questions arise. [address] [place] [address]  10/30/2023  2:59 PM  Incomplete  Hematology/Oncology Outpatient Follow-up  Julissa Booth 43 y.o. female 1985 [id number]    Date:  10/30/2023      Assessment and Plan:        HPI:          Interval history:  Last few months heavier bleeding; bleeds 5 days, heavy 3.5 days, changing pad every 4 hours, clots present     Pain in the epigastric going around the left, bloating in stomach     She has been taking protonix 40 mg at least 2 years, for GERD; She has been taking it every other day at this time     50 mg iron PO for the last few weeks, denies constipation     ROS: Review of Systems   Constitutional:  Positive for fatigue. Respiratory:  Negative for cough and shortness of breath. Gastrointestinal:  Positive for abdominal pain and constipation (chronic, goes every other day which is normal for her; not any different with oral iron). Negative for diarrhea, nausea and vomiting. Genitourinary:  Positive for menstrual problem. Musculoskeletal: Negative. Skin: Negative. Neurological:  Negative for dizziness, light-headedness and headaches. Psychiatric/Behavioral: Negative.          Past Medical History:   Diagnosis Date   • Abdominal pain    • Abnormal ECG    • Anemia    • Asthma     mild intermittent   • GERD (gastroesophageal reflux disease)    • History of palpitations    • Hypertension    • Lower back pain    • Migraines    • Seasonal allergies    • Trace mitral regurgitation by prior echocardiogram    • Trigger finger of right thumb 2/28/2017       Past Surgical History:   Procedure Laterality Date   • CHOLECYSTECTOMY      laparoscopic   • COLONOSCOPY  2017   • FL LUMBAR PUNCTURE DIAGNOSTIC  1/3/2023   • HERNIA REPAIR      umbilical   • LAPAROTOMY N/A 12/10/2020    Procedure: ABDOMINAL MYOMECTOMY;  Surgeon: Jeffry Pena DO;  [facility];  Service: Gynecology   • LUMBAR LAMINECTOMY Right 09/14/2016    Procedure: Right L5/S1 Metryx microdiscectomy;  Surgeon: Jamison Keen MD;  [facility];  Service:    • MS COLONOSCOPY FLX DX W/COLLJ SPEC WHEN PFRMD N/A 07/07/2016    Procedure: COLONOSCOPY;  Surgeon: Dana Lewis DO;  [facility]; Service: Gastroenterology   • MS HYSTEROSCOPY BX ENDOMETRIUM&/POLYPC W/WO D&C N/A 03/19/2018    Procedure: HYSTEROSCOPY; MYOMECTOMY; ENDOMETRIAL BIOPSY;  Surgeon: Jeffry Pena DO;  [facility];  Service: Gynecology   • MS TENDON SHEATH INCISION Right 02/28/2017    Procedure: THUMB TRIGGER RELEASE ;  Surgeon: Belinda Escalante DO;  [facility];  Service: Orthopedics   • SPINE SURGERY  9/2016    Microdisectomy   • WISDOM TOOTH EXTRACTION         Social History     Socioeconomic History   • Marital status: /Civil Union     Spouse name: None   • Number of children: None   • Years of education: None   • Highest education level: None   Occupational History   • None   Tobacco Use   • Smoking status: Never   • Smokeless tobacco: Never   Vaping Use   • Vaping Use: Never used   Substance and Sexual Activity   • Alcohol use:  Yes     Alcohol/week: 4.0 standard drinks of alcohol     Types: 4 Glasses of wine per week     Comment: socially    • Drug use: Yes     Types: Marijuana     Comment: Medical   • Sexual activity: Yes     Partners: Male     Birth control/protection: None   Other Topics Concern   • None   Social History Narrative    Exercises 3x week    Pet: dog     Social Determinants of Health     Financial Resource Strain: Not on file   Food Insecurity: Not on file   Transportation Needs: Not on file   Physical Activity: Not on file   Stress: Not on file   Social Connections: Not on file   Intimate Partner Violence: Not on file   Housing Stability: Not on file       Family History   Problem Relation Age of Onset   • Diabetes Mother         type2   • Hypertension Mother    • Depression Mother    • Thyroid disease Mother    • Arthritis Mother    • Anxiety disorder Mother    • Stroke Father    • Diabetes Father         type2   • Hypertension Father    • Alcohol abuse Father    • Substance Abuse Father    • Arthritis Father    • Drug abuse Father    • Dialysis Father    • Esophageal cancer Maternal Grandmother    • Breast cancer Maternal Grandmother         Maternal great grandmother   • Cancer Maternal Grandmother         Pancreatic   • Heart attack Maternal Grandfather         acute MI   • Arthritis Maternal Grandfather    • Stroke Maternal Grandfather         CVA   • Hyperlipidemia Maternal Grandfather    • Heart disease Maternal Grandfather    • Arthritis Paternal Grandfather    • Stroke Paternal Grandfather         CVA   • Cancer Paternal Grandfather    • Hyperlipidemia Paternal Grandfather    • Colon cancer Paternal Grandfather    • Bipolar disorder Maternal Aunt    • Brain cancer Maternal Aunt    • Heart attack Maternal Uncle         acute MI   • Arthritis Family    • Stroke Family         CVA   • Cancer Family    • Hyperlipidemia Family    • Stroke Family         CVA   • Hyperlipidemia Family        No Known Allergies      Current Outpatient Medications:   •  albuterol (ProAir HFA) 90 mcg/act inhaler, Inhale 2 puffs every 4 (four) hours as needed (When has flare up), Disp: 1 Inhaler, Rfl: 0  •  carvedilol (COREG) 25 mg tablet, Take 1 tablet (25 mg total) by mouth 2 (two) times a day with meals, Disp: 180 tablet, Rfl: 0  •  chlorthalidone 25 mg tablet, Take 1 tablet (25 mg total) by mouth daily, Disp: 90 tablet, Rfl: 1  •  Cholecalciferol (VITAMIN D3 PO), Take 2,000 mg by mouth daily in the early morning, Disp: , Rfl: •  Multiple Vitamins-Minerals (Womens Multivitamin) TABS, Take 1 tablet by mouth daily, Disp: , Rfl:   •  pantoprazole (PROTONIX) 40 mg tablet, Take 1 tablet (40 mg total) by mouth daily before breakfast, Disp: 90 tablet, Rfl: 0  •  potassium chloride (MICRO-K) 10 MEQ CR capsule, Take 1 capsule (10 mEq total) by mouth daily, Disp: 90 capsule, Rfl: 0    Physical Exam:  /77 (BP Location: Right arm, Patient Position: Sitting, Cuff Size: Large)   Pulse 58   Temp 97.7 °F (36.5 °C) (Temporal)   Resp 14   Ht 5' 4.21" (1.631 m)   Wt 88.9 kg (196 lb)   SpO2 97%   BMI 33.42 kg/m²     Physical Exam    Labs:  Lab Results   Component Value Date    WBC 9.06 08/30/2023    HGB 11.9 08/30/2023    HCT 37.8 08/30/2023    MCV 78 (L) 08/30/2023     08/30/2023     I have spent *** minutes with {Patient /Family:44246} today in which greater than 50% of this time was spent in counseling/coordination of care regarding {AMB Counseling Topics:[id number]}. Patient voiced understanding and agreement in the above discussion. Aware to contact our office with questions/symptoms in the interim. This note has been generated by voice recognition software system. Therefore, there may be spelling, grammar, and or syntax errors. Please contact if questions arise.

## 2023-10-30 NOTE — PROGRESS NOTES
Hematology/Oncology Outpatient Follow-up  Samy Tillman 43 y.o. female 1985 [id number]    Date:  10/30/2023      Assessment and Plan:  1. History of iron deficiency  48-year-old female presents for follow-up visit regarding history of iron deficiency. This was thought to be secondary to menorrhagia previously which has since resolved. She has recurrent iron deficiency at this time. She has been taking oral iron for approximately 3 weeks and tolerating well. Recommended repeating labs at approximately 1 month time to see if numbers are improving. If not we could discuss iron infusions if she fails oral iron. She understands this. She will message us should she not hear from us. Recommend follow-up with GI due to new iron deficiency without menorrhagia. - CBC and differential; Future  - Ferritin; Future  - CBC and differential; Future  - Ferritin; Future       HPI:  43year old female presents for follow up for iron def anemia. Past medical history significant for GERD, polycystic ovaries, asthma, trace mitral regurgitation, hypertension, migraines, herniated lumbar disc status post surgical intervention, trigger finger of right thumb, status post surgical intervention, uterine fibroid, female infertility, iron deficiency secondary to chronic blood loss. 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 Periods were very heavy for 6-7 months. She was seeing reproductive endocrinology and underwent myomectomy and bleeding improved a lot. Due to persistent microcytosis she had lab evaluation. Negative alpha-thalassemia. Hemoglobin electrophoresis was consistent with sickle cell trait, heterozygous.     Interval history:  Last few months heavier bleeding; bleeds 5 days, heavy 3.5 days, changing pad every 4 hours, clots present     Pain in the epigastric going around the left, bloating in stomach     She has been taking protonix 40 mg at least 2 years, for GERD; She has been taking it every other day at this time     50 mg iron PO for the last few weeks, denies constipation     ROS: Review of Systems   Constitutional:  Positive for fatigue. Respiratory:  Negative for cough and shortness of breath. Gastrointestinal:  Positive for abdominal pain and constipation (chronic, goes every other day which is normal for her; not any different with oral iron). Negative for diarrhea, nausea and vomiting. Genitourinary:  Positive for menstrual problem. Musculoskeletal: Negative. Skin: Negative. Neurological:  Negative for dizziness, light-headedness and headaches. Psychiatric/Behavioral: Negative. Past Medical History:   Diagnosis Date    Abdominal pain     Abnormal ECG     Anemia     Asthma     mild intermittent    GERD (gastroesophageal reflux disease)     History of palpitations     Hypertension     Lower back pain     Migraines     Seasonal allergies     Trace mitral regurgitation by prior echocardiogram     Trigger finger of right thumb 2/28/2017       Past Surgical History:   Procedure Laterality Date    CHOLECYSTECTOMY      laparoscopic    COLONOSCOPY  2017    [facility] LUMBAR PUNCTURE DIAGNOSTIC  1/3/2023    HERNIA REPAIR      umbilical    LAPAROTOMY N/A 12/10/2020    Procedure: ABDOMINAL MYOMECTOMY;  Surgeon: Diana Danielle DO;  [facility];  Service: Gynecology    LUMBAR LAMINECTOMY Right 09/14/2016    Procedure: Right L5/S1 Metryx microdiscectomy;  Surgeon: Annia Adam MD;  Location: [facility];  Service:     WI COLONOSCOPY FLX DX W/COLLJ SPEC WHEN PFRMD N/A 07/07/2016    Procedure: COLONOSCOPY;  Surgeon: Yves Butterfield DO;  Location: [facility];   Service: Gastroenterology    WI HYSTEROSCOPY BX ENDOMETRIUM&/POLYPC W/WO D&C N/A 03/19/2018    Procedure: HYSTEROSCOPY; MYOMECTOMY; ENDOMETRIAL BIOPSY;  Surgeon: Diana Danielle DO;  [facility];  Service: Gynecology    WI TENDON SHEATH INCISION Right 02/28/2017    Procedure: [place] RELEASE ;  Surgeon: Ender Black DO;  Location[facility];  Service: Orthopedics    SPINE SURGERY  9/2016    Microdisectomy    WISDOM TOOTH EXTRACTION         Social History     Socioeconomic History    Marital status: /Civil Union     Spouse name: None    Number of children: None    Years of education: None    Highest education level: None   Occupational History    None   Tobacco Use    Smoking status: Never    Smokeless tobacco: Never   Vaping Use    Vaping Use: Never used   Substance and Sexual Activity    Alcohol use:  Yes     Alcohol/week: 4.0 standard drinks of alcohol     Types: 4 Glasses of wine per week     Comment: socially     Drug use: Yes     Types: Marijuana     Comment: Medical    Sexual activity: Yes     Partners: Male     Birth control/protection: None   Other Topics Concern    None   Social History Narrative    Exercises 3x week    Pet: dog     Social Determinants of Health     Financial Resource Strain: Not on file   Food Insecurity: Not on file   Transportation Needs: Not on file   Physical Activity: Not on file   Stress: Not on file   Social Connections: Not on file   Intimate Partner Violence: Not on file   Housing Stability: Not on file       Family History   Problem Relation Age of Onset    Diabetes Mother         type2    Hypertension Mother     Depression Mother     Thyroid disease Mother     Arthritis Mother     Anxiety disorder Mother     Stroke Father     Diabetes Father         type2    Hypertension Father     Alcohol abuse Father     Substance Abuse Father     Arthritis Father     Drug abuse Father     Dialysis Father     Esophageal cancer Maternal Grandmother     Breast cancer Maternal Grandmother         Maternal great grandmother    Cancer Maternal Grandmother         Pancreatic    Heart attack Maternal Grandfather         acute MI    Arthritis Maternal Grandfather     Stroke Maternal Grandfather         CVA    Hyperlipidemia Maternal Grandfather     Heart disease Maternal Grandfather     Arthritis Paternal Grandfather     Stroke Paternal Grandfather         CVA    Cancer Paternal Grandfather     Hyperlipidemia Paternal Grandfather     Colon cancer Paternal Grandfather     Bipolar disorder Maternal Aunt     Brain cancer Maternal Aunt     Heart attack Maternal Uncle         acute MI    Arthritis Family     Stroke Family         CVA    Cancer Family     Hyperlipidemia Family     Stroke Family         CVA    Hyperlipidemia Family        No Known Allergies      Current Outpatient Medications:     albuterol (ProAir HFA) 90 mcg/act inhaler, Inhale 2 puffs every 4 (four) hours as needed (When has flare up), Disp: 1 Inhaler, Rfl: 0    carvedilol (COREG) 25 mg tablet, Take 1 tablet (25 mg total) by mouth 2 (two) times a day with meals, Disp: 180 tablet, Rfl: 0    chlorthalidone 25 mg tablet, Take 1 tablet (25 mg total) by mouth daily, Disp: 90 tablet, Rfl: 1    Cholecalciferol (VITAMIN D3 PO), Take 2,000 mg by mouth daily in the early morning, Disp: , Rfl:     Multiple Vitamins-Minerals (Womens Multivitamin) TABS, Take 1 tablet by mouth daily, Disp: , Rfl:     pantoprazole (PROTONIX) 40 mg tablet, Take 1 tablet (40 mg total) by mouth daily before breakfast, Disp: 90 tablet, Rfl: 0    potassium chloride (MICRO-K) 10 MEQ CR capsule, Take 1 capsule (10 mEq total) by mouth daily, Disp: 90 capsule, Rfl: 0    Physical Exam:  /77 (BP Location: Right arm, Patient Position: Sitting, Cuff Size: Large)   Pulse 58   Temp 97.7 °F (36.5 °C) (Temporal)   Resp 14   Ht 5' 4.21" (1.631 m)   Wt 88.9 kg (196 lb)   SpO2 97%   BMI 33.42 kg/m²     Physical Exam  Vitals reviewed. Constitutional:       General: She is not in acute distress. Appearance: She is well-developed. She is not ill-appearing. HENT:      Head: Normocephalic and atraumatic. Eyes:      General: No scleral icterus.      Conjunctiva/sclera: Conjunctivae normal.   Cardiovascular:      Rate and Rhythm: Normal rate and regular rhythm. Heart sounds: Normal heart sounds. No murmur heard. Pulmonary:      Effort: Pulmonary effort is normal. No respiratory distress. Breath sounds: Normal breath sounds. Abdominal:      Palpations: Abdomen is soft. Tenderness: There is no abdominal tenderness. Musculoskeletal:         General: No tenderness. Normal range of motion. Cervical back: Normal range of motion and neck supple. Right lower leg: No edema. Left lower leg: No edema. Lymphadenopathy:      Cervical: No cervical adenopathy. Skin:     General: Skin is warm and dry. Neurological:      Mental Status: She is alert and oriented to person, place, and time. Cranial Nerves: No cranial nerve deficit. Psychiatric:         Mood and Affect: Mood normal.         Behavior: Behavior normal.       Labs:  Lab Results   Component Value Date    WBC 9.06 08/30/2023    HGB 11.9 08/30/2023    HCT 37.8 08/30/2023    MCV 78 (L) 08/30/2023     08/30/2023     I have spent 20 minutes with Patient  today in which greater than 50% of this time was spent in counseling/coordination of care regarding Diagnostic results, Risks and benefits of tx options, Instructions for management, Patient and family education, Impressions, Counseling / Coordination of care, Documenting in the medical record, Reviewing / ordering tests, medicine, procedures  , and Obtaining or reviewing history  . Patient voiced understanding and agreement in the above discussion. Aware to contact our office with questions/symptoms in the interim. This note has been generated by voice recognition software system. Therefore, there may be spelling, grammar, and or syntax errors. Please contact if questions arise.

## 2023-11-29 ENCOUNTER — NURSE TRIAGE (OUTPATIENT)
Age: 38
End: 2023-11-29

## 2023-11-29 DIAGNOSIS — M54.40 ACUTE RIGHT-SIDED LOW BACK PAIN WITH SCIATICA, SCIATICA LATERALITY UNSPECIFIED: Primary | ICD-10-CM

## 2023-11-29 RX ORDER — METHYLPREDNISOLONE 4 MG/1
TABLET ORAL
Qty: 21 EACH | Refills: 0 | Status: SHIPPED | OUTPATIENT
Start: 2023-11-29

## 2023-11-29 NOTE — TELEPHONE ENCOUNTER
I sent over steroid pack to the pharmacy .  She can take ibuprofen 600 mg every 8 hours as needed as well    Dr Guzman Most

## 2023-11-29 NOTE — TELEPHONE ENCOUNTER
Regarding: back pain  ----- Message from Purnima Barrios sent at 11/29/2023  2:03 PM EST -----  Patient is calling in today she is having back pain. Has appointment tomorrow with Dr Daren Barnhart and was wondering if a medication can be called in to give her some relief until she is seen. Please advise.

## 2023-11-29 NOTE — TELEPHONE ENCOUNTER
Patient called, stating she received a missed call from us, but no message was left. I was able to see the call was from a nurse triage call.      Transferred to CTS

## 2023-11-29 NOTE — TELEPHONE ENCOUNTER
Reason for Disposition   Patient wants to be seen    Answer Assessment - Initial Assessment Questions  1. ONSET: "When did the pain begin?"      11/27  2. LOCATION: "Where does it hurt?" (upper, mid or lower back)      LOWER RIGHT SIDE  3. SEVERITY: "How bad is the pain?"  (e.g., Scale 1-10; mild, moderate, or severe)    - MILD (1-3): doesn't interfere with normal activities     - MODERATE (4-7): interferes with normal activities or awakens from sleep     - SEVERE (8-10): excruciating pain, unable to do any normal activities       5  4. PATTERN: "Is the pain constant?" (e.g., yes, no; constant, intermittent)       INTERMITTENT   5. RADIATION: "Does the pain shoot into your legs or elsewhere?"      SHOOTS DOWN TO RIGHT LEG  6. CAUSE:  "What do you think is causing the back pain?"       SCIATICA   7. BACK OVERUSE:  "Any recent lifting of heavy objects, strenuous work or exercise?"      NO  8. MEDICATIONS: "What have you taken so far for the pain?" (e.g., nothing, acetaminophen, NSAIDS)      TYLENOL   9. NEUROLOGIC SYMPTOMS: "Do you have any weakness, numbness, or problems with bowel/bladder control?"      TINGLING FEELING RIGHT LEG   10. OTHER SYMPTOMS: "Do you have any other symptoms?" (e.g., fever, abdominal pain, burning with urination, blood in urine)        NO  11. PREGNANCY: "Is there any chance you are pregnant?" (e.g., yes, no; LMP)        NO    Protocols used: Back Pain-ADULT-[place]  Patient called with lower back pain 4/10 that shoots down right leg. Patient scheduled appt tomorrow is requesting medication for tonight regarding the pain to be sent to [facility] . pharmacy.

## 2023-11-30 ENCOUNTER — OFFICE VISIT (OUTPATIENT)
Dept: FAMILY MEDICINE CLINIC | Facility: CLINIC | Age: 38
End: 2023-11-30
Payer: COMMERCIAL

## 2023-11-30 VITALS
OXYGEN SATURATION: 98 % | WEIGHT: 192.6 LBS | HEIGHT: 64 IN | DIASTOLIC BLOOD PRESSURE: 90 MMHG | TEMPERATURE: 97.1 F | BODY MASS INDEX: 32.88 KG/M2 | HEART RATE: 71 BPM | SYSTOLIC BLOOD PRESSURE: 138 MMHG

## 2023-11-30 DIAGNOSIS — M54.31 SCIATICA OF RIGHT SIDE: Primary | ICD-10-CM

## 2023-11-30 PROCEDURE — 99213 OFFICE O/P EST LOW 20 MIN: CPT | Performed by: FAMILY MEDICINE

## 2023-11-30 RX ORDER — CYCLOBENZAPRINE HCL 5 MG
5 TABLET ORAL
Qty: 30 TABLET | Refills: 0 | Status: SHIPPED | OUTPATIENT
Start: 2023-11-30

## 2023-11-30 RX ORDER — MELOXICAM 7.5 MG/1
7.5 TABLET ORAL DAILY
Qty: 15 TABLET | Refills: 0 | Status: SHIPPED | OUTPATIENT
Start: 2023-11-30

## 2023-11-30 NOTE — PROGRESS NOTES
Name: Shahnaz Casey      : [birth year]      MRN: [id number]  Encounter Provider: Rosalene Claude, MD  Encounter Date: 2023   Encounter department: [place] [id number]. Sciatica of right side  Comments:  History and examination consistent with right sciatica flare. Status post microdiscectomy in 2016. Patient has been pain-free since and acutely developed right lower back pain. + straight leg test.  PCP had already prescribed Medrol Dosepak which plans to . Would also add meloxicam 7.5 mg daily with food and Flexeril 5 mg at night. Follow-up as needed  Orders:  -     cyclobenzaprine (FLEXERIL) 5 mg tablet; Take 1 tablet (5 mg total) by mouth daily at bedtime  -     meloxicam (MOBIC) 7.5 mg tablet; Take 1 tablet (7.5 mg total) by mouth daily           Subjective      43year old F with a history of lumbar hernia and sciatica status post microdiscectomy in 2016 seen today with right lower back pain that radiates around the hip to the lower part of the leg. Started 4 days ago. No known injuries. Denies red flag symptoms. Applying cold and heat to the area and performing stretching exercises.     Review of Systems    Current Outpatient Medications on File Prior to Visit   Medication Sig   • albuterol (ProAir HFA) 90 mcg/act inhaler Inhale 2 puffs every 4 (four) hours as needed (When has flare up)   • carvedilol (COREG) 25 mg tablet Take 1 tablet (25 mg total) by mouth 2 (two) times a day with meals   • chlorthalidone 25 mg tablet Take 1 tablet (25 mg total) by mouth daily   • Cholecalciferol (VITAMIN D3 PO) Take 2,000 mg by mouth daily in the early morning   • methylPREDNISolone 4 MG tablet therapy pack Use as directed on package   • Multiple Vitamins-Minerals (Womens Multivitamin) TABS Take 1 tablet by mouth daily   • pantoprazole (PROTONIX) 40 mg tablet Take 1 tablet (40 mg total) by mouth daily before breakfast   • potassium chloride (MICRO-K) 10 MEQ CR capsule Take 1 capsule (10 mEq total) by mouth daily       Objective     /90 (BP Location: Left arm, Patient Position: Sitting, Cuff Size: Adult)   Pulse 71   Temp (!) 97.1 °F (36.2 °C) (Tympanic)   Ht 5' 4.21" (1.631 m)   Wt 87.4 kg (192 lb 9.6 oz)   SpO2 98%   BMI 32.84 kg/m²     Physical Exam  Constitutional:       General: She is not in acute distress. Appearance: Normal appearance. She is not ill-appearing or toxic-appearing. HENT:      Head: Normocephalic and atraumatic. Eyes:      Extraocular Movements: Extraocular movements intact. Cardiovascular:      Heart sounds: No murmur heard. Pulmonary:      Effort: Pulmonary effort is normal.      Breath sounds: Normal breath sounds. Abdominal:      General: There is no distension. Palpations: Abdomen is soft. There is no mass. Tenderness: There is no abdominal tenderness. There is no guarding or rebound. Hernia: No hernia is present. Musculoskeletal:      Lumbar back: Tenderness present. Decreased range of motion. Positive right straight leg raise test.        Back:    Skin:     General: Skin is warm. Neurological:      Mental Status: She is alert and oriented to person, place, and time.    Nelda Rolle MD

## 2023-12-13 ENCOUNTER — OFFICE VISIT (OUTPATIENT)
Dept: GASTROENTEROLOGY | Facility: CLINIC | Age: 38
End: 2023-12-13
Payer: COMMERCIAL

## 2023-12-13 ENCOUNTER — TELEPHONE (OUTPATIENT)
Dept: GASTROENTEROLOGY | Facility: CLINIC | Age: 38
End: 2023-12-13

## 2023-12-13 VITALS
DIASTOLIC BLOOD PRESSURE: 70 MMHG | BODY MASS INDEX: 32.44 KG/M2 | HEIGHT: 64 IN | SYSTOLIC BLOOD PRESSURE: 127 MMHG | TEMPERATURE: 98.9 F | WEIGHT: 190 LBS

## 2023-12-13 DIAGNOSIS — K76.0 FATTY LIVER: ICD-10-CM

## 2023-12-13 DIAGNOSIS — K58.1 IRRITABLE BOWEL SYNDROME WITH CONSTIPATION: Primary | ICD-10-CM

## 2023-12-13 DIAGNOSIS — D80.2 IGA DEFICIENCY (HCC): ICD-10-CM

## 2023-12-13 DIAGNOSIS — E61.1 IRON DEFICIENCY: ICD-10-CM

## 2023-12-13 PROCEDURE — 99214 OFFICE O/P EST MOD 30 MIN: CPT | Performed by: INTERNAL MEDICINE

## 2023-12-13 RX ORDER — POLYETHYLENE GLYCOL 3350 17 G/17G
17 POWDER, FOR SOLUTION ORAL DAILY
Qty: 30 EACH | Refills: 3 | Status: SHIPPED | OUTPATIENT
Start: 2023-12-13

## 2023-12-13 NOTE — PROGRESS NOTES
[facility] - Outpatient Consultation  Ofelia Tellez 43 y.o. female MRN: [id number]  Encounter: [id number]      PCP: Luke Nogueira MD  Referring: No referring provider defined for this encounter. ASSESSMENT AND PLAN:    43 yr old F w/ history of GERD, asthma, HTN, migraines, hepatomegaly, IBS-constipation who presents to GI clinic for evaluation of iron deficiency. Iron deficiency    Patient has low ferritin levels. Had history of menorrhagia for which she underwent myomectomy for uterine fibroids. She has no resolution of menorrhagia but continues to have deficiency. She denies seeing any hematochezia or melena or any other source of blood loss. Will plan on EGD and colonoscopy  We discussed if the EGD and colonoscopy is unremarkable she will need a capsule endoscopy  We discussed that PPI can sometimes lead to iron deficiency. She is only taking it intermittently as well for therefore requires. IBS-constipation    Discussed with patient again that her imaging studies from 2022 showed moderate stool burden. Since she has not had any improvement with fiber recommend starting MiraLAX daily  She can do a trial of IBgard as needed for the abdominal discomfort  Continue fiber with goal 25 g a day      Dysphagia  GERD    She is chronic but recently started taking pantoprazole 3 or 4 times a week instead of daily. She has noticed solid food dysphagia. Denies any weight loss. Dysphagia could be related to esophagitis but will do EGD for further dilation. We will schedule for an upper endoscopy to evaluate for peptic ulcer disease, Helicobacter pylori infection, reflux esophagitis, peptic stricture, and eosinophilic esophagitis.     Fatty liver      Fibrosis-4 (FIB-4) Score is: .55 which is representative of absence of fibrosis   Recommend weight loss  Check Hep B and hep C serologies   Check US elastography          Ziyad Ames MD   Gastroenterology Fellow ______________________________________________________________________    CC:  Chief Complaint   Patient presents with    Advice Only     Needs colonoscopy ref from Hem       HPI:  43 yr old F w/ history of GERD, asthma, HTN, migraines, hepatomegaly, IBS-constipation who presents to GI clinic for evaluation of iron deficiency. Patient states she is still having intermittent abdominal discomfort. She does have imrpovement in abdominal pain after having a bowel movement. States that she takes a lot of fiber in her diet. Regarding her iron deficiency she has with menorrhagia in the past she has gotten her fibroids removed her.'s are regular and normal.  She has been evaluated by hematology gastroenterology for iron deficiency she denies seeing any blood in her stool. REVIEW OF SYSTEMS:    CONSTITUTIONAL: Denies any fever, chills, rigors, and weight loss. HEENT: No earache or tinnitus. Denies hearing loss or visual disturbances. CARDIOVASCULAR: No chest pain or palpitations. RESPIRATORY: Denies any cough, hemoptysis, shortness of breath or dyspnea on exertion. GASTROINTESTINAL: As noted in the History of Present Illness. GENITOURINARY: No problems with urination. Denies any hematuria or dysuria. NEUROLOGIC: No dizziness or vertigo, denies headaches. MUSCULOSKELETAL: Denies any muscle or joint pain. SKIN: Denies skin rashes or itching. ENDOCRINE: Denies excessive thirst. Denies intolerance to heat or cold. PSYCHOSOCIAL: Denies depression or anxiety. Denies any recent memory loss.        Historical Information   Past Medical History:   Diagnosis Date    Abdominal pain     Abnormal ECG     Anemia     Asthma     mild intermittent    GERD (gastroesophageal reflux disease)     History of palpitations     Hypertension     Lower back pain     Migraines     Seasonal allergies     Trace mitral regurgitation by prior echocardiogram     Trigger finger of right thumb 2/28/2017     Past Surgical History:   Procedure Laterality Date    CHOLECYSTECTOMY      laparoscopic    COLONOSCOPY  2017    FL LUMBAR PUNCTURE DIAGNOSTIC  1/3/2023    HERNIA REPAIR      umbilical    LAPAROTOMY N/A 12/10/2020    Procedure: ABDOMINAL MYOMECTOMY;  Surgeon: Yrn Zabala DO;  Location: [facility];  Service: Gynecology    LUMBAR LAMINECTOMY Right 09/14/2016    Procedure: Right L5/S1 Metryx microdiscectomy;  Surgeon: Verito Johnson MD;  Location: [facility];  Service:     ND COLONOSCOPY FLX DX W/COLLJ SPEC WHEN PFRMD N/A 07/07/2016    Procedure: COLONOSCOPY;  Surgeon: Fernando Martinez DO;  Location: [facility];   Service: Gastroenterology    ND HYSTEROSCOPY BX ENDOMETRIUM&/POLYPC W/WO D&C N/A 03/19/2018    Procedure: HYSTEROSCOPY; MYOMECTOMY; ENDOMETRIAL BIOPSY;  Surgeon: Yrn Zabala DO;  [facility];  Service: Gynecology    ND TENDON SHEATH INCISION Right 02/28/2017    Procedure: THUMB TRIGGER RELEASE ;  Surgeon: Meaghan Lazo DO;  Location[facility];  Service: Orthopedics    SPINE SURGERY  9/2016    Microdisectomy    WISDOM TOOTH EXTRACTION       Social History   Social History     Substance and Sexual Activity   Alcohol Use Yes    Alcohol/week: 4.0 standard drinks of alcohol    Types: 4 Glasses of wine per week    Comment: socially      Social History     Substance and Sexual Activity   Drug Use Yes    Types: Marijuana    Comment: Medical     Social History     Tobacco Use   Smoking Status Never   Smokeless Tobacco Never     Family History   Problem Relation Age of Onset    Diabetes Mother         type2    Hypertension Mother     Depression Mother     Thyroid disease Mother     Arthritis Mother     Anxiety disorder Mother     Stroke Father     Diabetes Father         type2    Hypertension Father     Alcohol abuse Father     Substance Abuse Father     Arthritis Father     Drug abuse Father     Dialysis Father     Esophageal cancer Maternal Grandmother     Breast cancer Maternal Grandmother         Maternal great grandmother    Cancer Maternal Grandmother         Pancreatic    Heart attack Maternal Grandfather         acute MI    Arthritis Maternal Grandfather     Stroke Maternal Grandfather         CVA    Hyperlipidemia Maternal Grandfather     Heart disease Maternal Grandfather     Arthritis Paternal Grandfather     Stroke Paternal Grandfather         CVA    Cancer Paternal Grandfather     Hyperlipidemia Paternal Grandfather     Colon cancer Paternal Grandfather     Bipolar disorder Maternal Aunt     Brain cancer Maternal Aunt     Heart attack Maternal Uncle         acute MI    Arthritis Family     Stroke Family         CVA    Cancer Family     Hyperlipidemia Family     Stroke Family         CVA    Hyperlipidemia Family        Meds/Allergies       Current Outpatient Medications:     albuterol (ProAir HFA) 90 mcg/act inhaler    carvedilol (COREG) 25 mg tablet    chlorthalidone 25 mg tablet    Cholecalciferol (VITAMIN D3 PO)    cyclobenzaprine (FLEXERIL) 5 mg tablet    meloxicam (MOBIC) 7.5 mg tablet    Multiple Vitamins-Minerals (Womens Multivitamin) TABS    pantoprazole (PROTONIX) 40 mg tablet    potassium chloride (MICRO-K) 10 MEQ CR capsule    methylPREDNISolone 4 MG tablet therapy pack    No Known Allergies        Objective     unknown if currently breastfeeding. There is no height or weight on file to calculate BMI. PHYSICAL EXAM:      General Appearance:   Alert, cooperative, no distress   HEENT:   Normocephalic, atraumatic, anicteric. Neck:  Supple, symmetrical, trachea midline   Lungs:   Clear to auscultation bilaterally; no rales, rhonchi or wheezing; respirations unlabored    Heart[de-identified]   Regular rate and rhythm; no murmur, rub, or gallop.    Abdomen:   Soft, non-tender, non-distended; normal bowel sounds; no masses, no organomegaly    Genitalia:   Deferred    Rectal:   Deferred    Extremities:  No cyanosis, clubbing or edema    Pulses:  2+ and symmetric    Skin:  No jaundice, rashes, or lesions    Lymph nodes:  No palpable cervical lymphadenopathy        Lab Results:     Lab Results   Component Value Date    WBC 9.06 08/30/2023    HGB 11.9 08/30/2023    HCT 37.8 08/30/2023    MCV 78 (L) 08/30/2023     08/30/2023       Lab Results   Component Value Date     11/17/2015    K 2.8 (L) 08/30/2023    CL 97 08/30/2023    CO2 29 08/30/2023    ANIONGAP 7 11/17/2015    BUN 13 08/30/2023    CREATININE 1.04 08/30/2023    GLUCOSE 85 11/17/2015    GLUF 121 (H) 08/30/2023    CALCIUM 9.7 08/30/2023    AST 17 08/30/2023    ALT 20 08/30/2023    ALKPHOS 45 08/30/2023    PROT 7.6 11/17/2015    BILITOT 0.38 11/17/2015    EGFR 67 08/30/2023       Lab Results   Component Value Date    INR 0.92 01/03/2023    INR 0.98 12/10/2020    INR 1.03 09/13/2016    PROTIME 12.5 01/03/2023    PROTIME 13.1 12/10/2020    PROTIME 13.6 09/13/2016         Radiology Results:   No results found. Portions of the record may have been created with voice recognition software. Occasional wrong word or "sound a like" substitutions may have occurred due to the inherent limitations of voice recognition software. Read the chart carefully and recognize, using context, where substitutions have occurred. Answers submitted by the patient for this visit:  Abdominal Pain Questionnaire (Submitted on 12/13/2023)  Chief Complaint: Abdominal pain  Chronicity: chronic  Onset: more than 1 year ago  Onset quality: undetermined  Frequency: daily  Episode duration: 7 Days  Progression since onset: waxing and waning  Pain location: left flank, right flank  Pain - numeric: 7/10  Pain quality: aching, cramping, sharp  Radiates to: LLQ, LUQ, RLQ  anorexia: No  arthralgias: No  belching: Yes  constipation: Yes  diarrhea: Yes  dysuria: No  fever: No  flatus: No  frequency: No  headaches: No  hematochezia: No  hematuria: No  melena: No  myalgias: No  nausea:  No  weight loss: No  vomiting: No  Aggravated by: nothing  Relieved by: belching, bowel movements, certain positions, passing flatus

## 2023-12-13 NOTE — PATIENT INSTRUCTIONS
Dayan Tomlinson  [birth year]      Recommended Total Fiber Intake**   AGE MEN WOMAN   19-50 38 grams/day 25 grams/day   Over 50 30 grams/day 21 grams/day     Fiber Sources in Common Foods  Use this guide to find out if you have enough fiber in you diet.    Food Size of Serving [place]'s of Serving [employer] Grams/Servings Calories/  Serving   Fruits:  (raw unless otherwise noted Vegetables:  (cooked, unless otherwise noted)   Apple (w/peel) 1 medium 3.7 81 Artichoke 1 globe 6.5 60   Apricots 1 cup 3.7 74 Asparagus ½ cup 1.8 25   Banana 1 medium 2.7 105 Beans:      Blackberries 1 cup 7.2 75 Green  (canned)                       ½ cup 1.3 14   Blueberries 1 cup 3.9 81 Kidney ½ cup 5.7 114   Cantaloupe 1 cup 1.3 56 Joseph ½ cup 6.1 85   Grapefruit 1 medium 2.8 82 Goyal ½ cup 7.4 118   Grapes 1 cup 1.6 114 White ½ cup 5.5 122   Orange 1 medium 3.1 62 Beets ½ cup 1.6 37   Pear (with peel) 1 medium 4.0 98 Broccoli ½ cup 2.8 26   Pineapple 1 cup 1.9 76 Cabbage, green ½ cup 2.1 16   Plums 1 medium 1.0 36 Cabbage, green (raw) ½ cup 0.8 9   Prunes (dried) 1 cup 11.4 386 Carrots ½ cup 2.6 35   Raspberries 1 cup 8.4 60 Cauliflower ½ cup 2.0 17   Strawberries 1 cup 3.4 45 Cauliflower (raw) ½ cup 1.3 13   Watermelon 1 slice 0.8 51 Celery (raw) ½ cup 1.0 10   GRAIN PRODUCTS AND OTHERS: Corn ½ cup 2.0 66   Bread:    Cucumber (raw) ½ cup 0.4 7   Occitan 1 slice 0.8 68 Eggplant ½ cup 1.2 13   Rye 1 slice 1.6 67 Green Peas ½ cup 4.4 62   White 1 slice 0.6 67 Lettuce, iceberg (raw) ½ cup 0.4 4   Whole      Wheat 1 slice 2.0 70 Onions (raw) ½ cup 1.4 30   Cereal:    Potato (baked with skin) ½ cup 1.5 66   Bran 1 ounce 9.7 70 Spinach ½ cup 2.7 25   Corn Flakes 1 ounce 1.0 110 Tomato ½ cup 1.0 19   Oat Bran 1 ounce 4.3 69 Zucchini ½ cup 1.3 14   Oatmeal 1 ounce 3.0 109 METAMUCIL:   Shredded Wheat 1 ounce 2.8 102 Capsules 6 capsules 3.0 10   Crackers:    Smooth Texture Orange (sugar free) 1 tsp 3.0 20 Remigio 1 square 0.1 27 Smooth Texture Orange (with sugar) 1 tbsp 3.0 45   Saltine 1 regular 0.1 13 Wafers 2 wafers 3.0 120   Rice:          Brown ½ cup 1.8 108       White ½ cup 0.3 103       Spaghetti 2 ounces 2.1 225       Almonds (roasted) ½ cup 6.4 351       Peanuts (roasted) ½ cup 6.1 388         ** [place] [employer], [facility], 2002    Track your fiber intake for five days. Use the Fiber Source Guide to find out how much fiber is in common food. If you’re not getting your recommended amount of fiber each day, talk to your doctor about how you can increase the fiber in your diet. Example Monday Tuesday Wednesday Thursday Friday   Food Oatmeal        Fiber Grams 2.8        Food Blueberries        Fiber Grams 3.9        Food W.W. Bread        Fiber Grams 1.9        Food W.W. Bread        Fiber Grams 1.9        Food Apple        Fiber Grams 3.7        Food Spaghetti        Fiber Grams . 14        Food Corn        Fiber Grams 2.0        Food White Bread        Fiber Grams . 6        Add numbers in each column to find your daily fiber intake. Total Daily Fiber Intake 18.2            Too Low - Like most Americans, this example is not enough fiber. Talk to your doctor about how to add fiber to your diet. Quick Fiber Facts  · Most Americans consume only about half of the recommended fiber they need each day. · Fiber helps maintain normal bowel function, and helps prevent constipation and its potential complications. Straining and pressure from constipation may lead to diverticular disease and hemorrhoids. · Stool softeners or stimulant laxatives only offer short-term relief of constipation, while dietary changes or fiber therapies help break the cycle of irregularity. · Diets low in saturated fat and cholesterol that include 7 grams of soluble fiber per day from psyllium husk, as in Metamucil, may reduce the risk of heart disease by lowering cholesterol.   One adult dose of Metamucil has 2.4 grams of this soluble fiber. · Increase fiber intake gradually, giving the body time to adjust.    Scheduled date of EGD/colonoscopy (as of today):02/06/2024  Physician performing EGD/colonoscopy:Akiko  Location of EGD/colonoscopy: [address] [place] [address]  Desired bowel prep reviewed with patient:bisi / Dulcolax  Instructions reviewed with patient by: Mark Singh  Clearances:   NONE    Patient will follow up as needed  Elastography was also scheduled for 12/19/2023 at 2:00 PM

## 2023-12-15 DIAGNOSIS — I10 ESSENTIAL HYPERTENSION: ICD-10-CM

## 2023-12-15 RX ORDER — CHLORTHALIDONE 25 MG/1
25 TABLET ORAL DAILY
Qty: 90 TABLET | Refills: 2 | Status: SHIPPED | OUTPATIENT
Start: 2023-12-15

## 2023-12-19 ENCOUNTER — HOSPITAL ENCOUNTER (OUTPATIENT)
Dept: RADIOLOGY | Facility: HOSPITAL | Age: 38
Discharge: HOME | End: 2023-12-19
Payer: COMMERCIAL

## 2023-12-19 DIAGNOSIS — K76.0 FATTY LIVER: ICD-10-CM

## 2023-12-19 PROCEDURE — 76981 USE PARENCHYMA: CPT

## 2023-12-30 DIAGNOSIS — J45.909 MILD ASTHMA, UNSPECIFIED WHETHER COMPLICATED, UNSPECIFIED WHETHER PERSISTENT: ICD-10-CM

## 2024-01-02 RX ORDER — ALBUTEROL SULFATE 90 UG/1
2 AEROSOL, METERED RESPIRATORY (INHALATION) EVERY 4 HOURS PRN
Qty: 8.5 G | Refills: 5 | Status: SHIPPED | OUTPATIENT
Start: 2024-01-02

## 2024-01-23 ENCOUNTER — ANESTHESIA EVENT (OUTPATIENT)
Dept: ANESTHESIOLOGY | Facility: HOSPITAL | Age: 39
End: 2024-01-23

## 2024-01-23 ENCOUNTER — ANESTHESIA (OUTPATIENT)
Dept: ANESTHESIOLOGY | Facility: HOSPITAL | Age: 39
End: 2024-01-23

## 2024-01-28 DIAGNOSIS — K21.9 GASTROESOPHAGEAL REFLUX DISEASE WITHOUT ESOPHAGITIS: ICD-10-CM

## 2024-01-29 RX ORDER — PANTOPRAZOLE SODIUM 40 MG/1
40 TABLET, DELAYED RELEASE ORAL
Qty: 90 TABLET | Refills: 1 | Status: SHIPPED | OUTPATIENT
Start: 2024-01-29

## 2024-01-30 ENCOUNTER — OFFICE VISIT (OUTPATIENT)
Dept: OBGYN CLINIC | Facility: CLINIC | Age: 39
End: 2024-01-30
Payer: COMMERCIAL

## 2024-01-30 VITALS
BODY MASS INDEX: 33.67 KG/M2 | WEIGHT: 197.2 LBS | SYSTOLIC BLOOD PRESSURE: 116 MMHG | DIASTOLIC BLOOD PRESSURE: 68 MMHG | HEIGHT: 64 IN

## 2024-01-30 DIAGNOSIS — E28.2 PCOS (POLYCYSTIC OVARIAN SYNDROME): ICD-10-CM

## 2024-01-30 DIAGNOSIS — N89.8 VAGINAL DISCHARGE: ICD-10-CM

## 2024-01-30 DIAGNOSIS — N94.10 DYSPAREUNIA, FEMALE: ICD-10-CM

## 2024-01-30 DIAGNOSIS — Z01.419 ENCOUNTER FOR GYNECOLOGICAL EXAMINATION WITHOUT ABNORMAL FINDING: Primary | ICD-10-CM

## 2024-01-30 PROCEDURE — 99214 OFFICE O/P EST MOD 30 MIN: CPT | Performed by: OBSTETRICS & GYNECOLOGY

## 2024-01-30 PROCEDURE — 81514 NFCT DS BV&VAGINITIS DNA ALG: CPT | Performed by: OBSTETRICS & GYNECOLOGY

## 2024-01-30 NOTE — PROGRESS NOTES
Assessment/Plan:    No problem-specific Assessment & Plan notes found for this encounter.       Diagnoses and all orders for this visit:    Encounter for gynecological examination without abnormal finding    Vaginal discharge    Dyspareunia, female    PCOS (polycystic ovarian syndrome)          Subjective:      Patient ID: Kiara Ma is a 38 y.o. female with a PMH significant of PCOS who presents to the office today with a complaint of recurrent vaginal discharge and vaginal irritation.  Patient states that she has had the symptoms for few months.  Her last visit was in August with a complaint of vaginal itching, vaginal discharge, and vaginal discomfort.  Patient was prescribed nystatin, which she states has not helped.  She denies changes in her daily routine including changes in her laundry detergent or feminine washes.  Appropriate cultures will be obtained at today's visit.  Patient is sexually active and endorses dyspareunia.  She denies wanting STD testing at this time.  She states that she has tried using a Replens applicator to prevent vaginal dryness, but that it hurt with insertion.  Patient states that the discharge is white and clumpy.  She endorses slight itchiness, dryness, and irritation of the vagina.  She denies dysuria, hematuria, frequency, and urgency.  She denies fever, chills, nausea, vomiting, newfound back pain, and changes in appetite.  Patient states that her menstrual cycles are regular and denies significant PMS symptoms.  She endorses slight abdominal pain but cannot differentiate it from her menstrual cycle or her ovarian cysts.  Patient states that she has an annual appointment in the coming future and that she will probably ask for an ultrasound at that time.  Patient has elevated blood sugars with her last A1c being 6.3.  She is not being treated for elevated blood sugar and is being followed by her PCP.  She has no other questions or concerns at this time.    Total time of  "today's visit was 25 minutes of which greater than 50% was spent face-to-face counseling the patient as well as coordination of care, review of chart and lab values, physical examination as well as computer entry into the [facility] medical record system.    HPI    The following portions of the patient's history were reviewed and updated as appropriate: allergies, current medications, past family history, past medical history, past social history, past surgical history, and problem list.    Review of Systems   Constitutional: Negative.  Negative for appetite change, chills and fever.   HENT: Negative.     Eyes: Negative.    Respiratory: Negative.  Negative for shortness of breath.    Cardiovascular: Negative.  Negative for chest pain.   Gastrointestinal:  Positive for abdominal pain.   Endocrine: Negative.    Genitourinary:  Positive for dyspareunia and vaginal discharge. Negative for dysuria, menstrual problem and vaginal pain.   Musculoskeletal: Negative.    Skin: Negative.    Neurological: Negative.    Psychiatric/Behavioral: Negative.           Objective:      /68   Ht 5' 4\" (1.626 m)   Wt 89.4 kg (197 lb 3.2 oz)   LMP 01/11/2024 (Exact Date)   BMI 33.85 kg/m²          Physical Exam  Exam conducted with a chaperone present.   Constitutional:       Appearance: She is well-developed.   HENT:      Head: Normocephalic.   Eyes:      Pupils: Pupils are equal, round, and reactive to light.   Cardiovascular:      Rate and Rhythm: Normal rate and regular rhythm.   Pulmonary:      Effort: Pulmonary effort is normal.      Breath sounds: Normal breath sounds.   Abdominal:      Palpations: Abdomen is soft.      Hernia: There is no hernia in the left inguinal area or right inguinal area.   Genitourinary:     General: Normal vulva.      Exam position: Supine.      Labia:         Right: No rash or tenderness.         Left: No rash or tenderness.       Rectum: Normal.   Musculoskeletal:         General: Normal range of " motion.      Cervical back: Normal range of motion and neck supple.   Lymphadenopathy:      Lower Body: No right inguinal adenopathy. No left inguinal adenopathy.   Skin:     General: Skin is warm and dry.   Neurological:      Mental Status: She is alert and oriented to person, place, and time.   Psychiatric:         Behavior: Behavior normal.         Thought Content: Thought content normal.         Judgment: Judgment normal.

## 2024-01-30 NOTE — PATIENT INSTRUCTIONS
Patient presents with the ongoing complaint of vaginitis.    Vaginal cultures obtained in office today.  Patient will be notified of the results.  Pending above results, patient will be contacted for consultation regarding further treatment.    Spoke to the patient about options of preventing vaginal dryness and irritation.  Natural coconut oil and personal lubricants such as K-Y jelly, Astroglide, and platinum were mentioned. Patient can also try boric acid suppositories intravaginally for relief of vaginal pain.     Patient instructed to use warm water bath soaks with tea bags in it several times a day for vaginal discomfort.    Patient instructed to follow-up in the office in 1 month for her annual well visit.  A diagnostic ultrasound may be ordered at this time for further evaluation into her ovarian cyst.  Patient should call the office with any questions, complaints, or concerns that arise in the meantime.

## 2024-01-31 ENCOUNTER — OFFICE VISIT (OUTPATIENT)
Dept: CARDIAC SURGERY | Facility: CLINIC | Age: 39
End: 2024-01-31
Payer: COMMERCIAL

## 2024-01-31 VITALS
SYSTOLIC BLOOD PRESSURE: 110 MMHG | HEART RATE: 72 BPM | WEIGHT: 194 LBS | DIASTOLIC BLOOD PRESSURE: 70 MMHG | OXYGEN SATURATION: 100 % | BODY MASS INDEX: 33.12 KG/M2 | HEIGHT: 64 IN

## 2024-01-31 DIAGNOSIS — R06.02 SHORTNESS OF BREATH: ICD-10-CM

## 2024-01-31 DIAGNOSIS — I10 ESSENTIAL HYPERTENSION: ICD-10-CM

## 2024-01-31 DIAGNOSIS — R07.9 CHEST PAIN, UNSPECIFIED TYPE: ICD-10-CM

## 2024-01-31 DIAGNOSIS — R06.09 DYSPNEA ON EXERTION: ICD-10-CM

## 2024-01-31 PROCEDURE — 93000 ELECTROCARDIOGRAM COMPLETE: CPT | Performed by: INTERNAL MEDICINE

## 2024-01-31 PROCEDURE — 99214 OFFICE O/P EST MOD 30 MIN: CPT | Performed by: INTERNAL MEDICINE

## 2024-01-31 RX ORDER — CARVEDILOL PHOSPHATE 20 MG/1
40 CAPSULE, EXTENDED RELEASE ORAL DAILY
Qty: 90 CAPSULE | Refills: 3 | Status: SHIPPED | OUTPATIENT
Start: 2024-01-31

## 2024-01-31 RX ORDER — METOPROLOL SUCCINATE 25 MG/1
25 TABLET, EXTENDED RELEASE ORAL DAILY
Qty: 3 TABLET | Refills: 0 | Status: SHIPPED | OUTPATIENT
Start: 2024-01-31 | End: 2024-02-03

## 2024-01-31 RX ORDER — METOPROLOL SUCCINATE 25 MG/1
25 TABLET, EXTENDED RELEASE ORAL DAILY
Qty: 5 TABLET | Refills: 1 | Status: SHIPPED | OUTPATIENT
Start: 2024-01-31 | End: 2024-01-31

## 2024-01-31 NOTE — PROGRESS NOTES
.[facility]                                            Cardiology Follow Up Visit     Inter[facility]iology [facility]    Kiara Ma  1985  [id number]  [facility] [place]  [address]  [address]  [facility] [id number] [phone]    1. Essential hypertension  POCT ECG    CTA cardiac    metoprolol succinate (TOPROL-XL) 25 mg 24 hr tablet    Comprehensive metabolic panel    Lipid panel      2. Chest pain, unspecified type  CTA cardiac    metoprolol succinate (TOPROL-XL) 25 mg 24 hr tablet    Comprehensive metabolic panel    carvedilol (COREG CR) 20 MG 24 hr capsule      3. Shortness of breath  CTA cardiac    metoprolol succinate (TOPROL-XL) 25 mg 24 hr tablet    Comprehensive metabolic panel    Lipid panel    carvedilol (COREG CR) 20 MG 24 hr capsule      4. Dyspnea on exertion  CTA cardiac    Lipid panel    carvedilol (COREG CR) 20 MG 24 hr capsule              Discussion/Summary:    1.  Improved essential hypertension on current regimen  2.  Remittent shortness of breath some at rest some with exertion and associated chest tightness.  Again, able to do her exercise routine at the gym without difficulty.    Plan: Patient has had several stress test over the span of several years all of which have been nonrevealing.  Unclear etiology.  Due to low yield of repeating stress test ordered a coronary CTA.  If this is unrevealing suggest pulmonary evaluation regarding her history of asthma.  Toprol-XL 25 mg given for 3 total doses to achieve a lower heart rate for her coronary CT in addition to her daily carvedilol dose which has been changed to Coreg 20 mg CR.  She has not been taking carvedilol twice a day due to lightheadedness.    Interval History:    38-year-old female here for essential hypertension follow-up    Echocardiogram December 2022 performed for vague shortness of breath was without evidence of ischemia.  Normal LV function    He  has been taking her carvedilol once daily due to lightheadedness.    Admits to shortness of breath and chest tightness intermittently last several months    She is able to do her workout without difficulty at the gym    She notices sometimes when she is sitting she feels sore short of breath with associated chest tightness.    This is somewhat unpredictable.  There seems to be good days and bad days.  Last a few seconds to minutes at a time.    Does not hear herself wheeze.    Patient Active Problem List   Diagnosis    Herniated lumbar intervertebral disc    Asthma in adult, mild intermittent, uncomplicated    Benign essential hypertension    Mitral valve insufficiency    Obesity (BMI 30-39.9)    PVC (premature ventricular contraction)    Fibroids, submucosal    GERD (gastroesophageal reflux disease)    Female infertility    Polycystic ovaries    Migraine without aura and without status migrainosus, not intractable    Family history of sudden cardiac death (SCD)    Iron deficiency anemia due to chronic blood loss    Sickle cell trait (HCC)    Excessive sleepiness    Papilledema    Circadian rhythm sleep disorder    Behaviorally induced insufficient sleep syndrome     Past Medical History:   Diagnosis Date    Abdominal pain     Abnormal ECG     Anemia     Asthma     mild intermittent    GERD (gastroesophageal reflux disease)     History of palpitations     Hypertension     Lower back pain     Migraines     Seasonal allergies     Trace mitral regurgitation by prior echocardiogram     Trigger finger of right thumb 2/28/2017     Social History     Socioeconomic History    Marital status: /Civil Union     Spouse name: Not on file    Number of children: Not on file    Years of education: Not on file    Highest education level: Not on file   Occupational History    Not on file   Tobacco Use    Smoking status: Never    Smokeless tobacco: Never   Vaping Use    Vaping status: Never Used   Substance and Sexual Activity     Alcohol use: Yes     Alcohol/week: 4.0 standard drinks of alcohol     Types: 4 Glasses of wine per week     Comment: socially     Drug use: Yes     Types: Marijuana     Comment: Medical    Sexual activity: Yes     Partners: Male     Birth control/protection: None   Other Topics Concern    Not on file   Social History Narrative    Exercises 3x week    Pet: dog     Social Determinants of Health     Financial Resource Strain: Not on file   Food Insecurity: Not on file   Transportation Needs: Not on file   Physical Activity: Not on file   Stress: Not on file   Social Connections: Not on file   Intimate Partner Violence: Not on file   Housing Stability: Not on file      Family History   Problem Relation Age of Onset    Diabetes Mother         type2    Hypertension Mother     Depression Mother     Thyroid disease Mother     Arthritis Mother     Anxiety disorder Mother     Stroke Father     Diabetes Father         type2    Hypertension Father     Alcohol abuse Father     Substance Abuse Father     Arthritis Father     Drug abuse Father     Dialysis Father     Esophageal cancer Maternal Grandmother     Breast cancer Maternal Grandmother         Maternal great grandmother    Cancer Maternal Grandmother         Pancreatic    Heart attack Maternal Grandfather         acute MI    Arthritis Maternal Grandfather     Stroke Maternal Grandfather         CVA    Hyperlipidemia Maternal Grandfather     Heart disease Maternal Grandfather     Arthritis Paternal Grandfather     Stroke Paternal Grandfather         CVA    Cancer Paternal Grandfather     Hyperlipidemia Paternal Grandfather     Colon cancer Paternal Grandfather     Bipolar disorder Maternal Aunt     Brain cancer Maternal Aunt     Heart attack Maternal Uncle         acute MI    Arthritis Family     Stroke Family         CVA    Cancer Family     Hyperlipidemia Family     Stroke Family         CVA    Hyperlipidemia Family      Past Surgical History:   Procedure Laterality  Date    CHOLECYSTECTOMY      laparoscopic    COLONOSCOPY  2017    FL LUMBAR PUNCTURE DIAGNOSTIC  1/3/2023    HERNIA REPAIR      umbilical    LAPAROTOMY N/A 12/10/2020    Procedure: ABDOMINAL MYOMECTOMY;  Surgeon: Tara Budinetz, DO;  [facility];  Service: Gynecology    LUMBAR LAMINECTOMY Right 09/14/2016    Procedure: Right L5/S1 Metryx microdiscectomy;  Surgeon: Mark Holt MD;  [facility];  Service:     TX COLONOSCOPY FLX DX W/COLLJ SPEC WHEN PFRMD N/A 07/07/2016    Procedure: COLONOSCOPY;  Surgeon: Taty Estrella DO;  [facility]: [facility];  Service: Gastroenterology    TX HYSTEROSCOPY BX ENDOMETRIUM&/POLYPC W/WO D&C N/A 03/19/2018    Procedure: HYSTEROSCOPY; MYOMECTOMY; ENDOMETRIAL BIOPSY;  Surgeon: Tara Budinetz, DO;  [facility];  Service: Gynecology    TX TENDON SHEATH INCISION Right 02/28/2017    Procedure: THUMB TRIGGER RELEASE ;  Surgeon: Prasad Mooney DO;  [facility];  Service: Orthopedics    SPINE SURGERY  9/2016    Microdisectomy    WISDOM TOOTH EXTRACTION         Current Outpatient Medications:     albuterol (ProAir HFA) 90 mcg/act inhaler, Inhale 2 puffs every 4 (four) hours as needed (When has flare up), Disp: 8.5 g, Rfl: 5    carvedilol (COREG CR) 20 MG 24 hr capsule, Take 2 capsules (40 mg total) by mouth daily, Disp: 90 capsule, Rfl: 3    carvedilol (COREG) 25 mg tablet, Take 1 tablet (25 mg total) by mouth 2 (two) times a day with meals, Disp: 180 tablet, Rfl: 0    chlorthalidone 25 mg tablet, Take 1 tablet (25 mg total) by mouth daily, Disp: 90 tablet, Rfl: 2    Cholecalciferol (VITAMIN D3 PO), Take 2,000 mg by mouth daily in the early morning, Disp: , Rfl:     cyclobenzaprine (FLEXERIL) 5 mg tablet, Take 1 tablet (5 mg total) by mouth daily at bedtime, Disp: 30 tablet, Rfl: 0    meloxicam (MOBIC) 7.5 mg tablet, Take 1 tablet (7.5 mg total) by mouth daily, Disp: 15 tablet, Rfl: 0    metoprolol succinate (TOPROL-XL) 25 mg 24 hr tablet, Take 1 tablet  "(25 mg total) by mouth daily Start taking 2 days prior to CT scan and morning of, Disp: 5 tablet, Rfl: 1    pantoprazole (PROTONIX) 40 mg tablet, Take 1 tablet (40 mg total) by mouth daily before breakfast, Disp: 90 tablet, Rfl: 1    Peppermint Oil 90 MG CPCR, Take 360 mg by mouth 3 (three) times a day, Disp: 360 capsule, Rfl: 3    polyethylene glycol (MIRALAX) 17 g packet, Take 17 g by mouth daily, Disp: 30 each, Rfl: 3    potassium chloride (MICRO-K) 10 MEQ CR capsule, Take 1 capsule (10 mEq total) by mouth daily, Disp: 90 capsule, Rfl: 0    methylPREDNISolone 4 MG tablet therapy pack, Use as directed on package (Patient not taking: Reported on 12/13/2023), Disp: 21 each, Rfl: 0    Multiple Vitamins-Minerals (Womens Multivitamin) TABS, Take 1 tablet by mouth daily (Patient not taking: Reported on 1/31/2024), Disp: , Rfl:     polyethylene glycol (GOLYTELY) 4000 mL solution, Take 4,000 mL by mouth once for 1 dose (Patient not taking: Reported on 1/31/2024), Disp: 4000 mL, Rfl: 0  No Known Allergies      Social, Family, Medication history reviewed and updated as necessary      Labs:     Lab Results   Component Value Date     11/17/2015    K 2.8 (L) 08/30/2023    CL 97 08/30/2023    CO2 29 08/30/2023    BUN 13 08/30/2023    CREATININE 1.04 08/30/2023    CREATININE 1.04 12/20/2022    GLUCOSE 85 11/17/2015    CALCIUM 9.7 08/30/2023       Lab Results   Component Value Date    WBC 9.06 08/30/2023    HGB 11.9 08/30/2023    HGB 12.9 12/20/2022    HCT 37.8 08/30/2023    HCT 40.7 12/20/2022     08/30/2023     01/03/2023       Lab Results   Component Value Date    CHOL 181 06/23/2015    CHOL 187 01/13/2015     Lab Results   Component Value Date    HDL 61 08/30/2023    HDL 74 04/14/2022     Lab Results   Component Value Date    LDLCALC 131 (H) 08/30/2023    LDLCALC 91 04/14/2022     No results found for: \"LDLDIRECT\"          Lab Results   Component Value Date    TRIG 99 08/30/2023    TRIG 94 04/14/2022 " "      Lab Results   Component Value Date    ALT 20 08/30/2023    ALT 21 12/20/2022    AST 17 08/30/2023    AST 11 12/20/2022    ALKPHOS 45 08/30/2023    ALKPHOS 51 12/20/2022       Lab Results   Component Value Date    INR 0.92 01/03/2023    INR 0.98 12/10/2020       No results found for: \"NTBNP\"    Lab Results   Component Value Date    HGBA1C 6.3 (H) 08/30/2023    HGBA1C 6.1 (H) 02/01/2022    HGBA1C 6.0 (H) 06/01/2021           Imaging: Reviewed in epic        Review of Systems:  14 systems reviewed and negative with exception of the above        PHYSICAL EXAM:      Vitals:    01/31/24 1408   BP: 110/70   Pulse: 72   SpO2: 100%       Body mass index is 33.3 kg/m².   Weight (last 2 days)       Date/Time Weight    01/31/24 1408 88 (194)              Gen: No acute distress  HEENT: anicteric, mucous membranes moist  Neck: supple, no jugular venous distention, or carotid bruit  Heart: regular, normal s1 and s2, no murmur/rub or gallop  Lungs :clear to auscultation bilaterally, no rales/rhonchi or wheeze  Abdomen: soft nontender, normoactive bowel sounds, no organomegaly  Ext: warm and perfused, normal femoral pulses, no edema, or clubbing  Skin: warm, no rashes  Neuro: AAO x 3, no focal findings  Psychiatric: normal affect  Musculoskeletal: no obvious joint deformities.        This note was completed in part utilizing [employer] direct voice recognition software.   Grammatical errors, random word insertion, spelling mistakes, and incomplete sentences may be an occasional consequence of the system secondary to software limitations, ambient noise and hardware issues. At the time of dictation, efforts were made to edit, clarify and /or correct errors.  Please read the chart carefully and recognize, using context, where substitutions have occurred.  If you have any questions or concerns about the context, text or information contained within the body of this dictation, please contact myself, the provider, for further " clarification.

## 2024-02-01 ENCOUNTER — TELEPHONE (OUTPATIENT)
Dept: OBGYN CLINIC | Facility: CLINIC | Age: 39
End: 2024-02-01

## 2024-02-01 NOTE — TELEPHONE ENCOUNTER
----- Message from Prasad Zeng MD sent at 2/1/2024  8:55 AM EST -----  Vaginal culture results were all negative    Thanks

## 2024-02-06 ENCOUNTER — ANESTHESIA (OUTPATIENT)
Dept: GASTROENTEROLOGY | Facility: AMBULARY SURGERY CENTER | Age: 39
End: 2024-02-06

## 2024-02-06 ENCOUNTER — HOSPITAL ENCOUNTER (OUTPATIENT)
Dept: GASTROENTEROLOGY | Facility: AMBULARY SURGERY CENTER | Age: 39
Setting detail: OUTPATIENT SURGERY
Discharge: HOME | End: 2024-02-06
Payer: COMMERCIAL

## 2024-02-06 ENCOUNTER — ANESTHESIA EVENT (OUTPATIENT)
Dept: GASTROENTEROLOGY | Facility: AMBULARY SURGERY CENTER | Age: 39
End: 2024-02-06

## 2024-02-06 VITALS
TEMPERATURE: 97.5 F | WEIGHT: 189 LBS | HEART RATE: 75 BPM | OXYGEN SATURATION: 100 % | DIASTOLIC BLOOD PRESSURE: 83 MMHG | HEIGHT: 63 IN | BODY MASS INDEX: 33.49 KG/M2 | SYSTOLIC BLOOD PRESSURE: 141 MMHG | RESPIRATION RATE: 18 BRPM

## 2024-02-06 DIAGNOSIS — E61.1 IRON DEFICIENCY: ICD-10-CM

## 2024-02-06 PROCEDURE — 45380 COLONOSCOPY AND BIOPSY: CPT | Performed by: INTERNAL MEDICINE

## 2024-02-06 PROCEDURE — 43239 EGD BIOPSY SINGLE/MULTIPLE: CPT | Performed by: INTERNAL MEDICINE

## 2024-02-06 PROCEDURE — 88305 TISSUE EXAM BY PATHOLOGIST: CPT | Performed by: PATHOLOGY

## 2024-02-06 RX ORDER — PROPOFOL 10 MG/ML
INJECTION, EMULSION INTRAVENOUS AS NEEDED
Status: DISCONTINUED | OUTPATIENT
Start: 2024-02-06 | End: 2024-02-06

## 2024-02-06 RX ORDER — SODIUM CHLORIDE, SODIUM LACTATE, POTASSIUM CHLORIDE, CALCIUM CHLORIDE 600; 310; 30; 20 MG/100ML; MG/100ML; MG/100ML; MG/100ML
INJECTION, SOLUTION INTRAVENOUS CONTINUOUS PRN
Status: DISCONTINUED | OUTPATIENT
Start: 2024-02-06 | End: 2024-02-06

## 2024-02-06 RX ADMIN — PROPOFOL 50 MG: 10 INJECTION, EMULSION INTRAVENOUS at 12:29

## 2024-02-06 RX ADMIN — PROPOFOL 150 MG: 10 INJECTION, EMULSION INTRAVENOUS at 12:24

## 2024-02-06 RX ADMIN — PROPOFOL 50 MG: 10 INJECTION, EMULSION INTRAVENOUS at 12:38

## 2024-02-06 RX ADMIN — PROPOFOL 50 MG: 10 INJECTION, EMULSION INTRAVENOUS at 12:27

## 2024-02-06 RX ADMIN — PROPOFOL 50 MG: 10 INJECTION, EMULSION INTRAVENOUS at 12:31

## 2024-02-06 RX ADMIN — SODIUM CHLORIDE, SODIUM LACTATE, POTASSIUM CHLORIDE, AND CALCIUM CHLORIDE: .6; .31; .03; .02 INJECTION, SOLUTION INTRAVENOUS at 12:22

## 2024-02-06 RX ADMIN — PROPOFOL 50 MG: 10 INJECTION, EMULSION INTRAVENOUS at 12:33

## 2024-02-06 RX ADMIN — PROPOFOL 50 MG: 10 INJECTION, EMULSION INTRAVENOUS at 12:42

## 2024-02-06 NOTE — ANESTHESIA POSTPROCEDURE EVALUATION
Post-Op Assessment Note    CV Status:  Stable    Pain management: adequate       Mental Status:  Alert and awake   Hydration Status:  Euvolemic   PONV Controlled:  Controlled   Airway Patency:  Patent     Post Op Vitals Reviewed: Yes    No anethesia notable event occurred.    Staff: Anesthesiologist, CRNA               BP   111/66   Temp 98   Pulse 78   Resp 14   SpO2 95

## 2024-02-06 NOTE — ANESTHESIA PROCEDURE NOTES
Anesthesia Notable Event    Date/Time: 2/6/2024 4:06 PM    Performed by: Andre Lyman MD  Authorized by: Andre Lyman MD

## 2024-02-06 NOTE — ANESTHESIA PREPROCEDURE EVALUATION
Procedure:  EGD  COLONOSCOPY    Relevant Problems   CARDIO   (+) Benign essential hypertension   (+) Migraine without aura and without status migrainosus, not intractable   (+) Mitral valve insufficiency   (+) PVC (premature ventricular contraction)      GI/HEPATIC   (+) GERD (gastroesophageal reflux disease)      HEMATOLOGY   (+) Iron deficiency anemia due to chronic blood loss      NEURO/PSYCH   (+) Migraine without aura and without status migrainosus, not intractable      PULMONARY   (+) Asthma in adult, mild intermittent, uncomplicated    Has not experienced any secondary effects of sickle cell trait in the past. Refused pregnancy test, discussed risks involved to baby if potentially pregnant. Patient voiced understanding and agreed to anesthesia.     Physical Exam    Airway    Mallampati score: I  TM Distance: >3 FB  Neck ROM: full     Dental       Cardiovascular  Cardiovascular exam normal    Pulmonary  Pulmonary exam normal     Other Findings  post-pubertal.      Anesthesia Plan  ASA Score- 2     Anesthesia Type- IV sedation with anesthesia with ASA Monitors.         Additional Monitors:     Airway Plan:            Plan Factors-Exercise tolerance (METS): >4 METS.    Chart reviewed. EKG reviewed.  Existing labs reviewed. Patient summary reviewed.    Patient is not a current smoker.  Patient did not smoke on day of surgery.    Obstructive sleep apnea risk education given perioperatively.        Induction- intravenous.    Postoperative Plan- Plan for postoperative opioid use.     Informed Consent- Anesthetic plan and risks discussed with patient and spouse.  I personally reviewed this patient with the CRNA. Discussed and agreed on the Anesthesia Plan with the CRNA..

## 2024-02-06 NOTE — H&P
History and Physical - [facility]  Kiara Ma 38 y.o. female MRN: [id number]                  HPI: Kiara Ma is a 38 y.o. year old female who presents for dysphagia, iron deficiency anemia.      REVIEW OF SYSTEMS: Per the HPI, and otherwise unremarkable.    Historical Information   Past Medical History:   Diagnosis Date    Abdominal pain     Abnormal ECG     Anemia     Asthma     mild intermittent    GERD (gastroesophageal reflux disease)     History of palpitations     Hypertension     Lower back pain     Migraines     Seasonal allergies     Trace mitral regurgitation by prior echocardiogram     Trigger finger of right thumb 2/28/2017     Past Surgical History:   Procedure Laterality Date    CHOLECYSTECTOMY      laparoscopic    COLONOSCOPY  2017    FL LUMBAR PUNCTURE DIAGNOSTIC  1/3/2023    HERNIA REPAIR      umbilical    LAPAROTOMY N/A 12/10/2020    Procedure: ABDOMINAL MYOMECTOMY;  Surgeon: Tara Budinetz, DO;  [facility];  Service: Gynecology    LUMBAR LAMINECTOMY Right 09/14/2016    Procedure: Right L5/S1 Metryx microdiscectomy;  Surgeon: Mark Holt MD;  [facility];  Service:     PA COLONOSCOPY FLX DX W/COLLJ SPEC WHEN PFRMD N/A 07/07/2016    Procedure: COLONOSCOPY;  Surgeon: Taty Estrella DO;  [facility];  Service: Gastroenterology    PA HYSTEROSCOPY BX ENDOMETRIUM&/POLYPC W/WO D&C N/A 03/19/2018    Procedure: HYSTEROSCOPY; MYOMECTOMY; ENDOMETRIAL BIOPSY;  Surgeon: Tara Budinetz, DO;  [facility];  Service: Gynecology    PA TENDON SHEATH INCISION Right 02/28/2017    Procedure: THUMB TRIGGER RELEASE ;  Surgeon: Prasad Mooney DO;  [facility];  Service: Orthopedics    SPINE SURGERY  9/2016    Microdisectomy    WISDOM TOOTH EXTRACTION       Social History   Social History     Substance and Sexual Activity   Alcohol Use Yes    Alcohol/week: 4.0 standard drinks of alcohol    Types: 4 Glasses of wine per week    Comment:  socially      Social History     Substance and Sexual Activity   Drug Use Yes    Types: Marijuana    Comment: Medical     Social History     Tobacco Use   Smoking Status Never   Smokeless Tobacco Never     Family History   Problem Relation Age of Onset    Diabetes Mother         type2    Hypertension Mother     Depression Mother     Thyroid disease Mother     Arthritis Mother     Anxiety disorder Mother     Stroke Father     Diabetes Father         type2    Hypertension Father     Alcohol abuse Father     Substance Abuse Father     Arthritis Father     Drug abuse Father     Dialysis Father     Esophageal cancer Maternal Grandmother     Breast cancer Maternal Grandmother         Maternal great grandmother    Cancer Maternal Grandmother         Pancreatic    Heart attack Maternal Grandfather         acute MI    Arthritis Maternal Grandfather     Stroke Maternal Grandfather         CVA    Hyperlipidemia Maternal Grandfather     Heart disease Maternal Grandfather     Arthritis Paternal Grandfather     Stroke Paternal Grandfather         CVA    Cancer Paternal Grandfather     Hyperlipidemia Paternal Grandfather     Colon cancer Paternal Grandfather     Bipolar disorder Maternal Aunt     Brain cancer Maternal Aunt     Heart attack Maternal Uncle         acute MI    Arthritis Family     Stroke Family         CVA    Cancer Family     Hyperlipidemia Family     Stroke Family         CVA    Hyperlipidemia Family        Meds/Allergies       Current Outpatient Medications:     carvedilol (COREG CR) 20 MG 24 hr capsule    chlorthalidone 25 mg tablet    Multiple Vitamins-Minerals (Womens Multivitamin) TABS    pantoprazole (PROTONIX) 40 mg tablet    potassium chloride (MICRO-K) 10 MEQ CR capsule    albuterol (ProAir HFA) 90 mcg/act inhaler    Cholecalciferol (VITAMIN D3 PO)    cyclobenzaprine (FLEXERIL) 5 mg tablet    meloxicam (MOBIC) 7.5 mg tablet    metoprolol succinate (TOPROL-XL) 25 mg 24 hr tablet    Peppermint Oil 90 MG  "CPCR    polyethylene glycol (GOLYTELY) 4000 mL solution    No Known Allergies    Objective     /82   Pulse 75   Temp (!) 97.1 °F (36.2 °C) (Temporal)   Resp 18   Ht 5' 3\" (1.6 m)   Wt 85.7 kg (189 lb)   LMP 01/11/2024 (Exact Date)   SpO2 98%   BMI 33.48 kg/m²       PHYSICAL EXAM    Gen: NAD  Head: NCAT  CV: RRR  CHEST: Clear  ABD: soft, NT/ND  EXT: no edema      ASSESSMENT/PLAN:  This is a 38 y.o. year old female here for EGD & colonoscopy, and she is stable and optimized for her procedure.        "

## 2024-02-08 PROCEDURE — 88305 TISSUE EXAM BY PATHOLOGIST: CPT | Performed by: PATHOLOGY

## 2024-02-12 ENCOUNTER — ANNUAL EXAM (OUTPATIENT)
Dept: OBGYN CLINIC | Facility: CLINIC | Age: 39
End: 2024-02-12
Payer: COMMERCIAL

## 2024-02-12 VITALS
WEIGHT: 195.6 LBS | SYSTOLIC BLOOD PRESSURE: 120 MMHG | BODY MASS INDEX: 34.66 KG/M2 | DIASTOLIC BLOOD PRESSURE: 78 MMHG | HEIGHT: 63 IN

## 2024-02-12 DIAGNOSIS — N94.10 DYSPAREUNIA IN FEMALE: ICD-10-CM

## 2024-02-12 DIAGNOSIS — Z01.419 ENCOUNTER FOR GYNECOLOGICAL EXAMINATION WITHOUT ABNORMAL FINDING: Primary | ICD-10-CM

## 2024-02-12 DIAGNOSIS — Z12.31 ENCOUNTER FOR SCREENING MAMMOGRAM FOR MALIGNANT NEOPLASM OF BREAST: ICD-10-CM

## 2024-02-12 DIAGNOSIS — Z01.419 PAP SMEAR, AS PART OF ROUTINE GYNECOLOGICAL EXAMINATION: ICD-10-CM

## 2024-02-12 PROCEDURE — G0145 SCR C/V CYTO,THINLAYER,RESCR: HCPCS | Performed by: OBSTETRICS & GYNECOLOGY

## 2024-02-12 PROCEDURE — G0476 HPV COMBO ASSAY CA SCREEN: HCPCS | Performed by: OBSTETRICS & GYNECOLOGY

## 2024-02-12 PROCEDURE — S0612 ANNUAL GYNECOLOGICAL EXAMINA: HCPCS | Performed by: OBSTETRICS & GYNECOLOGY

## 2024-02-12 NOTE — PROGRESS NOTES
Assessment/Plan:    No problem-specific Assessment & Plan notes found for this encounter.       Diagnoses and all orders for this visit:    Encounter for gynecological examination without abnormal finding  -     Liquid-based pap, screening  -     HPV High Risk    Pap smear, as part of routine gynecological examination    Encounter for screening mammogram for malignant neoplasm of breast  -     Mammo screening bilateral w 3d & cad; Future    Dyspareunia in female  -     estradiol (ESTRACE VAGINAL) 0.1 mg/g vaginal cream; Apply pea sized amount intravaginally twice weekly          Normal gynecological physical examination.  Self-breast examination stressed.  Mammogram ordered.  Discussed regular exercise, healthy diet, importance of vitamin D and calcium supplements.  Discussed importance of sun block use during periods of prolonged sun exposure.  Patient will be seen in 1 year for routine gynecologic and medical examination.  Patient will call office for any problems, concerns, or issues which may arise during the interim.     Subjective:          Niru is a 38-year-old female with a past medical history of PCOS and submucosal uterine fibroids presenting to the office for an annual well visit.  She has been experiencing left-sided pelvic pain that is worse midcycle during ovulation, and feels similar to cystic pain she has had in the past.  She had a pelvic ultrasound last year which showed an 8 cm ovarian cyst.  She has also been experiencing dyspareunia and dryness during intercourse, for which she has already tried over-the-counter lubrication and coconut oil with no relief.  She has regular periods (LMP 2/6) lasting 5 to 6 days every 28 to 31 days.  She denies any change to bowel or bladder, abnormal discharge, burning, itching, abdominal pain, nausea, or vomiting.  Recommend beginning pea-sized amount of intravaginal estrogen twice weekly for vaginal dryness.  Scheduled follow-up ultrasound for PCOS for next  month.  Follow-up in 3 to 4 months.         Patient ID: Kiara Ma is a 38 y.o. female who presents today for her annual gynecologic and medical examination    Menstrual status: Having periods LMP 2/6    Vasomotor symptoms: None    Patient reports normal appetite    Patient reports normal bowel and bladder habits    Patient denies any significant pelvic or abdominal pain    Patient denies any headaches, chest pain, shortness of breath fever shakes or chills    Patient denies any COVID 19 symptoms including cough or loss of taste or smell    COVID vaccine status: Up-to-date    Medical problems: PCOS, mucosal fibroids, mitral valve insufficiency, migraine without aura, hypertension    Colonoscopy status: Deferred    Mammogram status: Deferred    The following portions of the patient's history were reviewed and updated as appropriate: allergies, current medications, past family history, past medical history, past social history, past surgical history and problem list.    Review of Systems   Constitutional: Negative.  Negative for appetite change, diaphoresis, fatigue, fever and unexpected weight change.   HENT: Negative.     Eyes: Negative.    Respiratory: Negative.     Cardiovascular: Negative.         For mitral valve insufficiency, hypertension, PVC, family history of sudden cardiac death   Gastrointestinal: Negative.  Negative for abdominal pain, blood in stool, constipation, diarrhea, nausea and vomiting.   Endocrine: Negative.  Negative for cold intolerance and heat intolerance.   Genitourinary:  Positive for dyspareunia and pelvic pain. Negative for dysuria, frequency, hematuria, urgency, vaginal bleeding, vaginal discharge and vaginal pain.        PCOS presenting as left-sided midcycle cystic pain.  Dryness and vaginal irritation with intercourse.   Musculoskeletal: Negative.    Skin: Negative.    Allergic/Immunologic: Negative.    Neurological: Negative.    Hematological: Negative.  Negative for  "adenopathy.        Sickle cell trait   Psychiatric/Behavioral: Negative.           Objective:      /78   Ht 5' 3\" (1.6 m)   Wt 88.7 kg (195 lb 9.6 oz)   LMP 02/06/2024 (Exact Date)   BMI 34.65 kg/m²          Physical Exam  Constitutional:       General: She is not in acute distress.     Appearance: Normal appearance. She is well-developed. She is not diaphoretic.   HENT:      Head: Normocephalic and atraumatic.   Eyes:      Pupils: Pupils are equal, round, and reactive to light.   Cardiovascular:      Rate and Rhythm: Normal rate and regular rhythm.      Heart sounds: Normal heart sounds. No murmur heard.     No friction rub. No gallop.   Pulmonary:      Effort: Pulmonary effort is normal.      Breath sounds: Normal breath sounds.   Chest:   Breasts:     Breasts are symmetrical.      Right: No inverted nipple, mass, nipple discharge, skin change or tenderness.      Left: No inverted nipple, mass, nipple discharge, skin change or tenderness.   Abdominal:      General: Bowel sounds are normal.      Palpations: Abdomen is soft.   Genitourinary:     Exam position: Supine.      Labia:         Right: No rash or lesion.         Left: No rash or lesion.       Vagina: Normal. No vaginal discharge, erythema, tenderness or bleeding.      Cervix: No discharge or friability.      Uterus: Not enlarged and not tender.       Adnexa:         Right: No mass, tenderness or fullness.          Left: No mass, tenderness or fullness.        Comments: Normal vaginal exam.  Musculoskeletal:         General: Normal range of motion.      Cervical back: Normal range of motion and neck supple.   Lymphadenopathy:      Cervical: No cervical adenopathy.      Upper Body:      Right upper body: No supraclavicular adenopathy.      Left upper body: No supraclavicular adenopathy.   Skin:     General: Skin is warm and dry.      Findings: No rash.   Neurological:      General: No focal deficit present.      Mental Status: She is alert and " oriented to person, place, and time.   Psychiatric:         Mood and Affect: Mood normal.         Speech: Speech normal.         Behavior: Behavior normal.         Thought Content: Thought content normal.         Judgment: Judgment normal.

## 2024-02-12 NOTE — PATIENT INSTRUCTIONS
Normal gynecological physical examination.  Self-breast examination stressed  Discussed regular exercise, healthy diet, importance of vitamin D and calcium supplements.  Discussed importance of sun block use during periods of prolonged sun exposure.  Patient will be seen in 1 month for pelvic ultrasound to evaluate PCOS.  Patient will be seen in 3 to 4 months for follow-up for dyspareunia.   Patient will call office for any problems, concerns, or issues which may arise during the interim.,

## 2024-02-16 ENCOUNTER — TELEPHONE (OUTPATIENT)
Dept: FAMILY MEDICINE CLINIC | Facility: CLINIC | Age: 39
End: 2024-02-16

## 2024-02-16 NOTE — TELEPHONE ENCOUNTER
"----- Message from Aspen Moore RN sent at 2/16/2024  3:52 PM EST -----  Regarding: FW: Irritated skin on neck   Contact: [phone]    ----- Message -----  From: Kiara Ma E \"Niru\"  Sent: 2/16/2024   3:37 PM EST  To: [facility]  Subject: Irritated skin on neck                           Hi Dr. Rhodes, over the last few weeks my neck and chest has become intermittenrly itchy and painful. I've developed dark, textured spots on my neck. I haven't changed any lotions, creams or anything else that may be causing this. I've been using anti itch cream but it only works for a short time    "

## 2024-02-16 NOTE — TELEPHONE ENCOUNTER
Called the patient and she said that the rash is very itchy and it is from the base of the neck to her chest. If feels like alligator skin. Patient scheduled

## 2024-02-18 RX ORDER — ESTRADIOL 0.1 MG/G
CREAM VAGINAL
Qty: 42.5 G | Refills: 2 | Status: SHIPPED | OUTPATIENT
Start: 2024-02-18

## 2024-02-19 LAB
LAB AP GYN PRIMARY INTERPRETATION: NORMAL
Lab: NORMAL

## 2024-02-22 ENCOUNTER — OFFICE VISIT (OUTPATIENT)
Dept: FAMILY MEDICINE CLINIC | Facility: CLINIC | Age: 39
End: 2024-02-22
Payer: COMMERCIAL

## 2024-02-22 VITALS
RESPIRATION RATE: 16 BRPM | OXYGEN SATURATION: 98 % | BODY MASS INDEX: 34.62 KG/M2 | WEIGHT: 195.4 LBS | HEART RATE: 77 BPM | HEIGHT: 63 IN | DIASTOLIC BLOOD PRESSURE: 86 MMHG | SYSTOLIC BLOOD PRESSURE: 110 MMHG | TEMPERATURE: 97.7 F

## 2024-02-22 DIAGNOSIS — L30.8 OTHER ECZEMA: Primary | ICD-10-CM

## 2024-02-22 PROCEDURE — 99213 OFFICE O/P EST LOW 20 MIN: CPT | Performed by: FAMILY MEDICINE

## 2024-02-22 RX ORDER — TRIAMCINOLONE ACETONIDE 5 MG/G
CREAM TOPICAL 3 TIMES DAILY
Qty: 30 G | Refills: 0 | Status: SHIPPED | OUTPATIENT
Start: 2024-02-22

## 2024-02-26 ENCOUNTER — CLINICAL SUPPORT (OUTPATIENT)
Dept: FAMILY MEDICINE CLINIC | Facility: CLINIC | Age: 39
End: 2024-02-26
Payer: COMMERCIAL

## 2024-02-26 DIAGNOSIS — Z11.1 ENCOUNTER FOR PPD SKIN TEST READING: ICD-10-CM

## 2024-02-26 DIAGNOSIS — Z11.1 SCREENING FOR TUBERCULOSIS: Primary | ICD-10-CM

## 2024-02-28 LAB
INDURATION: 0 MM
TB SKIN TEST: NEGATIVE

## 2024-02-28 PROCEDURE — 86580 TB INTRADERMAL TEST: CPT

## 2024-02-28 NOTE — PROGRESS NOTES
Name: Kiara Ma      : 1985      MRN: [id number]  Encounter Provider: Adriana Earl  Encounter Date: 2024   Encounter department: [facility]    Assessment & Plan     1. Screening for tuberculosis  -     TB Skin Test    2. Encounter for PPD skin test reading           Subjective          Current Outpatient Medications on File Prior to Visit   Medication Sig    albuterol (ProAir HFA) 90 mcg/act inhaler Inhale 2 puffs every 4 (four) hours as needed (When has flare up)    carvedilol (COREG CR) 20 MG 24 hr capsule Take 2 capsules (40 mg total) by mouth daily    chlorthalidone 25 mg tablet Take 1 tablet (25 mg total) by mouth daily    Cholecalciferol (VITAMIN D3 PO) Take 2,000 mg by mouth daily in the early morning    cyclobenzaprine (FLEXERIL) 5 mg tablet Take 1 tablet (5 mg total) by mouth daily at bedtime    estradiol (ESTRACE VAGINAL) 0.1 mg/g vaginal cream Apply pea sized amount intravaginally twice weekly    meloxicam (MOBIC) 7.5 mg tablet Take 1 tablet (7.5 mg total) by mouth daily    metoprolol succinate (TOPROL-XL) 25 mg 24 hr tablet Take 1 tablet (25 mg total) by mouth daily for 3 doses Start taking 2 days prior to CT scan and morning of    Multiple Vitamins-Minerals (Womens Multivitamin) TABS Take 1 tablet by mouth daily    pantoprazole (PROTONIX) 40 mg tablet Take 1 tablet (40 mg total) by mouth daily before breakfast    Peppermint Oil 90 MG CPCR Take 360 mg by mouth 3 (three) times a day (Patient not taking: Reported on 2024)    potassium chloride (MICRO-K) 10 MEQ CR capsule Take 1 capsule (10 mEq total) by mouth daily    triamcinolone (KENALOG) 0.5 % cream Apply topically 3 (three) times a day       Objective     LMP 2024 (Exact Date)       Jillian Newton

## 2024-03-05 ENCOUNTER — APPOINTMENT (OUTPATIENT)
Dept: LAB | Facility: CLINIC | Age: 39
End: 2024-03-05
Payer: COMMERCIAL

## 2024-03-05 DIAGNOSIS — I10 ESSENTIAL HYPERTENSION: ICD-10-CM

## 2024-03-05 DIAGNOSIS — R06.09 DYSPNEA ON EXERTION: ICD-10-CM

## 2024-03-05 DIAGNOSIS — R06.02 SHORTNESS OF BREATH: ICD-10-CM

## 2024-03-05 DIAGNOSIS — R07.9 CHEST PAIN, UNSPECIFIED TYPE: ICD-10-CM

## 2024-03-05 DIAGNOSIS — K76.0 FATTY LIVER: ICD-10-CM

## 2024-03-05 DIAGNOSIS — Z86.39 HISTORY OF IRON DEFICIENCY: ICD-10-CM

## 2024-03-05 LAB
ALBUMIN SERPL BCP-MCNC: 4.3 G/DL (ref 3.5–5)
ALP SERPL-CCNC: 46 U/L (ref 34–104)
ALT SERPL W P-5'-P-CCNC: 13 U/L (ref 7–52)
ANION GAP SERPL CALCULATED.3IONS-SCNC: 10 MMOL/L
AST SERPL W P-5'-P-CCNC: 15 U/L (ref 13–39)
BASOPHILS # BLD AUTO: 0.04 THOUSANDS/ÂΜL (ref 0–0.1)
BASOPHILS NFR BLD AUTO: 1 % (ref 0–1)
BILIRUB SERPL-MCNC: 0.33 MG/DL (ref 0.2–1)
BUN SERPL-MCNC: 16 MG/DL (ref 5–25)
CALCIUM SERPL-MCNC: 9.2 MG/DL (ref 8.4–10.2)
CHLORIDE SERPL-SCNC: 97 MMOL/L (ref 96–108)
CO2 SERPL-SCNC: 31 MMOL/L (ref 21–32)
CREAT SERPL-MCNC: 1.05 MG/DL (ref 0.6–1.3)
EOSINOPHIL # BLD AUTO: 0.08 THOUSAND/ÂΜL (ref 0–0.61)
EOSINOPHIL NFR BLD AUTO: 1 % (ref 0–6)
ERYTHROCYTE [DISTWIDTH] IN BLOOD BY AUTOMATED COUNT: 15.3 % (ref 11.6–15.1)
FERRITIN SERPL-MCNC: 11 NG/ML (ref 11–307)
GFR SERPL CREATININE-BSD FRML MDRD: 67 ML/MIN/1.73SQ M
GLUCOSE SERPL-MCNC: 86 MG/DL (ref 65–140)
HCT VFR BLD AUTO: 38 % (ref 34.8–46.1)
HGB BLD-MCNC: 11.8 G/DL (ref 11.5–15.4)
IMM GRANULOCYTES # BLD AUTO: 0.03 THOUSAND/UL (ref 0–0.2)
IMM GRANULOCYTES NFR BLD AUTO: 0 % (ref 0–2)
LYMPHOCYTES # BLD AUTO: 2.01 THOUSANDS/ÂΜL (ref 0.6–4.47)
LYMPHOCYTES NFR BLD AUTO: 26 % (ref 14–44)
MCH RBC QN AUTO: 24.6 PG (ref 26.8–34.3)
MCHC RBC AUTO-ENTMCNC: 31.1 G/DL (ref 31.4–37.4)
MCV RBC AUTO: 79 FL (ref 82–98)
MONOCYTES # BLD AUTO: 0.45 THOUSAND/ÂΜL (ref 0.17–1.22)
MONOCYTES NFR BLD AUTO: 6 % (ref 4–12)
NEUTROPHILS # BLD AUTO: 5.18 THOUSANDS/ÂΜL (ref 1.85–7.62)
NEUTS SEG NFR BLD AUTO: 66 % (ref 43–75)
NRBC BLD AUTO-RTO: 0 /100 WBCS
PLATELET # BLD AUTO: 270 THOUSANDS/UL (ref 149–390)
PMV BLD AUTO: 12 FL (ref 8.9–12.7)
POTASSIUM SERPL-SCNC: 3.1 MMOL/L (ref 3.5–5.3)
PROT SERPL-MCNC: 7 G/DL (ref 6.4–8.4)
RBC # BLD AUTO: 4.8 MILLION/UL (ref 3.81–5.12)
SODIUM SERPL-SCNC: 138 MMOL/L (ref 135–147)
WBC # BLD AUTO: 7.79 THOUSAND/UL (ref 4.31–10.16)

## 2024-03-05 PROCEDURE — 82728 ASSAY OF FERRITIN: CPT

## 2024-03-05 PROCEDURE — 80053 COMPREHEN METABOLIC PANEL: CPT

## 2024-03-05 PROCEDURE — 36415 COLL VENOUS BLD VENIPUNCTURE: CPT

## 2024-03-05 PROCEDURE — 85025 COMPLETE CBC W/AUTO DIFF WBC: CPT

## 2024-03-06 ENCOUNTER — TELEPHONE (OUTPATIENT)
Dept: CARDIOLOGY CLINIC | Facility: CLINIC | Age: 39
End: 2024-03-06

## 2024-03-06 RX ORDER — POTASSIUM CHLORIDE 750 MG/1
CAPSULE, EXTENDED RELEASE ORAL
COMMUNITY

## 2024-03-06 NOTE — TELEPHONE ENCOUNTER
Spoke with the patient regarding her lab results and low potassium. She will increase dose to 20 meq daily. She verbalized understanding.

## 2024-03-06 NOTE — TELEPHONE ENCOUNTER
----- Message from Hernandez Jimenes, DO sent at 3/6/2024  9:05 AM EST -----  Please let patient know lab results looked fine with exception of potassium. Recommend increasing to 20meq daily as she is currently on 10

## 2024-03-07 ENCOUNTER — HOSPITAL ENCOUNTER (OUTPATIENT)
Dept: CT IMAGING | Facility: HOSPITAL | Age: 39
Discharge: HOME | End: 2024-03-07
Attending: INTERNAL MEDICINE
Payer: COMMERCIAL

## 2024-03-07 VITALS — RESPIRATION RATE: 20 BRPM | DIASTOLIC BLOOD PRESSURE: 66 MMHG | HEART RATE: 62 BPM | SYSTOLIC BLOOD PRESSURE: 111 MMHG

## 2024-03-07 DIAGNOSIS — R06.09 DYSPNEA ON EXERTION: ICD-10-CM

## 2024-03-07 DIAGNOSIS — R07.9 CHEST PAIN, UNSPECIFIED TYPE: ICD-10-CM

## 2024-03-07 DIAGNOSIS — I10 ESSENTIAL HYPERTENSION: ICD-10-CM

## 2024-03-07 DIAGNOSIS — R06.02 SHORTNESS OF BREATH: ICD-10-CM

## 2024-03-07 PROCEDURE — G1004 CDSM NDSC: HCPCS

## 2024-03-07 PROCEDURE — 75574 CT ANGIO HRT W/3D IMAGE: CPT

## 2024-03-07 RX ORDER — NITROGLYCERIN 0.4 MG/1
0.4 TABLET SUBLINGUAL ONCE
Status: COMPLETED | OUTPATIENT
Start: 2024-03-07 | End: 2024-03-07

## 2024-03-07 RX ORDER — METOPROLOL TARTRATE 1 MG/ML
5 INJECTION, SOLUTION INTRAVENOUS
Status: DISCONTINUED | OUTPATIENT
Start: 2024-03-07 | End: 2024-03-08 | Stop reason: HOSPADM

## 2024-03-07 RX ADMIN — IOHEXOL 80 ML: 350 INJECTION, SOLUTION INTRAVENOUS at 15:04

## 2024-03-07 RX ADMIN — NITROGLYCERIN 0.4 MG: 0.4 TABLET SUBLINGUAL at 14:57

## 2024-03-07 NOTE — NURSING NOTE
Patient arrived for cardiac CT angiography. Test education reviewed with patient. Medications and allergies verified. Patient took po Toprol 25 mg two days prior and today as instructed by cardiology. SL nitro given per protocol. See vitals flowsheet. Tolerated test well. Instructed to increase fluid intake remainder of day. Vitals stable, patient asymptomatic upon departure with .

## 2024-03-13 ENCOUNTER — CLINICAL SUPPORT (OUTPATIENT)
Dept: FAMILY MEDICINE CLINIC | Facility: CLINIC | Age: 39
End: 2024-03-13
Payer: COMMERCIAL

## 2024-03-13 DIAGNOSIS — Z11.1 SCREENING FOR TUBERCULOSIS: Primary | ICD-10-CM

## 2024-03-13 PROCEDURE — 86580 TB INTRADERMAL TEST: CPT

## 2024-03-13 NOTE — PROGRESS NOTES
Name: Kiara Ma      : 1985      MRN: [id number]  Encounter Provider: Lesley Toledo LPN  Encounter Date: 3/13/2024   Encounter department: [facility]    Assessment & Plan     1. Screening for tuberculosis  -     TB Skin Test           Subjective      Current Outpatient Medications on File Prior to Visit   Medication Sig    albuterol (ProAir HFA) 90 mcg/act inhaler Inhale 2 puffs every 4 (four) hours as needed (When has flare up)    carvedilol (COREG CR) 20 MG 24 hr capsule Take 2 capsules (40 mg total) by mouth daily    chlorthalidone 25 mg tablet Take 1 tablet (25 mg total) by mouth daily    Cholecalciferol (VITAMIN D3 PO) Take 2,000 mg by mouth daily in the early morning    cyclobenzaprine (FLEXERIL) 5 mg tablet Take 1 tablet (5 mg total) by mouth daily at bedtime    estradiol (ESTRACE VAGINAL) 0.1 mg/g vaginal cream Apply pea sized amount intravaginally twice weekly    meloxicam (MOBIC) 7.5 mg tablet Take 1 tablet (7.5 mg total) by mouth daily    metoprolol succinate (TOPROL-XL) 25 mg 24 hr tablet Take 1 tablet (25 mg total) by mouth daily for 3 doses Start taking 2 days prior to CT scan and morning of    Multiple Vitamins-Minerals (Womens Multivitamin) TABS Take 1 tablet by mouth daily    pantoprazole (PROTONIX) 40 mg tablet Take 1 tablet (40 mg total) by mouth daily before breakfast    Peppermint Oil 90 MG CPCR Take 360 mg by mouth 3 (three) times a day (Patient not taking: Reported on 2024)    potassium chloride (MICRO-K) 10 MEQ CR capsule Take 1 capsule (10 mEq total) by mouth daily    potassium chloride (MICRO-K) 10 MEQ CR capsule Take 2 tablets daily    triamcinolone (KENALOG) 0.5 % cream Apply topically 3 (three) times a day       Objective       Lesley Toledo LPN

## 2024-03-15 ENCOUNTER — CLINICAL SUPPORT (OUTPATIENT)
Dept: FAMILY MEDICINE CLINIC | Facility: CLINIC | Age: 39
End: 2024-03-15

## 2024-03-15 DIAGNOSIS — Z11.1 ENCOUNTER FOR PPD SKIN TEST READING: Primary | ICD-10-CM

## 2024-03-15 LAB
INDURATION: 0 MM
TB SKIN TEST: NEGATIVE

## 2024-03-15 NOTE — PROGRESS NOTES
Name: Kiara Ma      : 1985      MRN: [id number]  Encounter Provider: Lesley Toledo LPN  Encounter Date: 3/15/2024   Encounter department: [facility]    Assessment & Plan     1. Encounter for PPD skin test reading           Subjective      Current Outpatient Medications on File Prior to Visit   Medication Sig    albuterol (ProAir HFA) 90 mcg/act inhaler Inhale 2 puffs every 4 (four) hours as needed (When has flare up)    carvedilol (COREG CR) 20 MG 24 hr capsule Take 2 capsules (40 mg total) by mouth daily    chlorthalidone 25 mg tablet Take 1 tablet (25 mg total) by mouth daily    Cholecalciferol (VITAMIN D3 PO) Take 2,000 mg by mouth daily in the early morning    cyclobenzaprine (FLEXERIL) 5 mg tablet Take 1 tablet (5 mg total) by mouth daily at bedtime    estradiol (ESTRACE VAGINAL) 0.1 mg/g vaginal cream Apply pea sized amount intravaginally twice weekly    meloxicam (MOBIC) 7.5 mg tablet Take 1 tablet (7.5 mg total) by mouth daily    metoprolol succinate (TOPROL-XL) 25 mg 24 hr tablet Take 1 tablet (25 mg total) by mouth daily for 3 doses Start taking 2 days prior to CT scan and morning of    Multiple Vitamins-Minerals (Womens Multivitamin) TABS Take 1 tablet by mouth daily    pantoprazole (PROTONIX) 40 mg tablet Take 1 tablet (40 mg total) by mouth daily before breakfast    Peppermint Oil 90 MG CPCR Take 360 mg by mouth 3 (three) times a day (Patient not taking: Reported on 2024)    potassium chloride (MICRO-K) 10 MEQ CR capsule Take 1 capsule (10 mEq total) by mouth daily    potassium chloride (MICRO-K) 10 MEQ CR capsule Take 2 tablets daily    triamcinolone (KENALOG) 0.5 % cream Apply topically 3 (three) times a day       Objective       Lesley Toledo LPN

## 2024-03-18 DIAGNOSIS — I10 ESSENTIAL HYPERTENSION: ICD-10-CM

## 2024-03-20 RX ORDER — CHLORTHALIDONE 25 MG/1
25 TABLET ORAL DAILY
Qty: 90 TABLET | Refills: 3 | Status: SHIPPED | OUTPATIENT
Start: 2024-03-20

## 2024-04-05 DIAGNOSIS — R07.9 CHEST PAIN, UNSPECIFIED TYPE: ICD-10-CM

## 2024-04-05 DIAGNOSIS — R06.09 DYSPNEA ON EXERTION: ICD-10-CM

## 2024-04-05 DIAGNOSIS — R06.02 SHORTNESS OF BREATH: ICD-10-CM

## 2024-04-08 RX ORDER — CARVEDILOL PHOSPHATE 20 MG/1
40 CAPSULE, EXTENDED RELEASE ORAL DAILY
Qty: 90 CAPSULE | Refills: 0 | Status: SHIPPED | OUTPATIENT
Start: 2024-04-08

## 2024-04-09 ENCOUNTER — TELEPHONE (OUTPATIENT)
Dept: HEMATOLOGY ONCOLOGY | Facility: CLINIC | Age: 39
End: 2024-04-09

## 2024-04-09 NOTE — TELEPHONE ENCOUNTER
Appointment Change  Cancel, Reschedule, Change to Virtual      Who are you speaking with? Patient   If it is not the patient, is the caller listed on the communication consent form? Yes   Which provider is the appointment scheduled with? Minal Caban PA-C   When was the original appointment scheduled?    Please list date and time 5/2/24   At which location is the appointment scheduled to take place? [place]   Was the appointment rescheduled?     Was the appointment changed from an in person visit to a virtual visit?    If so, please list the details of the change. 5/24/24   What is the reason for the appointment change? Provider/ left message

## 2024-04-15 NOTE — PROGRESS NOTES
AMB US Pelvic Non OB    Date/Time: 4/16/2024 8:30 AM    Performed by: Nicole Holloway  Authorized by: Prasad Zeng MD  Universal Protocol:  Consent given by: patient  Timeout called at: 4/16/2024 8:23 AM.  Patient understanding: patient states understanding of the procedure being performed  Patient identity confirmed: verbally with patient    Procedure details:     SIS Procedure: No    Technique:  Transvaginal US, Non-OB    Position: lithotomy exam    Uterine findings:     Length (cm): 9.23    Height (cm):  6.66    Width (cm):  6.86    Endometrial stripe: identified      Endometrium thickness (mm):  7.95  Left ovary findings:     Left ovary:  Visualized    Length (cm): 2.67    Height (cm): 1.17    Width (cm): 1.5  Right ovary findings:     Right ovary:  Visualized    Length (cm): 4.38    Height (cm): 2.63    Width (cm): 3.13  Other findings:     Free pelvic fluid: identified    Post-Procedure Details:     Impression:  Anteverted uterus demonstrates multiple fibroids. Largest are right intramural 2.1cm and 7.5mm, posterior intramural 2.8cm, anterior intramural 1.8cm, fundal intramural 1.3cm, left intramural 1.1cm, and left subserosal 1.7cm. There are varicosities bilaterally in the adnexal region, but more on the left. The left ovary appears within normal limits. The right ovary demonstrates a complex cystic mass 2.5cm with normal appearing blood flow on color doppler. A small amount of free fluid is noted in the right adnexa.     Tolerance:  Tolerated well, no immediate complications    Complications: no complications    Additional Procedure Comments:      [employer] [id number] transvaginal transducer Serial #[id number] was used to perform the examination today and subsequently followed with high level disinfection utilizing Trophon EPR procedure.     Ultrasound performed at:     [facility]  [address] [place] [address]  [address] [place] [address], [place]  [address]  Phone  [phone]  Fax  [phone]

## 2024-04-16 ENCOUNTER — ULTRASOUND (OUTPATIENT)
Dept: OBGYN CLINIC | Facility: CLINIC | Age: 39
End: 2024-04-16
Payer: COMMERCIAL

## 2024-04-16 DIAGNOSIS — D21.9 FIBROIDS: ICD-10-CM

## 2024-04-16 DIAGNOSIS — R10.2 PELVIC PAIN: ICD-10-CM

## 2024-04-16 DIAGNOSIS — N94.10 DYSPAREUNIA IN FEMALE: Primary | ICD-10-CM

## 2024-04-16 PROCEDURE — 76830 TRANSVAGINAL US NON-OB: CPT | Performed by: OBSTETRICS & GYNECOLOGY

## 2024-04-16 NOTE — PROGRESS NOTES
Ultrasound results were reviewed and note is made of multiple uterine fibroids and pelvic varicosities.    In addition there is a right ovarian cyst present as well.    Patient now desires definitive surgery because of the bleeding and continual amount of pelvic pain.    Referral to Dr. Gurpreet Virgen was placed at today's visit    All questions were answered in detail for the patient    Patient knows to call for any problems, questions, issues or concerns which may arise for her

## 2024-04-30 ENCOUNTER — OFFICE VISIT (OUTPATIENT)
Dept: GYNECOLOGY | Facility: CLINIC | Age: 39
End: 2024-04-30
Payer: COMMERCIAL

## 2024-04-30 VITALS
HEIGHT: 63 IN | DIASTOLIC BLOOD PRESSURE: 76 MMHG | SYSTOLIC BLOOD PRESSURE: 120 MMHG | WEIGHT: 198 LBS | HEART RATE: 77 BPM | BODY MASS INDEX: 35.08 KG/M2

## 2024-04-30 DIAGNOSIS — Z01.818 PREOP TESTING: ICD-10-CM

## 2024-04-30 DIAGNOSIS — D57.3 SICKLE CELL TRAIT (HCC): Primary | ICD-10-CM

## 2024-04-30 DIAGNOSIS — R10.2 PELVIC PAIN: ICD-10-CM

## 2024-04-30 DIAGNOSIS — D21.9 FIBROIDS: ICD-10-CM

## 2024-04-30 PROCEDURE — 99245 OFF/OP CONSLTJ NEW/EST HI 55: CPT | Performed by: OBSTETRICS & GYNECOLOGY

## 2024-04-30 NOTE — PROGRESS NOTES
Assessment/Plan:         Diagnoses and all orders for this visit:    Sickle cell trait (HCC)    Pelvic pain  -        Fibroids; discussed options including following versus medical management versus uterine artery embolization versus surgical management with either myomectomy or hysterectomy.  Patient has opted to proceed with a hysterectomy.  Discussed LSH BS versus TLH BS.  She has opted to proceed with an LSH BS.  The risks of the surgery were discussed including, but not limited to, infection, hemorrhage, bowel bladder or vascular injury, possible need for laparotomy.  Because of her cardiac history I recommended cardiac clearance prior to surgery  -      Subjective:      Patient ID: Kiara Ma is a 39 y.o. female.    HPI G0. New patient, referred by Dr Zeng secondary to fibroid uterus and pelvic pain.  Patient presents complaining of continuous pain primarily in the left lower quadrant described as a stabbing sensation followed by persistent ache.  She also is experiencing increasing lower back pain pelvic pressure urinary frequency.  She states her menses are regular.  She has had a prior myomectomy via laparotomy in 2020.  At this point she desires definitive treatment.  Recent US:     AMB US Pelvic Non OB     Date/Time: 4/16/2024 8:30 AM     Performed by: Nicole Holloway  Authorized by: Prasad Zeng MD  Universal Protocol:  Consent given by: patient  Timeout called at: 4/16/2024 8:23 AM.  Patient understanding: patient states understanding of the procedure being performed  Patient identity confirmed: verbally with patient     Procedure details:     SIS Procedure: No    Technique:  Transvaginal US, Non-OB    Position: lithotomy exam    Uterine findings:     Length (cm): 9.23    Height (cm):  6.66    Width (cm):  6.86    Endometrial stripe: identified      Endometrium thickness (mm):  7.95  Left ovary findings:     Left ovary:  Visualized    Length (cm): 2.67    Height (cm): 1.17    Width  (cm): 1.5  Right ovary findings:     Right ovary:  Visualized    Length (cm): 4.38    Height (cm): 2.63    Width (cm): 3.13  Other findings:     Free pelvic fluid: identified    Post-Procedure Details:     Impression:  Anteverted uterus demonstrates multiple fibroids. Largest are right intramural 2.1cm and 7.5mm, posterior intramural 2.8cm, anterior intramural 1.8cm, fundal intramural 1.3cm, left intramural 1.1cm, and left subserosal 1.7cm. There are varicosities bilaterally in the adnexal region, but more on the left. The left ovary appears within normal limits. The right ovary demonstrates a complex cystic mass 2.5cm with normal appearing blood flow on color doppler. A small amount of free fluid is noted in the right adnexa.     Tolerance:  Tolerated well, no immediate complications    Complications: no complications            The following portions of the patient's history were reviewed and updated as appropriate: She  has a past medical history of Abdominal pain, Abnormal ECG, Anemia, Asthma, Female infertility, Fibroid, GERD (gastroesophageal reflux disease), History of palpitations, Hypertension, Lower back pain, Migraines, Ovarian cyst, Polycystic ovary syndrome, Seasonal allergies, Trace mitral regurgitation by prior echocardiogram, and Trigger finger of right thumb (02/28/2017).  She   Patient Active Problem List    Diagnosis Date Noted    Circadian rhythm sleep disorder 01/24/2023    Behaviorally induced insufficient sleep syndrome 01/24/2023    Papilledema 01/03/2023    Excessive sleepiness     Sickle cell trait (HCC) 03/16/2021    Iron deficiency anemia due to chronic blood loss 11/19/2020    Family history of sudden cardiac death (SCD) 02/21/2020    Migraine without aura and without status migrainosus, not intractable 04/30/2019    Female infertility 09/19/2018    Polycystic ovaries 09/19/2018    GERD (gastroesophageal reflux disease) 07/23/2018    Fibroids, submucosal 03/19/2018    PVC (premature  ventricular contraction) 04/13/2017    Obesity (BMI 30-39.9) 01/17/2017    Benign essential hypertension 12/27/2016    Herniated lumbar intervertebral disc 09/14/2016    Mitral valve insufficiency 03/03/2016    Asthma in adult, mild intermittent, uncomplicated 03/29/2013     She  has a past surgical history that includes Cholecystectomy; Hernia repair; pr tendon sheath incision (Right, 02/28/2017); Lumbar laminectomy (Right, 09/14/2016); pr colonoscopy flx dx w/collj spec when pfrmd (N/A, 07/07/2016); [place] tooth extraction; pr hysteroscopy bx endometrium&/polypc w/wo d&c (N/A, 03/19/2018); LAPAROTOMY (N/A, 12/10/2020); Colonoscopy (2017); Spine surgery (9/2016); [place] lumbar puncture diagnostic (01/03/2023); and Myomectomy (12/10/20).  Her family history includes Alcohol abuse in her father; Anxiety disorder in her mother; Arthritis in her family, father, maternal grandfather, mother, and paternal grandfather; Bipolar disorder in her maternal aunt; Brain cancer in her maternal aunt; Breast cancer in her maternal grandmother; Cancer in her family, maternal grandmother, and paternal grandfather; Colon cancer in her paternal grandfather; Depression in her mother; Diabetes in her father and mother; Dialysis in her father; Drug abuse in her father; Esophageal cancer in her maternal grandmother; Heart attack in her maternal grandfather and maternal uncle; Heart disease in her maternal grandfather; Hyperlipidemia in her family, family, maternal grandfather, and paternal grandfather; Hypertension in her father and mother; Stroke in her family, family, father, maternal grandfather, and paternal grandfather; Substance Abuse in her father; Thyroid disease in her mother.  She  reports that she has never smoked. She has never used smokeless tobacco. She reports current alcohol use of about 4.0 standard drinks of alcohol per week. She reports current drug use. Drug: Marijuana.  Current Outpatient Medications   Medication Sig  Dispense Refill    albuterol (ProAir HFA) 90 mcg/act inhaler Inhale 2 puffs every 4 (four) hours as needed (When has flare up) 8.5 g 5    carvedilol (COREG CR) 20 MG 24 hr capsule Take 2 capsules (40 mg total) by mouth daily 90 capsule 0    chlorthalidone 25 mg tablet Take 1 tablet (25 mg total) by mouth daily 90 tablet 3    Cholecalciferol (VITAMIN D3 PO) Take 2,000 mg by mouth daily in the early morning      cyclobenzaprine (FLEXERIL) 5 mg tablet Take 1 tablet (5 mg total) by mouth daily at bedtime 30 tablet 0    estradiol (ESTRACE VAGINAL) 0.1 mg/g vaginal cream Apply pea sized amount intravaginally twice weekly 42.5 g 2    meloxicam (MOBIC) 7.5 mg tablet Take 1 tablet (7.5 mg total) by mouth daily 15 tablet 0    metoprolol succinate (TOPROL-XL) 25 mg 24 hr tablet Take 1 tablet (25 mg total) by mouth daily for 3 doses Start taking 2 days prior to CT scan and morning of 3 tablet 0    Multiple Vitamins-Minerals (Womens Multivitamin) TABS Take 1 tablet by mouth daily      pantoprazole (PROTONIX) 40 mg tablet Take 1 tablet (40 mg total) by mouth daily before breakfast 90 tablet 1    Peppermint Oil 90 MG CPCR Take 360 mg by mouth 3 (three) times a day (Patient not taking: Reported on 2/12/2024) 360 capsule 3    potassium chloride (MICRO-K) 10 MEQ CR capsule Take 1 capsule (10 mEq total) by mouth daily 90 capsule 0    potassium chloride (MICRO-K) 10 MEQ CR capsule Take 2 tablets daily      triamcinolone (KENALOG) 0.5 % cream Apply topically 3 (three) times a day 30 g 0     No current facility-administered medications for this visit.     Current Outpatient Medications on File Prior to Visit   Medication Sig    albuterol (ProAir HFA) 90 mcg/act inhaler Inhale 2 puffs every 4 (four) hours as needed (When has flare up)    carvedilol (COREG CR) 20 MG 24 hr capsule Take 2 capsules (40 mg total) by mouth daily    chlorthalidone 25 mg tablet Take 1 tablet (25 mg total) by mouth daily    Cholecalciferol (VITAMIN D3 PO) Take  2,000 mg by mouth daily in the early morning    cyclobenzaprine (FLEXERIL) 5 mg tablet Take 1 tablet (5 mg total) by mouth daily at bedtime    estradiol (ESTRACE VAGINAL) 0.1 mg/g vaginal cream Apply pea sized amount intravaginally twice weekly    meloxicam (MOBIC) 7.5 mg tablet Take 1 tablet (7.5 mg total) by mouth daily    metoprolol succinate (TOPROL-XL) 25 mg 24 hr tablet Take 1 tablet (25 mg total) by mouth daily for 3 doses Start taking 2 days prior to CT scan and morning of    Multiple Vitamins-Minerals (Womens Multivitamin) TABS Take 1 tablet by mouth daily    pantoprazole (PROTONIX) 40 mg tablet Take 1 tablet (40 mg total) by mouth daily before breakfast    Peppermint Oil 90 MG CPCR Take 360 mg by mouth 3 (three) times a day (Patient not taking: Reported on 2/12/2024)    potassium chloride (MICRO-K) 10 MEQ CR capsule Take 1 capsule (10 mEq total) by mouth daily    potassium chloride (MICRO-K) 10 MEQ CR capsule Take 2 tablets daily    triamcinolone (KENALOG) 0.5 % cream Apply topically 3 (three) times a day     No current facility-administered medications on file prior to visit.     She has No Known Allergies..    Review of Systems   Constitutional: Negative.    HENT:  Negative for sore throat and trouble swallowing.    Gastrointestinal:  Positive for abdominal distention and abdominal pain.   Genitourinary:  Positive for frequency and pelvic pain.         Objective:      There were no vitals taken for this visit.         Physical Exam  Vitals reviewed.   Constitutional:       Appearance: Normal appearance.   Cardiovascular:      Rate and Rhythm: Normal rate and regular rhythm.      Pulses: Normal pulses.      Heart sounds: Normal heart sounds. No murmur heard.  Pulmonary:      Effort: Pulmonary effort is normal. No respiratory distress.      Breath sounds: Normal breath sounds.   Abdominal:      General: There is no distension.      Palpations: Abdomen is soft. There is no mass.      Tenderness: There is no  abdominal tenderness. There is no guarding or rebound.      Hernia: No hernia is present. There is no hernia in the left inguinal area or right inguinal area.   Genitourinary:     General: Normal vulva.      Labia:         Right: No rash, tenderness or lesion.         Left: No rash, tenderness or lesion.       Vagina: Normal.      Cervix: Normal.      Uterus: Enlarged.       Adnexa:         Right: No mass, tenderness or fullness.          Left: No mass, tenderness or fullness.     Lymphadenopathy:      Lower Body: No right inguinal adenopathy. No left inguinal adenopathy.   Neurological:      Mental Status: She is alert.

## 2024-04-30 NOTE — LETTER
April 30, 2024     Prasad Zeng MD  [address]  [address]  [place] [address]    Patient: Kiara Ma   YOB: 1985   Date of Visit: 4/30/2024       Dear Dr. Zeng:    Thank you for referring Kiara Ma to me for evaluation. Below are my notes for this consultation.    If you have questions, please do not hesitate to call me. I look forward to following your patient along with you.         Sincerely,        Low Virgen DO        CC: No Recipients    Low Virgen DO  4/30/2024  4:28 PM  Sign when Signing Visit  Assessment/Plan:         Diagnoses and all orders for this visit:    Sickle cell trait (HCC)    Pelvic pain  -        Fibroids; discussed options including following versus medical management versus uterine artery embolization versus surgical management with either myomectomy or hysterectomy.  Patient has opted to proceed with a hysterectomy.  Discussed LSH BS versus TLH BS.  She has opted to proceed with an LSH BS.  The risks of the surgery were discussed including, but not limited to, infection, hemorrhage, bowel bladder or vascular injury, possible need for laparotomy.  Because of her cardiac history I recommended cardiac clearance prior to surgery  -      Subjective:      Patient ID: Kiara Ma is a 39 y.o. female.    HPI G0. New patient, referred by Dr Zeng secondary to fibroid uterus and pelvic pain.  Patient presents complaining of continuous pain primarily in the left lower quadrant described as a stabbing sensation followed by persistent ache.  She also is experiencing increasing lower back pain pelvic pressure urinary frequency.  She states her menses are regular.  She has had a prior myomectomy via laparotomy in 2020.  At this point she desires definitive treatment.  Recent US:     AMB US Pelvic Non OB     Date/Time: 4/16/2024 8:30 AM     Performed by: Nicole Holloway  Authorized by: Prasad Zeng MD  Universal  Protocol:  Consent given by: patient  Timeout called at: 4/16/2024 8:23 AM.  Patient understanding: patient states understanding of the procedure being performed  Patient identity confirmed: verbally with patient     Procedure details:     SIS Procedure: No    Technique:  Transvaginal US, Non-OB    Position: lithotomy exam    Uterine findings:     Length (cm): 9.23    Height (cm):  6.66    Width (cm):  6.86    Endometrial stripe: identified      Endometrium thickness (mm):  7.95  Left ovary findings:     Left ovary:  Visualized    Length (cm): 2.67    Height (cm): 1.17    Width (cm): 1.5  Right ovary findings:     Right ovary:  Visualized    Length (cm): 4.38    Height (cm): 2.63    Width (cm): 3.13  Other findings:     Free pelvic fluid: identified    Post-Procedure Details:     Impression:  Anteverted uterus demonstrates multiple fibroids. Largest are right intramural 2.1cm and 7.5mm, posterior intramural 2.8cm, anterior intramural 1.8cm, fundal intramural 1.3cm, left intramural 1.1cm, and left subserosal 1.7cm. There are varicosities bilaterally in the adnexal region, but more on the left. The left ovary appears within normal limits. The right ovary demonstrates a complex cystic mass 2.5cm with normal appearing blood flow on color doppler. A small amount of free fluid is noted in the right adnexa.     Tolerance:  Tolerated well, no immediate complications    Complications: no complications            The following portions of the patient's history were reviewed and updated as appropriate: She  has a past medical history of Abdominal pain, Abnormal ECG, Anemia, Asthma, Female infertility, Fibroid, GERD (gastroesophageal reflux disease), History of palpitations, Hypertension, Lower back pain, Migraines, Ovarian cyst, Polycystic ovary syndrome, Seasonal allergies, Trace mitral regurgitation by prior echocardiogram, and Trigger finger of right thumb (02/28/2017).  She   Patient Active Problem List    Diagnosis Date  Noted   • Circadian rhythm sleep disorder 01/24/2023   • Behaviorally induced insufficient sleep syndrome 01/24/2023   • Papilledema 01/03/2023   • Excessive sleepiness    • Sickle cell trait (HCC) 03/16/2021   • Iron deficiency anemia due to chronic blood loss 11/19/2020   • Family history of sudden cardiac death (SCD) 02/21/2020   • Migraine without aura and without status migrainosus, not intractable 04/30/2019   • Female infertility 09/19/2018   • Polycystic ovaries 09/19/2018   • GERD (gastroesophageal reflux disease) 07/23/2018   • Fibroids, submucosal 03/19/2018   • PVC (premature ventricular contraction) 04/13/2017   • Obesity (BMI 30-39.9) 01/17/2017   • Benign essential hypertension 12/27/2016   • Herniated lumbar intervertebral disc 09/14/2016   • Mitral valve insufficiency 03/03/2016   • Asthma in adult, mild intermittent, uncomplicated 03/29/2013     She  has a past surgical history that includes Cholecystectomy; Hernia repair; pr tendon sheath incision (Right, 02/28/2017); Lumbar laminectomy (Right, 09/14/2016); pr colonoscopy flx dx w/collj spec when pfrmd (N/A, 07/07/2016); [place] tooth extraction; pr hysteroscopy bx endometrium&/polypc w/wo d&c (N/A, 03/19/2018); LAPAROTOMY (N/A, 12/10/2020); Colonoscopy (2017); Spine surgery (9/2016); [place] lumbar puncture diagnostic (01/03/2023); and Myomectomy (12/10/20).  Her family history includes Alcohol abuse in her father; Anxiety disorder in her mother; Arthritis in her family, father, maternal grandfather, mother, and paternal grandfather; Bipolar disorder in her maternal aunt; Brain cancer in her maternal aunt; Breast cancer in her maternal grandmother; Cancer in her family, maternal grandmother, and paternal grandfather; Colon cancer in her paternal grandfather; Depression in her mother; Diabetes in her father and mother; Dialysis in her father; Drug abuse in her father; Esophageal cancer in her maternal grandmother; Heart attack in her maternal  grandfather and maternal uncle; Heart disease in her maternal grandfather; Hyperlipidemia in her family, family, maternal grandfather, and paternal grandfather; Hypertension in her father and mother; Stroke in her family, family, father, maternal grandfather, and paternal grandfather; Substance Abuse in her father; Thyroid disease in her mother.  She  reports that she has never smoked. She has never used smokeless tobacco. She reports current alcohol use of about 4.0 standard drinks of alcohol per week. She reports current drug use. Drug: Marijuana.  Current Outpatient Medications   Medication Sig Dispense Refill   • albuterol (ProAir HFA) 90 mcg/act inhaler Inhale 2 puffs every 4 (four) hours as needed (When has flare up) 8.5 g 5   • carvedilol (COREG CR) 20 MG 24 hr capsule Take 2 capsules (40 mg total) by mouth daily 90 capsule 0   • chlorthalidone 25 mg tablet Take 1 tablet (25 mg total) by mouth daily 90 tablet 3   • Cholecalciferol (VITAMIN D3 PO) Take 2,000 mg by mouth daily in the early morning     • cyclobenzaprine (FLEXERIL) 5 mg tablet Take 1 tablet (5 mg total) by mouth daily at bedtime 30 tablet 0   • estradiol (ESTRACE VAGINAL) 0.1 mg/g vaginal cream Apply pea sized amount intravaginally twice weekly 42.5 g 2   • meloxicam (MOBIC) 7.5 mg tablet Take 1 tablet (7.5 mg total) by mouth daily 15 tablet 0   • metoprolol succinate (TOPROL-XL) 25 mg 24 hr tablet Take 1 tablet (25 mg total) by mouth daily for 3 doses Start taking 2 days prior to CT scan and morning of 3 tablet 0   • Multiple Vitamins-Minerals (Womens Multivitamin) TABS Take 1 tablet by mouth daily     • pantoprazole (PROTONIX) 40 mg tablet Take 1 tablet (40 mg total) by mouth daily before breakfast 90 tablet 1   • Peppermint Oil 90 MG CPCR Take 360 mg by mouth 3 (three) times a day (Patient not taking: Reported on 2/12/2024) 360 capsule 3   • potassium chloride (MICRO-K) 10 MEQ CR capsule Take 1 capsule (10 mEq total) by mouth daily 90 capsule  0   • potassium chloride (MICRO-K) 10 MEQ CR capsule Take 2 tablets daily     • triamcinolone (KENALOG) 0.5 % cream Apply topically 3 (three) times a day 30 g 0     No current facility-administered medications for this visit.     Current Outpatient Medications on File Prior to Visit   Medication Sig   • albuterol (ProAir HFA) 90 mcg/act inhaler Inhale 2 puffs every 4 (four) hours as needed (When has flare up)   • carvedilol (COREG CR) 20 MG 24 hr capsule Take 2 capsules (40 mg total) by mouth daily   • chlorthalidone 25 mg tablet Take 1 tablet (25 mg total) by mouth daily   • Cholecalciferol (VITAMIN D3 PO) Take 2,000 mg by mouth daily in the early morning   • cyclobenzaprine (FLEXERIL) 5 mg tablet Take 1 tablet (5 mg total) by mouth daily at bedtime   • estradiol (ESTRACE VAGINAL) 0.1 mg/g vaginal cream Apply pea sized amount intravaginally twice weekly   • meloxicam (MOBIC) 7.5 mg tablet Take 1 tablet (7.5 mg total) by mouth daily   • metoprolol succinate (TOPROL-XL) 25 mg 24 hr tablet Take 1 tablet (25 mg total) by mouth daily for 3 doses Start taking 2 days prior to CT scan and morning of   • Multiple Vitamins-Minerals (Womens Multivitamin) TABS Take 1 tablet by mouth daily   • pantoprazole (PROTONIX) 40 mg tablet Take 1 tablet (40 mg total) by mouth daily before breakfast   • Peppermint Oil 90 MG CPCR Take 360 mg by mouth 3 (three) times a day (Patient not taking: Reported on 2/12/2024)   • potassium chloride (MICRO-K) 10 MEQ CR capsule Take 1 capsule (10 mEq total) by mouth daily   • potassium chloride (MICRO-K) 10 MEQ CR capsule Take 2 tablets daily   • triamcinolone (KENALOG) 0.5 % cream Apply topically 3 (three) times a day     No current facility-administered medications on file prior to visit.     She has No Known Allergies..    Review of Systems   Constitutional: Negative.    HENT:  Negative for sore throat and trouble swallowing.    Gastrointestinal:  Positive for abdominal distention and abdominal  pain.   Genitourinary:  Positive for frequency and pelvic pain.         Objective:      There were no vitals taken for this visit.         Physical Exam  Vitals reviewed.   Constitutional:       Appearance: Normal appearance.   Cardiovascular:      Rate and Rhythm: Normal rate and regular rhythm.      Pulses: Normal pulses.      Heart sounds: Normal heart sounds. No murmur heard.  Pulmonary:      Effort: Pulmonary effort is normal. No respiratory distress.      Breath sounds: Normal breath sounds.   Abdominal:      General: There is no distension.      Palpations: Abdomen is soft. There is no mass.      Tenderness: There is no abdominal tenderness. There is no guarding or rebound.      Hernia: No hernia is present. There is no hernia in the left inguinal area or right inguinal area.   Genitourinary:     General: Normal vulva.      Labia:         Right: No rash, tenderness or lesion.         Left: No rash, tenderness or lesion.       Vagina: Normal.      Cervix: Normal.      Uterus: Enlarged.       Adnexa:         Right: No mass, tenderness or fullness.          Left: No mass, tenderness or fullness.     Lymphadenopathy:      Lower Body: No right inguinal adenopathy. No left inguinal adenopathy.   Neurological:      Mental Status: She is alert.

## 2024-05-22 DIAGNOSIS — R06.09 DYSPNEA ON EXERTION: ICD-10-CM

## 2024-05-22 DIAGNOSIS — R07.9 CHEST PAIN, UNSPECIFIED TYPE: ICD-10-CM

## 2024-05-22 DIAGNOSIS — R06.02 SHORTNESS OF BREATH: ICD-10-CM

## 2024-05-24 ENCOUNTER — OFFICE VISIT (OUTPATIENT)
Dept: HEMATOLOGY ONCOLOGY | Facility: CLINIC | Age: 39
End: 2024-05-24
Payer: COMMERCIAL

## 2024-05-24 ENCOUNTER — TELEPHONE (OUTPATIENT)
Dept: GYNECOLOGY | Facility: CLINIC | Age: 39
End: 2024-05-24

## 2024-05-24 VITALS
BODY MASS INDEX: 35.79 KG/M2 | DIASTOLIC BLOOD PRESSURE: 82 MMHG | TEMPERATURE: 98 F | RESPIRATION RATE: 18 BRPM | WEIGHT: 202 LBS | OXYGEN SATURATION: 98 % | HEIGHT: 63 IN | SYSTOLIC BLOOD PRESSURE: 122 MMHG | HEART RATE: 81 BPM

## 2024-05-24 DIAGNOSIS — Z86.39 HISTORY OF IRON DEFICIENCY: Primary | ICD-10-CM

## 2024-05-24 PROCEDURE — 99213 OFFICE O/P EST LOW 20 MIN: CPT | Performed by: PHYSICIAN ASSISTANT

## 2024-05-24 RX ORDER — CARVEDILOL PHOSPHATE 20 MG/1
40 CAPSULE, EXTENDED RELEASE ORAL DAILY
Qty: 90 CAPSULE | Refills: 0 | Status: SHIPPED | OUTPATIENT
Start: 2024-05-24

## 2024-05-24 NOTE — PROGRESS NOTES
Hematology/Oncology Outpatient Follow-up  Kiara Ma 39 y.o. female 1985 [id number]    Date:  5/24/2024      Assessment and Plan:  1. History of iron deficiency  39-year-old female presents for follow-up visit for iron deficiency.  She has history of fibroids with menorrhagia.  This was treated with a myomectomy in 2020 however fibroids have grown back and menorrhagia has returned.  Most recent labs in March show a ferritin of 11, hemoglobin 11.9, consistent with iron deficiency.  Would likely recommend more iron infusions prior to surgical intervention in July 2024.  She will get repeat labs and we will determine dose and frequency.    Follow-up in 6 months with labs.    - Ferritin; Future  - CBC and differential; Future  - CBC and differential; Future  - Ferritin; Future       HPI:  38 year old female presents for follow up for iron def anemia.      Past medical history significant for GERD, polycystic ovaries, asthma, trace mitral regurgitation, hypertension, migraines, herniated lumbar disc status post surgical intervention, trigger finger of right thumb, status post surgical intervention, uterine fibroid, female infertility, iron deficiency secondary to chronic blood loss.     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     Periods were very heavy for 6-7 months. She was seeing reproductive endocrinology and underwent myomectomy and bleeding improved a lot.      Due to persistent microcytosis she had lab evaluation. Negative alpha-thalassemia. Hemoglobin electrophoresis was consistent with sickle cell trait, heterozygous.     Interval history:  Bleeding 5-6 days   First 3-4 days, heavy bleeding   Taking oral iron occasionally     ROS: Review of Systems   Constitutional:  Positive for fatigue. Negative for activity change, appetite change, chills, fever and unexpected weight change.   Respiratory:  Negative for cough and shortness of breath.    Cardiovascular:  Negative  for chest pain, palpitations and leg swelling.   Gastrointestinal:  Negative for abdominal pain, blood in stool, constipation, diarrhea, nausea and vomiting.   Genitourinary:  Positive for menstrual problem. Negative for difficulty urinating, dysuria and hematuria.   Musculoskeletal:  Negative for arthralgias.   Skin: Negative.    Neurological:  Negative for dizziness, weakness, light-headedness, numbness and headaches.   Hematological: Negative.    Psychiatric/Behavioral: Negative.       Past Medical History:   Diagnosis Date    Abdominal pain     Abnormal ECG     Anemia     Asthma     mild intermittent    Female infertility     Fibroid     Myomectomy 12/10/20    GERD (gastroesophageal reflux disease)     History of palpitations     Hypertension     Lower back pain     Migraines     Ovarian cyst     Polycystic ovary syndrome     Seasonal allergies     Trace mitral regurgitation by prior echocardiogram     Trigger finger of right thumb 02/28/2017       Past Surgical History:   Procedure Laterality Date    CHOLECYSTECTOMY      laparoscopic    COLONOSCOPY  2017    FL LUMBAR PUNCTURE DIAGNOSTIC  01/03/2023    HERNIA REPAIR      umbilical    LAPAROTOMY N/A 12/10/2020    Procedure: ABDOMINAL MYOMECTOMY;  Surgeon: Tara Budinetz, DO;  [facility];  Service: Gynecology    LUMBAR LAMINECTOMY Right 09/14/2016    Procedure: Right L5/S1 Metryx microdiscectomy;  Surgeon: Mark Holt MD;  [facility];  Service:     MYOMECTOMY  12/10/20    GA COLONOSCOPY FLX DX W/COLLJ SPEC WHEN PFRMD N/A 07/07/2016    Procedure: COLONOSCOPY;  Surgeon: Taty Estrella DO;  Location: [facility];  Service: Gastroenterology    GA HYSTEROSCOPY BX ENDOMETRIUM&/POLYPC W/WO D&C N/A 03/19/2018    Procedure: HYSTEROSCOPY; MYOMECTOMY; ENDOMETRIAL BIOPSY;  Surgeon: Tara Budinetz, DO;  [facility];  Service: Gynecology    GA TENDON SHEATH INCISION Right 02/28/2017    Procedure: THUMB TRIGGER RELEASE ;  Surgeon: Prasad Mooney  DO;  [facility];  Service: Orthopedics    SPINE SURGERY  9/2016    Microdisectomy    WISDOM TOOTH EXTRACTION         Social History     Socioeconomic History    Marital status: /Civil Union     Spouse name: Not on file    Number of children: Not on file    Years of education: Not on file    Highest education level: Not on file   Occupational History    Not on file   Tobacco Use    Smoking status: Never    Smokeless tobacco: Never   Vaping Use    Vaping status: Never Used   Substance and Sexual Activity    Alcohol use: Yes     Alcohol/week: 4.0 standard drinks of alcohol     Types: 4 Glasses of wine per week     Comment: socially     Drug use: Yes     Types: Marijuana     Comment: Medical    Sexual activity: Yes     Partners: Male     Birth control/protection: None   Other Topics Concern    Not on file   Social History Narrative    Exercises 3x week    Pet: dog     Social Determinants of Health     Financial Resource Strain: Not on file   Food Insecurity: Not on file   Transportation Needs: Not on file   Physical Activity: Not on file   Stress: Not on file   Social Connections: Not on file   Intimate Partner Violence: Not on file   Housing Stability: Not on file       Family History   Problem Relation Age of Onset    Diabetes Mother         type2    Hypertension Mother     Depression Mother     Thyroid disease Mother     Arthritis Mother     Anxiety disorder Mother     Stroke Father     Diabetes Father         type2    Hypertension Father     Alcohol abuse Father     Substance Abuse Father     Arthritis Father     Drug abuse Father     Dialysis Father     Esophageal cancer Maternal Grandmother     Breast cancer Maternal Grandmother         Maternal great grandmother    Cancer Maternal Grandmother         Pancreatic    Heart attack Maternal Grandfather         acute MI    Arthritis Maternal Grandfather     Stroke Maternal Grandfather         CVA    Hyperlipidemia Maternal Grandfather     Heart disease  Maternal Grandfather     Arthritis Paternal Grandfather     Stroke Paternal Grandfather         CVA    Cancer Paternal Grandfather     Hyperlipidemia Paternal Grandfather     Colon cancer Paternal Grandfather     Bipolar disorder Maternal Aunt     Brain cancer Maternal Aunt     Heart attack Maternal Uncle         acute MI    Arthritis Family     Stroke Family         CVA    Cancer Family     Hyperlipidemia Family     Stroke Family         CVA    Hyperlipidemia Family        No Known Allergies      Current Outpatient Medications:     albuterol (ProAir HFA) 90 mcg/act inhaler, Inhale 2 puffs every 4 (four) hours as needed (When has flare up), Disp: 8.5 g, Rfl: 5    carvedilol (COREG CR) 20 MG 24 hr capsule, Take 2 capsules (40 mg total) by mouth daily, Disp: 90 capsule, Rfl: 0    chlorthalidone 25 mg tablet, Take 1 tablet (25 mg total) by mouth daily, Disp: 90 tablet, Rfl: 3    Cholecalciferol (VITAMIN D3 PO), Take 2,000 mg by mouth daily in the early morning, Disp: , Rfl:     cyclobenzaprine (FLEXERIL) 5 mg tablet, Take 1 tablet (5 mg total) by mouth daily at bedtime, Disp: 30 tablet, Rfl: 0    estradiol (ESTRACE VAGINAL) 0.1 mg/g vaginal cream, Apply pea sized amount intravaginally twice weekly, Disp: 42.5 g, Rfl: 2    meloxicam (MOBIC) 7.5 mg tablet, Take 1 tablet (7.5 mg total) by mouth daily, Disp: 15 tablet, Rfl: 0    Multiple Vitamins-Minerals (Womens Multivitamin) TABS, Take 1 tablet by mouth daily, Disp: , Rfl:     pantoprazole (PROTONIX) 40 mg tablet, Take 1 tablet (40 mg total) by mouth daily before breakfast, Disp: 90 tablet, Rfl: 1    potassium chloride (MICRO-K) 10 MEQ CR capsule, Take 1 capsule (10 mEq total) by mouth daily, Disp: 90 capsule, Rfl: 0    potassium chloride (MICRO-K) 10 MEQ CR capsule, Take 2 tablets daily, Disp: , Rfl:     triamcinolone (KENALOG) 0.5 % cream, Apply topically 3 (three) times a day, Disp: 30 g, Rfl: 0    metoprolol succinate (TOPROL-XL) 25 mg 24 hr tablet, Take 1 tablet (25  "mg total) by mouth daily for 3 doses Start taking 2 days prior to CT scan and morning of (Patient not taking: Reported on 5/24/2024), Disp: 3 tablet, Rfl: 0    Peppermint Oil 90 MG CPCR, Take 360 mg by mouth 3 (three) times a day (Patient not taking: Reported on 2/12/2024), Disp: 360 capsule, Rfl: 3      Physical Exam:  /82 (BP Location: Left arm, Patient Position: Sitting, Cuff Size: Standard)   Pulse 81   Temp 98 °F (36.7 °C) (Temporal)   Resp 18   Ht 5' 3\" (1.6 m)   Wt 91.6 kg (202 lb)   LMP 04/25/2024   SpO2 98%   BMI 35.78 kg/m²     Physical Exam  Vitals reviewed.   Constitutional:       General: She is not in acute distress.     Appearance: She is well-developed. She is not ill-appearing.   HENT:      Head: Normocephalic and atraumatic.   Eyes:      General: No scleral icterus.     Conjunctiva/sclera: Conjunctivae normal.   Cardiovascular:      Rate and Rhythm: Normal rate and regular rhythm.      Heart sounds: Normal heart sounds. No murmur heard.  Pulmonary:      Effort: Pulmonary effort is normal. No respiratory distress.      Breath sounds: Normal breath sounds.   Abdominal:      Palpations: Abdomen is soft.      Tenderness: There is no abdominal tenderness.   Musculoskeletal:         General: No tenderness. Normal range of motion.      Cervical back: Normal range of motion and neck supple.      Right lower leg: No edema.      Left lower leg: No edema.   Lymphadenopathy:      Cervical: No cervical adenopathy.   Skin:     General: Skin is warm and dry.   Neurological:      Mental Status: She is alert and oriented to person, place, and time.      Cranial Nerves: No cranial nerve deficit.   Psychiatric:         Mood and Affect: Mood normal.         Behavior: Behavior normal.       Labs  Lab Results   Component Value Date    WBC 7.79 03/05/2024    HGB 11.8 03/05/2024    HCT 38.0 03/05/2024    MCV 79 (L) 03/05/2024     03/05/2024     I have spent 20 minutes with Patient  today in which " greater than 50% of this time was spent in counseling/coordination of care regarding Diagnostic results, Risks and benefits of tx options, Instructions for management, Impressions, Documenting in the medical record, Reviewing / ordering tests, medicine, procedures  , and Obtaining or reviewing history  .     Patient voiced understanding and agreement in the above discussion. Aware to contact our office with questions/symptoms in the interim.     This note has been generated by voice recognition software system.  Therefore, there may be spelling, grammar, and or syntax errors. Please contact if questions arise.

## 2024-05-24 NOTE — TELEPHONE ENCOUNTER
----- Message from Low Virgen DO sent at 2024  7:42 AM EDT -----  [facility] GYN Department  Surgery Scheduling Sheet    Patient Name: Kiara Ma  : 1985    Provider: Low Virgen DO     Needed: no; Language: N/A    Procedure: exam under anesthesia and LSH BS    Diagnosis: fibropid uterus, pain    Special Needs or Equipment: none    Anesthesia: General anesthesia    Length of stay: outpatient  Does patient have comorbid conditions that will require close perioperative monitoring prior to safe discharge: no    The patient has comorbid conditions that will require close perioperative monitoring prior to safe discharge, including N/A.   This may require acute care beyond the usual and routine recovery period. As such, inpatient admission post-operatively is expected and appropriate, and anticipated hospital length of stay will be >2 midnights.    Pre-Admission Testing Needed: no   Labs that should be ordered: cbc, hgb A1C, and type and screen    Order PAT that is recommended in prep for procedure?: Yes    Medical Clearance Needed: yes; Provider: cardiologist    MA Form Signed (tubals/hysterectomy): Not Indicated    Surgical Drink Given: yes     How many days out of work: 4 week(s)     How many days no drivin week(s)       Is pre op appt needed?  no  Interval for post op appt: 2 week(s)         For Surgical Scheduler:     Surgery Scheduled On:  [place]: [facility]    Pre-op Appt:   Post op Appt:  Consult/Medical clearance appt:

## 2024-05-30 ENCOUNTER — PROCEDURE VISIT (OUTPATIENT)
Dept: OBGYN CLINIC | Facility: CLINIC | Age: 39
End: 2024-05-30
Payer: COMMERCIAL

## 2024-05-30 ENCOUNTER — TELEPHONE (OUTPATIENT)
Age: 39
End: 2024-05-30

## 2024-05-30 VITALS
BODY MASS INDEX: 35.65 KG/M2 | DIASTOLIC BLOOD PRESSURE: 80 MMHG | HEIGHT: 63 IN | WEIGHT: 201.2 LBS | SYSTOLIC BLOOD PRESSURE: 122 MMHG

## 2024-05-30 DIAGNOSIS — E28.2 PCOS (POLYCYSTIC OVARIAN SYNDROME): ICD-10-CM

## 2024-05-30 DIAGNOSIS — D21.9 FIBROIDS: ICD-10-CM

## 2024-05-30 DIAGNOSIS — N94.10 DYSPAREUNIA IN FEMALE: Primary | ICD-10-CM

## 2024-05-30 PROCEDURE — 88305 TISSUE EXAM BY PATHOLOGIST: CPT | Performed by: PATHOLOGY

## 2024-05-30 PROCEDURE — 58100 BIOPSY OF UTERUS LINING: CPT | Performed by: OBSTETRICS & GYNECOLOGY

## 2024-05-30 NOTE — PATIENT INSTRUCTIONS
Informed patient of potential for spotting after completion of procedure.   Recommended 400 mg ibuprofen every 6 hours PRN pain/discomfort s/p procedure.   Call the office with any questions or concerns.

## 2024-05-30 NOTE — TELEPHONE ENCOUNTER
Pt called in asking about if she needed to come in for the EMB today due to her having the upcoming hysterectomy. Patient was notified that she would need to come in for a tissue sample for medical necessity to have the hysterectomy, pt verbalized understanding. Patient was notified that she can take 600mg of motrin 1 hour before procedure prior to procedure, pt verbalized understanding. Pt had no further questions

## 2024-05-30 NOTE — PROGRESS NOTES
Endometrial biopsy    Date/Time: 5/30/2024 4:00 PM    Performed by: Prasad Zeng MD  Authorized by: Prasad Zeng MD  Universal Protocol:  Consent: Verbal consent obtained. Written consent obtained.  Risks and benefits: risks, benefits and alternatives were discussed  Consent given by: patient  Timeout called at: 5/30/2024 4:01 PM.  Patient understanding: patient states understanding of the procedure being performed  Patient consent: the patient's understanding of the procedure matches consent given  Procedure consent: procedure consent matches procedure scheduled  Relevant documents: relevant documents present and verified  Test results: test results available and properly labeled  Site marked: the operative site was not marked  Radiology Images displayed and confirmed. If images not available, report reviewed: imaging studies available  Required items: required blood products, implants, devices, and special equipment available  Patient identity confirmed: verbally with patient    Indication:     Indications: Other disorder of menstruation and other abnormal bleeding from female genital tract    Pre-procedure:     Premeds:  Ibuprofen  Procedure:     Procedure: endometrial biopsy with Pipelle      A bivalve speculum was placed in the vagina: yes      Cervix cleaned and prepped: yes      A paracervical block was performed: no      An intracervical block was performed: no      The cervix was dilated: no      Uterus sounded: no      Curettes used:  1    Specimen collected: specimen collected and sent to pathology      Patient tolerated procedure well with no complications: yes    Findings:     Uterus size:  6-8 weeks    Cervix: normal      Adnexa: normal    Comments:     Procedure comments:  Topic: Dysfunctional uterine bleeding with fibroids     Procedure: Endometrial biopsy    Excellent hemostasis noted     Instructions and restrictions given     All questions answered     Further treatment and  follow-up planning will be based upon final pathology results     Patient to call for any problems, questions, issues or concerns which may arise for her

## 2024-05-31 ENCOUNTER — OFFICE VISIT (OUTPATIENT)
Dept: CARDIOLOGY CLINIC | Facility: CLINIC | Age: 39
End: 2024-05-31
Payer: COMMERCIAL

## 2024-05-31 VITALS
HEART RATE: 66 BPM | BODY MASS INDEX: 35.33 KG/M2 | WEIGHT: 199.4 LBS | SYSTOLIC BLOOD PRESSURE: 126 MMHG | DIASTOLIC BLOOD PRESSURE: 80 MMHG | HEIGHT: 63 IN

## 2024-05-31 DIAGNOSIS — I10 BENIGN ESSENTIAL HYPERTENSION: ICD-10-CM

## 2024-05-31 DIAGNOSIS — Z01.810 PRE-OPERATIVE CARDIOVASCULAR EXAMINATION: Primary | ICD-10-CM

## 2024-05-31 PROCEDURE — 93000 ELECTROCARDIOGRAM COMPLETE: CPT | Performed by: INTERNAL MEDICINE

## 2024-05-31 PROCEDURE — 99213 OFFICE O/P EST LOW 20 MIN: CPT | Performed by: INTERNAL MEDICINE

## 2024-05-31 RX ORDER — POTASSIUM CHLORIDE 750 MG/1
20 CAPSULE, EXTENDED RELEASE ORAL DAILY
Qty: 180 CAPSULE | Refills: 1 | Status: SHIPPED | OUTPATIENT
Start: 2024-05-31

## 2024-05-31 NOTE — PATIENT INSTRUCTIONS
You were seen today in the Cardiology office for follow up.  No additional workup is required prior to your surgery.  Please continue all your cardiac medication as prescribed.    Thank you for choosing [facility].

## 2024-05-31 NOTE — PROGRESS NOTES
[facility]    CHIEF COMPLAINT:   Chief Complaint   Patient presents with    Follow-up     No cardiac complaints, here for cardiac clearance for hysterectomy in July.       HPI:  Kiara Ma is a 39 y.o. female with a past medical history pertinent for asthma, dyslipidemia, hypertension. She presents today for pre operative risk assessment for Laparoscopic Hysterectomy with bilateral salpingectomy for uterine leiomyoma. Surgical date is 7/23/24 with Dr. Virgen. Planned anesthesia: General.    No history of diabetes mellitus, chronic kidney disease, TIA/CVA, myocardial infarction, congestive heart failure, DVT/PE. Activity: Gym 4-5x per week. Does weight training. No exertional chest pain or dyspnea. No prior pregnancy. Prior work up for renal artery stenosis negative. Recent cardiac CTA which was normal.    Currently denies fatigue, new visual changes, lightheadedness, syncope, chest pain, palpitations, shortness of breath with exertion, orthopnea, PND, nausea, vomiting, diarrhea, dark or bright red blood in stools, lower extremity swelling.    Social history: Never smoker.  Family history: Mother - No CAD or cerebrovascular disease. Father - had first stroke at 61 years of age. Also had MI but doesn't know age. Siblings without known cardiac history other than hypertension - full brother 36, half brother (same father) 45.    The following portions of the patient's history were reviewed and updated as appropriate: allergies, current medications, past family history, past medical history, past social history, past surgical history, and problem list.    SINCE LAST OV I REVIEWED WITH THE PATIENT THE INTERIM LABS, TEST RESULTS, CONSULTANT(S) NOTES AND PERFORMED AN INTERIM REVIEW OF HISTORY    Past Medical History:   Diagnosis Date    Abdominal pain     Abnormal ECG     Anemia     Asthma     mild intermittent    Female infertility     Fibroid     Myomectomy 12/10/20    GERD (gastroesophageal  reflux disease)     History of palpitations     Hypertension     Lower back pain     Migraines     Ovarian cyst     Polycystic ovary syndrome     Seasonal allergies     Trace mitral regurgitation by prior echocardiogram     Trigger finger of right thumb 02/28/2017       Past Surgical History:   Procedure Laterality Date    CHOLECYSTECTOMY      laparoscopic    COLONOSCOPY  2017    FL LUMBAR PUNCTURE DIAGNOSTIC  01/03/2023    HERNIA REPAIR      umbilical    LAPAROTOMY N/A 12/10/2020    Procedure: ABDOMINAL MYOMECTOMY;  Surgeon: Tara Budinetz, DO;  Location[facility];  Service: Gynecology    LUMBAR LAMINECTOMY Right 09/14/2016    Procedure: Right L5/S1 Metryx microdiscectomy;  Surgeon: Mark Holt MD;  Location: [facility];  Service:     MYOMECTOMY  12/10/20    NV COLONOSCOPY FLX DX W/COLLJ SPEC WHEN PFRMD N/A 07/07/2016    Procedure: COLONOSCOPY;  Surgeon: Taty Estrella DO;  Location: [facility];  Service: Gastroenterology    NV HYSTEROSCOPY BX ENDOMETRIUM&/POLYPC W/WO D&C N/A 03/19/2018    Procedure: HYSTEROSCOPY; MYOMECTOMY; ENDOMETRIAL BIOPSY;  Surgeon: Tara Budinetz, DO;  [facility];  Service: Gynecology    NV TENDON SHEATH INCISION Right 02/28/2017    Procedure: THUMB TRIGGER RELEASE ;  Surgeon: Prasad Mooney DO;  Location[facility];  Service: Orthopedics    SPINE SURGERY  9/2016    Microdisectomy    WISDOM TOOTH EXTRACTION         Social History     Socioeconomic History    Marital status: /Civil Union     Spouse name: Not on file    Number of children: Not on file    Years of education: Not on file    Highest education level: Not on file   Occupational History    Not on file   Tobacco Use    Smoking status: Never    Smokeless tobacco: Never   Vaping Use    Vaping status: Never Used   Substance and Sexual Activity    Alcohol use: Yes     Alcohol/week: 4.0 standard drinks of alcohol     Types: 4 Glasses of wine per week     Comment: socially     Drug use: Yes     Types: Marijuana      Comment: Medical    Sexual activity: Yes     Partners: Male     Birth control/protection: None   Other Topics Concern    Not on file   Social History Narrative    Exercises 3x week    Pet: dog     Social Determinants of Health     Financial Resource Strain: Not on file   Food Insecurity: Not on file   Transportation Needs: Not on file   Physical Activity: Not on file   Stress: Not on file   Social Connections: Not on file   Intimate Partner Violence: Not on file   Housing Stability: Not on file       Family History   Problem Relation Age of Onset    Diabetes Mother         type2    Hypertension Mother     Depression Mother     Thyroid disease Mother     Arthritis Mother     Anxiety disorder Mother     Stroke Father     Diabetes Father         type2    Hypertension Father     Alcohol abuse Father     Substance Abuse Father     Arthritis Father     Drug abuse Father     Dialysis Father     Esophageal cancer Maternal Grandmother     Breast cancer Maternal Grandmother         Maternal great grandmother    Cancer Maternal Grandmother         Pancreatic    Heart attack Maternal Grandfather         acute MI    Arthritis Maternal Grandfather     Stroke Maternal Grandfather         CVA    Hyperlipidemia Maternal Grandfather     Heart disease Maternal Grandfather     Arthritis Paternal Grandfather     Stroke Paternal Grandfather         CVA    Cancer Paternal Grandfather     Hyperlipidemia Paternal Grandfather     Colon cancer Paternal Grandfather     Bipolar disorder Maternal Aunt     Brain cancer Maternal Aunt     Heart attack Maternal Uncle         acute MI    Arthritis Family     Stroke Family         CVA    Cancer Family     Hyperlipidemia Family     Stroke Family         CVA    Hyperlipidemia Family        No Known Allergies    Current Outpatient Medications   Medication Sig Dispense Refill    albuterol (ProAir HFA) 90 mcg/act inhaler Inhale 2 puffs every 4 (four) hours as needed (When has flare up) 8.5 g 5     "carvedilol (COREG CR) 20 MG 24 hr capsule Take 2 capsules (40 mg total) by mouth daily 90 capsule 0    chlorthalidone 25 mg tablet Take 1 tablet (25 mg total) by mouth daily 90 tablet 3    Cholecalciferol (VITAMIN D3 PO) Take 2,000 mg by mouth daily in the early morning      meloxicam (MOBIC) 7.5 mg tablet Take 1 tablet (7.5 mg total) by mouth daily (Patient taking differently: Take 7.5 mg by mouth daily PRN) 15 tablet 0    Multiple Vitamins-Minerals (Womens Multivitamin) TABS Take 1 tablet by mouth daily      pantoprazole (PROTONIX) 40 mg tablet Take 1 tablet (40 mg total) by mouth daily before breakfast (Patient taking differently: Take 40 mg by mouth daily before breakfast 4 times a week) 90 tablet 1    potassium chloride (MICRO-K) 10 MEQ CR capsule Take 2 capsules (20 mEq total) by mouth daily Take 2 tablets daily 180 capsule 1    cyclobenzaprine (FLEXERIL) 5 mg tablet Take 1 tablet (5 mg total) by mouth daily at bedtime (Patient not taking: Reported on 5/30/2024) 30 tablet 0    estradiol (ESTRACE VAGINAL) 0.1 mg/g vaginal cream Apply pea sized amount intravaginally twice weekly (Patient not taking: Reported on 5/30/2024) 42.5 g 2    Peppermint Oil 90 MG CPCR Take 360 mg by mouth 3 (three) times a day (Patient not taking: Reported on 5/30/2024) 360 capsule 3    triamcinolone (KENALOG) 0.5 % cream Apply topically 3 (three) times a day (Patient not taking: Reported on 5/30/2024) 30 g 0     No current facility-administered medications for this visit.       /80 (BP Location: Left arm, Patient Position: Sitting, Cuff Size: Large)   Pulse 66   Ht 5' 3\" (1.6 m)   Wt 90.4 kg (199 lb 6.4 oz)   LMP 05/23/2024 (Exact Date)   BMI 35.32 kg/m²     Review of Systems   All other systems reviewed and are negative.      Physical Exam  Vitals reviewed.   Constitutional:       General: She is not in acute distress.     Appearance: Normal appearance. She is well-developed. She is not toxic-appearing.   HENT:      Head: " Normocephalic and atraumatic.   Eyes:      General: No scleral icterus.     Conjunctiva/sclera: Conjunctivae normal.   Neck:      Vascular: No carotid bruit.   Cardiovascular:      Rate and Rhythm: Normal rate and regular rhythm.      Heart sounds: Normal heart sounds. No murmur heard.     No gallop.   Pulmonary:      Effort: Pulmonary effort is normal. No respiratory distress.      Breath sounds: Normal breath sounds. No wheezing or rales.   Abdominal:      General: Abdomen is flat. Bowel sounds are normal. There is no distension.      Palpations: Abdomen is soft.      Tenderness: There is no abdominal tenderness.   Musculoskeletal:      Right lower leg: No edema.      Left lower leg: No edema.   Skin:     General: Skin is warm and dry.   Neurological:      Mental Status: She is alert.   Psychiatric:         Mood and Affect: Mood normal.         Behavior: Behavior normal.          Lab Results   Component Value Date     11/17/2015    K 3.1 (L) 03/05/2024    CL 97 03/05/2024    CO2 31 03/05/2024    BUN 16 03/05/2024    CREATININE 1.05 03/05/2024    GLUCOSE 85 11/17/2015    CALCIUM 9.2 03/05/2024    ALT 13 03/05/2024    AST 15 03/05/2024    INR 0.92 01/03/2023       Lab Results   Component Value Date    CHOL 181 06/23/2015    HDL 66 06/10/2024    LDLCALC 108 (H) 06/10/2024    TRIG 133 06/10/2024       Lab Results   Component Value Date    WBC 9.85 06/10/2024    HGB 12.3 06/10/2024    HCT 38.4 06/10/2024     06/10/2024       Lab Results   Component Value Date     06/10/2024    HGBA1C 6.1 (H) 06/10/2024       Cardiac studies:   Results for orders placed or performed in visit on 05/31/24   POCT ECG    Impression    Sinus rhythm with short NH  Nonspecific T wave abnormality  Baseline artifact        Cardiac CTA - 3/7/24:  IMPRESSION:  CAD-RADS 0  - Degree of maximal coronary stenosis = 0%  .  GARETT - 12/8/22: The patient reached stage 4.0 of the protocol after exercising for 9 min and 26 sec and had a  maximal HR of 151 bpm (82 % of MPHR) and 10.8 METS. The patient experienced no angina during the test. Blood pressure demonstrated a normal response and heart rate demonstrated a normal response to stress.   -Stress ECG is negative for ischemia after maximal exercise  -Post Stress Echo: Left ventricle cavity has normal reduction in size post-stress. The left ventricle systolic function is hyperdynamic post-stress. The post-stress echo showed normal wall motion which was hyperdynamic compared to baseline.     ASSESSMENT AND PLAN:  Niru was seen today for follow-up.    Diagnoses and all orders for this visit:    Pre-operative cardiovascular examination:  She presents today for pre operative risk assessment for Laparoscopic Hysterectomy with bilateral salpingectomy for uterine leiomyoma. Surgical date is 7/23/24 with Dr. Virgen. Planned anesthesia: General. No history of diabetes mellitus, chronic kidney disease, TIA/CVA, myocardial infarction, congestive heart failure, DVT/PE. She has no cardiac or pulmonary complaints. Prior exercise stress testing reviewed and overall low risk study. Recent cardiac CTA with no coronary calcium.    Preoperative cardiac risk assessment:  -RCI RISK CLASS I (0 risk factors, risk of major cardiac compl. appr. 0.5%)  -No Cardiac Contraindication for Planned Surgical Procedures  -Continue current cardiac medication in the ailyn-operative period  -DVT prophylaxis per surgical team  -     POCT ECG    Benign essential hypertension: Well-controlled on current regimen   -     potassium chloride (MICRO-K) 10 MEQ CR capsule; Take 2 capsules (20 mEq total) by mouth daily Take 2 tablets daily      Oleg York MD

## 2024-06-03 PROCEDURE — 88305 TISSUE EXAM BY PATHOLOGIST: CPT | Performed by: PATHOLOGY

## 2024-06-04 ENCOUNTER — OFFICE VISIT (OUTPATIENT)
Dept: OBGYN CLINIC | Facility: CLINIC | Age: 39
End: 2024-06-04
Payer: COMMERCIAL

## 2024-06-04 VITALS
DIASTOLIC BLOOD PRESSURE: 68 MMHG | WEIGHT: 201.2 LBS | HEIGHT: 63 IN | SYSTOLIC BLOOD PRESSURE: 104 MMHG | BODY MASS INDEX: 35.65 KG/M2

## 2024-06-04 DIAGNOSIS — N92.6 MISSED PERIOD: ICD-10-CM

## 2024-06-04 DIAGNOSIS — R82.90 URINE ABNORMALITY: ICD-10-CM

## 2024-06-04 DIAGNOSIS — N89.8 VAGINAL DISCHARGE: Primary | ICD-10-CM

## 2024-06-04 PROCEDURE — 99214 OFFICE O/P EST MOD 30 MIN: CPT | Performed by: OBSTETRICS & GYNECOLOGY

## 2024-06-04 PROCEDURE — 87086 URINE CULTURE/COLONY COUNT: CPT | Performed by: OBSTETRICS & GYNECOLOGY

## 2024-06-04 PROCEDURE — 81002 URINALYSIS NONAUTO W/O SCOPE: CPT | Performed by: OBSTETRICS & GYNECOLOGY

## 2024-06-04 PROCEDURE — 81514 NFCT DS BV&VAGINITIS DNA ALG: CPT | Performed by: OBSTETRICS & GYNECOLOGY

## 2024-06-04 NOTE — PROGRESS NOTES
"Assessment/Plan:      Diagnoses and all orders for this visit:    Vaginal discharge  -     Molecular Vaginal Panel    Urine abnormality  -     POCT urine dip  -     Urine culture          Subjective:     Patient ID: Kiara Ma is a 39 y.o. female presenting to the office with vaginal odor, irritation, and dysuria x4 days. Admits to white smooth discharge. Odor is \"like a loaf of bread.\" Hx of yeast infections. Feels similar to previous yeast infection. Endometrial biopsy completed 6 days ago. Was unsure if she could use any OTC candidal medications. Denies fever, chills, abdominal pain, cramping, vaginal bleeding.  Patient scheduled for laparoscopic hysterectomy in July.      Total time of today's visit was 25 minutes of which greater than 50% was spent face-to-face counseling the patient as well as coordination of care, review of chart and lab values, physical examination as well as computer entry into the [employer] medical record system.      HPI    Review of Systems   Constitutional: Negative.  Negative for appetite change, diaphoresis, fatigue, fever and unexpected weight change.   Respiratory: Negative.     Cardiovascular: Negative.    Gastrointestinal: Negative.  Negative for abdominal pain, diarrhea, nausea and vomiting.   Endocrine: Negative.  Negative for cold intolerance and heat intolerance.   Genitourinary:  Positive for dysuria, vaginal discharge and vaginal pain. Negative for frequency, hematuria, urgency and vaginal bleeding.        +vaginal odor   Musculoskeletal: Negative.    Skin: Negative.    Hematological: Negative.  Negative for adenopathy.         Objective:     Physical Exam  Vitals reviewed. Exam conducted with a chaperone present.   Constitutional:       General: She is not in acute distress.     Appearance: Normal appearance. She is well-developed. She is not diaphoretic.   Cardiovascular:      Rate and Rhythm: Normal rate and regular rhythm.      Heart sounds: Normal heart sounds. No " murmur heard.     No friction rub. No gallop.   Pulmonary:      Effort: Pulmonary effort is normal.      Breath sounds: Normal breath sounds.   Genitourinary:     General: Normal vulva.      Exam position: Supine.      Labia:         Right: No rash or lesion.         Left: No rash or lesion.       Vagina: Vaginal discharge (white in color, smooth) present. No erythema, tenderness or bleeding.      Cervix: No discharge or friability.      Uterus: Not enlarged and not tender.       Adnexa:         Right: No mass, tenderness or fullness.          Left: No mass, tenderness or fullness.     Neurological:      General: No focal deficit present.      Mental Status: She is alert and oriented to person, place, and time.   Psychiatric:         Speech: Speech normal.

## 2024-06-04 NOTE — PATIENT INSTRUCTIONS
Obtained vaginal swab.   Discussed importance of hygiene.   Obtained UA and will send off for culture.   Call the office with any questions or concerns.   Will f/u with test results via portal.

## 2024-06-06 LAB — BACTERIA UR CULT: ABNORMAL

## 2024-06-10 ENCOUNTER — TRANSCRIBE ORDERS (OUTPATIENT)
Dept: LAB | Facility: CLINIC | Age: 39
End: 2024-06-10

## 2024-06-10 ENCOUNTER — NURSE TRIAGE (OUTPATIENT)
Age: 39
End: 2024-06-10

## 2024-06-10 ENCOUNTER — TELEPHONE (OUTPATIENT)
Dept: CARDIOLOGY CLINIC | Facility: CLINIC | Age: 39
End: 2024-06-10

## 2024-06-10 ENCOUNTER — APPOINTMENT (OUTPATIENT)
Dept: LAB | Facility: CLINIC | Age: 39
End: 2024-06-10

## 2024-06-10 DIAGNOSIS — Z01.818 PREOP TESTING: ICD-10-CM

## 2024-06-10 DIAGNOSIS — Z00.8 HEALTH EXAMINATION IN POPULATION SURVEY: Primary | ICD-10-CM

## 2024-06-10 DIAGNOSIS — N92.6 MISSED PERIOD: ICD-10-CM

## 2024-06-10 DIAGNOSIS — Z86.39 HISTORY OF IRON DEFICIENCY: ICD-10-CM

## 2024-06-10 DIAGNOSIS — Z00.8 HEALTH EXAMINATION IN POPULATION SURVEY: ICD-10-CM

## 2024-06-10 LAB
ABO GROUP BLD: NORMAL
B-HCG SERPL-ACNC: <0.6 MIU/ML (ref 0–5)
BASOPHILS # BLD AUTO: 0.07 THOUSANDS/ÂΜL (ref 0–0.1)
BASOPHILS NFR BLD AUTO: 1 % (ref 0–1)
BLD GP AB SCN SERPL QL: NEGATIVE
CHOLEST SERPL-MCNC: 201 MG/DL
EOSINOPHIL # BLD AUTO: 0.1 THOUSAND/ÂΜL (ref 0–0.61)
EOSINOPHIL NFR BLD AUTO: 1 % (ref 0–6)
ERYTHROCYTE [DISTWIDTH] IN BLOOD BY AUTOMATED COUNT: 16.4 % (ref 11.6–15.1)
EST. AVERAGE GLUCOSE BLD GHB EST-MCNC: 128 MG/DL
FERRITIN SERPL-MCNC: 10 NG/ML (ref 11–307)
HBA1C MFR BLD: 6.1 %
HBV CORE AB SER QL: NORMAL
HBV CORE IGM SER QL: NORMAL
HBV SURFACE AG SER QL: NORMAL
HCT VFR BLD AUTO: 38.4 % (ref 34.8–46.1)
HCV AB SER QL: NORMAL
HDLC SERPL-MCNC: 66 MG/DL
HGB BLD-MCNC: 12.3 G/DL (ref 11.5–15.4)
IMM GRANULOCYTES # BLD AUTO: 0.02 THOUSAND/UL (ref 0–0.2)
IMM GRANULOCYTES NFR BLD AUTO: 0 % (ref 0–2)
LDLC SERPL CALC-MCNC: 108 MG/DL (ref 0–100)
LYMPHOCYTES # BLD AUTO: 1.91 THOUSANDS/ÂΜL (ref 0.6–4.47)
LYMPHOCYTES NFR BLD AUTO: 19 % (ref 14–44)
MCH RBC QN AUTO: 24.6 PG (ref 26.8–34.3)
MCHC RBC AUTO-ENTMCNC: 32 G/DL (ref 31.4–37.4)
MCV RBC AUTO: 77 FL (ref 82–98)
MONOCYTES # BLD AUTO: 0.62 THOUSAND/ÂΜL (ref 0.17–1.22)
MONOCYTES NFR BLD AUTO: 6 % (ref 4–12)
NEUTROPHILS # BLD AUTO: 7.13 THOUSANDS/ÂΜL (ref 1.85–7.62)
NEUTS SEG NFR BLD AUTO: 73 % (ref 43–75)
NONHDLC SERPL-MCNC: 135 MG/DL
NRBC BLD AUTO-RTO: 0 /100 WBCS
PLATELET # BLD AUTO: 301 THOUSANDS/UL (ref 149–390)
PMV BLD AUTO: 11.8 FL (ref 8.9–12.7)
RBC # BLD AUTO: 5 MILLION/UL (ref 3.81–5.12)
RH BLD: NEGATIVE
SPECIMEN EXPIRATION DATE: NORMAL
TRIGL SERPL-MCNC: 133 MG/DL
WBC # BLD AUTO: 9.85 THOUSAND/UL (ref 4.31–10.16)

## 2024-06-10 PROCEDURE — 85025 COMPLETE CBC W/AUTO DIFF WBC: CPT

## 2024-06-10 PROCEDURE — 86850 RBC ANTIBODY SCREEN: CPT

## 2024-06-10 PROCEDURE — 86900 BLOOD TYPING SEROLOGIC ABO: CPT

## 2024-06-10 PROCEDURE — 82728 ASSAY OF FERRITIN: CPT

## 2024-06-10 PROCEDURE — 86901 BLOOD TYPING SEROLOGIC RH(D): CPT

## 2024-06-10 PROCEDURE — 83036 HEMOGLOBIN GLYCOSYLATED A1C: CPT

## 2024-06-10 PROCEDURE — 84702 CHORIONIC GONADOTROPIN TEST: CPT

## 2024-06-10 NOTE — TELEPHONE ENCOUNTER
Left non-detailed voicemail message for patient to return our call. Message also routed to provider to review.

## 2024-06-10 NOTE — TELEPHONE ENCOUNTER
----- Message from Diamond LYLE sent at 6/10/2024  1:55 PM EDT -----    ----- Message -----  From: Hernandez Jimenes DO  Sent: 6/10/2024   1:27 PM EDT  To: [facility] [place] [facility]    Please let patient know labs looked fine.    Called pt and lft msg re: labs,looks fine.

## 2024-06-10 NOTE — TELEPHONE ENCOUNTER
Regarding: UTI  ----- Message from Maria Luisa MATHIS sent at 6/10/2024  3:46 PM EDT -----  Pt called to speak with nurse regarding results. [facility] but know one has contacted her about it or reaching out to her to treat it. No CTS available asked to have a nurse call her to further discuss.

## 2024-06-10 NOTE — TELEPHONE ENCOUNTER
"Pt returned call. Advised results have not yet been reviewed but once they are, pt will get a call with results and any recommendations. Pt thankful.  Reason for Disposition   Nursing judgment    Answer Assessment - Initial Assessment Questions  1. REASON FOR CALL or QUESTION: \"What is your reason for calling today?\" or \"How can I best help you?\" or \"What question do you have that I can help answer?\"      Results    Protocols used: Information Only Call - No Triage-ADULT-[place]    "

## 2024-06-11 PROCEDURE — NC001 PR NO CHARGE: Performed by: OBSTETRICS & GYNECOLOGY

## 2024-06-11 NOTE — H&P
Assessment/Plan:            Diagnoses and all orders for this visit:     Sickle cell trait (HCC)     Pelvic pain  -         Fibroids; discussed options including following versus medical management versus uterine artery embolization versus surgical management with either myomectomy or hysterectomy.  Patient has opted to proceed with a hysterectomy.  Discussed LSH BS versus TLH BS.  She has opted to proceed with an LSH BS.  The risks of the surgery were discussed including, but not limited to, infection, hemorrhage, bowel bladder or vascular injury, possible need for laparotomy.  Because of her cardiac history I recommended cardiac clearance prior to surgery  -       Subjective:       Patient ID: Kiara Ma is a 39 y.o. female.     HPI G0. New patient, referred by Dr Zeng secondary to fibroid uterus and pelvic pain.  Patient presents complaining of continuous pain primarily in the left lower quadrant described as a stabbing sensation followed by persistent ache.  She also is experiencing increasing lower back pain pelvic pressure urinary frequency.  She states her menses are regular.  She has had a prior myomectomy via laparotomy in 2020.  At this point she desires definitive treatment.  Recent US:     AMB US Pelvic Non OB     Date/Time: 4/16/2024 8:30 AM     Performed by: Nicole Holloway  Authorized by: Prasad Zeng MD  Universal Protocol:  Consent given by: patient  Timeout called at: 4/16/2024 8:23 AM.  Patient understanding: patient states understanding of the procedure being performed  Patient identity confirmed: verbally with patient     Procedure details:     SIS Procedure: No    Technique:  Transvaginal US, Non-OB    Position: lithotomy exam    Uterine findings:     Length (cm): 9.23    Height (cm):  6.66    Width (cm):  6.86    Endometrial stripe: identified      Endometrium thickness (mm):  7.95  Left ovary findings:     Left ovary:  Visualized    Length (cm): 2.67    Height (cm): 1.17     Width (cm): 1.5  Right ovary findings:     Right ovary:  Visualized    Length (cm): 4.38    Height (cm): 2.63    Width (cm): 3.13  Other findings:     Free pelvic fluid: identified    Post-Procedure Details:     Impression:  Anteverted uterus demonstrates multiple fibroids. Largest are right intramural 2.1cm and 7.5mm, posterior intramural 2.8cm, anterior intramural 1.8cm, fundal intramural 1.3cm, left intramural 1.1cm, and left subserosal 1.7cm. There are varicosities bilaterally in the adnexal region, but more on the left. The left ovary appears within normal limits. The right ovary demonstrates a complex cystic mass 2.5cm with normal appearing blood flow on color doppler. A small amount of free fluid is noted in the right adnexa.     Tolerance:  Tolerated well, no immediate complications    Complications: no complications              The following portions of the patient's history were reviewed and updated as appropriate: She  has a past medical history of Abdominal pain, Abnormal ECG, Anemia, Asthma, Female infertility, Fibroid, GERD (gastroesophageal reflux disease), History of palpitations, Hypertension, Lower back pain, Migraines, Ovarian cyst, Polycystic ovary syndrome, Seasonal allergies, Trace mitral regurgitation by prior echocardiogram, and Trigger finger of right thumb (02/28/2017).  She        Patient Active Problem List     Diagnosis Date Noted    Circadian rhythm sleep disorder 01/24/2023    Behaviorally induced insufficient sleep syndrome 01/24/2023    Papilledema 01/03/2023    Excessive sleepiness      Sickle cell trait (HCC) 03/16/2021    Iron deficiency anemia due to chronic blood loss 11/19/2020    Family history of sudden cardiac death (SCD) 02/21/2020    Migraine without aura and without status migrainosus, not intractable 04/30/2019    Female infertility 09/19/2018    Polycystic ovaries 09/19/2018    GERD (gastroesophageal reflux disease) 07/23/2018    Fibroids, submucosal 03/19/2018    PVC  (premature ventricular contraction) 04/13/2017    Obesity (BMI 30-39.9) 01/17/2017    Benign essential hypertension 12/27/2016    Herniated lumbar intervertebral disc 09/14/2016    Mitral valve insufficiency 03/03/2016    Asthma in adult, mild intermittent, uncomplicated 03/29/2013      She  has a past surgical history that includes Cholecystectomy; Hernia repair; pr tendon sheath incision (Right, 02/28/2017); Lumbar laminectomy (Right, 09/14/2016); pr colonoscopy flx dx w/collj spec when pfrmd (N/A, 07/07/2016); [place] tooth extraction; pr hysteroscopy bx endometrium&/polypc w/wo d&c (N/A, 03/19/2018); LAPAROTOMY (N/A, 12/10/2020); Colonoscopy (2017); Spine surgery (9/2016); FL lumbar puncture diagnostic (01/03/2023); and Myomectomy (12/10/20).  Her family history includes Alcohol abuse in her father; Anxiety disorder in her mother; Arthritis in her family, father, maternal grandfather, mother, and paternal grandfather; Bipolar disorder in her maternal aunt; Brain cancer in her maternal aunt; Breast cancer in her maternal grandmother; Cancer in her family, maternal grandmother, and paternal grandfather; Colon cancer in her paternal grandfather; Depression in her mother; Diabetes in her father and mother; Dialysis in her father; Drug abuse in her father; Esophageal cancer in her maternal grandmother; Heart attack in her maternal grandfather and maternal uncle; Heart disease in her maternal grandfather; Hyperlipidemia in her family, family, maternal grandfather, and paternal grandfather; Hypertension in her father and mother; Stroke in her family, family, father, maternal grandfather, and paternal grandfather; Substance Abuse in her father; Thyroid disease in her mother.  She  reports that she has never smoked. She has never used smokeless tobacco. She reports current alcohol use of about 4.0 standard drinks of alcohol per week. She reports current drug use. Drug: Marijuana.  Current Rx          Current Outpatient  Medications   Medication Sig Dispense Refill    albuterol (ProAir HFA) 90 mcg/act inhaler Inhale 2 puffs every 4 (four) hours as needed (When has flare up) 8.5 g 5    carvedilol (COREG CR) 20 MG 24 hr capsule Take 2 capsules (40 mg total) by mouth daily 90 capsule 0    chlorthalidone 25 mg tablet Take 1 tablet (25 mg total) by mouth daily 90 tablet 3    Cholecalciferol (VITAMIN D3 PO) Take 2,000 mg by mouth daily in the early morning        cyclobenzaprine (FLEXERIL) 5 mg tablet Take 1 tablet (5 mg total) by mouth daily at bedtime 30 tablet 0    estradiol (ESTRACE VAGINAL) 0.1 mg/g vaginal cream Apply pea sized amount intravaginally twice weekly 42.5 g 2    meloxicam (MOBIC) 7.5 mg tablet Take 1 tablet (7.5 mg total) by mouth daily 15 tablet 0    metoprolol succinate (TOPROL-XL) 25 mg 24 hr tablet Take 1 tablet (25 mg total) by mouth daily for 3 doses Start taking 2 days prior to CT scan and morning of 3 tablet 0    Multiple Vitamins-Minerals (Womens Multivitamin) TABS Take 1 tablet by mouth daily        pantoprazole (PROTONIX) 40 mg tablet Take 1 tablet (40 mg total) by mouth daily before breakfast 90 tablet 1    Peppermint Oil 90 MG CPCR Take 360 mg by mouth 3 (three) times a day (Patient not taking: Reported on 2/12/2024) 360 capsule 3    potassium chloride (MICRO-K) 10 MEQ CR capsule Take 1 capsule (10 mEq total) by mouth daily 90 capsule 0    potassium chloride (MICRO-K) 10 MEQ CR capsule Take 2 tablets daily        triamcinolone (KENALOG) 0.5 % cream Apply topically 3 (three) times a day 30 g 0      No current facility-administered medications for this visit.              Current Outpatient Medications on File Prior to Visit   Medication Sig    albuterol (ProAir HFA) 90 mcg/act inhaler Inhale 2 puffs every 4 (four) hours as needed (When has flare up)    carvedilol (COREG CR) 20 MG 24 hr capsule Take 2 capsules (40 mg total) by mouth daily    chlorthalidone 25 mg tablet Take 1 tablet (25 mg total) by mouth  daily    Cholecalciferol (VITAMIN D3 PO) Take 2,000 mg by mouth daily in the early morning    cyclobenzaprine (FLEXERIL) 5 mg tablet Take 1 tablet (5 mg total) by mouth daily at bedtime    estradiol (ESTRACE VAGINAL) 0.1 mg/g vaginal cream Apply pea sized amount intravaginally twice weekly    meloxicam (MOBIC) 7.5 mg tablet Take 1 tablet (7.5 mg total) by mouth daily    metoprolol succinate (TOPROL-XL) 25 mg 24 hr tablet Take 1 tablet (25 mg total) by mouth daily for 3 doses Start taking 2 days prior to CT scan and morning of    Multiple Vitamins-Minerals (Womens Multivitamin) TABS Take 1 tablet by mouth daily    pantoprazole (PROTONIX) 40 mg tablet Take 1 tablet (40 mg total) by mouth daily before breakfast    Peppermint Oil 90 MG CPCR Take 360 mg by mouth 3 (three) times a day (Patient not taking: Reported on 2/12/2024)    potassium chloride (MICRO-K) 10 MEQ CR capsule Take 1 capsule (10 mEq total) by mouth daily    potassium chloride (MICRO-K) 10 MEQ CR capsule Take 2 tablets daily    triamcinolone (KENALOG) 0.5 % cream Apply topically 3 (three) times a day      No current facility-administered medications on file prior to visit.      She has No Known Allergies..     Review of Systems   Constitutional: Negative.    HENT:  Negative for sore throat and trouble swallowing.    Gastrointestinal:  Positive for abdominal distention and abdominal pain.   Genitourinary:  Positive for frequency and pelvic pain.          Objective:        There were no vitals taken for this visit.            Physical Exam  Vitals reviewed.   Constitutional:       Appearance: Normal appearance.   Cardiovascular:      Rate and Rhythm: Normal rate and regular rhythm.      Pulses: Normal pulses.      Heart sounds: Normal heart sounds. No murmur heard.  Pulmonary:      Effort: Pulmonary effort is normal. No respiratory distress.      Breath sounds: Normal breath sounds.   Abdominal:      General: There is no distension.      Palpations: Abdomen  is soft. There is no mass.      Tenderness: There is no abdominal tenderness. There is no guarding or rebound.      Hernia: No hernia is present. There is no hernia in the left inguinal area or right inguinal area.   Genitourinary:     General: Normal vulva.      Labia:         Right: No rash, tenderness or lesion.         Left: No rash, tenderness or lesion.       Vagina: Normal.      Cervix: Normal.      Uterus: Enlarged.       Adnexa:         Right: No mass, tenderness or fullness.          Left: No mass, tenderness or fullness.     Lymphadenopathy:      Lower Body: No right inguinal adenopathy. No left inguinal adenopathy.   Neurological:      Mental Status: She is alert.

## 2024-06-19 ENCOUNTER — TELEPHONE (OUTPATIENT)
Dept: HEMATOLOGY ONCOLOGY | Facility: CLINIC | Age: 39
End: 2024-06-19

## 2024-06-19 DIAGNOSIS — D50.0 IRON DEFICIENCY ANEMIA DUE TO CHRONIC BLOOD LOSS: Primary | ICD-10-CM

## 2024-06-19 RX ORDER — SODIUM CHLORIDE 9 MG/ML
20 INJECTION, SOLUTION INTRAVENOUS ONCE
Status: CANCELLED | OUTPATIENT
Start: 2024-06-26

## 2024-06-19 NOTE — TELEPHONE ENCOUNTER
Labs reviewed.  Needs IV iron x 3 doses.  Patient was sent [employer] message regarding this.  Please schedule.

## 2024-06-22 DIAGNOSIS — I10 ESSENTIAL HYPERTENSION: ICD-10-CM

## 2024-06-23 RX ORDER — CHLORTHALIDONE 25 MG/1
25 TABLET ORAL DAILY
Qty: 90 TABLET | Refills: 1 | Status: SHIPPED | OUTPATIENT
Start: 2024-06-23

## 2024-06-26 ENCOUNTER — HOSPITAL ENCOUNTER (OUTPATIENT)
Dept: INFUSION CENTER | Facility: HOSPITAL | Age: 39
Discharge: HOME | End: 2024-06-26
Attending: INTERNAL MEDICINE
Payer: COMMERCIAL

## 2024-06-26 VITALS
RESPIRATION RATE: 18 BRPM | HEART RATE: 80 BPM | TEMPERATURE: 98.1 F | SYSTOLIC BLOOD PRESSURE: 122 MMHG | DIASTOLIC BLOOD PRESSURE: 85 MMHG

## 2024-06-26 DIAGNOSIS — D50.0 IRON DEFICIENCY ANEMIA DUE TO CHRONIC BLOOD LOSS: Primary | ICD-10-CM

## 2024-06-26 PROCEDURE — 96367 TX/PROPH/DG ADDL SEQ IV INF: CPT

## 2024-06-26 PROCEDURE — 96365 THER/PROPH/DIAG IV INF INIT: CPT

## 2024-06-26 RX ORDER — SODIUM CHLORIDE 9 MG/ML
20 INJECTION, SOLUTION INTRAVENOUS ONCE
Status: COMPLETED | OUTPATIENT
Start: 2024-06-26 | End: 2024-06-26

## 2024-06-26 RX ORDER — SODIUM CHLORIDE 9 MG/ML
20 INJECTION, SOLUTION INTRAVENOUS ONCE
Status: CANCELLED | OUTPATIENT
Start: 2024-07-03

## 2024-06-26 RX ADMIN — DIPHENHYDRAMINE HYDROCHLORIDE 50 MG: 50 INJECTION, SOLUTION INTRAMUSCULAR; INTRAVENOUS at 13:31

## 2024-06-26 RX ADMIN — SODIUM CHLORIDE 20 ML/HR: 9 INJECTION, SOLUTION INTRAVENOUS at 13:24

## 2024-06-26 RX ADMIN — IRON SUCROSE 200 MG: 20 INJECTION, SOLUTION INTRAVENOUS at 14:13

## 2024-06-26 NOTE — PLAN OF CARE
Problem: Potential for Falls  Goal: Patient will remain free of falls  Description: INTERVENTIONS:  - Assess patient frequently for physical needs  -  Identify cognitive and physical deficits and behaviors that affect risk of falls.  -  [place] fall precautions as indicated by assessment.  - Educate patient/family on patient safety including physical limitations  - Instruct patient to call for assistance with activity based on assessment  - Modify environment to reduce risk of injury  - Consider OT/PT consult to assist with strengthening/mobility  Outcome: Progressing

## 2024-06-26 NOTE — PROGRESS NOTES
Pt tolerated treatment today with no adverse reactions. Declined AVS, confirmed next apt for 7/8 @ 1:00. Left unit ambulatory with a steady gait.

## 2024-07-05 DIAGNOSIS — D50.0 IRON DEFICIENCY ANEMIA DUE TO CHRONIC BLOOD LOSS: Primary | ICD-10-CM

## 2024-07-05 RX ORDER — SODIUM CHLORIDE 9 MG/ML
20 INJECTION, SOLUTION INTRAVENOUS ONCE
OUTPATIENT
Start: 2024-07-08

## 2024-07-07 DIAGNOSIS — R06.02 SHORTNESS OF BREATH: ICD-10-CM

## 2024-07-07 DIAGNOSIS — R06.09 DYSPNEA ON EXERTION: ICD-10-CM

## 2024-07-07 DIAGNOSIS — R07.9 CHEST PAIN, UNSPECIFIED TYPE: ICD-10-CM

## 2024-07-08 ENCOUNTER — HOSPITAL ENCOUNTER (OUTPATIENT)
Dept: INFUSION CENTER | Facility: HOSPITAL | Age: 39
Discharge: HOME | End: 2024-07-08
Attending: INTERNAL MEDICINE
Payer: COMMERCIAL

## 2024-07-08 VITALS
HEART RATE: 80 BPM | RESPIRATION RATE: 18 BRPM | DIASTOLIC BLOOD PRESSURE: 83 MMHG | SYSTOLIC BLOOD PRESSURE: 129 MMHG | TEMPERATURE: 97.5 F

## 2024-07-08 DIAGNOSIS — D50.0 IRON DEFICIENCY ANEMIA DUE TO CHRONIC BLOOD LOSS: Primary | ICD-10-CM

## 2024-07-08 PROCEDURE — 96365 THER/PROPH/DIAG IV INF INIT: CPT

## 2024-07-08 PROCEDURE — 96367 TX/PROPH/DG ADDL SEQ IV INF: CPT

## 2024-07-08 RX ORDER — SODIUM CHLORIDE 9 MG/ML
20 INJECTION, SOLUTION INTRAVENOUS ONCE
Status: CANCELLED | OUTPATIENT
Start: 2024-07-15

## 2024-07-08 RX ORDER — SODIUM CHLORIDE 9 MG/ML
20 INJECTION, SOLUTION INTRAVENOUS ONCE
Status: COMPLETED | OUTPATIENT
Start: 2024-07-08 | End: 2024-07-08

## 2024-07-08 RX ORDER — CARVEDILOL PHOSPHATE 20 MG/1
40 CAPSULE, EXTENDED RELEASE ORAL DAILY
Qty: 90 CAPSULE | Refills: 0 | Status: SHIPPED | OUTPATIENT
Start: 2024-07-08

## 2024-07-08 RX ADMIN — DIPHENHYDRAMINE HYDROCHLORIDE 50 MG: 50 INJECTION, SOLUTION INTRAMUSCULAR; INTRAVENOUS at 13:13

## 2024-07-08 RX ADMIN — SODIUM CHLORIDE 20 ML/HR: 0.9 INJECTION, SOLUTION INTRAVENOUS at 13:11

## 2024-07-08 RX ADMIN — IRON SUCROSE 200 MG: 20 INJECTION, SOLUTION INTRAVENOUS at 13:44

## 2024-07-08 NOTE — PROGRESS NOTES
Pt tolerated venofer infusion without difficulty.  No s/s reaction noted.  Confirmed next appt on 7/15 at 1330.  AVS declined.  Left ambulatory in stable condition.

## 2024-07-10 ENCOUNTER — ANESTHESIA EVENT (OUTPATIENT)
Dept: PERIOP | Facility: HOSPITAL | Age: 39
End: 2024-07-10
Payer: COMMERCIAL

## 2024-07-15 ENCOUNTER — HOSPITAL ENCOUNTER (OUTPATIENT)
Dept: INFUSION CENTER | Facility: HOSPITAL | Age: 39
Discharge: HOME | End: 2024-07-15
Attending: INTERNAL MEDICINE
Payer: COMMERCIAL

## 2024-07-15 VITALS
SYSTOLIC BLOOD PRESSURE: 139 MMHG | HEART RATE: 73 BPM | TEMPERATURE: 97.4 F | DIASTOLIC BLOOD PRESSURE: 85 MMHG | RESPIRATION RATE: 18 BRPM

## 2024-07-15 DIAGNOSIS — D50.0 IRON DEFICIENCY ANEMIA DUE TO CHRONIC BLOOD LOSS: Primary | ICD-10-CM

## 2024-07-15 PROCEDURE — 96365 THER/PROPH/DIAG IV INF INIT: CPT

## 2024-07-15 PROCEDURE — 96367 TX/PROPH/DG ADDL SEQ IV INF: CPT

## 2024-07-15 RX ORDER — SODIUM CHLORIDE 9 MG/ML
20 INJECTION, SOLUTION INTRAVENOUS ONCE
Status: CANCELLED | OUTPATIENT
Start: 2024-07-22

## 2024-07-15 RX ORDER — SODIUM CHLORIDE 9 MG/ML
20 INJECTION, SOLUTION INTRAVENOUS ONCE
Status: COMPLETED | OUTPATIENT
Start: 2024-07-15 | End: 2024-07-15

## 2024-07-15 RX ADMIN — DIPHENHYDRAMINE HYDROCHLORIDE 50 MG: 50 INJECTION, SOLUTION INTRAMUSCULAR; INTRAVENOUS at 13:26

## 2024-07-15 RX ADMIN — IRON SUCROSE 200 MG: 20 INJECTION, SOLUTION INTRAVENOUS at 14:01

## 2024-07-15 RX ADMIN — SODIUM CHLORIDE 20 ML/HR: 0.9 INJECTION, SOLUTION INTRAVENOUS at 13:22

## 2024-07-15 NOTE — PROGRESS NOTES
Pt tolerated venofer infusion without difficulty.  No s/s reaction noted.  No further infusions ordered at this time.  AVS declined.  Left ambulatory in stable condition.

## 2024-07-15 NOTE — PRE-PROCEDURE INSTRUCTIONS
Pre-Surgery Instructions:   Medication Instructions    albuterol (ProAir HFA) 90 mcg/act inhaler Uses PRN- OK to take day of surgery    carvedilol (COREG CR) 20 MG 24 hr capsule Take day of surgery.    chlorthalidone 25 mg tablet Hold day of surgery.    Cholecalciferol (VITAMIN D3 PO) Stop taking 7 days prior to surgery.    meloxicam (MOBIC) 7.5 mg tablet Stop taking 7 days prior to surgery.    Multiple Vitamins-Minerals (Womens Multivitamin) TABS Stop taking 7 days prior to surgery.    pantoprazole (PROTONIX) 40 mg tablet Take day of surgery.    potassium chloride (MICRO-K) 10 MEQ CR capsule Take day of surgery.    Medication instructions for day surgery reviewed. Please use only a sip of water to take your instructed medications. Avoid all over the counter vitamins, supplements and NSAIDS for one week prior to surgery per anesthesia guidelines. Tylenol is ok to take as needed.     You will receive a call one business day prior to surgery with an arrival time and hospital directions. If your surgery is scheduled on a Monday, the hospital will be calling you on the Friday prior to your surgery. If you have not heard from anyone by 8pm, please call the hospital supervisor through the hospital  at [phone]. ([place] [phone] or [place] [phone]).    Do not eat or drink anything after midnight the night before your surgery, including candy, mints, lifesavers, or chewing gum. Do not drink alcohol 24hrs before your surgery. Try not to smoke at least 24hrs before your surgery.       Follow the pre surgery showering instructions as listed in the “My Surgical Experience Booklet” or otherwise provided by your surgeon's office. Do not use a blade to shave the surgical area 1 week before surgery. It is okay to use a clean electric clippers up to 24 hours before surgery. Do not apply any lotions, creams, including makeup, cologne, deodorant, or perfumes after showering on the day of your surgery. Do not  use dry shampoo, hair spray, hair gel, or any type of hair products.     No contact lenses, eye make-up, or artificial eyelashes. Remove nail polish, including gel polish, and any artificial, gel, or acrylic nails if possible. Remove all jewelry including rings and body piercing jewelry.     Wear causal clothing that is easy to take on and off. Consider your type of surgery.    Keep any valuables, jewelry, piercings at home. Please bring any specially ordered equipment (sling, braces) if indicated.    Arrange for a responsible person to drive you to and from the hospital on the day of your surgery. Please confirm the visitor policy for the day of your procedure when you receive your phone call with an arrival time.     Call the surgeon's office with any new illnesses, exposures, or additional questions prior to surgery.    Please reference your “My Surgical Experience Booklet” for additional information to prepare for your upcoming surgery.    Pt instructed to stop nsaids and supplements one week prior to surgery. Pt verbalized understanding of shower, med and pre-op ensure instructions.

## 2024-07-18 ENCOUNTER — OFFICE VISIT (OUTPATIENT)
Dept: FAMILY MEDICINE CLINIC | Facility: CLINIC | Age: 39
End: 2024-07-18
Payer: COMMERCIAL

## 2024-07-18 VITALS
DIASTOLIC BLOOD PRESSURE: 80 MMHG | WEIGHT: 200 LBS | BODY MASS INDEX: 35.44 KG/M2 | SYSTOLIC BLOOD PRESSURE: 130 MMHG | TEMPERATURE: 98.7 F | HEART RATE: 85 BPM | HEIGHT: 63 IN | OXYGEN SATURATION: 98 % | RESPIRATION RATE: 18 BRPM

## 2024-07-18 DIAGNOSIS — G43.109 OCULAR MIGRAINE: Primary | ICD-10-CM

## 2024-07-18 DIAGNOSIS — I10 BENIGN ESSENTIAL HYPERTENSION: ICD-10-CM

## 2024-07-18 PROBLEM — H47.10 PAPILLEDEMA: Status: RESOLVED | Noted: 2023-01-03 | Resolved: 2024-07-18

## 2024-07-18 PROCEDURE — 99213 OFFICE O/P EST LOW 20 MIN: CPT | Performed by: FAMILY MEDICINE

## 2024-07-18 RX ORDER — RIZATRIPTAN BENZOATE 10 MG/1
10 TABLET, ORALLY DISINTEGRATING ORAL ONCE AS NEEDED
Qty: 10 TABLET | Refills: 0 | Status: SHIPPED | OUTPATIENT
Start: 2024-07-18

## 2024-07-18 NOTE — PROGRESS NOTES
Ambulatory Visit  Name: Kiara Ma      : 1985      MRN: [id number]  Encounter Provider: Imani Rhodes MD  Encounter Date: 2024   Encounter department: [facility]    Assessment & Plan   1. Ocular migraine  Comments:  to f/u with eye specialist  Assessment & Plan:  To start Mag Ox 400 mg daily and b2 400 mg daily   Trial of maxalt prn   Orders:  -     rizatriptan (MAXALT-MLT) 10 mg disintegrating tablet; Take 1 tablet (10 mg total) by mouth once as needed for migraine for up to 1 dose may repeat in 2 hours if necessary  2. Benign essential hypertension  Assessment & Plan:  Doing well on current meds         History of Present Illness       On and off pressure behind the left eye and temporal region for the last 1-2 months   Dull in nature- feels like left side of her head in a fog  Nothing makes it better or worse  Last eye exam was last September and it was normal       Eye Problem   The left eye is affected. This is a new problem. There was no injury mechanism. The patient is experiencing no pain. There is No known exposure to pink eye. She Does not wear contacts. Pertinent negatives include no blurred vision, eye discharge, double vision, eye redness, fever, foreign body sensation, itching, nausea, photophobia, recent URI or vomiting. She has tried nothing for the symptoms.     Review of Systems   Constitutional:  Negative for fever.   Eyes:  Negative for blurred vision, double vision, photophobia, discharge, redness and itching.   Gastrointestinal:  Negative for nausea and vomiting.     Past Medical History:   Diagnosis Date   • Abdominal pain    • Abnormal ECG    • Anemia    • Asthma     mild intermittent   • Female infertility    • Fibroid     Myomectomy 12/10/20   • GERD (gastroesophageal reflux disease)    • History of palpitations    • Hypertension    • Lower back pain    • Migraines    • Ovarian cyst    • Papilledema 2023   • Polycystic ovary syndrome    •  Seasonal allergies    • Trace mitral regurgitation by prior echocardiogram    • Trigger finger of right thumb 02/28/2017     Past Surgical History:   Procedure Laterality Date   • CHOLECYSTECTOMY      laparoscopic   • COLONOSCOPY  2017   • FL LUMBAR PUNCTURE DIAGNOSTIC  01/03/2023   • HERNIA REPAIR      umbilical   • LAPAROTOMY N/A 12/10/2020    Procedure: ABDOMINAL MYOMECTOMY;  Surgeon: Tara Budinetz, DO;  Location[facility];  Service: Gynecology   • LUMBAR LAMINECTOMY Right 09/14/2016    Procedure: Right L5/S1 Metryx microdiscectomy;  Surgeon: Mark [place] MD;  Location: [facility];  Service:    • MYOMECTOMY  12/10/20   • SD COLONOSCOPY FLX DX W/COLLJ SPEC WHEN PFRMD N/A 07/07/2016    Procedure: COLONOSCOPY;  Surgeon: Taty Estrella DO;  Location: [facility];  Service: Gastroenterology   • SD HYSTEROSCOPY BX ENDOMETRIUM&/POLYPC W/WO D&C N/A 03/19/2018    Procedure: HYSTEROSCOPY; MYOMECTOMY; ENDOMETRIAL BIOPSY;  Surgeon: Tara Budinetz, DO;  [facility];  Service: Gynecology   • SD TENDON SHEATH INCISION Right 02/28/2017    Procedure: THUMB TRIGGER RELEASE ;  Surgeon: Prasad Mooney DO;  [facility];  Service: Orthopedics   • SPINE SURGERY  9/2016    Microdisectomy   • WISDOM TOOTH EXTRACTION       Family History   Problem Relation Age of Onset   • Diabetes Mother         type2   • Hypertension Mother    • Depression Mother    • Thyroid disease Mother    • Arthritis Mother    • Anxiety disorder Mother    • Stroke Father    • Diabetes Father         type2   • Hypertension Father    • Alcohol abuse Father    • Substance Abuse Father    • Arthritis Father    • Drug abuse Father    • Dialysis Father    • Esophageal cancer Maternal Grandmother    • Breast cancer Maternal Grandmother         Maternal great grandmother   • Cancer Maternal Grandmother         Pancreatic   • Heart attack Maternal Grandfather         acute MI   • Arthritis Maternal Grandfather    • Stroke Maternal Grandfather          CVA   • Hyperlipidemia Maternal Grandfather    • Heart disease Maternal Grandfather    • Arthritis Paternal Grandfather    • Stroke Paternal Grandfather         CVA   • Cancer Paternal Grandfather    • Hyperlipidemia Paternal Grandfather    • Colon cancer Paternal Grandfather    • Bipolar disorder Maternal Aunt    • Brain cancer Maternal Aunt    • Heart attack Maternal Uncle         acute MI   • Arthritis Family    • Stroke Family         CVA   • Cancer Family    • Hyperlipidemia Family    • Stroke Family         CVA   • Hyperlipidemia Family      Social History     Tobacco Use   • Smoking status: Never   • Smokeless tobacco: Never   Vaping Use   • Vaping status: Never Used   Substance and Sexual Activity   • Alcohol use: Yes     Comment: social   • Drug use: Yes     Types: Marijuana     Comment: Medical   • Sexual activity: Yes     Partners: Male     Birth control/protection: None     Current Outpatient Medications on File Prior to Visit   Medication Sig   • albuterol (ProAir HFA) 90 mcg/act inhaler Inhale 2 puffs every 4 (four) hours as needed (When has flare up)   • carvedilol (COREG CR) 20 MG 24 hr capsule Take 2 capsules (40 mg total) by mouth daily   • chlorthalidone 25 mg tablet Take 1 tablet (25 mg total) by mouth daily   • Cholecalciferol (VITAMIN D3 PO) Take 2,000 mg by mouth daily in the early morning   • meloxicam (MOBIC) 7.5 mg tablet Take 1 tablet (7.5 mg total) by mouth daily (Patient taking differently: Take 7.5 mg by mouth daily PRN)   • Multiple Vitamins-Minerals (Womens Multivitamin) TABS Take 1 tablet by mouth daily   • pantoprazole (PROTONIX) 40 mg tablet Take 1 tablet (40 mg total) by mouth daily before breakfast (Patient taking differently: Take 40 mg by mouth daily before breakfast 4 times a week)   • potassium chloride (MICRO-K) 10 MEQ CR capsule Take 2 capsules (20 mEq total) by mouth daily Take 2 tablets daily     No Known Allergies  Immunization History   Administered Date(s) Administered  "  • COVID-19 PFIZER VACCINE 0.3 ML IM 02/07/2021, 02/27/2021, 12/11/2021   • HPV Quadrivalent 10/29/2007   • INFLUENZA 10/28/2013, 10/15/2016, 10/20/2017, 10/04/2018, 09/17/2019, 10/13/2020, 09/28/2022   • Influenza Quadrivalent 3 years and older 10/13/2020   • Influenza Quadrivalent Preservative Free 3 years and older IM 10/15/2016, 10/20/2017   • Influenza, injectable, quadrivalent, preservative free 0.5 mL 10/03/2023   • Influenza, recombinant, quadrivalent,injectable, preservative free 09/16/2021, 09/28/2022   • Tdap 08/31/2009, 06/25/2015   • Tuberculin Skin Test-PPD Intradermal 02/28/2024, 03/13/2024     Objective     /80 (BP Location: Left arm, Patient Position: Sitting, Cuff Size: Standard)   Pulse 85   Temp 98.7 °F (37.1 °C) (Tympanic)   Resp 18   Ht 5' 3\" (1.6 m)   Wt 90.7 kg (200 lb)   SpO2 98%   BMI 35.43 kg/m²     Physical Exam  Vitals reviewed.   Constitutional:       Appearance: Normal appearance.   Cardiovascular:      Rate and Rhythm: Normal rate and regular rhythm.      Pulses: Normal pulses.      Heart sounds: Normal heart sounds.   Neurological:      General: No focal deficit present.      Mental Status: She is alert and oriented to person, place, and time.   Psychiatric:         Mood and Affect: Mood normal.         Behavior: Behavior normal.         "

## 2024-07-23 ENCOUNTER — ANESTHESIA (OUTPATIENT)
Dept: PERIOP | Facility: HOSPITAL | Age: 39
End: 2024-07-23
Payer: COMMERCIAL

## 2024-07-23 ENCOUNTER — HOSPITAL ENCOUNTER (OUTPATIENT)
Facility: HOSPITAL | Age: 39
Setting detail: OUTPATIENT SURGERY
Discharge: HOME | End: 2024-07-23
Attending: OBSTETRICS & GYNECOLOGY | Admitting: OBSTETRICS & GYNECOLOGY
Payer: COMMERCIAL

## 2024-07-23 VITALS
TEMPERATURE: 97 F | BODY MASS INDEX: 35.12 KG/M2 | HEART RATE: 55 BPM | OXYGEN SATURATION: 95 % | HEIGHT: 63 IN | DIASTOLIC BLOOD PRESSURE: 59 MMHG | SYSTOLIC BLOOD PRESSURE: 109 MMHG | WEIGHT: 198.19 LBS | RESPIRATION RATE: 16 BRPM

## 2024-07-23 DIAGNOSIS — D25.9 UTERINE LEIOMYOMA, UNSPECIFIED LOCATION: ICD-10-CM

## 2024-07-23 DIAGNOSIS — G89.18 POST-OP PAIN: Primary | ICD-10-CM

## 2024-07-23 LAB
ABO GROUP BLD: NORMAL
BLD GP AB SCN SERPL QL: NEGATIVE
EXT PREGNANCY TEST URINE: NEGATIVE
EXT. CONTROL: NORMAL
RH BLD: NEGATIVE
SPECIMEN EXPIRATION DATE: NORMAL

## 2024-07-23 PROCEDURE — 86900 BLOOD TYPING SEROLOGIC ABO: CPT | Performed by: OBSTETRICS & GYNECOLOGY

## 2024-07-23 PROCEDURE — 86901 BLOOD TYPING SEROLOGIC RH(D): CPT | Performed by: OBSTETRICS & GYNECOLOGY

## 2024-07-23 PROCEDURE — 81025 URINE PREGNANCY TEST: CPT | Performed by: ANESTHESIOLOGY

## 2024-07-23 PROCEDURE — 88307 TISSUE EXAM BY PATHOLOGIST: CPT | Performed by: PATHOLOGY

## 2024-07-23 PROCEDURE — 86850 RBC ANTIBODY SCREEN: CPT | Performed by: OBSTETRICS & GYNECOLOGY

## 2024-07-23 PROCEDURE — C1782 MORCELLATOR: HCPCS | Performed by: OBSTETRICS & GYNECOLOGY

## 2024-07-23 RX ORDER — LIDOCAINE HYDROCHLORIDE 10 MG/ML
INJECTION, SOLUTION EPIDURAL; INFILTRATION; INTRACAUDAL; PERINEURAL AS NEEDED
Status: DISCONTINUED | OUTPATIENT
Start: 2024-07-23 | End: 2024-07-23

## 2024-07-23 RX ORDER — ONDANSETRON 2 MG/ML
INJECTION INTRAMUSCULAR; INTRAVENOUS AS NEEDED
Status: DISCONTINUED | OUTPATIENT
Start: 2024-07-23 | End: 2024-07-23

## 2024-07-23 RX ORDER — ACETAMINOPHEN 325 MG/1
650 TABLET ORAL EVERY 6 HOURS PRN
Qty: 56 TABLET | Refills: 0 | Status: SHIPPED | OUTPATIENT
Start: 2024-07-23 | End: 2024-08-06

## 2024-07-23 RX ORDER — ONDANSETRON 2 MG/ML
4 INJECTION INTRAMUSCULAR; INTRAVENOUS ONCE AS NEEDED
Status: DISCONTINUED | OUTPATIENT
Start: 2024-07-23 | End: 2024-07-23 | Stop reason: HOSPADM

## 2024-07-23 RX ORDER — KETOROLAC TROMETHAMINE 30 MG/ML
INJECTION, SOLUTION INTRAMUSCULAR; INTRAVENOUS AS NEEDED
Status: DISCONTINUED | OUTPATIENT
Start: 2024-07-23 | End: 2024-07-23

## 2024-07-23 RX ORDER — SODIUM CHLORIDE, SODIUM LACTATE, POTASSIUM CHLORIDE, CALCIUM CHLORIDE 600; 310; 30; 20 MG/100ML; MG/100ML; MG/100ML; MG/100ML
125 INJECTION, SOLUTION INTRAVENOUS CONTINUOUS
Status: DISCONTINUED | OUTPATIENT
Start: 2024-07-23 | End: 2024-07-24 | Stop reason: HOSPADM

## 2024-07-23 RX ORDER — ONDANSETRON 2 MG/ML
4 INJECTION INTRAMUSCULAR; INTRAVENOUS EVERY 6 HOURS PRN
Status: DISCONTINUED | OUTPATIENT
Start: 2024-07-23 | End: 2024-07-24 | Stop reason: HOSPADM

## 2024-07-23 RX ORDER — MAGNESIUM HYDROXIDE 1200 MG/15ML
LIQUID ORAL AS NEEDED
Status: DISCONTINUED | OUTPATIENT
Start: 2024-07-23 | End: 2024-07-23 | Stop reason: HOSPADM

## 2024-07-23 RX ORDER — OXYCODONE HYDROCHLORIDE AND ACETAMINOPHEN 5; 325 MG/1; MG/1
1 TABLET ORAL EVERY 4 HOURS PRN
Qty: 15 TABLET | Refills: 0 | Status: SHIPPED | OUTPATIENT
Start: 2024-07-23

## 2024-07-23 RX ORDER — GLYCOPYRROLATE 0.2 MG/ML
INJECTION INTRAMUSCULAR; INTRAVENOUS AS NEEDED
Status: DISCONTINUED | OUTPATIENT
Start: 2024-07-23 | End: 2024-07-23

## 2024-07-23 RX ORDER — MIDAZOLAM HYDROCHLORIDE 2 MG/2ML
INJECTION, SOLUTION INTRAMUSCULAR; INTRAVENOUS AS NEEDED
Status: DISCONTINUED | OUTPATIENT
Start: 2024-07-23 | End: 2024-07-23

## 2024-07-23 RX ORDER — MEPERIDINE HYDROCHLORIDE 25 MG/ML
12.5 INJECTION INTRAMUSCULAR; INTRAVENOUS; SUBCUTANEOUS
Status: DISCONTINUED | OUTPATIENT
Start: 2024-07-23 | End: 2024-07-23 | Stop reason: HOSPADM

## 2024-07-23 RX ORDER — CEFAZOLIN SODIUM 2 G/50ML
2000 SOLUTION INTRAVENOUS ONCE
Status: COMPLETED | OUTPATIENT
Start: 2024-07-23 | End: 2024-07-23

## 2024-07-23 RX ORDER — BUPIVACAINE HYDROCHLORIDE 2.5 MG/ML
INJECTION, SOLUTION EPIDURAL; INFILTRATION; INTRACAUDAL AS NEEDED
Status: DISCONTINUED | OUTPATIENT
Start: 2024-07-23 | End: 2024-07-23 | Stop reason: HOSPADM

## 2024-07-23 RX ORDER — ROCURONIUM BROMIDE 10 MG/ML
INJECTION, SOLUTION INTRAVENOUS AS NEEDED
Status: DISCONTINUED | OUTPATIENT
Start: 2024-07-23 | End: 2024-07-23

## 2024-07-23 RX ORDER — HYDROMORPHONE HCL/PF 1 MG/ML
0.5 SYRINGE (ML) INJECTION
Status: DISCONTINUED | OUTPATIENT
Start: 2024-07-23 | End: 2024-07-23 | Stop reason: HOSPADM

## 2024-07-23 RX ORDER — DEXAMETHASONE SODIUM PHOSPHATE 10 MG/ML
INJECTION, SOLUTION INTRAMUSCULAR; INTRAVENOUS AS NEEDED
Status: DISCONTINUED | OUTPATIENT
Start: 2024-07-23 | End: 2024-07-23

## 2024-07-23 RX ORDER — IBUPROFEN 600 MG/1
600 TABLET ORAL EVERY 6 HOURS PRN
Qty: 30 TABLET | Refills: 0 | Status: SHIPPED | OUTPATIENT
Start: 2024-07-23 | End: 2024-08-06

## 2024-07-23 RX ORDER — OXYCODONE HYDROCHLORIDE 5 MG/1
5 TABLET ORAL EVERY 4 HOURS PRN
Status: DISCONTINUED | OUTPATIENT
Start: 2024-07-23 | End: 2024-07-24 | Stop reason: HOSPADM

## 2024-07-23 RX ORDER — LIDOCAINE HYDROCHLORIDE 20 MG/ML
INJECTION, SOLUTION EPIDURAL; INFILTRATION; INTRACAUDAL; PERINEURAL AS NEEDED
Status: DISCONTINUED | OUTPATIENT
Start: 2024-07-23 | End: 2024-07-23

## 2024-07-23 RX ORDER — PROPOFOL 10 MG/ML
INJECTION, EMULSION INTRAVENOUS AS NEEDED
Status: DISCONTINUED | OUTPATIENT
Start: 2024-07-23 | End: 2024-07-23

## 2024-07-23 RX ORDER — FENTANYL CITRATE 50 UG/ML
INJECTION, SOLUTION INTRAMUSCULAR; INTRAVENOUS AS NEEDED
Status: DISCONTINUED | OUTPATIENT
Start: 2024-07-23 | End: 2024-07-23

## 2024-07-23 RX ORDER — CEFAZOLIN SODIUM 1 G/3ML
INJECTION, POWDER, FOR SOLUTION INTRAMUSCULAR; INTRAVENOUS AS NEEDED
Status: DISCONTINUED | OUTPATIENT
Start: 2024-07-23 | End: 2024-07-23

## 2024-07-23 RX ORDER — ACETAMINOPHEN 325 MG/1
975 TABLET ORAL EVERY 6 HOURS PRN
Status: DISCONTINUED | OUTPATIENT
Start: 2024-07-23 | End: 2024-07-24 | Stop reason: HOSPADM

## 2024-07-23 RX ORDER — FENTANYL CITRATE/PF 50 MCG/ML
25 SYRINGE (ML) INJECTION
Status: DISCONTINUED | OUTPATIENT
Start: 2024-07-23 | End: 2024-07-23 | Stop reason: HOSPADM

## 2024-07-23 RX ADMIN — SODIUM CHLORIDE, SODIUM LACTATE, POTASSIUM CHLORIDE, AND CALCIUM CHLORIDE: .6; .31; .03; .02 INJECTION, SOLUTION INTRAVENOUS at 16:50

## 2024-07-23 RX ADMIN — ROCURONIUM BROMIDE 10 MG: 10 INJECTION, SOLUTION INTRAVENOUS at 16:38

## 2024-07-23 RX ADMIN — CEFAZOLIN 2000 MG: 1 INJECTION, POWDER, FOR SOLUTION INTRAMUSCULAR; INTRAVENOUS; PARENTERAL at 16:51

## 2024-07-23 RX ADMIN — SODIUM CHLORIDE, SODIUM LACTATE, POTASSIUM CHLORIDE, AND CALCIUM CHLORIDE 125 ML/HR: .6; .31; .03; .02 INJECTION, SOLUTION INTRAVENOUS at 11:11

## 2024-07-23 RX ADMIN — INDIGOTINDISULFONATE SODIUM 40 MG: 8 INJECTION INTRAVENOUS at 16:44

## 2024-07-23 RX ADMIN — GLYCOPYRROLATE 0.2 MG: 0.2 INJECTION, SOLUTION INTRAMUSCULAR; INTRAVENOUS at 13:21

## 2024-07-23 RX ADMIN — FENTANYL CITRATE 50 MCG: 50 INJECTION, SOLUTION INTRAMUSCULAR; INTRAVENOUS at 13:03

## 2024-07-23 RX ADMIN — ROCURONIUM BROMIDE 10 MG: 10 INJECTION, SOLUTION INTRAVENOUS at 13:42

## 2024-07-23 RX ADMIN — ROCURONIUM BROMIDE 10 MG: 10 INJECTION, SOLUTION INTRAVENOUS at 14:25

## 2024-07-23 RX ADMIN — PROPOFOL 200 MG: 10 INJECTION, EMULSION INTRAVENOUS at 13:03

## 2024-07-23 RX ADMIN — FENTANYL CITRATE 25 MCG: 50 INJECTION, SOLUTION INTRAMUSCULAR; INTRAVENOUS at 18:11

## 2024-07-23 RX ADMIN — DEXAMETHASONE SODIUM PHOSPHATE 10 MG: 10 INJECTION INTRAMUSCULAR; INTRAVENOUS at 13:03

## 2024-07-23 RX ADMIN — LIDOCAINE HYDROCHLORIDE 100 MG: 20 INJECTION, SOLUTION EPIDURAL; INFILTRATION; INTRACAUDAL at 13:03

## 2024-07-23 RX ADMIN — MIDAZOLAM 2 MG: 1 INJECTION INTRAMUSCULAR; INTRAVENOUS at 12:58

## 2024-07-23 RX ADMIN — ACETAMINOPHEN 975 MG: 325 TABLET, FILM COATED ORAL at 19:28

## 2024-07-23 RX ADMIN — ROCURONIUM BROMIDE 20 MG: 10 INJECTION, SOLUTION INTRAVENOUS at 15:32

## 2024-07-23 RX ADMIN — ROCURONIUM BROMIDE 40 MG: 10 INJECTION, SOLUTION INTRAVENOUS at 13:03

## 2024-07-23 RX ADMIN — KETOROLAC TROMETHAMINE 15 MG: 30 INJECTION, SOLUTION INTRAMUSCULAR; INTRAVENOUS at 16:57

## 2024-07-23 RX ADMIN — CEFAZOLIN SODIUM 2000 MG: 2 SOLUTION INTRAVENOUS at 13:09

## 2024-07-23 RX ADMIN — ONDANSETRON 4 MG: 2 INJECTION INTRAMUSCULAR; INTRAVENOUS at 13:03

## 2024-07-23 RX ADMIN — SUGAMMADEX 200 MG: 100 INJECTION, SOLUTION INTRAVENOUS at 16:58

## 2024-07-23 RX ADMIN — FENTANYL CITRATE 50 MCG: 50 INJECTION, SOLUTION INTRAMUSCULAR; INTRAVENOUS at 16:38

## 2024-07-23 RX ADMIN — SODIUM CHLORIDE, SODIUM LACTATE, POTASSIUM CHLORIDE, AND CALCIUM CHLORIDE: .6; .31; .03; .02 INJECTION, SOLUTION INTRAVENOUS at 13:25

## 2024-07-23 RX ADMIN — FENTANYL CITRATE 25 MCG: 50 INJECTION, SOLUTION INTRAMUSCULAR; INTRAVENOUS at 17:48

## 2024-07-23 NOTE — ANESTHESIA PREPROCEDURE EVALUATION
Procedure:  (LSH) W/ BILATERAL SALPINGECTOMY & EUA (Abdomen)    Relevant Problems   CARDIO   (+) Benign essential hypertension   (+) Migraine without aura and without status migrainosus, not intractable   (+) Mitral valve insufficiency   (+) Ocular migraine   (+) PVC (premature ventricular contraction)      GI/HEPATIC   (+) GERD (gastroesophageal reflux disease)      HEMATOLOGY   (+) Iron deficiency anemia due to chronic blood loss      NEURO/PSYCH   (+) Migraine without aura and without status migrainosus, not intractable   (+) Ocular migraine      PULMONARY   (+) Asthma in adult, mild intermittent, uncomplicated      Blood   (+) Sickle cell trait (HCC)      Other   (+) Obesity (BMI 30-39.9)        Physical Exam    Airway    Mallampati score: I  TM Distance: >3 FB  Neck ROM: full     Dental   No notable dental hx     Cardiovascular  Cardiovascular exam normal    Pulmonary  Pulmonary exam normal     Other Findings  post-pubertal.      Anesthesia Plan  ASA Score- 2     Anesthesia Type- general with ASA Monitors.         Additional Monitors:     Airway Plan: ETT.    Comment: TAP block d/w pt consent obtained .       Plan Factors-Exercise tolerance (METS): >4 METS.    Chart reviewed.   Existing labs reviewed. Patient summary reviewed.    Patient is a current smoker.  Patient instructed to abstain from smoking on day of procedure. Patient did not smoke on day of surgery.    Obstructive sleep apnea risk education given perioperatively.        Induction- intravenous.    Postoperative Plan- Plan for postoperative opioid use.     Perioperative Resuscitation Plan - Level 1 - Full Code.       Informed Consent- Anesthetic plan and risks discussed with patient and spouse.

## 2024-07-23 NOTE — OP NOTE
OPERATIVE REPORT  PATIENT NAME: Kiara Ma    :  1985  MRN: [id number]  Pt Location: [facility] OR ROOM 06    SURGERY DATE: 2024    Surgeons and Role:  Panel 1:     * Low Virgen DO - Primary     * Phong Langford DO - Assisting     * Guy Conrad MD - Fellow     * Rui Mensah MD - Co-surgeon  Panel 2:     * Rui Mensah MD - Primary    Preop Diagnosis:  Uterine leiomyoma, unspecified location [D25.9]    Post-Op Diagnosis Codes:     * Uterine leiomyoma, unspecified location [D25.9]    Procedure(s):  EXAM UNDER ANESTHESIA. (LSH) W/ BILATERAL SALPINGECTOMY AND LEFT OOPHORECTOMY  LAPAROSCOPY PELVIC SURGERY (extensive adhesiolysis, approximately 60 minutes)    Specimen(s):  ID Type Source Tests Collected by Time Destination   1 : uterus, left bso, right partial salpingectomy Tissue Uterus TISSUE EXAM Low Virgen DO 2024 1430        Estimated Blood Loss:   Minimal    Drains:  Urethral Catheter (Active)   Number of days: 0       Anesthesia Type:   General    Operative Indications:  Uterine leiomyoma, unspecified location [D25.9]      Operative Findings:  #1.  Dense adhesions from perirectal fat to anterior abdominal wall, left pelvic sidewall, left ovary, posterior aspect of the uterus.  Extensive adhesiolysis taking approximately 60 minutes performed.  At the end of procedure the rectal integrity was tested under irrigant by distending the rectosigmoid using a rigid proctoscope.  There was no evidence of bubble leaks.  #2.  Large fibroid uterus with grossly normal ovaries.  Left ovary densely adherent to posterior aspect of the uterus and perirectal fat.  Left oophorectomy was indicated.  #3.  Reportedly adhesions from small bowel loop to anterior abdominal wall.  I ran the entire small bowel from the terminal ileum to ligament of Treitz and there was no evidence of any enterotomies or other abnormalities.  Single point of adhesion identified with no concerning  findings.    Complications:   None    Procedure and Technique:  I was called for intraoperative consultation given above-mentioned findings/extensive pelvic adhesive disease.  Please see Dr. Virgen note for initial portions of the surgery.  I proceeded with adhesiolysis which in total took approximately 60 minutes.  This involved sharp dissection to mobilize some adhesions from perirectal fat to right and left pelvic sidewall and posterior aspect of the uterus.  Some very dense adhesions were also noted and those were divided using several wide 45 mm loads of the ETS laparoscopic stapler.  To allow stapler used the umbilical port site was upsized to a 15 mm trocar.    Once the rectosigmoid was completely mobilized off of the posterior aspect of the uterus and left ovary I assisted Dr. Virgen and open the left retroperitoneum, identifying the left ovarian vessels.  Decision was made to proceed with left oophorectomy for which he cauterized divided the left ovarian vessels so the left ovary could be taken en bloc with the uterine specimen and adhesions as noted.    At this point the procedure was turned to Dr. Vigren for completion of hysterectomy.  Please see his note for details.    Finally I performed thorough exploration of the small bowel, omentum, etc.  For this purpose an additional 5 mm trocar was placed in the left flank under direct visualization.  Remaining adhesions from the omentum to the abdominal wall were taken down sharply using the LigaSure device.  Then, the appendix was clearly visualized and noted to be intact.  The terminal ileum was identified and the cecum and terminal ileum thoroughly explored.  No abnormalities were noted.  The small bowel was run from the terminal ileum to ligament of Treitz with no enterotomies noted.  Single point of adhesion was identified in relation to prior adhesiolysis performed Dr. Virgen at this area.  Hemostatic and the bowel wall intact in relation to  this adhesion point.    Finally, Trendelenburg was reversed.  The rectosigmoid was submerged under irrigant and occluded proximally using a bowel grasper.  The rectosigmoid was distended transanally using a rigid sigmoidoscope.  There was no evidence of bubble leaks.    At this point the procedure was returned to Dr. Virgen for closure and cystoscopy.  Please see his note for details.    This procedure was not performed to treat colon cancer through resection    Rui Mensah MD, FACOG, FACS  7/23/2024  5:02 PM

## 2024-07-23 NOTE — ANESTHESIA POSTPROCEDURE EVALUATION
Post-Op Assessment Note    CV Status:  Stable  Pain Score: 0    Pain management: adequate       Mental Status:  Alert and awake   Hydration Status:  Euvolemic   PONV Controlled:  Controlled   Airway Patency:  Patent     Post Op Vitals Reviewed: Yes    No anethesia notable event occurred.    Staff: CRNA, Anesthesiologist               /55 (07/23/24 1732)    Temp 98.1 °F (36.7 °C) (07/23/24 1732)    Pulse 60 (07/23/24 1732)   Resp 17 (07/23/24 1732)    SpO2 96 % (07/23/24 1732)

## 2024-07-23 NOTE — OP NOTE
OPERATIVE REPORT  PATIENT NAME: Kiara Ma    :  1985  MRN: [id number]  Pt Location: [facility] 06    SURGERY DATE: 2024    Surgeons and Role:  Panel 1:     * Low Virgen DO - Primary     * Phong Langford DO - Assisting     * Guy Conrad MD - Fellow     * Rui Mensah MD - Co-surgeon  Panel 2:     * Rui Mensah MD - Primary    Preop Diagnosis:  Uterine leiomyoma, unspecified location [D25.9]    Post-Op Diagnosis Codes:     * Uterine leiomyoma, unspecified location [D25.9]    Procedure(s) (LRB):  EXAM UNDER ANESTHESIA, (LSH) W/ BILATERAL SALPINGECTOMY AND LEFT OOPHORECTOMY (N/A)  LAPAROSCOPY PELVIC SURGERY (N/A)    Specimen(s):  ID Type Source Tests Collected by Time Destination   1 : uterus, left salpingo-oophorectomy, right partial salpingectomy Tissue Uterus TISSUE EXAM Low Virgen DO 2024 1704      EBL 50 cc    Drains:  [REMOVED] Urethral Catheter (Removed)   Number of days: 0       Anesthesia Type:   General ET    Operative Indications:  Uterine leiomyoma, unspecified location [D25.9]     Operative Findings:   1.  External genitalia grossly normal in appearance.  No ulcerations, no lacerations, no lesions.  Bimanual exam revealed anteverted uterus with normal contours and freely mobile.  No adnexal masses palpated bilaterally.  2.  Vagina and cervix were  grossly normal in appearance without any lacerations or lesions.   3. Uterus sounded to 8 cm.  4.  Laparoscopic evaluation revealed no injury to bowel or bladder vasculature upon entrance.  Uterus was anteverted, palpable fibroids.  Bilateral ovaries were  grossly normal in appearance; however left ovary was adhered to perirectal fat and uterus.  Bilateral fallopian tubes were adhered to omentum and posterior fundal aspect of uteris, but grossly normal in appearance.  Dense adhesions from perirectal fat to anterior abdominal wall, left pelvic sidewall, left ovary, posterior aspect of the uterus.    5.  Upper abdominal organs were visualized and grossly normal in appearance.  These include liver, stomach, and omentum. Small bowel was run from terminal ileum to ligament of Treitz with Dr. Mensah with no evidence of enterotomy. Single point of adhesion identified with no concerning findings.       Complications:   Extensive adhesiolysis (requiring assistance of GYN ONC and additional 60 minutes of operating time)    Procedure and Technique:    The patient was properly identified in the holding area by the operating room staff and attending physician. She was taken to operating room where general anesthesia was instituted without complication. She was placed in the dorsal lithotomy position with her legs in Rene stirrups with care taken to avoid excessive flexion or extension of her lower extremities. Once the patient was properly positioned, the patient was prepped and draped in normal sterile fashion. A time-out was taken. The patient was again properly identified by the operating room staff and attending physician. At this time, the patient was receiving antibiotics for prophylaxis.  Bilateral SCDs were placed in the lower extremities for DVT prevention prior to the institution of anesthesia.     The uterine manipulator was placed without complications. Only the tip of the BISI was used after we sounded the uterus to 8 cm. A Bishop catheter was aseptically inserted. Gloves were changed, and attention was directed to the abdomen.      Two Allis clamps were used to irwin the umbilicus. A 10-mm incision was created at the level of the umbilicus. A Veress needle was utilized to get into the peritoneal axis. Once we were intraperitoneum, we allowed CO2 gas to go in until we reached a pressure of 15 mmHg. Once we reached that pressure, the 10-mm optical trocar was inserted under direct visualization. Once we were inside the peritoneal cavity, two additional trocars were placed on the lower quadrants  approximately 2 fingerbreadths above the anterior superior iliac spine and 2 cm medial to that spot. A 10-mm incision was created under transillumination, and we always observed the inferior epigastric vessels trying to avoid injury to this vessel. Once the 3 trocars were placed, we started the procedure.      First, a survey of the cavity was performed, and we confirmed the  above-mentioned findings. There was extensive omental adhesions along the the entire uterus with the omentum adhered to the anterior aspect of the uterus and the fundus. Perirectal fat was adhered to the left adnexa and posterior aspect of uterus. Lysis of adhesions was initiated at the anterior lower aspect of the uterus with use of Ligasure and laparoscopic scissors, Given extensive adhesions, Dr. Mensah (GYN Oncology) was called to operating room to assist. The lysis of adhesions took over 60 minutes to complete.  Please see Dr. Mensah's note for dictation of lysis of adhesions, left salpingo-oophorectomy, testing of rectal integrity. Of note, an additional 5 mm port was added for assistance with running of small bowel. Bilateral ureter vermiculation was noted intraoperatively.     After the extensive adhesiolysis with improved visualization, the right salpingectomy  was performed by transecting the  fallopian tube from the mesosalpinx all the way to the level of the cornua using the Ligasure device. Once it was detached it was removed out of the abdomen. The utero-ovarian ligament on the right side was transected and the broad ligament was taken down to the uterine vessels incorporating the round ligament into the transection. The round ligament was opened on the right side, and we developed the vesicouterine space without complications. We began cautery of the easily visible uterine arteries at the level of the internal cervical os on the left. These uterine arteries were double burned without complications. The same procedure was  then performed on the left  side. Once we secured the uterine arteries bilaterally, they were doubly burned and cut. We noticed blanching of the uterus, and we introduced the Olympus laparoscopic bipolar loop. Once we were able to get the loop at the level of the endocervical canal, the uterus was grasped using a Tenaculum. The manipulator was replaced with a sponge stick and the loop was activated and we transected the corpus of the uterus without complications under direct visualization using the tenaculum for stabilization. A laparoscopic J hook bovie tip was used to cauterize the cervical os and areas over the cervical stump that were oozing. The pelvis was hemostatic after irrigation.     At this point, we introduced 15 port on the right with the 15 endocatch bag thorugh the right lower quadrant. The left lower quadrant trocar was replaced with the Liang power morcellator. The morcellator was introduced through the same incision site after the trocar was removed under direct visualization. The blade was activated after a laparoscopic tenaculum was brought through the device and the specimen was placed into the endocatch bag. Once in the bag, the specimen was grasped with the tenaculum and the morcellator was activated to begin specimen morcellation. The entire morcellation process occurred under direct visualization and remained contained in the specimen bag. Once completed, the morcellator was placed on safe mode and removed from the abdomen. The endocatch bag was also removed out of the abdomen through the 15 mm trocar with small tissue pieces and residual small volume of blood remaining in the intact bag. At this point, we did a survey of the pelvic cavity and we confirmed good hemostasis.     The patient was given indigo carmine. Next cystoscopy was begun. The cortes catheter was removed and the cystoscope was inserted under direct visualization. Efflux was noted from bilateral ureteral orifices. No suture  material or injury noted to bladder lumen. The patients bladder was then drained using the cortes catheter which was removed at the conclusion of the case.     The left and right lower quadrant fascial defects were closed using the M close laparoscopic fascial closure device with 0-vicryl suture under visualization. Local was also infiltrated into these ports prior to closure. The camera port was also removed once CO2 gas was evacuated from the abdomen and after several Valsalva breaths were given. The skin incisions were closed with interrupted 3-0 plaingut suture and bandaids.     All sponge, lap, needle, instrument counts were correct times two.  Dr. Virgen was present for the entire case     Patient Disposition:  PACU       SIGNATURE: Phong Langford,   DATE: July 23, 2024  TIME: 5:17 PM

## 2024-07-25 PROCEDURE — 88307 TISSUE EXAM BY PATHOLOGIST: CPT | Performed by: PATHOLOGY

## 2024-07-26 ENCOUNTER — TELEPHONE (OUTPATIENT)
Age: 39
End: 2024-07-26

## 2024-07-26 NOTE — TELEPHONE ENCOUNTER
Pt called looking to schedule post op appt with Dr. Virgen due to recent surgery on 7/23. Please provide a call back to assist with scheduling. Thank you.

## 2024-07-27 ENCOUNTER — APPOINTMENT (EMERGENCY)
Dept: CT IMAGING | Facility: HOSPITAL | Age: 39
End: 2024-07-27
Payer: COMMERCIAL

## 2024-07-27 ENCOUNTER — HOSPITAL ENCOUNTER (EMERGENCY)
Facility: HOSPITAL | Age: 39
Discharge: HOME | End: 2024-07-28
Attending: EMERGENCY MEDICINE | Admitting: EMERGENCY MEDICINE
Payer: COMMERCIAL

## 2024-07-27 DIAGNOSIS — K59.00 CONSTIPATION: Primary | ICD-10-CM

## 2024-07-27 LAB
ALBUMIN SERPL BCG-MCNC: 4.2 G/DL (ref 3.5–5)
ALP SERPL-CCNC: 50 U/L (ref 34–104)
ALT SERPL W P-5'-P-CCNC: 15 U/L (ref 7–52)
ANION GAP SERPL CALCULATED.3IONS-SCNC: 10 MMOL/L (ref 4–13)
AST SERPL W P-5'-P-CCNC: 13 U/L (ref 13–39)
BASOPHILS # BLD AUTO: 0.04 THOUSANDS/ÂΜL (ref 0–0.1)
BASOPHILS NFR BLD AUTO: 0 % (ref 0–1)
BILIRUB SERPL-MCNC: 0.37 MG/DL (ref 0.2–1)
BILIRUB UR QL STRIP: NEGATIVE
BUN SERPL-MCNC: 13 MG/DL (ref 5–25)
CALCIUM SERPL-MCNC: 9.8 MG/DL (ref 8.4–10.2)
CHLORIDE SERPL-SCNC: 97 MMOL/L (ref 96–108)
CLARITY UR: CLEAR
CO2 SERPL-SCNC: 31 MMOL/L (ref 21–32)
COLOR UR: NORMAL
CREAT SERPL-MCNC: 0.94 MG/DL (ref 0.6–1.3)
EOSINOPHIL # BLD AUTO: 0.1 THOUSAND/ÂΜL (ref 0–0.61)
EOSINOPHIL NFR BLD AUTO: 1 % (ref 0–6)
ERYTHROCYTE [DISTWIDTH] IN BLOOD BY AUTOMATED COUNT: 17.9 % (ref 11.6–15.1)
GFR SERPL CREATININE-BSD FRML MDRD: 76 ML/MIN/1.73SQ M
GLUCOSE SERPL-MCNC: 114 MG/DL (ref 65–140)
GLUCOSE UR STRIP-MCNC: NEGATIVE MG/DL
HCT VFR BLD AUTO: 39.6 % (ref 34.8–46.1)
HGB BLD-MCNC: 13 G/DL (ref 11.5–15.4)
HGB UR QL STRIP.AUTO: NEGATIVE
IMM GRANULOCYTES # BLD AUTO: 0.05 THOUSAND/UL (ref 0–0.2)
IMM GRANULOCYTES NFR BLD AUTO: 0 % (ref 0–2)
KETONES UR STRIP-MCNC: NEGATIVE MG/DL
LEUKOCYTE ESTERASE UR QL STRIP: NEGATIVE
LYMPHOCYTES # BLD AUTO: 1.89 THOUSANDS/ÂΜL (ref 0.6–4.47)
LYMPHOCYTES NFR BLD AUTO: 15 % (ref 14–44)
MCH RBC QN AUTO: 24.6 PG (ref 26.8–34.3)
MCHC RBC AUTO-ENTMCNC: 32.8 G/DL (ref 31.4–37.4)
MCV RBC AUTO: 75 FL (ref 82–98)
MONOCYTES # BLD AUTO: 0.52 THOUSAND/ÂΜL (ref 0.17–1.22)
MONOCYTES NFR BLD AUTO: 4 % (ref 4–12)
NEUTROPHILS # BLD AUTO: 9.89 THOUSANDS/ÂΜL (ref 1.85–7.62)
NEUTS SEG NFR BLD AUTO: 80 % (ref 43–75)
NITRITE UR QL STRIP: NEGATIVE
NRBC BLD AUTO-RTO: 0 /100 WBCS
PH UR STRIP.AUTO: 7.5 [PH]
PLATELET # BLD AUTO: 279 THOUSANDS/UL (ref 149–390)
PMV BLD AUTO: 10.6 FL (ref 8.9–12.7)
POTASSIUM SERPL-SCNC: 3.3 MMOL/L (ref 3.5–5.3)
PROT SERPL-MCNC: 7.2 G/DL (ref 6.4–8.4)
PROT UR STRIP-MCNC: NEGATIVE MG/DL
RBC # BLD AUTO: 5.29 MILLION/UL (ref 3.81–5.12)
SODIUM SERPL-SCNC: 138 MMOL/L (ref 135–147)
SP GR UR STRIP.AUTO: 1.01 (ref 1–1.03)
UROBILINOGEN UR STRIP-ACNC: <2 MG/DL
WBC # BLD AUTO: 12.49 THOUSAND/UL (ref 4.31–10.16)

## 2024-07-27 PROCEDURE — 36415 COLL VENOUS BLD VENIPUNCTURE: CPT | Performed by: EMERGENCY MEDICINE

## 2024-07-27 PROCEDURE — 96360 HYDRATION IV INFUSION INIT: CPT

## 2024-07-27 PROCEDURE — 96361 HYDRATE IV INFUSION ADD-ON: CPT

## 2024-07-27 PROCEDURE — 74177 CT ABD & PELVIS W/CONTRAST: CPT

## 2024-07-27 PROCEDURE — 81003 URINALYSIS AUTO W/O SCOPE: CPT | Performed by: EMERGENCY MEDICINE

## 2024-07-27 PROCEDURE — 85025 COMPLETE CBC W/AUTO DIFF WBC: CPT | Performed by: EMERGENCY MEDICINE

## 2024-07-27 PROCEDURE — 99284 EMERGENCY DEPT VISIT MOD MDM: CPT

## 2024-07-27 PROCEDURE — 80053 COMPREHEN METABOLIC PANEL: CPT | Performed by: EMERGENCY MEDICINE

## 2024-07-27 PROCEDURE — 99285 EMERGENCY DEPT VISIT HI MDM: CPT | Performed by: EMERGENCY MEDICINE

## 2024-07-27 RX ADMIN — SODIUM CHLORIDE 1000 ML: 0.9 INJECTION, SOLUTION INTRAVENOUS at 21:02

## 2024-07-27 RX ADMIN — IOHEXOL 100 ML: 350 INJECTION, SOLUTION INTRAVENOUS at 22:36

## 2024-07-28 VITALS
HEART RATE: 80 BPM | TEMPERATURE: 98.5 F | OXYGEN SATURATION: 99 % | SYSTOLIC BLOOD PRESSURE: 149 MMHG | DIASTOLIC BLOOD PRESSURE: 78 MMHG | RESPIRATION RATE: 18 BRPM

## 2024-07-28 RX ORDER — DOCUSATE SODIUM 100 MG/1
100 CAPSULE, LIQUID FILLED ORAL 2 TIMES DAILY
Qty: 60 CAPSULE | Refills: 0 | Status: SHIPPED | OUTPATIENT
Start: 2024-07-28

## 2024-07-28 RX ORDER — POLYETHYLENE GLYCOL 3350 17 G/17G
17 POWDER, FOR SOLUTION ORAL DAILY
Qty: 20 EACH | Refills: 0 | Status: SHIPPED | OUTPATIENT
Start: 2024-07-28

## 2024-07-28 NOTE — ED PROVIDER NOTES
Pt Name: Kiara Ma  MRN: [id number]  Birthdate 1985  Age/Sex: 39 y.o. female  Date of evaluation: 7/27/2024  PCP: Imani Rhodes MD    CHIEF COMPLAINT    Chief Complaint   Patient presents with    Constipation    Abdominal Pain     Pt with recent hysterectomy on tues.  Pt reports last BM tues.  Took 2 dulcolax oral with no BM.  Pt took ibuprofen 1hr PTA.  Pt took last percocet last night         HPI    Kiara presents to the Emergency Department complaining of constipation following hysterectomy 4 days ago.  She has been taking percocet about once a day.  She has been able to eat and drink normally.  She has had no issues with her wounds.  Yesterday she took a dose of dulcolax and then she took another one today.  She has had no bowel movements at all but did have severe cramping pain.        HPI      Past Medical and Surgical History    Past Medical History:   Diagnosis Date    Abdominal pain     Abnormal ECG     Anemia     Asthma     mild intermittent    Female infertility     Fibroid     Myomectomy 12/10/20    GERD (gastroesophageal reflux disease)     History of palpitations     Hypertension     Lower back pain     Migraines     Ovarian cyst     Papilledema 01/03/2023    Polycystic ovary syndrome     Seasonal allergies     Trace mitral regurgitation by prior echocardiogram     Trigger finger of right thumb 02/28/2017       Past Surgical History:   Procedure Laterality Date    CHOLECYSTECTOMY      laparoscopic    COLONOSCOPY  2017    FL LUMBAR PUNCTURE DIAGNOSTIC  01/03/2023    HERNIA REPAIR      umbilical    LAPAROTOMY N/A 12/10/2020    Procedure: ABDOMINAL MYOMECTOMY;  Surgeon: Tara Budinetz, DO;  [facility]: [facility];  Service: Gynecology    LUMBAR LAMINECTOMY Right 09/14/2016    Procedure: Right L5/S1 Metryx microdiscectomy;  Surgeon: Mark Holt MD;  Location: [facility];  Service:     MYOMECTOMY  12/10/20    ID COLONOSCOPY FLX DX W/COLLJ SPEC WHEN PFRMD N/A 07/07/2016    Procedure:  COLONOSCOPY;  Surgeon: Taty Estrella DO;  Location: [facility];  Service: Gastroenterology    LA HYSTEROSCOPY BX ENDOMETRIUM&/POLYPC W/WO D&C N/A 03/19/2018    Procedure: HYSTEROSCOPY; MYOMECTOMY; ENDOMETRIAL BIOPSY;  Surgeon: Tara Budinetz, DO;  [facility];  Service: Gynecology    LA LAPS SUPRACRV HYSTERECT 250 GM/< RMVL TUBE/OVAR N/A 7/23/2024    Procedure: EXAM UNDER ANESTHESIA, (LSH) W/ BILATERAL SALPINGECTOMY AND LEFT OOPHORECTOMY;  Surgeon: Low Virgen DO;  Location[facility];  Service: Gynecology    LA TENDON SHEATH INCISION Right 02/28/2017    Procedure: THUMB TRIGGER RELEASE ;  Surgeon: Prasad Mooney DO;  Location: [facility];  Service: Orthopedics    SPINE SURGERY  9/2016    Microdisectomy    WISDOM TOOTH EXTRACTION         Family History   Problem Relation Age of Onset    Diabetes Mother         type2    Hypertension Mother     Depression Mother     Thyroid disease Mother     Arthritis Mother     Anxiety disorder Mother     Stroke Father     Diabetes Father         type2    Hypertension Father     Alcohol abuse Father     Substance Abuse Father     Arthritis Father     Drug abuse Father     Dialysis Father     Esophageal cancer Maternal Grandmother     Breast cancer Maternal Grandmother         Maternal great grandmother    Cancer Maternal Grandmother         Pancreatic    Heart attack Maternal Grandfather         acute MI    Arthritis Maternal Grandfather     Stroke Maternal Grandfather         CVA    Hyperlipidemia Maternal Grandfather     Heart disease Maternal Grandfather     Arthritis Paternal Grandfather     Stroke Paternal Grandfather         CVA    Cancer Paternal Grandfather     Hyperlipidemia Paternal Grandfather     Colon cancer Paternal Grandfather     Bipolar disorder Maternal Aunt     Brain cancer Maternal Aunt     Heart attack Maternal Uncle         acute MI    Arthritis Family     Stroke Family         CVA    Cancer Family     Hyperlipidemia Family     Stroke Family          CVA    Hyperlipidemia Family        Social History     Tobacco Use    Smoking status: Never    Smokeless tobacco: Never   Vaping Use    Vaping status: Never Used   Substance Use Topics    Alcohol use: Yes     Comment: social- drinks twice weekly- beer/wine- last used 7/21    Drug use: Yes     Types: Marijuana     Comment: Medical- last used on 7/20/24         .    Allergies    No Known Allergies    Home Medications    Prior to Admission medications    Medication Sig Start Date End Date Taking? Authorizing Provider   acetaminophen (TYLENOL) 325 mg tablet Take 2 tablets (650 mg total) by mouth every 6 (six) hours as needed for mild pain, headaches or fever for up to 14 days 7/23/24 8/6/24  Phong Langford,    albuterol (ProAir HFA) 90 mcg/act inhaler Inhale 2 puffs every 4 (four) hours as needed (When has flare up) 1/2/24   Imani Rhodes MD   carvedilol (COREG CR) 20 MG 24 hr capsule Take 2 capsules (40 mg total) by mouth daily 7/8/24   Hernandez Jimenes DO   chlorthalidone 25 mg tablet Take 1 tablet (25 mg total) by mouth daily 6/23/24   Hernandez Jimenes DO   Cholecalciferol (VITAMIN D3 PO) Take 2,000 mg by mouth daily in the early morning    Historical Provider, MD   ibuprofen (MOTRIN) 600 mg tablet Take 1 tablet (600 mg total) by mouth every 6 (six) hours as needed for moderate pain for up to 14 days 7/23/24 8/6/24  Phong Langford DO   meloxicam (MOBIC) 7.5 mg tablet Take 1 tablet (7.5 mg total) by mouth daily  Patient taking differently: Take 7.5 mg by mouth daily PRN 11/30/23   Cb Silva MD   Multiple Vitamins-Minerals (Womens Multivitamin) TABS Take 1 tablet by mouth daily    Historical Provider, MD   oxyCODONE-acetaminophen (Percocet) 5-325 mg per tablet Take 1 tablet by mouth every 4 (four) hours as needed for severe pain for up to 15 doses Max Daily Amount: 6 tablets 7/23/24   Low Virgen DO   pantoprazole (PROTONIX) 40 mg tablet Take 1 tablet (40 mg total) by  mouth daily before breakfast  Patient taking differently: Take 40 mg by mouth daily before breakfast 4 times a week 1/29/24   Imani Rhodes MD   potassium chloride (MICRO-K) 10 MEQ CR capsule Take 2 capsules (20 mEq total) by mouth daily Take 2 tablets daily 5/31/24   Oleg York MD   rizatriptan (MAXALT-MLT) 10 mg disintegrating tablet Take 1 tablet (10 mg total) by mouth once as needed for migraine for up to 1 dose may repeat in 2 hours if necessary 7/18/24   Imani Rhodes MD           Review of Systems    Review of Systems   Constitutional:  Negative for chills and fever.   HENT:  Negative for ear pain and sore throat.    Eyes:  Negative for pain and visual disturbance.   Respiratory:  Negative for cough and shortness of breath.    Cardiovascular:  Negative for chest pain and palpitations.   Gastrointestinal:  Positive for abdominal pain and constipation. Negative for vomiting.   Genitourinary:  Negative for dysuria and hematuria.   Musculoskeletal:  Negative for arthralgias and back pain.   Skin:  Negative for color change and rash.   Neurological:  Negative for seizures and syncope.   All other systems reviewed and are negative.        Physical Exam      ED Triage Vitals   Temperature Pulse Respirations Blood Pressure SpO2   07/27/24 2038 07/27/24 2038 07/27/24 2038 07/27/24 2045 07/27/24 2039   98.5 °F (36.9 °C) 94 20 137/75 100 %      Temp Source Heart Rate Source Patient Position - Orthostatic VS BP Location FiO2 (%)   07/27/24 2038 07/27/24 2206 07/27/24 2038 07/28/24 0017 --   Oral Monitor Sitting Right arm       Pain Score       07/27/24 2038       10 - Worst Possible Pain               Physical Exam  Vitals and nursing note reviewed.   Constitutional:       General: She is not in acute distress.     Appearance: She is well-developed.   HENT:      Head: Normocephalic and atraumatic.   Eyes:      Conjunctiva/sclera: Conjunctivae normal.   Cardiovascular:      Rate and Rhythm: Normal rate and regular  rhythm.      Heart sounds: No murmur heard.  Pulmonary:      Effort: Pulmonary effort is normal. No respiratory distress.      Breath sounds: Normal breath sounds.   Abdominal:      Palpations: Abdomen is soft.      Tenderness: There is no abdominal tenderness.      Comments: Wounds from laparoscopic surgery area well healing. No surrounding erythema and no drainage.    Musculoskeletal:         General: No swelling.      Cervical back: Neck supple.   Skin:     General: Skin is warm and dry.      Capillary Refill: Capillary refill takes less than 2 seconds.   Neurological:      Mental Status: She is alert.   Psychiatric:         Mood and Affect: Mood normal.                Assessment and Plan    Kiara Ma is a 39 y.o. female who presents with constipation . Physical examination remarkable for mild lower abdominal tenderness. Differential diagnosis (not completely inclusive) includes constipation vs surgical complication. Plan will be to perform diagnostic testing and treat symptomatically.      MDM      Diagnostic Results      Labs:    Results for orders placed or performed during the hospital encounter of 07/27/24   CBC and differential   Result Value Ref Range    WBC 12.49 (H) 4.31 - 10.16 Thousand/uL    RBC 5.29 (H) 3.81 - 5.12 Million/uL    Hemoglobin 13.0 11.5 - 15.4 g/dL    Hematocrit 39.6 34.8 - 46.1 %    MCV 75 (L) 82 - 98 fL    MCH 24.6 (L) 26.8 - 34.3 pg    MCHC 32.8 31.4 - 37.4 g/dL    RDW 17.9 (H) 11.6 - 15.1 %    MPV 10.6 8.9 - 12.7 fL    Platelets 279 149 - 390 Thousands/uL    nRBC 0 /100 WBCs    Segmented % 80 (H) 43 - 75 %    Immature Grans % 0 0 - 2 %    Lymphocytes % 15 14 - 44 %    Monocytes % 4 4 - 12 %    Eosinophils Relative 1 0 - 6 %    Basophils Relative 0 0 - 1 %    Absolute Neutrophils 9.89 (H) 1.85 - 7.62 Thousands/µL    Absolute Immature Grans 0.05 0.00 - 0.20 Thousand/uL    Absolute Lymphocytes 1.89 0.60 - 4.47 Thousands/µL    Absolute Monocytes 0.52 0.17 - 1.22 Thousand/µL     Eosinophils Absolute 0.10 0.00 - 0.61 Thousand/µL    Basophils Absolute 0.04 0.00 - 0.10 Thousands/µL   Comprehensive metabolic panel   Result Value Ref Range    Sodium 138 135 - 147 mmol/L    Potassium 3.3 (L) 3.5 - 5.3 mmol/L    Chloride 97 96 - 108 mmol/L    CO2 31 21 - 32 mmol/L    ANION GAP 10 4 - 13 mmol/L    BUN 13 5 - 25 mg/dL    Creatinine 0.94 0.60 - 1.30 mg/dL    Glucose 114 65 - 140 mg/dL    Calcium 9.8 8.4 - 10.2 mg/dL    AST 13 13 - 39 U/L    ALT 15 7 - 52 U/L    Alkaline Phosphatase 50 34 - 104 U/L    Total Protein 7.2 6.4 - 8.4 g/dL    Albumin 4.2 3.5 - 5.0 g/dL    Total Bilirubin 0.37 0.20 - 1.00 mg/dL    eGFR 76 ml/min/1.73sq m   UA w Reflex to Microscopic w Reflex to Culture    Specimen: Urine, Clean Catch   Result Value Ref Range    Color, UA Light Yellow     Clarity, UA Clear     Specific Gravity, UA 1.011 1.003 - 1.030    pH, UA 7.5 4.5, 5.0, 5.5, 6.0, 6.5, 7.0, 7.5, 8.0    Leukocytes, UA Negative Negative    Nitrite, UA Negative Negative    Protein, UA Negative Negative mg/dl    Glucose, UA Negative Negative mg/dl    Ketones, UA Negative Negative mg/dl    Urobilinogen, UA <2.0 <2.0 mg/dl mg/dl    Bilirubin, UA Negative Negative    Occult Blood, UA Negative Negative       All labs reviewed and utilized in the medical decision making process    Radiology:    CT abdomen pelvis with contrast   Final Result      Dilated colon leading up to a focally narrowed/collapsed segment of rectum adjacent to site of adhesiolysis and hysterectomy which demonstrates mild wall thickening and inflammation. Findings may reflect focal postoperative ileus with proximal upstream    delayed transit or alternatively a serosal bowel injury without signs of visible bowel wall discontinuity.      Postsurgical changes of recent hysterectomy and left oophorectomy         Workstation performed: [id number]             All radiology studies independently viewed by me and interpreted by the  radiologist.    Procedure    Procedures      ED Course of Care and Re-Assessments    Patient was pain free in the ED.      She felt comfortable with discharge and outpatient follow up.    I did reach out to OBGYN but they were not able to see the patient for over an hour and she felt comfortable going home.      Medications   sodium chloride 0.9 % bolus 1,000 mL (0 mL Intravenous Stopped 7/27/24 2235)   iohexol (OMNIPAQUE) 350 MG/ML injection (MULTI-DOSE) 100 mL (100 mL Intravenous Given 7/27/24 2236)           FINAL IMPRESSION    Final diagnoses:   Constipation         DISPOSITION/PLAN      Time reflects when diagnosis was documented in both MDM as applicable and the Disposition within this note       Time User Action Codes Description Comment    7/28/2024  1:05 AM Liz Cornejo Add [K59.00] Constipation           ED Disposition       ED Disposition   Discharge    Condition   Stable    Date/Time   Sun Jul 28, 2024  1:05 AM    Comment   Kiara Ma discharge to home/self care.                   Follow-up Information       Follow up With Specialties Details Why Contact Info    Imani Rhodes MD Family Medicine Schedule an appointment as soon as possible for a visit   [address] [place] [address]  [phone]      Low Virgen DO Obstetrics and Gynecology, Gynecology, Obstetrics Schedule an appointment as soon as possible for a visit   [address] [address]  [phone]                PATIENT REFERRED TO:    Imani Rhodes MD  [address] [place] [id number]  [phone]    Schedule an appointment as soon as possible for a visit       Low Virgen DO  [address] [place] [address]  [phone]    Schedule an appointment as soon as possible for a visit         DISCHARGE MEDICATIONS:    Discharge Medication List as of 7/28/2024  1:08 AM        START taking these medications    Details    docusate sodium (COLACE) 100 mg capsule Take 1 capsule (100 mg total) by mouth 2 (two) times a day, Starting Sun 7/28/2024, Normal      polyethylene glycol (MIRALAX) 17 g packet Take 17 g by mouth daily, Starting Sun 7/28/2024, Normal           CONTINUE these medications which have NOT CHANGED    Details   acetaminophen (TYLENOL) 325 mg tablet Take 2 tablets (650 mg total) by mouth every 6 (six) hours as needed for mild pain, headaches or fever for up to 14 days, Starting Tue 7/23/2024, Until Tue 8/6/2024 at 2359, Normal      albuterol (ProAir HFA) 90 mcg/act inhaler Inhale 2 puffs every 4 (four) hours as needed (When has flare up), Starting Tue 1/2/2024, Normal      carvedilol (COREG CR) 20 MG 24 hr capsule Take 2 capsules (40 mg total) by mouth daily, Starting Mon 7/8/2024, Normal      chlorthalidone 25 mg tablet Take 1 tablet (25 mg total) by mouth daily, Starting Sun 6/23/2024, Normal      Cholecalciferol (VITAMIN D3 PO) Take 2,000 mg by mouth daily in the early morning, Historical Med      ibuprofen (MOTRIN) 600 mg tablet Take 1 tablet (600 mg total) by mouth every 6 (six) hours as needed for moderate pain for up to 14 days, Starting Tue 7/23/2024, Until Tue 8/6/2024 at 2359, Normal      meloxicam (MOBIC) 7.5 mg tablet Take 1 tablet (7.5 mg total) by mouth daily, Starting u 11/30/2023, Normal      Multiple Vitamins-Minerals (Womens Multivitamin) TABS Take 1 tablet by mouth daily, Historical Med      oxyCODONE-acetaminophen (Percocet) 5-325 mg per tablet Take 1 tablet by mouth every 4 (four) hours as needed for severe pain for up to 15 doses Max Daily Amount: 6 tablets, Starting Tue 7/23/2024, Normal      pantoprazole (PROTONIX) 40 mg tablet Take 1 tablet (40 mg total) by mouth daily before breakfast, Starting Mon 1/29/2024, Normal      potassium chloride (MICRO-K) 10 MEQ CR capsule Take 2 capsules (20 mEq total) by mouth daily Take 2 tablets daily, Starting Fri 5/31/2024, Normal      rizatriptan  (MAXALT-MLT) 10 mg disintegrating tablet Take 1 tablet (10 mg total) by mouth once as needed for migraine for up to 1 dose may repeat in 2 hours if necessary, Starting u 7/18/2024, Normal             No discharge procedures on file.         Liz Cornejo, DO Liz Cornejo,   07/28/24 023

## 2024-07-30 ENCOUNTER — NURSE TRIAGE (OUTPATIENT)
Age: 39
End: 2024-07-30

## 2024-07-30 NOTE — TELEPHONE ENCOUNTER
"Spoke with patient who reports lap hysterectomy 7/23. She reports constipation and abd cramping.  She took her last dose of percocet Friday evening.  Was seen in the ER 7/27 for constipation.  Taking 1 stool softener daily. She is having small BMs.  Will discuss with provider and call back with recommendations.  Pt agreeable to plan.     Reason for Disposition   MILD TO MODERATE post-op pain (e.g., pain scale 1-7) that is not controlled with pain medications    Answer Assessment - Initial Assessment Questions  1. SYMPTOM: \"What's the main symptom you're concerned about?\" (e.g., pain, fever, vomiting)      constipation  2. ONSET: \"When did this  start?\"      7/23  3. SURGERY: \"What surgery was performed?\"      Lap hysterectomy  4. DATE of SURGERY: \"When was surgery performed?\"       7/23  5. ANESTHESIA: \" What type of anesthesia did you have?\" (e.g., general, spinal, epidural, local)      general  6. PAIN: \"Is there any pain?\" If Yes, ask: \"How bad is it?\"  (Scale 1-10; or mild, moderate, severe)      0/10  7. FEVER: \"Do you have a fever?\" If Yes, ask: \"What is your temperature, how was it measured, and when did it start?\"      denies  8. VOMITING: \"Is there any vomiting?\" If yes, ask: \"How many times?\"      denies  9. BLEEDING: \"Is there any bleeding?\" If Yes, ask: \"How much?\" and \"Where?\"      denies  10. OTHER SYMPTOMS: \"Do you have any other symptoms?\" (e.g., drainage from wound, painful urination, constipation)        denies    Protocols used: Post-Op Symptoms and Questions-ADULT-[place]    "

## 2024-07-31 NOTE — TELEPHONE ENCOUNTER
Spoke to Niru,  Pt. state She had a BM yesterday Pt. Advised She can use Mirlax up to 3 times daily.   Pt. States She had already stopped Percocet.  Pt. Aware to use anaprox for pain if needed.

## 2024-08-01 ENCOUNTER — TELEPHONE (OUTPATIENT)
Dept: GYNECOLOGY | Facility: CLINIC | Age: 39
End: 2024-08-01

## 2024-08-01 NOTE — TELEPHONE ENCOUNTER
Called pt to notify her her short term disability forms were completed and she states she was given new forms. I let her know to send them today as soon as possible and I can complete them today for her

## 2024-08-08 ENCOUNTER — OFFICE VISIT (OUTPATIENT)
Dept: GYNECOLOGY | Facility: CLINIC | Age: 39
End: 2024-08-08

## 2024-08-08 VITALS — SYSTOLIC BLOOD PRESSURE: 110 MMHG | HEART RATE: 74 BPM | DIASTOLIC BLOOD PRESSURE: 74 MMHG

## 2024-08-08 DIAGNOSIS — Z48.89 POSTOPERATIVE VISIT: Primary | ICD-10-CM

## 2024-08-08 PROCEDURE — 99024 POSTOP FOLLOW-UP VISIT: CPT | Performed by: OBSTETRICS & GYNECOLOGY

## 2024-08-08 NOTE — PROGRESS NOTES
Assessment/Plan:      Diagnoses and all orders for this visit:    Postoperative visit          Subjective:     Patient ID: Kiara Ma is a 39 y.o. female.    HPI patient presents to the office for postoperative check.  She is status post LSH BS left oophorectomy and lysis of extensive adhesions on July 23.  Patient is doing well since the surgery.    Review of Systems      Objective:     Physical Exam    Port sites are healing well with no erythema exudate or induration    Path report: Benign fibroid uterus with benign left ovary    Impression: Normal postoperative check    Plan: Return to the office as needed/annual

## 2024-08-11 DIAGNOSIS — K21.9 GASTROESOPHAGEAL REFLUX DISEASE WITHOUT ESOPHAGITIS: ICD-10-CM

## 2024-08-11 RX ORDER — PANTOPRAZOLE SODIUM 40 MG/1
40 TABLET, DELAYED RELEASE ORAL
Qty: 90 TABLET | Refills: 1 | Status: SHIPPED | OUTPATIENT
Start: 2024-08-11

## 2024-08-19 ENCOUNTER — TELEPHONE (OUTPATIENT)
Age: 39
End: 2024-08-19

## 2024-08-19 NOTE — TELEPHONE ENCOUNTER
Pt called in and requesting a return to work note from surgery , return to work is 09/17 for her part time job

## 2024-08-23 DIAGNOSIS — R07.9 CHEST PAIN, UNSPECIFIED TYPE: ICD-10-CM

## 2024-08-23 DIAGNOSIS — R06.09 DYSPNEA ON EXERTION: ICD-10-CM

## 2024-08-23 DIAGNOSIS — R06.02 SHORTNESS OF BREATH: ICD-10-CM

## 2024-08-26 RX ORDER — CARVEDILOL PHOSPHATE 20 MG/1
40 CAPSULE, EXTENDED RELEASE ORAL DAILY
Qty: 90 CAPSULE | Refills: 3 | Status: SHIPPED | OUTPATIENT
Start: 2024-08-26

## 2024-10-10 DIAGNOSIS — R07.9 CHEST PAIN, UNSPECIFIED TYPE: ICD-10-CM

## 2024-10-10 DIAGNOSIS — R06.09 DYSPNEA ON EXERTION: ICD-10-CM

## 2024-10-10 DIAGNOSIS — R06.02 SHORTNESS OF BREATH: ICD-10-CM

## 2024-10-10 RX ORDER — CARVEDILOL PHOSPHATE 20 MG/1
40 CAPSULE, EXTENDED RELEASE ORAL DAILY
Qty: 180 CAPSULE | Refills: 0 | Status: SHIPPED | OUTPATIENT
Start: 2024-10-10

## 2024-10-15 ENCOUNTER — OFFICE VISIT (OUTPATIENT)
Dept: FAMILY MEDICINE CLINIC | Facility: CLINIC | Age: 39
End: 2024-10-15
Payer: COMMERCIAL

## 2024-10-15 VITALS
RESPIRATION RATE: 17 BRPM | HEIGHT: 64 IN | DIASTOLIC BLOOD PRESSURE: 78 MMHG | SYSTOLIC BLOOD PRESSURE: 110 MMHG | TEMPERATURE: 97.8 F | BODY MASS INDEX: 34.49 KG/M2 | OXYGEN SATURATION: 98 % | HEART RATE: 77 BPM | WEIGHT: 202 LBS

## 2024-10-15 DIAGNOSIS — I10 BENIGN ESSENTIAL HYPERTENSION: ICD-10-CM

## 2024-10-15 DIAGNOSIS — Z00.00 ANNUAL PHYSICAL EXAM: Primary | ICD-10-CM

## 2024-10-15 DIAGNOSIS — Z23 FLU VACCINE NEED: ICD-10-CM

## 2024-10-15 DIAGNOSIS — E66.9 OBESITY (BMI 30-39.9): ICD-10-CM

## 2024-10-15 DIAGNOSIS — R20.0 NUMBNESS AND TINGLING IN BOTH HANDS: ICD-10-CM

## 2024-10-15 DIAGNOSIS — R20.2 NUMBNESS AND TINGLING IN BOTH HANDS: ICD-10-CM

## 2024-10-15 DIAGNOSIS — K21.9 GASTROESOPHAGEAL REFLUX DISEASE WITHOUT ESOPHAGITIS: ICD-10-CM

## 2024-10-15 PROCEDURE — 90656 IIV3 VACC NO PRSV 0.5 ML IM: CPT

## 2024-10-15 PROCEDURE — 99396 PREV VISIT EST AGE 40-64: CPT | Performed by: FAMILY MEDICINE

## 2024-10-15 PROCEDURE — 99214 OFFICE O/P EST MOD 30 MIN: CPT | Performed by: FAMILY MEDICINE

## 2024-10-15 PROCEDURE — 90471 IMMUNIZATION ADMIN: CPT

## 2024-10-15 RX ORDER — TIRZEPATIDE 2.5 MG/.5ML
2.5 INJECTION, SOLUTION SUBCUTANEOUS WEEKLY
Qty: 2 ML | Refills: 0 | Status: SHIPPED | OUTPATIENT
Start: 2024-10-15 | End: 2024-11-12

## 2024-10-15 NOTE — ASSESSMENT & PLAN NOTE
Hard time losing weight despite of eating healthy  Myfitness pal - calories counting <1700 avery/day   Diet and exercise encouraged   Trial of zepbound     Orders:    tirzepatide (Zepbound) 2.5 mg/0.5 mL auto-injector; Inject 0.5 mL (2.5 mg total) under the skin once a week for 28 days

## 2024-10-15 NOTE — ADDENDUM NOTE
Addended by: OMI VO on: 10/15/2024 04:28 PM     Modules accepted: Orders     Dog bite of multiple sites

## 2024-10-16 ENCOUNTER — TELEPHONE (OUTPATIENT)
Age: 39
End: 2024-10-16

## 2024-10-16 NOTE — TELEPHONE ENCOUNTER
PA for Tirzepatide (Zepbound) 2.5 mg/0.5 mL auto-injector  APPROVED     Date(s) approved 10- - 10-        Patient advised by          [x]COMARCOhart Message  [x]Phone call   []LMOM  []L/M to call office as no active Communication consent on file  []Unable to leave detailed message as VM not approved on Communication consent       Pharmacy advised by    [x]Fax  []Phone call    Approval letter scanned into Media no letter not available at this time

## 2024-10-16 NOTE — TELEPHONE ENCOUNTER
PA for Tirzepatide (Zepbound) 2.5 mg/0.5 mL auto-injector SUBMITTED     via    []CMM-KEY:   [x]Surescripts-Case ID # [id number]  []Availity-Auth ID # NDC #   []Faxed to plan   []Other website   []Phone call Case ID #     Office notes sent, clinical questions answered. Awaiting determination    Turnaround time for your insurance to make a decision on your Prior Authorization can take 7-21 business days.

## 2024-10-21 ENCOUNTER — HOSPITAL ENCOUNTER (OUTPATIENT)
Dept: NEUROLOGY | Facility: CLINIC | Age: 39
Discharge: HOME | End: 2024-10-21
Payer: COMMERCIAL

## 2024-10-21 DIAGNOSIS — R20.2 NUMBNESS AND TINGLING IN BOTH HANDS: ICD-10-CM

## 2024-10-21 DIAGNOSIS — R20.0 NUMBNESS AND TINGLING IN BOTH HANDS: ICD-10-CM

## 2024-10-21 PROBLEM — G56.22 ULNAR NEUROPATHY AT ELBOW, LEFT: Status: ACTIVE | Noted: 2024-10-21

## 2024-10-21 PROCEDURE — 95886 MUSC TEST DONE W/N TEST COMP: CPT | Performed by: PSYCHIATRY & NEUROLOGY

## 2024-10-21 PROCEDURE — 95913 NRV CNDJ TEST 13/> STUDIES: CPT | Performed by: PSYCHIATRY & NEUROLOGY

## 2024-11-21 ENCOUNTER — TELEPHONE (OUTPATIENT)
Dept: FAMILY MEDICINE CLINIC | Facility: CLINIC | Age: 39
End: 2024-11-21

## 2024-11-21 DIAGNOSIS — E66.9 OBESITY (BMI 30-39.9): Primary | ICD-10-CM

## 2024-11-21 RX ORDER — TIRZEPATIDE 5 MG/.5ML
5 INJECTION, SOLUTION SUBCUTANEOUS WEEKLY
Qty: 2 ML | Refills: 0 | Status: SHIPPED | OUTPATIENT
Start: 2024-11-21

## 2024-11-21 NOTE — TELEPHONE ENCOUNTER
Patient called the RX Refill Line. Message is being forwarded to the office.     Patient is requesting rx refill on her zepbound. Pt last dose was 2.5 mg. Pt would like an increase. Can you send rx to the pharmacy asap    Pharmacy:[facility] - [place], [place] - [address]      How are you tolerating the medication?   [] Nausea  [] Vomiting  [] Diarrhea  [x] Asymptomatic  [] Other:    Last visit weight: 202    Current weight:199    Date of last injection: 11/21/2024    How many injections do you have left: 0    Would you like an increase in your dose?  [x] Yes   [] No       Please contact patient if needed  at [phone]

## 2024-11-25 ENCOUNTER — NURSE TRIAGE (OUTPATIENT)
Age: 39
End: 2024-11-25

## 2024-11-25 ENCOUNTER — OFFICE VISIT (OUTPATIENT)
Dept: OBGYN CLINIC | Facility: CLINIC | Age: 39
End: 2024-11-25
Payer: COMMERCIAL

## 2024-11-25 VITALS — BODY MASS INDEX: 33.75 KG/M2 | WEIGHT: 198.4 LBS

## 2024-11-25 DIAGNOSIS — N89.8 VAGINAL DISCHARGE: Primary | ICD-10-CM

## 2024-11-25 PROCEDURE — 81514 NFCT DS BV&VAGINITIS DNA ALG: CPT | Performed by: OBSTETRICS & GYNECOLOGY

## 2024-11-25 PROCEDURE — 99214 OFFICE O/P EST MOD 30 MIN: CPT | Performed by: OBSTETRICS & GYNECOLOGY

## 2024-11-25 RX ORDER — METRONIDAZOLE 500 MG/1
500 TABLET ORAL EVERY 12 HOURS SCHEDULED
Qty: 14 TABLET | Refills: 0 | Status: SHIPPED | OUTPATIENT
Start: 2024-11-25 | End: 2024-12-02

## 2024-11-25 RX ORDER — FLUCONAZOLE 150 MG/1
150 TABLET ORAL ONCE
Qty: 1 TABLET | Refills: 0 | Status: SHIPPED | OUTPATIENT
Start: 2024-11-25 | End: 2024-11-25

## 2024-11-25 NOTE — TELEPHONE ENCOUNTER
"Patient called office c/o yellow/white thick vaginal discharge, vaginal itching, irritation and burning that started about 9 days ago. She states she tried taking Monistat 3 without relief. She thought it was yeast , but thinks this could now be BV. She denies vaginal odor, abdominal pain, pain with urination, back pain, fever, or any other symptoms to note at this time. Scheduled patient for office appointment today.           Reason for Disposition   Abnormal color vaginal discharge (i.e., yellow, green, gray)    Answer Assessment - Initial Assessment Questions  1. DISCHARGE: \"Describe the discharge.\" (e.g., white, yellow, green, gray, foamy, cottage cheese-like)      Thick white / yellow   2. ODOR: \"Is there a bad odor?\"      No   3. ONSET: \"When did the discharge begin?\"      9 days ago   4. RASH: \"Is there a rash in the genital area?\" If Yes, ask: \"Describe it.\" (e.g., redness, blisters, sores, bumps)      No   5. ABDOMEN PAIN: \"Are you having any abdomen pain?\" If Yes, ask: \"What does it feel like? \" (e.g., crampy, dull, intermittent, constant)       No   6. ABDOMEN PAIN SEVERITY: If present, ask: \"How bad is it?\" (e.g., Scale 1-10; mild, moderate, or severe)      None   7. CAUSE: \"What do you think is causing the discharge?\" \"Have you had the same problem before?\" \"What happened then?\"      Yeast   8. OTHER SYMPTOMS: \"Do you have any other symptoms?\" (e.g., fever, itching, vaginal bleeding, pain with urination, injury to genital area, vaginal foreign body)      Vaginal itching, vaginal burning, vaginal irritation   9. PREGNANCY: \"Is there any chance you are pregnant?\" \"When was your last menstrual period?\"      No. Hysterectomy.    Protocols used: Vaginal Discharge-Adult-[place]    "

## 2024-11-25 NOTE — PROGRESS NOTES
Assessment/Plan:      Diagnoses and all orders for this visit:    Vaginal discharge          Subjective:     Patient ID: Kiara Ma is a 39 y.o. female.    Patient is a 39-year-old female who presents today complaining of a light vaginal discharge without significant odor.    She also complains of intermittent episodes of vaginal irritation as well.    Patient reports that she is not experiencing any abnormal vaginal bleeding since having a laparoscopic hysterectomy bilateral salpingectomy and ovary removal recently.    Patient denies any change in bladder habits as well and denies any frequency, urgency or dysuria.  She also denies any fever shakes or chills as well.    Complete set of vaginal cultures were obtained at today's visit.    Pelvic examination was unremarkable as well.    Further treatment follow-up planning will be based upon culture results    All questions answered for patient during today's visit.    She was told to feel free to call for any problems, questions, issues or concerns which may arise for her    Patient will be seen for routine follow-up as planned      Total time of today's visit was 25  minutes of which greater than 50% was spent face-to-face counseling the patient as well as coordination of care, review of chart and lab values, physical examination as well as computer entry into the [employer] medical record system.          Review of Systems   Constitutional: Negative.  Negative for appetite change, diaphoresis, fatigue, fever and unexpected weight change.   HENT: Negative.     Eyes: Negative.    Respiratory: Negative.     Cardiovascular: Negative.    Gastrointestinal: Negative.  Negative for abdominal pain, blood in stool, constipation, diarrhea, nausea and vomiting.   Endocrine: Negative.  Negative for cold intolerance and heat intolerance.   Genitourinary: Negative.  Negative for dysuria, frequency, hematuria, urgency, vaginal bleeding, vaginal discharge and vaginal pain.    Musculoskeletal: Negative.    Skin: Negative.    Allergic/Immunologic: Negative.    Neurological: Negative.    Hematological: Negative.  Negative for adenopathy.   Psychiatric/Behavioral: Negative.           Objective:     Physical Exam  Constitutional:       Appearance: She is well-developed.   HENT:      Head: Normocephalic.   Eyes:      Pupils: Pupils are equal, round, and reactive to light.   Cardiovascular:      Rate and Rhythm: Normal rate.   Pulmonary:      Effort: Pulmonary effort is normal.   Abdominal:      Palpations: Abdomen is soft.   Genitourinary:     Labia:         Right: No rash, tenderness, lesion or injury.         Left: No rash, tenderness, lesion or injury.       Urethra: No urethral swelling or urethral lesion.      Vagina: Vaginal discharge present. No erythema, bleeding or lesions.      Uterus: Absent.       Adnexa: Right adnexa normal and left adnexa normal.        Right: No mass, tenderness or fullness.          Left: No mass, tenderness or fullness.        Comments: Mild light vaginal discharge noted  Appropriate cultures obtained  Musculoskeletal:         General: Normal range of motion.      Cervical back: Normal range of motion and neck supple.   Skin:     General: Skin is warm and dry.   Neurological:      General: No focal deficit present.      Mental Status: She is alert and oriented to person, place, and time.   Psychiatric:         Mood and Affect: Mood normal.         Behavior: Behavior normal.         Thought Content: Thought content normal.         Judgment: Judgment normal.

## 2024-11-26 NOTE — PATIENT INSTRUCTIONS
Topic: Vaginal discharge    Pelvic exam revealed mild light white vaginal discharge.  Appropriate cultures obtained.    Remainder the pelvic examination was unremarkable    Appropriate treatment and follow-up planning will be based upon results    All questions answered    Patient to call for any problems, questions, issues or concerns which arise for her

## 2024-12-09 ENCOUNTER — OFFICE VISIT (OUTPATIENT)
Dept: FAMILY MEDICINE CLINIC | Facility: CLINIC | Age: 39
End: 2024-12-09
Payer: COMMERCIAL

## 2024-12-09 VITALS
RESPIRATION RATE: 16 BRPM | OXYGEN SATURATION: 98 % | BODY MASS INDEX: 32.44 KG/M2 | WEIGHT: 190 LBS | DIASTOLIC BLOOD PRESSURE: 68 MMHG | TEMPERATURE: 97.8 F | HEIGHT: 64 IN | SYSTOLIC BLOOD PRESSURE: 118 MMHG | HEART RATE: 77 BPM

## 2024-12-09 DIAGNOSIS — K21.9 GASTROESOPHAGEAL REFLUX DISEASE WITHOUT ESOPHAGITIS: ICD-10-CM

## 2024-12-09 DIAGNOSIS — E66.9 OBESITY (BMI 30-39.9): ICD-10-CM

## 2024-12-09 DIAGNOSIS — I10 BENIGN ESSENTIAL HYPERTENSION: ICD-10-CM

## 2024-12-09 DIAGNOSIS — M62.838 MUSCLE SPASMS OF NECK: Primary | ICD-10-CM

## 2024-12-09 PROBLEM — F51.12 BEHAVIORALLY INDUCED INSUFFICIENT SLEEP SYNDROME: Status: RESOLVED | Noted: 2023-01-24 | Resolved: 2024-12-09

## 2024-12-09 PROCEDURE — 99214 OFFICE O/P EST MOD 30 MIN: CPT | Performed by: FAMILY MEDICINE

## 2024-12-09 RX ORDER — PANTOPRAZOLE SODIUM 40 MG/1
40 TABLET, DELAYED RELEASE ORAL
Qty: 90 TABLET | Refills: 1 | Status: SHIPPED | OUTPATIENT
Start: 2024-12-09

## 2024-12-09 RX ORDER — METHOCARBAMOL 500 MG/1
500 TABLET, FILM COATED ORAL 3 TIMES DAILY PRN
Qty: 30 TABLET | Refills: 0 | Status: SHIPPED | OUTPATIENT
Start: 2024-12-09

## 2024-12-09 RX ORDER — IBUPROFEN 600 MG/1
600 TABLET, FILM COATED ORAL EVERY 6 HOURS PRN
Qty: 30 TABLET | Refills: 0 | Status: SHIPPED | OUTPATIENT
Start: 2024-12-09

## 2024-12-09 RX ORDER — POTASSIUM CHLORIDE 750 MG/1
20 CAPSULE, EXTENDED RELEASE ORAL DAILY
Qty: 180 CAPSULE | Refills: 1 | Status: SHIPPED | OUTPATIENT
Start: 2024-12-09

## 2024-12-09 RX ORDER — PREDNISONE 10 MG/1
TABLET ORAL
Qty: 20 TABLET | Refills: 0 | Status: SHIPPED | OUTPATIENT
Start: 2024-12-09

## 2024-12-09 RX ORDER — TIRZEPATIDE 5 MG/.5ML
5 INJECTION, SOLUTION SUBCUTANEOUS WEEKLY
Qty: 2 ML | Refills: 0 | Status: SHIPPED | OUTPATIENT
Start: 2024-12-09

## 2024-12-09 NOTE — ASSESSMENT & PLAN NOTE
Stable on protonix    Orders:    pantoprazole (PROTONIX) 40 mg tablet; Take 1 tablet (40 mg total) by mouth daily before breakfast

## 2024-12-09 NOTE — ASSESSMENT & PLAN NOTE
Stable on current meds  Orders:    potassium chloride (MICRO-K) 10 MEQ CR capsule; Take 2 capsules (20 mEq total) by mouth daily Take 2 tablets daily

## 2024-12-09 NOTE — ASSESSMENT & PLAN NOTE
Lost over 12 pounds since 2 months ago   Continue diet, exercise and portion control   Refilled med today   Orders:    tirzepatide (Zepbound) 5 mg/0.5 mL auto-injector; Inject 0.5 mL (5 mg total) under the skin once a week

## 2024-12-09 NOTE — PROGRESS NOTES
Name: Kiara Ma      : 1985      MRN: [id number]  Encounter Provider: Imani Rhodes MD  Encounter Date: 2024   Encounter department: [facility]  :  Assessment & Plan  Muscle spasms of neck    Orders:    ibuprofen (MOTRIN) 600 mg tablet; Take 1 tablet (600 mg total) by mouth every 6 (six) hours as needed for mild pain    predniSONE 10 mg tablet; 4 tabs x 2 days, 3 tabs x 2 days, 2 tabs x 2 days, 1 tab x 2 days    methocarbamol (ROBAXIN) 500 mg tablet; Take 1 tablet (500 mg total) by mouth 3 (three) times a day as needed for muscle spasms    Ambulatory Referral to Physical Therapy; Future    Benign essential hypertension  Stable on current meds  Orders:    potassium chloride (MICRO-K) 10 MEQ CR capsule; Take 2 capsules (20 mEq total) by mouth daily Take 2 tablets daily    Gastroesophageal reflux disease without esophagitis  Stable on protonix    Orders:    pantoprazole (PROTONIX) 40 mg tablet; Take 1 tablet (40 mg total) by mouth daily before breakfast    Obesity (BMI 30-39.9)  Lost over 12 pounds since 2 months ago   Continue diet, exercise and portion control   Refilled med today   Orders:    tirzepatide (Zepbound) 5 mg/0.5 mL auto-injector; Inject 0.5 mL (5 mg total) under the skin once a week           History of Present Illness   Here for neck pain for 3 days   No triggers that she could recall  + stiffness   Moving makes it worse  Resting makes it better  Tried muscle relaxer, heat, cold which didn't help     Neck Pain   This is a new problem. Episode onset: 3 days ago. The pain is associated with nothing. The pain is present in the midline. The quality of the pain is described as aching. The symptoms are aggravated by position. Stiffness is present All day. Pertinent negatives include no chest pain, fever, headaches, leg pain, numbness, pain with swallowing, paresis, photophobia, syncope, tingling, trouble swallowing, visual change, weakness or weight loss. She has  tried bed rest and ice for the symptoms.     Review of Systems   Constitutional:  Negative for fever and weight loss.   HENT:  Negative for trouble swallowing.    Eyes:  Negative for photophobia.   Cardiovascular:  Negative for chest pain and syncope.   Musculoskeletal:  Positive for arthralgias and neck pain.   Neurological:  Negative for tingling, weakness, numbness and headaches.     Medical History Reviewed by provider this encounter:  Tobacco  Allergies  Meds  Problems  Med Hx  Surg Hx  Fam Hx     .  Past Medical History   Past Medical History:   Diagnosis Date    Abdominal pain     Abnormal ECG     Anemia     Asthma     mild intermittent    Female infertility     Fibroid     Myomectomy 12/10/20    GERD (gastroesophageal reflux disease)     History of palpitations     Hypertension     Lower back pain     Migraines     Ovarian cyst     Papilledema 01/03/2023    Polycystic ovary syndrome     Seasonal allergies     Trace mitral regurgitation by prior echocardiogram     Trigger finger of right thumb 02/28/2017     Past Surgical History:   Procedure Laterality Date    CHOLECYSTECTOMY      laparoscopic    COLONOSCOPY  2017    FL LUMBAR PUNCTURE DIAGNOSTIC  01/03/2023    HERNIA REPAIR      umbilical    LAPAROTOMY N/A 12/10/2020    Procedure: ABDOMINAL MYOMECTOMY;  Surgeon: Tara Budinetz, DO;  Location: [facility];  Service: Gynecology    LUMBAR LAMINECTOMY Right 09/14/2016    Procedure: Right L5/S1 Metryx microdiscectomy;  Surgeon: Mark Holt MD;  Location: [facility];  Service:     MYOMECTOMY  12/10/20    TN COLONOSCOPY FLX DX W/COLLJ SPEC WHEN PFRMD N/A 07/07/2016    Procedure: COLONOSCOPY;  Surgeon: Taty Estrella DO;  Location: [facility];  Service: Gastroenterology    TN HYSTEROSCOPY BX ENDOMETRIUM&/POLYPC W/WO D&C N/A 03/19/2018    Procedure: HYSTEROSCOPY; MYOMECTOMY; ENDOMETRIAL BIOPSY;  Surgeon: Tara Budinetz, DO;  [facility];  Service: Gynecology    TN LAPS SUPRACRV HYSTERECT 250  GM/< RMVL TUBE/OVAR N/A 7/23/2024    Procedure: EXAM UNDER ANESTHESIA, (LSH) W/ BILATERAL SALPINGECTOMY AND LEFT OOPHORECTOMY;  Surgeon: Low Virgen DO;  Location: [facility];  Service: Gynecology    NE TENDON SHEATH INCISION Right 02/28/2017    Procedure: THUMB TRIGGER RELEASE ;  Surgeon: Prasad Mooney DO;  Location: [facility];  Service: Orthopedics    SPINE SURGERY  9/2016    Microdisectomy    WISDOM TOOTH EXTRACTION       Family History   Problem Relation Age of Onset    Diabetes Mother         type2    Hypertension Mother     Depression Mother     Thyroid disease Mother     Arthritis Mother     Anxiety disorder Mother     Stroke Father     Diabetes Father         type2    Hypertension Father     Alcohol abuse Father     Substance Abuse Father     Arthritis Father     Drug abuse Father     Dialysis Father     Esophageal cancer Maternal Grandmother     Breast cancer Maternal Grandmother         Maternal great grandmother    Cancer Maternal Grandmother         Pancreatic    Heart attack Maternal Grandfather         acute MI    Arthritis Maternal Grandfather     Stroke Maternal Grandfather         CVA    Hyperlipidemia Maternal Grandfather     Heart disease Maternal Grandfather     Arthritis Paternal Grandfather     Stroke Paternal Grandfather         CVA    Cancer Paternal Grandfather     Hyperlipidemia Paternal Grandfather     Colon cancer Paternal Grandfather     Bipolar disorder Maternal Aunt     Brain cancer Maternal Aunt     Heart attack Maternal Uncle         acute MI    Arthritis Family     Stroke Family         CVA    Cancer Family     Hyperlipidemia Family     Stroke Family         CVA    Hyperlipidemia Family       reports that she has never smoked. She has never been exposed to tobacco smoke. She has never used smokeless tobacco. She reports current alcohol use. She reports current drug use. Drug: Marijuana.  Current Outpatient Medications on File Prior to Visit   Medication Sig Dispense Refill     albuterol (ProAir HFA) 90 mcg/act inhaler Inhale 2 puffs every 4 (four) hours as needed (When has flare up) 8.5 g 5    carvedilol (COREG CR) 20 MG 24 hr capsule Take 2 capsules (40 mg total) by mouth daily 180 capsule 0    chlorthalidone 25 mg tablet Take 1 tablet (25 mg total) by mouth daily 90 tablet 1    Cholecalciferol (VITAMIN D3 PO) Take 2,000 mg by mouth daily in the early morning      Multiple Vitamins-Minerals (Womens Multivitamin) TABS Take 1 tablet by mouth daily      [DISCONTINUED] pantoprazole (PROTONIX) 40 mg tablet Take 1 tablet (40 mg total) by mouth daily before breakfast 90 tablet 1    [DISCONTINUED] potassium chloride (MICRO-K) 10 MEQ CR capsule Take 2 capsules (20 mEq total) by mouth daily Take 2 tablets daily 180 capsule 1    [DISCONTINUED] tirzepatide (Zepbound) 5 mg/0.5 mL auto-injector Inject 0.5 mL (5 mg total) under the skin once a week 2 mL 0    docusate sodium (COLACE) 100 mg capsule Take 1 capsule (100 mg total) by mouth 2 (two) times a day (Patient not taking: Reported on 12/9/2024) 60 capsule 0    ibuprofen (MOTRIN) 600 mg tablet Take 1 tablet (600 mg total) by mouth every 6 (six) hours as needed for moderate pain for up to 14 days (Patient not taking: Reported on 12/9/2024) 30 tablet 0    meloxicam (MOBIC) 7.5 mg tablet Take 1 tablet (7.5 mg total) by mouth daily (Patient not taking: Reported on 12/9/2024) 15 tablet 0    polyethylene glycol (MIRALAX) 17 g packet Take 17 g by mouth daily (Patient not taking: Reported on 8/8/2024) 20 each 0    rizatriptan (MAXALT-MLT) 10 mg disintegrating tablet Take 1 tablet (10 mg total) by mouth once as needed for migraine for up to 1 dose may repeat in 2 hours if necessary (Patient not taking: Reported on 8/8/2024) 10 tablet 0     No current facility-administered medications on file prior to visit.   No Known Allergies   Current Outpatient Medications on File Prior to Visit   Medication Sig Dispense Refill    albuterol (ProAir HFA) 90 mcg/act  inhaler Inhale 2 puffs every 4 (four) hours as needed (When has flare up) 8.5 g 5    carvedilol (COREG CR) 20 MG 24 hr capsule Take 2 capsules (40 mg total) by mouth daily 180 capsule 0    chlorthalidone 25 mg tablet Take 1 tablet (25 mg total) by mouth daily 90 tablet 1    Cholecalciferol (VITAMIN D3 PO) Take 2,000 mg by mouth daily in the early morning      Multiple Vitamins-Minerals (Womens Multivitamin) TABS Take 1 tablet by mouth daily      [DISCONTINUED] pantoprazole (PROTONIX) 40 mg tablet Take 1 tablet (40 mg total) by mouth daily before breakfast 90 tablet 1    [DISCONTINUED] potassium chloride (MICRO-K) 10 MEQ CR capsule Take 2 capsules (20 mEq total) by mouth daily Take 2 tablets daily 180 capsule 1    [DISCONTINUED] tirzepatide (Zepbound) 5 mg/0.5 mL auto-injector Inject 0.5 mL (5 mg total) under the skin once a week 2 mL 0    docusate sodium (COLACE) 100 mg capsule Take 1 capsule (100 mg total) by mouth 2 (two) times a day (Patient not taking: Reported on 12/9/2024) 60 capsule 0    ibuprofen (MOTRIN) 600 mg tablet Take 1 tablet (600 mg total) by mouth every 6 (six) hours as needed for moderate pain for up to 14 days (Patient not taking: Reported on 12/9/2024) 30 tablet 0    meloxicam (MOBIC) 7.5 mg tablet Take 1 tablet (7.5 mg total) by mouth daily (Patient not taking: Reported on 12/9/2024) 15 tablet 0    polyethylene glycol (MIRALAX) 17 g packet Take 17 g by mouth daily (Patient not taking: Reported on 8/8/2024) 20 each 0    rizatriptan (MAXALT-MLT) 10 mg disintegrating tablet Take 1 tablet (10 mg total) by mouth once as needed for migraine for up to 1 dose may repeat in 2 hours if necessary (Patient not taking: Reported on 8/8/2024) 10 tablet 0     No current facility-administered medications on file prior to visit.      Social History     Tobacco Use    Smoking status: Never     Passive exposure: Never    Smokeless tobacco: Never   Vaping Use    Vaping status: Never Used   Substance and Sexual  "Activity    Alcohol use: Yes     Comment: social- drinks twice weekly- beer/wine- last used 7/21    Drug use: Yes     Types: Marijuana     Comment: Medical- last used on 7/20/24    Sexual activity: Yes     Partners: Male     Birth control/protection: None        Objective   /68 (BP Location: Left arm, Patient Position: Sitting, Cuff Size: Standard)   Pulse 77   Temp 97.8 °F (36.6 °C) (Tympanic)   Resp 16   Ht 5' 4.29\" (1.633 m)   Wt 86.2 kg (190 lb)   LMP 07/23/2024   SpO2 98%   BMI 32.32 kg/m²      Physical Exam  Vitals and nursing note reviewed.   Constitutional:       Appearance: Normal appearance.   Cardiovascular:      Rate and Rhythm: Normal rate and regular rhythm.      Pulses: Normal pulses.      Heart sounds: Normal heart sounds.   Musculoskeletal:         General: Tenderness present. No deformity.      Right lower leg: No edema.      Left lower leg: No edema.      Comments: Limited ROM cervical spine  + spasm trapezium muscles bilaterally   Neurological:      General: No focal deficit present.      Mental Status: She is alert.   Psychiatric:         Mood and Affect: Mood normal.         Behavior: Behavior normal.         "

## 2024-12-16 ENCOUNTER — HOSPITAL ENCOUNTER (OUTPATIENT)
Dept: MAMMOGRAPHY | Facility: MEDICAL CENTER | Age: 39
Discharge: HOME | End: 2024-12-16
Payer: COMMERCIAL

## 2024-12-16 VITALS — HEIGHT: 64 IN | WEIGHT: 190 LBS | BODY MASS INDEX: 32.44 KG/M2

## 2024-12-16 DIAGNOSIS — Z12.31 ENCOUNTER FOR SCREENING MAMMOGRAM FOR MALIGNANT NEOPLASM OF BREAST: ICD-10-CM

## 2024-12-16 PROCEDURE — 77067 SCR MAMMO BI INCL CAD: CPT

## 2024-12-16 PROCEDURE — 77063 BREAST TOMOSYNTHESIS BI: CPT

## 2024-12-26 DIAGNOSIS — I10 ESSENTIAL HYPERTENSION: ICD-10-CM

## 2024-12-27 ENCOUNTER — NURSE TRIAGE (OUTPATIENT)
Age: 39
End: 2024-12-27

## 2024-12-27 ENCOUNTER — OFFICE VISIT (OUTPATIENT)
Dept: OBGYN CLINIC | Facility: CLINIC | Age: 39
End: 2024-12-27
Payer: COMMERCIAL

## 2024-12-27 VITALS — BODY MASS INDEX: 32.87 KG/M2 | WEIGHT: 193 LBS | SYSTOLIC BLOOD PRESSURE: 120 MMHG | DIASTOLIC BLOOD PRESSURE: 70 MMHG

## 2024-12-27 DIAGNOSIS — N89.8 VAGINAL DISCHARGE: Primary | ICD-10-CM

## 2024-12-27 PROCEDURE — 99214 OFFICE O/P EST MOD 30 MIN: CPT | Performed by: OBSTETRICS & GYNECOLOGY

## 2024-12-27 PROCEDURE — 87563 M. GENITALIUM AMP PROBE: CPT | Performed by: OBSTETRICS & GYNECOLOGY

## 2024-12-27 PROCEDURE — 81514 NFCT DS BV&VAGINITIS DNA ALG: CPT | Performed by: OBSTETRICS & GYNECOLOGY

## 2024-12-27 PROCEDURE — 87661 TRICHOMONAS VAGINALIS AMPLIF: CPT | Performed by: OBSTETRICS & GYNECOLOGY

## 2024-12-27 RX ORDER — CHLORTHALIDONE 25 MG/1
25 TABLET ORAL DAILY
Qty: 90 TABLET | Refills: 1 | Status: SHIPPED | OUTPATIENT
Start: 2024-12-27

## 2024-12-27 NOTE — TELEPHONE ENCOUNTER
"Patient reporting having some vaginal bleeding- had hysterectomy in July. Reports bleeding is more than spotting, but not a full flow. No pain. Reports vaginal odor- foul/fishy and vaginal irritation. Discussed possible BV and scheduled patient for an eval today.     Reason for Disposition   Vaginal odor (bad smell) not improved > 3 days following Care Advice    Answer Assessment - Initial Assessment Questions  1. SYMPTOM: \"What's the main symptom you're concerned about?\" (e.g., pain, itching, dryness)      Vaginal odor, spotting   2. LOCATION: \"Where is the  sx located?\" (e.g., inside/outside, left/right)      Vaginal area  3. ONSET: \"When did the  sx  start?\"      Today   4. PAIN: \"Is there any pain?\" If Yes, ask: \"How bad is it?\" (Scale: 1-10; mild, moderate, severe)      Denies  5. ITCHING: \"Is there any itching?\" If Yes, ask: \"How bad is it?\" (Scale: 1-10; mild, moderate, severe)      Denies  6. CAUSE: \"What do you think is causing the discharge?\" \"Have you had the same problem before?\" \"What happened then?\"      Unsure   7. OTHER SYMPTOMS: \"Do you have any other symptoms?\" (e.g., fever, itching, vaginal bleeding, pain with urination, injury to genital area, vaginal foreign body)      Vaginal bleeding  8. PREGNANCY: \"Is there any chance you are pregnant?\" \"When was your last menstrual period?\"      Hysterectomy    Protocols used: Vaginal Symptoms-Adult-[place]    "

## 2024-12-30 LAB
M GENITALIUM DNA SPEC QL NAA+PROBE: NEGATIVE
T VAGINALIS DNA SPEC QL NAA+PROBE: NEGATIVE

## 2025-01-09 ENCOUNTER — RESULTS FOLLOW-UP (OUTPATIENT)
Dept: OBGYN CLINIC | Facility: CLINIC | Age: 40
End: 2025-01-09

## 2025-01-10 DIAGNOSIS — R06.02 SHORTNESS OF BREATH: ICD-10-CM

## 2025-01-10 DIAGNOSIS — R07.9 CHEST PAIN, UNSPECIFIED TYPE: ICD-10-CM

## 2025-01-10 DIAGNOSIS — R06.09 DYSPNEA ON EXERTION: ICD-10-CM

## 2025-01-10 NOTE — PROGRESS NOTES
Assessment/Plan:      Diagnoses and all orders for this visit:    Vaginal discharge  -     Trichomonas vaginalis/Mycoplasma genitalium PCR  -     Molecular Vaginal Panel          Subjective:     Patient ID: Kiara Ma is a 39 y.o. female.    Patient is a 39-year-old female who is status post hysterectomy in July for heavy vaginal bleeding.    Patient only experiencing some light spotting postoperatively.    She does report some vaginal discharge at this time.    She reports normal bowel bladder habits.    She denies any hematuria, frequency, urgency or incontinence.    She also denies any fever shakes or chills    Patient reports normal appetite as well    Exam today mild vaginal discharge was appreciated.  Appropriate vaginal cultures were obtained.    All questions were answered for patient during today's visit    Further treatment and follow-up planning will be based upon final culture results    Patient told to feel free to call for any problems, questions, issues or concerns which may arise for her.      Total time of today's visit was 25 minutes of which greater than 50% was spent face-to-face counseling the patient as well as coordination of care, review of chart and lab values, physical examination as well as computer entry into the [employer] medical record system.          Review of Systems   Constitutional: Negative.  Negative for appetite change, diaphoresis, fatigue, fever and unexpected weight change.   HENT: Negative.     Eyes: Negative.    Respiratory: Negative.     Cardiovascular: Negative.    Gastrointestinal: Negative.  Negative for abdominal pain, blood in stool, constipation, diarrhea, nausea and vomiting.   Endocrine: Negative.  Negative for cold intolerance and heat intolerance.   Genitourinary: Negative.  Negative for dysuria, frequency, hematuria, urgency, vaginal bleeding, vaginal discharge and vaginal pain.   Musculoskeletal: Negative.    Skin: Negative.    Allergic/Immunologic: Negative.     Neurological: Negative.    Hematological: Negative.  Negative for adenopathy.   Psychiatric/Behavioral: Negative.           Objective:     Physical Exam  Constitutional:       Appearance: She is well-developed.   HENT:      Head: Normocephalic.   Eyes:      Pupils: Pupils are equal, round, and reactive to light.   Cardiovascular:      Rate and Rhythm: Normal rate.   Pulmonary:      Effort: Pulmonary effort is normal.   Abdominal:      Palpations: Abdomen is soft.   Genitourinary:     Labia:         Right: No rash, tenderness, lesion or injury.         Left: No rash, tenderness, lesion or injury.       Urethra: No urethral swelling or urethral lesion.      Vagina: Vaginal discharge present. No erythema, bleeding or lesions.      Cervix: No discharge, lesion or erythema.      Uterus: Absent.       Adnexa: Right adnexa normal and left adnexa normal.        Right: No mass, tenderness or fullness.          Left: No mass, tenderness or fullness.        Comments: Patient is status post hysterectomy in July  Mild vaginal discharge appreciated  Appropriate cultures obtained  Musculoskeletal:         General: Normal range of motion.      Cervical back: Normal range of motion and neck supple.   Skin:     General: Skin is warm and dry.   Neurological:      General: No focal deficit present.      Mental Status: She is alert and oriented to person, place, and time.   Psychiatric:         Mood and Affect: Mood normal.         Behavior: Behavior normal.         Thought Content: Thought content normal.         Judgment: Judgment normal.

## 2025-01-13 RX ORDER — CARVEDILOL PHOSPHATE 20 MG/1
40 CAPSULE, EXTENDED RELEASE ORAL DAILY
Qty: 180 CAPSULE | Refills: 0 | Status: SHIPPED | OUTPATIENT
Start: 2025-01-13

## 2025-01-15 ENCOUNTER — OFFICE VISIT (OUTPATIENT)
Dept: FAMILY MEDICINE CLINIC | Facility: CLINIC | Age: 40
End: 2025-01-15
Payer: COMMERCIAL

## 2025-01-15 VITALS
WEIGHT: 186 LBS | HEIGHT: 64 IN | OXYGEN SATURATION: 98 % | HEART RATE: 66 BPM | DIASTOLIC BLOOD PRESSURE: 86 MMHG | TEMPERATURE: 98 F | RESPIRATION RATE: 17 BRPM | BODY MASS INDEX: 31.76 KG/M2 | SYSTOLIC BLOOD PRESSURE: 120 MMHG

## 2025-01-15 DIAGNOSIS — J20.9 ACUTE BRONCHITIS, UNSPECIFIED ORGANISM: Primary | ICD-10-CM

## 2025-01-15 DIAGNOSIS — E66.811 CLASS 1 OBESITY WITHOUT SERIOUS COMORBIDITY WITH BODY MASS INDEX (BMI) OF 31.0 TO 31.9 IN ADULT, UNSPECIFIED OBESITY TYPE: ICD-10-CM

## 2025-01-15 DIAGNOSIS — I10 BENIGN ESSENTIAL HYPERTENSION: ICD-10-CM

## 2025-01-15 DIAGNOSIS — K21.9 GASTROESOPHAGEAL REFLUX DISEASE WITHOUT ESOPHAGITIS: ICD-10-CM

## 2025-01-15 PROCEDURE — 99214 OFFICE O/P EST MOD 30 MIN: CPT | Performed by: FAMILY MEDICINE

## 2025-01-15 RX ORDER — TIRZEPATIDE 2.5 MG/.5ML
2.5 INJECTION, SOLUTION SUBCUTANEOUS WEEKLY
Qty: 2 ML | Refills: 0 | Status: SHIPPED | OUTPATIENT
Start: 2025-01-15 | End: 2025-02-12

## 2025-01-15 RX ORDER — BENZONATATE 200 MG/1
200 CAPSULE ORAL 3 TIMES DAILY PRN
Qty: 20 CAPSULE | Refills: 0 | Status: SHIPPED | OUTPATIENT
Start: 2025-01-15

## 2025-01-15 RX ORDER — AZITHROMYCIN 250 MG/1
TABLET, FILM COATED ORAL
Qty: 6 TABLET | Refills: 0 | Status: SHIPPED | OUTPATIENT
Start: 2025-01-15 | End: 2025-01-19

## 2025-01-15 RX ORDER — PREDNISONE 10 MG/1
TABLET ORAL
Qty: 20 TABLET | Refills: 0 | Status: SHIPPED | OUTPATIENT
Start: 2025-01-15

## 2025-01-15 NOTE — ASSESSMENT & PLAN NOTE
Stable on current meds  Orders:  •  Comprehensive metabolic panel  •  CBC and differential  •  Lipid panel

## 2025-01-15 NOTE — PROGRESS NOTES
Name: Kiara Ma      : 1985      MRN: [id number]  Encounter Provider: Imani Rhodes MD  Encounter Date: 1/15/2025   Encounter department: [facility]  :  Assessment & Plan  Acute bronchitis, unspecified organism    Orders:  •  azithromycin (ZITHROMAX) 250 mg tablet; Take 2 tablets today then 1 tablet daily x 4 days  •  predniSONE 10 mg tablet; 4 tabs x 2 days, 3 tabs x 2 days, 2 tabs x 2 days, 1 tab x 2 days  •  benzonatate (TESSALON) 200 MG capsule; Take 1 capsule (200 mg total) by mouth 3 (three) times a day as needed for cough    Class 1 obesity without serious comorbidity with body mass index (BMI) of 31.0 to 31.9 in adult, unspecified obesity type  Diet, exercise and portion control  To restart zepbound 2.5 mg weekly again     Orders:  •  tirzepatide (Zepbound) 2.5 mg/0.5 mL auto-injector; Inject 0.5 mL (2.5 mg total) under the skin once a week for 28 days  •  Comprehensive metabolic panel  •  CBC and differential  •  Lipid panel  •  Hemoglobin A1C    Benign essential hypertension  Stable on current meds  Orders:  •  Comprehensive metabolic panel  •  CBC and differential  •  Lipid panel    Gastroesophageal reflux disease without esophagitis  Stable on pantoprazole               History of Present Illness   Tested with flu A  2 weeks ago  Nausea and vomiting started 1 week after - she stopped zepbound   Continued to have cough and easily fatigue since flu     Cough  This is a new problem. The current episode started 1 to 4 weeks ago. The problem has been waxing and waning. The problem occurs every few minutes. The cough is Non-productive. Associated symptoms include shortness of breath and wheezing. Pertinent negatives include no chest pain, chills, ear congestion, ear pain, fever, headaches, heartburn, hemoptysis, myalgias, nasal congestion, postnasal drip, rash, rhinorrhea, sore throat, sweats or weight loss. She has tried OTC cough suppressant, cool air and a beta-agonist  "inhaler for the symptoms. The treatment provided no relief. Her past medical history is significant for asthma. There is no history of bronchiectasis, bronchitis, COPD, emphysema, environmental allergies or pneumonia.       Review of Systems   Constitutional: Negative.  Negative for chills, fever and weight loss.   HENT: Negative.  Negative for ear pain, postnasal drip, rhinorrhea and sore throat.    Respiratory:  Positive for cough, shortness of breath and wheezing. Negative for hemoptysis.    Cardiovascular: Negative.  Negative for chest pain.   Gastrointestinal:  Negative for heartburn.   Genitourinary: Negative.    Musculoskeletal: Negative.  Negative for myalgias.   Skin:  Negative for rash.   Allergic/Immunologic: Negative for environmental allergies.   Neurological:  Negative for headaches.   Hematological: Negative.    Psychiatric/Behavioral: Negative.         Objective   /86 (BP Location: Left arm, Patient Position: Sitting, Cuff Size: Standard)   Pulse 66   Temp 98 °F (36.7 °C) (Tympanic)   Resp 17   Ht 5' 4.25\" (1.632 m)   Wt 84.4 kg (186 lb)   LMP 07/23/2024   SpO2 98%   BMI 31.68 kg/m²      Physical Exam  Constitutional:       Appearance: Normal appearance.   HENT:      Right Ear: Tympanic membrane normal.      Left Ear: Tympanic membrane normal.      Nose: Nose normal.   Eyes:      Extraocular Movements: Extraocular movements intact.      Pupils: Pupils are equal, round, and reactive to light.   Cardiovascular:      Rate and Rhythm: Normal rate and regular rhythm.      Pulses: Normal pulses.      Heart sounds: Normal heart sounds.   Pulmonary:      Effort: Pulmonary effort is normal.      Breath sounds: Normal breath sounds.   Neurological:      General: No focal deficit present.      Mental Status: She is alert and oriented to person, place, and time.   Psychiatric:         Mood and Affect: Mood normal.         Behavior: Behavior normal.         "

## 2025-01-24 DIAGNOSIS — E66.9 OBESITY (BMI 30-39.9): ICD-10-CM

## 2025-01-24 RX ORDER — TIRZEPATIDE 5 MG/.5ML
5 INJECTION, SOLUTION SUBCUTANEOUS WEEKLY
Qty: 2 ML | Refills: 0 | Status: SHIPPED | OUTPATIENT
Start: 2025-01-24

## 2025-02-13 ENCOUNTER — NURSE TRIAGE (OUTPATIENT)
Age: 40
End: 2025-02-13

## 2025-02-13 ENCOUNTER — OFFICE VISIT (OUTPATIENT)
Dept: FAMILY MEDICINE CLINIC | Facility: CLINIC | Age: 40
End: 2025-02-13

## 2025-02-13 ENCOUNTER — PATIENT MESSAGE (OUTPATIENT)
Dept: FAMILY MEDICINE CLINIC | Facility: CLINIC | Age: 40
End: 2025-02-13

## 2025-02-13 VITALS
HEIGHT: 64 IN | HEART RATE: 82 BPM | OXYGEN SATURATION: 98 % | BODY MASS INDEX: 31.45 KG/M2 | WEIGHT: 184.2 LBS | TEMPERATURE: 97.7 F | DIASTOLIC BLOOD PRESSURE: 86 MMHG | RESPIRATION RATE: 16 BRPM | SYSTOLIC BLOOD PRESSURE: 124 MMHG

## 2025-02-13 DIAGNOSIS — H01.135 ECZEMATOUS DERMATITIS OF UPPER AND LOWER EYELIDS OF BOTH EYES: Primary | ICD-10-CM

## 2025-02-13 DIAGNOSIS — H01.131 ECZEMATOUS DERMATITIS OF UPPER AND LOWER EYELIDS OF BOTH EYES: Primary | ICD-10-CM

## 2025-02-13 DIAGNOSIS — H01.134 ECZEMATOUS DERMATITIS OF UPPER AND LOWER EYELIDS OF BOTH EYES: Primary | ICD-10-CM

## 2025-02-13 DIAGNOSIS — H01.132 ECZEMATOUS DERMATITIS OF UPPER AND LOWER EYELIDS OF BOTH EYES: Primary | ICD-10-CM

## 2025-02-13 NOTE — TELEPHONE ENCOUNTER
"Reason for Disposition  • Patient wants to be seen    Answer Assessment - Initial Assessment Questions  1. ONSET: \"When did the pain start?\" (e.g., minutes, hours, days)      Over the last few dasy   2. TIMING: \"Does the pain come and go, or has it been constant since it started?\" (e.g., constant, intermittent, fleeting)      Dryness all the time   3. SEVERITY: \"How bad is the pain?\"  (Scale 1-10; mild, moderate or severe)      No pain   4. LOCATION: \"Where does it hurt?\"  (e.g., eyelid, eye, cheekbone)      No pain   5. CAUSE: \"What do you think is causing the pain?\"      I don't know   6. VISION: \"Do you have blurred vision or changes in your vision?\"       no  7. EYE DISCHARGE: \"Is there any discharge (pus) from the eye(s)?\"  If Yes, ask: \"What color is it?\"       no  8. FEVER: \"Do you have a fever?\" If Yes, ask: \"What is it, how was it measured, and when did it start?\"       no  9. OTHER SYMPTOMS: \"Do you have any other symptoms?\" (e.g., headache, nasal discharge, facial rash)      Dryness   10. PREGNANCY: \"Is there any chance you are pregnant?\" \"When was your last menstrual period?\"        no    Protocols used: Eye Pain and Other Symptoms-Adult-[place]    "

## 2025-02-13 NOTE — TELEPHONE ENCOUNTER
"Regarding: skin irritation  ----- Message from Martha LYLE sent at 2/13/2025 10:36 AM EST -----  Message in My Chart:  \"Hi Dr. Rhodes, I've been having some sort of skin irritation around my eyelids. I haven't changed or added anything to my skin regime so I'm not sure what caused it. Is there anything I can use on it?   When I wake up in the morning, it's really dried and crusty, I've used eczema cream but it doesn't seem to be helping\"    "

## 2025-02-13 NOTE — PROGRESS NOTES
Name: Kiara Ma      : 1985      MRN: [id number]  Encounter Provider: ANGELIKA Townsend  Encounter Date: 2025   Encounter department: [facility]  :  Assessment & Plan  Eczematous dermatitis of upper and lower eyelids of both eyes  Recommend she keep her skin care products limited to outside of the orbital bones and treat the effected skin with aquaphor.  Cleanse with cool water instead of warm.  Pt instructed to call for reevaluation if sx worsen or persist.  A referral to dermatology is provided should this continue or spread.            Orders:    Ambulatory Referral to Dermatology; Future           History of Present Illness   Pt is a 39 y.o. female who is seen today for evaluation of eye itching and dryness.  Past medical history of HTN, migraines, mitral valve insufficiency, ocular migraines, PVC, asthma, GERD, PCOS, sickle cell trait, iron deficiency anemia, obesity.  She started to notice irritation on her R upper eyelid about a month ago and then the lower eyelid as well about 2 weeks later.  She started using vanicream on her eyelids but that did not seem to help.  She tried also a topical antifungal and that did not seem to help either.  She has noticed this on the left eyelids as well in the last week.  Areas are very itchy.  She wakes up in the morning and it is very dry/scaly looking, she sometimes puts vit c on it.  She has not changed any of her skin care products; she use a turmeric cleanser, niacinamide and hyaluronic acid serums and vit c cream.  She uses these products twice a day.  She does not wear any makeup.  No change in shower products, detergent.  No new medications.      Review of Systems   Eyes:  Negative for pain, discharge, redness, itching and visual disturbance.   Skin:  Positive for rash (eyelids, upper and lower).       Objective   /86 (BP Location: Left arm, Patient Position: Sitting, Cuff Size: Standard)   Pulse 82   Temp  "97.7 °F (36.5 °C) (Tympanic)   Resp 16   Ht 5' 4\" (1.626 m)   Wt 83.6 kg (184 lb 3.2 oz)   LMP 07/23/2024   SpO2 98%   BMI 31.62 kg/m²      Physical Exam  Vitals reviewed.   Constitutional:       General: She is awake. She is not in acute distress.     Appearance: Normal appearance. She is well-developed and well-groomed. She is not ill-appearing.   HENT:      Head: Normocephalic.   Eyes:      General: Lids are normal.      Conjunctiva/sclera: Conjunctivae normal.   Pulmonary:      Effort: Pulmonary effort is normal. No tachypnea, accessory muscle usage or respiratory distress.   Skin:     Findings: Rash (eyelids, see photos above) present. Rash is scaling (dry/rough).   Neurological:      Mental Status: She is alert and oriented to person, place, and time.   Psychiatric:         Mood and Affect: Mood normal.         Speech: Speech normal.         Behavior: Behavior normal. Behavior is cooperative.         Thought Content: Thought content normal.         Judgment: Judgment normal.         "

## 2025-02-24 DIAGNOSIS — J20.9 ACUTE BRONCHITIS, UNSPECIFIED ORGANISM: ICD-10-CM

## 2025-02-24 RX ORDER — BENZONATATE 200 MG/1
200 CAPSULE ORAL 3 TIMES DAILY PRN
Qty: 20 CAPSULE | Refills: 0 | Status: SHIPPED | OUTPATIENT
Start: 2025-02-24

## 2025-02-26 ENCOUNTER — TELEPHONE (OUTPATIENT)
Age: 40
End: 2025-02-26

## 2025-02-26 NOTE — TELEPHONE ENCOUNTER
Continuity of Care Form    Patient Name: Taylor Cr   :  1959  MRN:  [id number]    Admit date:  2023  Discharge date:  23    Code Status Order: Full Code   Advance Directives:     Admitting Physician:  Charly Caruso MD  PCP: Anna Chavis, APRN - CNP    Discharging Nurse: Tacho Mccloud Unit/Room#: 4330/8033-82  Discharging Unit Phone Number: [phone]    Emergency Contact:   Extended Emergency Contact Information  Primary Emergency Contact:  [address], [address] Phone: [phone]  Relation: Brother/Sister  Secondary Emergency Contact: Kaleigh Albert, [address] Phone: [phone]  Relation: Brother/Sister    Past Surgical History:  Past Surgical History:   Procedure Laterality Date    ANKLE FRACTURE SURGERY      AV FISTULA CREATION  2021    BACK SURGERY      BREAST SURGERY      CARDIAC SURGERY      CARPAL TUNNEL RELEASE      CHOLECYSTECTOMY, OPEN N/A     COLONOSCOPY      CORONARY ARTERY BYPASS GRAFT      x3    DIALYSIS FISTULA CREATION Left 2021    LEFT AV FISTULA CREATION UPPER EXTREMITY performed by Lizeth Quintanilla MD at [address]      IR TUNNELED [address] 5 YEARS  2021    IR TUNNELED CATHETER PLACEMENT GREATER THAN 5 YEARS 2021 [facility] SPECIAL PROCEDURES    KNEE ARTHROSCOPY      right    OTHER SURGICAL HISTORY  2022    cvc exchange    TONSILLECTOMY         Immunization History:   Immunization History   Administered Date(s) Administered    COVID-19, MODERNA Booster BLUE border, (age 18y+), IM, 50mcg/0.25mL 2022    COVID-19, PFIZER PURPLE top, DILUTE for use, (age 15 y+), 30mcg/0.3mL 2021, 2021    Influenza Virus Vaccine 10/18/2018, 09/10/2019, 2020    Influenza, AFLURIA (age 1 yrs+), FLUZONE, (age 10 mo+), MDV, 0.5mL 10/16/2017    Influenza, FLUARIX, FLULAVAL, FLUZONE (age 10 mo+) AND AFLURIA, (age 1 y+), PF, 0.5mL 10/18/2018, 09/10/2019, Patient was called unable to reach a message was left to call the office in regards to coming in for OV with Dr. Rhodes tomorrow.   09/20/2019, 11/03/2020    Influenza, FLUCELVAX, (age 10 mo+), MDCK, PF, 0.5mL 09/30/2021    Pneumococcal Conjugate 13-valent ([id number]) 08/06/2019    Pneumococcal Polysaccharide ([id number]) 10/30/2014    Tdap (Boostrix, Adacel) 11/18/2017    Zoster Recombinant (Shingrix) 09/23/2022, 01/10/2023       Active Problems:  Patient Active Problem List   Diagnosis Code    Atherosclerosis of coronary artery bypass graft of native heart without angina pectoris I25.810    Acute kidney injury superimposed on CKD ([facility] [address] N17.9, N18.9    Acute on chronic diastolic heart failure ([facility]) I50.33    Diabetic polyneuropathy associated with type 2 diabetes mellitus ([facility]) E11.42    History of coronary artery bypass graft Z95.1    Iron deficiency anemia D50.9    Spinal stenosis of lumbar region with neurogenic claudication M48.062    Mixed hyperlipidemia E78.2    CKD (chronic kidney disease) stage 4, GFR 15-29 ml/min ([facility]) N18.4    Type 2 diabetes mellitus with chronic kidney disease on chronic dialysis, with long-term current use of insulin ([facility]) E11.22, N18.6, Z99.2, Z79.4    Obesity, Class II, BMI 35-39.9 E66.9    Thyroid nodule greater than or equal to 1 cm in diameter incidentally noted on imaging study E04.1    Hypertension, essential I10    Chronic ischemic heart disease I25.9    Ischemic stroke of frontal lobe ([facility]) I63.9    Morbid obesity with BMI of 40.0-44.9, adult ([facility]) E66.01, Z68.41    Disequilibrium syndrome E87.8    Anxiety F41.9    Chronic midline low back pain with bilateral sciatica M54.41, M54.42, G89.29    COVID U07.1    Cerebral hypoperfusion I67.9    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    History of fusion of cervical spine Z98.1    Depression with anxiety F41.8    Vancomycin resistant Enterococcus UTI A49.1, Z16.21    Dialysis disequilibrium syndrome E87.8    VRE infection (vancomycin resistant Enterococcus) A49.1, Z16.21    Infection due to ESBL-producing Klebsiella pneumoniae A49.8, Z16.12    Class 2 severe obesity due to excess calories with serious comorbidity and body mass index (BMI) of 35.0 to 35.9 in adult [facility]) E66.01, Z68.35    Type 2 diabetes mellitus with hyperglycemia, with long-term current use of insulin ([facility]) E11.65, Z79.4    Right hemiparesis ([facility]) G81.91    Ulcer of left foot, limited to breakdown of skin ([address] L97.521    Secondary hyperparathyroidism ([facility]) N25.81    Hypertensive urgency I16.0    Hypoxia R09.02    Congestive heart failure ([facility]) I50.9    Nephrotic range proteinuria R80.9    Acute respiratory failure with hypoxia ([facility]) J96.01    Syncope and collapse R55       Isolation/Infection:   Isolation            Contact          Patient Infection Status       Infection Onset Added Last Indicated Last Indicated By Review Planned Expiration Resolved Resolved By    ESBL (Extended Spectrum Beta Lactamase) 08/02/22 08/05/22 08/02/22 Culture, Urine        MDRO (multi-drug resistant organism) 08/02/22 08/05/22 08/02/22 Culture, Urine        VRE 08/02/22 08/04/22 08/02/22 Culture, Urine        Urine- 8/22      ESBL (Extended Spectrum Beta Lactamase) 07/02/22 07/04/22 08/02/22 Culture, Urine        MDRO (multi-drug resistant organism) 07/02/22 07/04/22 08/02/22 Culture, Urine        VRE 05/09/22 05/13/22 08/02/22 Culture, Urine        MRSA 08/03/20 08/13/21 08/13/21 Tremayne Wang RN        Added from external infection.     Resolved    COVID-19 (Rule Out) 08/05/22 08/05/22 08/05/22 COVID-19, Rapid (Ordered)   08/08/22 Rule-Out Test Resulted    COVID-19 (Rule Out) 06/18/22 06/18/22 06/18/22 COVID-19, Rapid (Ordered)   06/18/22 Rule-Out Test Resulted    COVID-19 (Rule Out) 06/06/22 06/06/22 06/06/22 COVID-19 & Influenza Combo (Ordered)   06/06/22 Rule-Out Test Resulted    COVID-19 (Rule Out) 04/14/22 04/14/22 04/14/22 COVID-19 & Influenza Combo (Ordered)   04/15/22 Raya Vega RN    COVID-19 (Rule Out) 04/13/22 04/13/22 04/13/22 COVID-19 & Influenza Combo (Ordered)   04/13/22 Rule-Out Test Resulted COVID-19 (Rule Out) 22 COVID-19 (Ordered)   22 Rule-Out Test Resulted    COVID-19 22 COVID-19   22     COVID-19 (Rule Out) 22 COVID-19 (Ordered)   22 Rule-Out Test Resulted            Nurse Assessment:  Last Vital Signs: /68   Pulse 65   Temp 98.4 °F (36.9 °C)   Resp 20   Wt 248 lb (112.5 kg)   SpO2 90%   BMI 41.27 kg/m²     Last documented pain score (0-10 scale): Pain Level: 6  Last Weight:   Wt Readings from Last 1 Encounters:   23 248 lb (112.5 kg)     Mental Status:  oriented, alert, coherent, and logical    IV Access:  - None    Nursing Mobility/ADLs:  Walking   Assisted  Transfer  Assisted  Bathing  Assisted  Dressing  Assisted  Toileting  Assisted  Feeding  Independent  Med Admin  Independent  Med Delivery   whole    Wound Care Documentation and Therapy:        Elimination:  Continence: Bowel: Yes  Bladder: Yes  Urinary Catheter: None   Colostomy/Ileostomy/Ileal Conduit: No       Date of Last BM:     Intake/Output Summary (Last 24 hours) at 2023 1551  Last data filed at 2023 1534  Gross per 24 hour   Intake --   Output 2 ml   Net -2 ml     No intake/output data recorded. Safety Concerns: At Risk for Falls    Impairments/Disabilities:      None    Nutrition Therapy:  Current Nutrition Therapy:   - Oral Diet:  General    Routes of Feeding: Oral  Liquids: No Restrictions  Daily Fluid Restriction: no  Last Modified Barium Swallow with Video (Video Swallowing Test): not done    Treatments at the Time of Hospital Discharge:   Respiratory Treatments: n/a  Oxygen Therapy:  is on oxygen at 3 L/min per nasal cannula. Ventilator:    - No ventilator support    Rehab Therapies: Physical Therapy and Occupational Therapy  Weight Bearing Status/Restrictions: No weight bearing restrictions  Other Medical Equipment (for information only, NOT a DME order):     Other Treatments: Skilled Nursing assessment and monitoring. Medication education and monitoring per protocol. Resume previous orders    Patient's personal belongings (please select all that are sent with patient):  None    RN SIGNATURE:  Electronically signed by Shima Lujan RN on 2/17/23 at 3:13 PM EST    CASE MANAGEMENT/SOCIAL WORK SECTION    Inpatient Status Date:     Readmission Risk Assessment Score:  Readmission Risk              Risk of Unplanned Readmission:  37           Discharging to Facility/ [address] Phone: [phone]  Fax: [phone]      Dialysis Facility (if applicable)   Name:  Address:  Dialysis Schedule:  Phone:  Fax:    / signature: Electronically signed by Otoniel Nina RN on 2/12/23 at 3:51 PM EST    PHYSICIAN SECTION    Prognosis: Fair    Condition at Discharge: Stable    Rehab Potential (if transferring to Rehab): Fair    Recommended Labs or Other Treatments After Discharge:     Physician Certification: I certify the above information and transfer of Jill Harrell  is necessary for the continuing treatment of the diagnosis listed and that she requires [facility] for greater 30 days.      Update Admission H&P: No change in H&P    PHYSICIAN SIGNATURE:  Electronically signed by Nuvia Cary MD on 2/17/23 at 4:20 PM EST    Medication List    START taking these medications    START taking these medications   ticagrelor 90 MG Tabs tablet  Commonly known as: BRILINTA  Take 1 tablet by mouth 2 times daily     CHANGE how you take these medications    CHANGE how you take these medications   amLODIPine 5 MG tablet  Commonly known as: NORVASC  Take 1 tablet by mouth daily  Start taking on: February 18, 2023  What changed:   medication strength  how much to take   atorvastatin 80 MG tablet  Commonly known as: LIPITOR  Take 1 tablet by mouth nightly  What changed:   medication strength  how much to take   carvedilol 6.25 MG tablet  Commonly known as: COREG  Take 1 tablet by mouth 2 times daily  What changed:   medication strength  how much to take   sodium bicarbonate 650 MG tablet  Take 2 tablets by mouth 2 times daily  What changed: additional instructions     CONTINUE taking these medications    CONTINUE taking these medications   acetaminophen 325 MG tablet  Commonly known as: TYLENOL  Take 650 mg by mouth every 6 hours as needed for Pain   allopurinol 100 MG tablet  Commonly known as: ZYLOPRIM  Take 1 tablet by mouth daily   aspirin 81 MG chewable tablet  Take 1 tablet by mouth daily   AZO-CRANBERRY PO  Take 2 capsules by mouth daily   Basaglar KwikPen 100 UNIT/ML injection pen  Generic drug: insulin glargine  Inject 55 Units into the skin 2 times daily   blood glucose test strips  Test 3 times a day & as needed for symptoms of irregular blood glucose. Dispense sufficient amount for indicated testing frequency plus additional to accommodate PRN testing needs.    bumetanide 1 MG tablet  Commonly known as: BUMEX  Take 3 tablets by mouth 2 times daily F/u with nephro for dose adjustment   Dexcom G6  Deveron Curls  1 each by Does not apply route 4 times daily   Dexcom G6 Sensor Misc  1 each by Does not apply route 4 times daily   docusate sodium 100 MG capsule  Commonly known as: COLACE  Take 1 capsule by mouth 2 times daily   Easy Touch Pen Needles 29G X 12MM Misc  Generic drug: Insulin Pen Needle  5 each by Does not apply route daily   FIBER-CAPS PO  Take 2 capsules by mouth in the morning and at bedtime   HumaLOG 100 UNIT/ML injection vial  Generic drug: insulin lispro  Inject into the skin 3 times daily (before meals) Indications: 15 units and sliding scale (patient reports only the sliding scale)   * Lancets Misc  1 each by Does not apply route daily   * ReliOn Lancets Micro-Thin 33G Misc  USE AS DIRECTED EVERY DAY   linaclotide 145 MCG capsule  Commonly known as: Linzess  Take 1 capsule by mouth every morning (before breakfast)   melatonin 3 MG Tabs tablet  Take 10 mg by mouth nightly as needed (for sleep) Indications: Trouble Sleeping   pantoprazole 40 MG tablet  Commonly known as: PROTONIX  TAKE 1 TABLET BY MOUTH ONCE DAILY IN THE MORNING BEFORE BREAKFAST   tamsulosin 0.4 MG capsule  Commonly known as: FLOMAX  Take 1 capsule by mouth daily   True Metrix Go Glucose Meter w/Device Kit  1 each by Does not apply route 4 times daily    very important  * This list has 2 medication(s) that are the same as other medications prescribed for you. Read the directions carefully, and ask your doctor or other care provider to review them with you. STOP taking these medications    STOP taking these medications   PROBIOTIC ACIDOPHILUS PO     ASK your doctor about these medications    ASK your doctor about these medications   * gabapentin 300 MG capsule  Commonly known as: NEURONTIN  Take 300 mg by mouth in the morning. Ask about: Which instructions should I use? * gabapentin 100 MG capsule  Commonly known as: NEURONTIN  Take 1 capsule by mouth daily for 30 days. Intended supply: 30 days  Ask about: Which instructions should I use?    very important  * This list has 2 medication(s) that are the same as other medications prescribed for you. Read the directions carefully, and ask your doctor or other care provider to review them with you.      Where to Get Your Medications      These medications were sent to [facility], [address] [place] [address] Drive [address]  Phone: [phone]       amLODIPine 5 MG tablet        atorvastatin 80 MG tablet        carvedilol 6.25 MG tablet        ticagrelor 90 MG Tabs tablet      You can get these medications from any pharmacy    Bring a paper prescription for each of these medications      gabapentin 100 MG capsule             Printing Report    Report Name Print   Discharge Meds Print

## 2025-02-26 NOTE — TELEPHONE ENCOUNTER
Patient called in post OV 2/13 wit dx of excema of bilateral eyelids. Advised to use Aquaphor. Patient called in to report that her eyelids are starting to swell R>L, itchy; and mild redness in both eyelids. Please follow up with patient for provider response for care or OV.

## 2025-02-26 NOTE — TELEPHONE ENCOUNTER
Patient has an appointment on 04/24 for eczema going on for over a month on eyelid she went to her pcp and was told to use aquaphor until she sees a dermatologist and does not work at all .   Denied history of eczema , denied recent contact with new medications or chemicals.  I could not find anything before 04/24 , I added her to the cancellation list , advised to keep calling looking for a last minute cancellation and reach out to her pcp

## 2025-02-27 NOTE — TELEPHONE ENCOUNTER
Patient is just calling back for the appointment scheduling for today. I scheduled her for tomorrow 2/28 at 2:15pm. No open slots today 2/27

## 2025-02-28 ENCOUNTER — OFFICE VISIT (OUTPATIENT)
Dept: FAMILY MEDICINE CLINIC | Facility: CLINIC | Age: 40
End: 2025-02-28
Payer: COMMERCIAL

## 2025-02-28 VITALS
DIASTOLIC BLOOD PRESSURE: 80 MMHG | OXYGEN SATURATION: 98 % | TEMPERATURE: 98.1 F | BODY MASS INDEX: 31.24 KG/M2 | RESPIRATION RATE: 17 BRPM | HEIGHT: 64 IN | WEIGHT: 183 LBS | HEART RATE: 89 BPM | SYSTOLIC BLOOD PRESSURE: 120 MMHG

## 2025-02-28 DIAGNOSIS — H01.135 ECZEMATOUS DERMATITIS OF UPPER AND LOWER EYELIDS OF BOTH EYES: Primary | ICD-10-CM

## 2025-02-28 DIAGNOSIS — E66.9 OBESITY (BMI 30-39.9): ICD-10-CM

## 2025-02-28 DIAGNOSIS — H01.132 ECZEMATOUS DERMATITIS OF UPPER AND LOWER EYELIDS OF BOTH EYES: Primary | ICD-10-CM

## 2025-02-28 DIAGNOSIS — H01.134 ECZEMATOUS DERMATITIS OF UPPER AND LOWER EYELIDS OF BOTH EYES: Primary | ICD-10-CM

## 2025-02-28 DIAGNOSIS — H01.131 ECZEMATOUS DERMATITIS OF UPPER AND LOWER EYELIDS OF BOTH EYES: Primary | ICD-10-CM

## 2025-02-28 DIAGNOSIS — I10 BENIGN ESSENTIAL HYPERTENSION: ICD-10-CM

## 2025-02-28 PROCEDURE — 99214 OFFICE O/P EST MOD 30 MIN: CPT | Performed by: FAMILY MEDICINE

## 2025-02-28 RX ORDER — TIRZEPATIDE 7.5 MG/.5ML
7.5 INJECTION, SOLUTION SUBCUTANEOUS WEEKLY
Qty: 2 ML | Refills: 0 | Status: SHIPPED | OUTPATIENT
Start: 2025-02-28

## 2025-02-28 RX ORDER — TRIAMCINOLONE ACETONIDE 1 MG/G
OINTMENT TOPICAL 2 TIMES DAILY
Qty: 30 G | Refills: 0 | Status: SHIPPED | OUTPATIENT
Start: 2025-02-28

## 2025-02-28 NOTE — PROGRESS NOTES
"Name: Kiara Ma      : 1985      MRN: [id number]  Encounter Provider: Imani Rhodes MD  Encounter Date: 2025   Encounter department: [facility]  :  Assessment & Plan  Eczematous dermatitis of upper and lower eyelids of both eyes  To look for triggers  Orders:  •  triamcinolone (KENALOG) 0.1 % ointment; Apply topically 2 (two) times a day    Obesity (BMI 30-39.9)  Initial weight : 205 lbs  Current weight: 183 lbs   She wants to go up on zepbound to 7.5 mg weekly  Orders:  •  tirzepatide (Zepbound) 7.5 mg/0.5 mL auto-injector; Inject 0.5 mL (7.5 mg total) under the skin once a week    Benign essential hypertension  Doing well on current meds              History of Present Illness   HPI  Bilateral upper and lower eye lid itching and dryness for 1 month   Seen here 2 weeks ago and was told to use Aquafor  Aquafor made it worse and feels more raw    Review of Systems   HENT: Negative.     Respiratory: Negative.     Cardiovascular: Negative.    Skin:  Positive for rash.       Objective   /80 (BP Location: Left arm, Patient Position: Sitting, Cuff Size: Standard)   Pulse 89   Temp 98.1 °F (36.7 °C) (Tympanic)   Resp 17   Ht 5' 4\" (1.626 m)   Wt 83 kg (183 lb)   LMP 2024   SpO2 98%   BMI 31.41 kg/m²      Physical Exam  Vitals and nursing note reviewed.   Constitutional:       Appearance: Normal appearance.   Eyes:      General:         Right eye: No discharge.         Left eye: No discharge.      Comments: Dryness over bilateral upper and lower eyelids   Cardiovascular:      Pulses: Normal pulses.      Heart sounds: Normal heart sounds.   Pulmonary:      Effort: Pulmonary effort is normal.      Breath sounds: Normal breath sounds.   Neurological:      Mental Status: She is alert.               "

## 2025-02-28 NOTE — ASSESSMENT & PLAN NOTE
Initial weight : 205 lbs  Current weight: 183 lbs   She wants to go up on zepbound to 7.5 mg weekly  Orders:  •  tirzepatide (Zepbound) 7.5 mg/0.5 mL auto-injector; Inject 0.5 mL (7.5 mg total) under the skin once a week

## 2025-03-17 DIAGNOSIS — K21.9 GASTROESOPHAGEAL REFLUX DISEASE WITHOUT ESOPHAGITIS: ICD-10-CM

## 2025-03-18 RX ORDER — PANTOPRAZOLE SODIUM 40 MG/1
40 TABLET, DELAYED RELEASE ORAL
Qty: 90 TABLET | Refills: 1 | Status: SHIPPED | OUTPATIENT
Start: 2025-03-18

## 2025-03-21 DIAGNOSIS — I10 ESSENTIAL HYPERTENSION: ICD-10-CM

## 2025-03-21 RX ORDER — CHLORTHALIDONE 25 MG/1
25 TABLET ORAL DAILY
Qty: 90 TABLET | Refills: 3 | Status: SHIPPED | OUTPATIENT
Start: 2025-03-21

## 2025-03-24 NOTE — PROGRESS NOTES
Assessment/Plan:    No problem-specific Assessment & Plan notes found for this encounter.       Diagnoses and all orders for this visit:    Encounter for gynecological examination without abnormal finding  -     Liquid-based pap, screening; Future  -     Liquid-based pap, screening  -     Molecular Hold Sample    Pap smear, as part of routine gynecological examination    Encounter for screening mammogram for malignant neoplasm of breast  -     Mammo screening bilateral w 3d and cad; Future          Normal gynecological physical examination.  Self-breast examination stressed.  Mammogram ordered.  Discussed regular exercise, healthy diet, importance of vitamin D and calcium supplements.  Discussed importance of sun block use during periods of prolonged sun exposure.  Patient will be seen in 1 year for routine gynecologic and medical examination.  Patient will call office for any problems, concerns, or issues which may arise during the interim.     Subjective:      Patient is a 39 year old female with past medical history significant for uterine fibroids, PCOS, hypertension, migraines without aura, and sickle cell trait who presents to the office for a yearly exam without any questions or concerns.  Patient is status post hysterectomy in July for uterine fibroids.  She denies any complications from the procedure.  Patient denied any pelvic or abdominal pain.  No abnormal bleeding or discharge.  No change in bowel or bladder function.  No appetite change.  She denied any hot flashes or night sweats.    No breast tenderness or other breast abnormalities.  Patient states she does regular self breast exams.  She had a recent mammogram in December.    No chest pain or shortness of breath.  Patient denied fevers, chills, headache, dizziness, weakness    HPI    Patient ID: Kiara Ma is a 39 y.o. female who presents today for her annual gynecologic and medical examination    Menstrual status: Menstruating       Vasomotor symptoms: Denied    Patient reports normal appetite    Patient reports normal bowel and bladder habits    Patient denies any significant pelvic or abdominal pain    Patient denies any headaches, chest pain, shortness of breath fever shakes or chills    Patient denies any COVID 19 symptoms including cough or loss of taste or smell    COVID vaccine status: Aware of vaccination protocols    Medical problems:PCOS, hypertension, sickle cell trait, uterine fibroids    Colonoscopy status:Not yet eligible     Mammogram status:December 2024    The following portions of the patient's history were reviewed and updated as appropriate: allergies, current medications, past family history, past medical history, past social history, past surgical history and problem list.    Review of Systems   Constitutional: Negative.  Negative for appetite change, diaphoresis, fatigue, fever and unexpected weight change.   HENT: Negative.     Eyes: Negative.    Respiratory: Negative.     Cardiovascular: Negative.         Followed for hypertension    Gastrointestinal: Negative.  Negative for abdominal pain, blood in stool, constipation, diarrhea, nausea and vomiting.   Endocrine: Negative.  Negative for cold intolerance and heat intolerance.   Genitourinary: Negative.  Negative for dysuria, frequency, hematuria, urgency, vaginal bleeding, vaginal discharge and vaginal pain.        Followed for PCOS   Musculoskeletal: Negative.    Skin: Negative.    Allergic/Immunologic: Negative.    Neurological: Negative.    Hematological: Negative.  Negative for adenopathy.   Psychiatric/Behavioral: Negative.           Objective:      /74   Wt 80.7 kg (178 lb)   LMP 07/23/2024   BMI 30.55 kg/m²          Physical Exam  Constitutional:       General: She is not in acute distress.     Appearance: Normal appearance. She is well-developed. She is not diaphoretic.   HENT:      Head: Normocephalic and atraumatic.   Eyes:      Pupils: Pupils are  equal, round, and reactive to light.   Cardiovascular:      Rate and Rhythm: Normal rate and regular rhythm.      Heart sounds: Normal heart sounds. No murmur heard.     No friction rub. No gallop.   Pulmonary:      Effort: Pulmonary effort is normal.      Breath sounds: Normal breath sounds.   Chest:   Breasts:     Breasts are symmetrical.      Right: No inverted nipple, mass, nipple discharge, skin change or tenderness.      Left: No inverted nipple, mass, nipple discharge, skin change or tenderness.   Abdominal:      General: Bowel sounds are normal.      Palpations: Abdomen is soft.   Genitourinary:     General: Normal vulva.      Exam position: Supine.      Labia:         Right: No rash or lesion.         Left: No rash or lesion.       Vagina: Normal. No vaginal discharge, erythema, tenderness or bleeding.      Cervix: No discharge or friability.      Uterus: Not enlarged and not tender.       Adnexa:         Right: No mass, tenderness or fullness.          Left: No mass, tenderness or fullness.        Rectum: Normal. Guaiac result negative.   Musculoskeletal:         General: Normal range of motion.      Cervical back: Normal range of motion and neck supple.   Lymphadenopathy:      Cervical: No cervical adenopathy.      Upper Body:      Right upper body: No supraclavicular adenopathy.      Left upper body: No supraclavicular adenopathy.   Skin:     General: Skin is warm and dry.      Findings: No rash.   Neurological:      General: No focal deficit present.      Mental Status: She is alert and oriented to person, place, and time.   Psychiatric:         Mood and Affect: Mood normal.         Speech: Speech normal.         Behavior: Behavior normal.         Thought Content: Thought content normal.         Judgment: Judgment normal.

## 2025-03-25 ENCOUNTER — ANNUAL EXAM (OUTPATIENT)
Dept: OBGYN CLINIC | Facility: CLINIC | Age: 40
End: 2025-03-25
Payer: COMMERCIAL

## 2025-03-25 VITALS — BODY MASS INDEX: 30.55 KG/M2 | DIASTOLIC BLOOD PRESSURE: 74 MMHG | WEIGHT: 178 LBS | SYSTOLIC BLOOD PRESSURE: 122 MMHG

## 2025-03-25 DIAGNOSIS — Z01.419 PAP SMEAR, AS PART OF ROUTINE GYNECOLOGICAL EXAMINATION: ICD-10-CM

## 2025-03-25 DIAGNOSIS — Z01.419 ENCOUNTER FOR GYNECOLOGICAL EXAMINATION WITHOUT ABNORMAL FINDING: Primary | ICD-10-CM

## 2025-03-25 DIAGNOSIS — E66.9 OBESITY (BMI 30-39.9): ICD-10-CM

## 2025-03-25 DIAGNOSIS — Z12.31 ENCOUNTER FOR SCREENING MAMMOGRAM FOR MALIGNANT NEOPLASM OF BREAST: ICD-10-CM

## 2025-03-25 PROCEDURE — G0145 SCR C/V CYTO,THINLAYER,RESCR: HCPCS | Performed by: OBSTETRICS & GYNECOLOGY

## 2025-03-25 PROCEDURE — S0612 ANNUAL GYNECOLOGICAL EXAMINA: HCPCS | Performed by: OBSTETRICS & GYNECOLOGY

## 2025-03-26 DIAGNOSIS — Z87.898 H/O MOTION SICKNESS: Primary | ICD-10-CM

## 2025-03-26 RX ORDER — SCOPOLAMINE 1 MG/3D
1 PATCH, EXTENDED RELEASE TRANSDERMAL
Qty: 10 PATCH | Refills: 0 | Status: SHIPPED | OUTPATIENT
Start: 2025-03-26

## 2025-03-26 RX ORDER — TIRZEPATIDE 7.5 MG/.5ML
7.5 INJECTION, SOLUTION SUBCUTANEOUS WEEKLY
Qty: 2 ML | Refills: 0 | Status: SHIPPED | OUTPATIENT
Start: 2025-03-26

## 2025-03-26 NOTE — TELEPHONE ENCOUNTER
Requested medication(s) are due for refill today: Yes  Patient has already received a courtesy refill: No  Other reason request has been forwarded to provider: per protocol. Patient wants to continue on same dose

## 2025-03-27 LAB
LAB AP GYN PRIMARY INTERPRETATION: NORMAL
Lab: NORMAL

## 2025-04-07 DIAGNOSIS — R06.09 DYSPNEA ON EXERTION: ICD-10-CM

## 2025-04-07 DIAGNOSIS — I10 ESSENTIAL HYPERTENSION: Primary | ICD-10-CM

## 2025-04-07 DIAGNOSIS — R07.9 CHEST PAIN, UNSPECIFIED TYPE: ICD-10-CM

## 2025-04-07 DIAGNOSIS — R06.02 SHORTNESS OF BREATH: ICD-10-CM

## 2025-04-08 RX ORDER — CARVEDILOL PHOSPHATE 20 MG/1
40 CAPSULE, EXTENDED RELEASE ORAL DAILY
Qty: 180 CAPSULE | Refills: 0 | Status: SHIPPED | OUTPATIENT
Start: 2025-04-08

## 2025-04-23 ENCOUNTER — APPOINTMENT (OUTPATIENT)
Dept: LAB | Facility: CLINIC | Age: 40
End: 2025-04-23
Attending: FAMILY MEDICINE
Payer: COMMERCIAL

## 2025-04-23 ENCOUNTER — OFFICE VISIT (OUTPATIENT)
Dept: FAMILY MEDICINE CLINIC | Facility: CLINIC | Age: 40
End: 2025-04-23
Payer: COMMERCIAL

## 2025-04-23 ENCOUNTER — APPOINTMENT (OUTPATIENT)
Dept: LAB | Facility: CLINIC | Age: 40
End: 2025-04-23
Attending: PREVENTIVE MEDICINE
Payer: COMMERCIAL

## 2025-04-23 ENCOUNTER — RESULTS FOLLOW-UP (OUTPATIENT)
Dept: FAMILY MEDICINE CLINIC | Facility: CLINIC | Age: 40
End: 2025-04-23

## 2025-04-23 VITALS
OXYGEN SATURATION: 100 % | HEIGHT: 64 IN | WEIGHT: 181 LBS | TEMPERATURE: 97.8 F | HEART RATE: 88 BPM | DIASTOLIC BLOOD PRESSURE: 84 MMHG | BODY MASS INDEX: 30.9 KG/M2 | RESPIRATION RATE: 17 BRPM | SYSTOLIC BLOOD PRESSURE: 122 MMHG

## 2025-04-23 DIAGNOSIS — K21.9 GASTROESOPHAGEAL REFLUX DISEASE WITHOUT ESOPHAGITIS: ICD-10-CM

## 2025-04-23 DIAGNOSIS — Z86.39 HISTORY OF IRON DEFICIENCY: ICD-10-CM

## 2025-04-23 DIAGNOSIS — I10 BENIGN ESSENTIAL HYPERTENSION: ICD-10-CM

## 2025-04-23 DIAGNOSIS — G43.009 MIGRAINE WITHOUT AURA AND WITHOUT STATUS MIGRAINOSUS, NOT INTRACTABLE: ICD-10-CM

## 2025-04-23 DIAGNOSIS — E87.6 HYPOKALEMIA: ICD-10-CM

## 2025-04-23 DIAGNOSIS — Z00.8 HEALTH EXAMINATION IN POPULATION SURVEY: ICD-10-CM

## 2025-04-23 DIAGNOSIS — M25.512 ACUTE PAIN OF LEFT SHOULDER: Primary | ICD-10-CM

## 2025-04-23 DIAGNOSIS — E66.9 OBESITY (BMI 30-39.9): ICD-10-CM

## 2025-04-23 DIAGNOSIS — D50.0 IRON DEFICIENCY ANEMIA DUE TO CHRONIC BLOOD LOSS: ICD-10-CM

## 2025-04-23 LAB
ALBUMIN SERPL BCG-MCNC: 4.1 G/DL (ref 3.5–5)
ALP SERPL-CCNC: 45 U/L (ref 34–104)
ALT SERPL W P-5'-P-CCNC: 26 U/L (ref 7–52)
ANION GAP SERPL CALCULATED.3IONS-SCNC: 13 MMOL/L (ref 4–13)
AST SERPL W P-5'-P-CCNC: 28 U/L (ref 13–39)
BASOPHILS # BLD AUTO: 0.03 THOUSANDS/ÂΜL (ref 0–0.1)
BASOPHILS NFR BLD AUTO: 0 % (ref 0–1)
BILIRUB SERPL-MCNC: 0.35 MG/DL (ref 0.2–1)
BUN SERPL-MCNC: 9 MG/DL (ref 5–25)
CALCIUM SERPL-MCNC: 9.3 MG/DL (ref 8.4–10.2)
CHLORIDE SERPL-SCNC: 95 MMOL/L (ref 96–108)
CHOLEST SERPL-MCNC: 193 MG/DL (ref ?–200)
CO2 SERPL-SCNC: 31 MMOL/L (ref 21–32)
CREAT SERPL-MCNC: 0.89 MG/DL (ref 0.6–1.3)
EOSINOPHIL # BLD AUTO: 0.06 THOUSAND/ÂΜL (ref 0–0.61)
EOSINOPHIL NFR BLD AUTO: 1 % (ref 0–6)
ERYTHROCYTE [DISTWIDTH] IN BLOOD BY AUTOMATED COUNT: 14.9 % (ref 11.6–15.1)
FERRITIN SERPL-MCNC: 72 NG/ML (ref 30–307)
GFR SERPL CREATININE-BSD FRML MDRD: 80 ML/MIN/1.73SQ M
GLUCOSE P FAST SERPL-MCNC: 97 MG/DL (ref 65–99)
HCT VFR BLD AUTO: 40 % (ref 34.8–46.1)
HDLC SERPL-MCNC: 66 MG/DL
HGB BLD-MCNC: 13 G/DL (ref 11.5–15.4)
IMM GRANULOCYTES # BLD AUTO: 0.01 THOUSAND/UL (ref 0–0.2)
IMM GRANULOCYTES NFR BLD AUTO: 0 % (ref 0–2)
LDLC SERPL CALC-MCNC: 102 MG/DL (ref 0–100)
LYMPHOCYTES # BLD AUTO: 1.58 THOUSANDS/ÂΜL (ref 0.6–4.47)
LYMPHOCYTES NFR BLD AUTO: 22 % (ref 14–44)
MCH RBC QN AUTO: 26.2 PG (ref 26.8–34.3)
MCHC RBC AUTO-ENTMCNC: 32.5 G/DL (ref 31.4–37.4)
MCV RBC AUTO: 81 FL (ref 82–98)
MONOCYTES # BLD AUTO: 0.38 THOUSAND/ÂΜL (ref 0.17–1.22)
MONOCYTES NFR BLD AUTO: 5 % (ref 4–12)
NEUTROPHILS # BLD AUTO: 5.04 THOUSANDS/ÂΜL (ref 1.85–7.62)
NEUTS SEG NFR BLD AUTO: 72 % (ref 43–75)
NONHDLC SERPL-MCNC: 127 MG/DL
NRBC BLD AUTO-RTO: 0 /100 WBCS
PLATELET # BLD AUTO: 262 THOUSANDS/UL (ref 149–390)
PMV BLD AUTO: 11.8 FL (ref 8.9–12.7)
POTASSIUM SERPL-SCNC: 2.6 MMOL/L (ref 3.5–5.3)
PROT SERPL-MCNC: 6.8 G/DL (ref 6.4–8.4)
RBC # BLD AUTO: 4.97 MILLION/UL (ref 3.81–5.12)
SODIUM SERPL-SCNC: 139 MMOL/L (ref 135–147)
TRIGL SERPL-MCNC: 126 MG/DL (ref ?–150)
WBC # BLD AUTO: 7.1 THOUSAND/UL (ref 4.31–10.16)

## 2025-04-23 PROCEDURE — 82728 ASSAY OF FERRITIN: CPT

## 2025-04-23 PROCEDURE — 99214 OFFICE O/P EST MOD 30 MIN: CPT | Performed by: FAMILY MEDICINE

## 2025-04-23 PROCEDURE — 36415 COLL VENOUS BLD VENIPUNCTURE: CPT

## 2025-04-23 RX ORDER — IBUPROFEN 600 MG/1
600 TABLET, FILM COATED ORAL EVERY 6 HOURS PRN
Qty: 30 TABLET | Refills: 0 | Status: SHIPPED | OUTPATIENT
Start: 2025-04-23 | End: 2025-05-07

## 2025-04-23 RX ORDER — TIRZEPATIDE 10 MG/.5ML
10 INJECTION, SOLUTION SUBCUTANEOUS WEEKLY
Qty: 2 ML | Refills: 0 | Status: SHIPPED | OUTPATIENT
Start: 2025-04-23

## 2025-04-23 NOTE — ASSESSMENT & PLAN NOTE
Initial weight: 205 lbs  Current weight: 181 lbs   She wants to go up to 10 mg weekly   Continue diet, exercise and portion control

## 2025-04-23 NOTE — ASSESSMENT & PLAN NOTE
Initial weight: 205 lbs  Current weight: 181 lbs   She wants to go up to 10 mg weekly   Continue diet, exercise and portion control     Orders:  •  tirzepatide (Zepbound) 10 mg/0.5 mL auto-injector; Inject 0.5 mL (10 mg total) under the skin once a week

## 2025-04-23 NOTE — PROGRESS NOTES
"Name: Kiara Ma      : 1985      MRN: [id number]  Encounter Provider: Imani Rhodes MD  Encounter Date: 2025   Encounter department: [facility]  :  Assessment & Plan  Benign essential hypertension  Stable on current meds       Acute pain of left shoulder    Orders:  •  Ambulatory Referral to Physical Therapy; Future  •  ibuprofen (MOTRIN) 600 mg tablet; Take 1 tablet (600 mg total) by mouth every 6 (six) hours as needed for moderate pain for up to 14 days    Hypokalemia  Labs reviewed   Low potassium 2.6  To take potassium 20 mEQ BID for 3 days   Recheck in 1 week     Orders:  •  Basic metabolic panel; Future    Obesity (BMI 30-39.9)  Initial weight: 205 lbs  Current weight: 181 lbs   She wants to go up to 10 mg weekly   Continue diet, exercise and portion control     Orders:  •  tirzepatide (Zepbound) 10 mg/0.5 mL auto-injector; Inject 0.5 mL (10 mg total) under the skin once a week    Iron deficiency anemia due to chronic blood loss  Recent labs were normal          Migraine without aura and without status migrainosus, not intractable  Stable   Continue to monitor            Gastroesophageal reflux disease without esophagitis  Feels soreness in back of her throat   To restart pantoprazole daily               History of Present Illness   HPI  Here for follow up  Feels well overall  No side effects from the medications   Left shoulder pain constant for the last 2-3 months     Review of Systems   Constitutional: Negative.    HENT: Negative.     Eyes: Negative.    Respiratory: Negative.     Endocrine: Negative.    Genitourinary: Negative.    Musculoskeletal:  Positive for arthralgias.   Neurological: Negative.    Hematological: Negative.    Psychiatric/Behavioral: Negative.         Objective   /84 (BP Location: Left arm, Patient Position: Sitting, Cuff Size: Standard)   Pulse 88   Temp 97.8 °F (36.6 °C) (Tympanic)   Resp 17   Ht 5' 4\" (1.626 m)   Wt 82.1 kg (181 lb) "   LMP 07/23/2024   SpO2 100%   BMI 31.07 kg/m²      Physical Exam  Vitals and nursing note reviewed.   Constitutional:       Appearance: Normal appearance.   HENT:      Nose: Nose normal.   Cardiovascular:      Rate and Rhythm: Normal rate and regular rhythm.      Pulses: Normal pulses.      Heart sounds: Normal heart sounds.   Pulmonary:      Effort: Pulmonary effort is normal.      Breath sounds: Normal breath sounds.   Musculoskeletal:         General: Tenderness present.      Comments: Left AC Joint   Neurological:      General: No focal deficit present.      Mental Status: She is alert and oriented to person, place, and time.

## 2025-04-24 LAB
EST. AVERAGE GLUCOSE BLD GHB EST-MCNC: 105 MG/DL
HBA1C MFR BLD: 5.3 %

## 2025-05-01 ENCOUNTER — APPOINTMENT (OUTPATIENT)
Dept: LAB | Facility: CLINIC | Age: 40
End: 2025-05-01
Attending: FAMILY MEDICINE
Payer: COMMERCIAL

## 2025-05-01 ENCOUNTER — EVALUATION (OUTPATIENT)
Dept: PHYSICAL THERAPY | Facility: REHABILITATION | Age: 40
End: 2025-05-01
Attending: FAMILY MEDICINE
Payer: COMMERCIAL

## 2025-05-01 ENCOUNTER — RESULTS FOLLOW-UP (OUTPATIENT)
Dept: FAMILY MEDICINE CLINIC | Facility: CLINIC | Age: 40
End: 2025-05-01

## 2025-05-01 DIAGNOSIS — M25.512 ACUTE PAIN OF LEFT SHOULDER: Primary | ICD-10-CM

## 2025-05-01 DIAGNOSIS — E87.6 HYPOKALEMIA: ICD-10-CM

## 2025-05-01 LAB
ANION GAP SERPL CALCULATED.3IONS-SCNC: 9 MMOL/L (ref 4–13)
BUN SERPL-MCNC: 15 MG/DL (ref 5–25)
CALCIUM SERPL-MCNC: 9.4 MG/DL (ref 8.4–10.2)
CHLORIDE SERPL-SCNC: 99 MMOL/L (ref 96–108)
CO2 SERPL-SCNC: 29 MMOL/L (ref 21–32)
CREAT SERPL-MCNC: 1.03 MG/DL (ref 0.6–1.3)
GFR SERPL CREATININE-BSD FRML MDRD: 67 ML/MIN/1.73SQ M
GLUCOSE SERPL-MCNC: 77 MG/DL (ref 65–140)
POTASSIUM SERPL-SCNC: 3.6 MMOL/L (ref 3.5–5.3)
SODIUM SERPL-SCNC: 137 MMOL/L (ref 135–147)

## 2025-05-01 PROCEDURE — 97110 THERAPEUTIC EXERCISES: CPT | Performed by: PHYSICAL THERAPIST

## 2025-05-01 PROCEDURE — 80048 BASIC METABOLIC PNL TOTAL CA: CPT

## 2025-05-01 PROCEDURE — 36415 COLL VENOUS BLD VENIPUNCTURE: CPT

## 2025-05-01 PROCEDURE — 97161 PT EVAL LOW COMPLEX 20 MIN: CPT | Performed by: PHYSICAL THERAPIST

## 2025-05-01 NOTE — PROGRESS NOTES
PT Evaluation     Today's date: 2025  Patient name: Kiara Ma  : 1985  MRN: [id number]  Referring provider: Imani Rhodes MD  Dx:   Encounter Diagnosis     ICD-10-CM    1. Acute pain of left shoulder  M25.512 Ambulatory Referral to Physical Therapy                     Assessment  Impairments: abnormal or restricted ROM, activity intolerance, impaired physical strength, lacks appropriate home exercise program, pain with function and activity limitations    Assessment details: Patient presents with pain, decreased shoulder ER ROM w/elbow at side,  decreased shoulder strength, and decreased function secondary to left shoulder strain/tendinitis.  Patient would benefit from skilled PT intervention to address these issues and to maximize function.  Thank you for the referral.  Understanding of Dx/Px/POC: good     Prognosis: good    Goals  Short Term:  Pt will report decreased levels of pain by at least 2 subjective ratings in 4 weeks  Pt will demonstrate improved ROM by at least 10 degrees in 4 weeks  Pt will demonstrate improved strength by 1/2 grade MMT in 4 weeks  Long Term:   Pt will be independent in their HEP in 8 weeks  Pt will demonstrate improved FOTO, > predicted level  Pt will be independent with all ADL's  Pt will be able to sleep without waking due to pain    Plan  Patient would benefit from: skilled physical therapy    Planned therapy interventions: joint mobilization, manual therapy, neuromuscular re-education, patient/caregiver education, postural training, stretching, strengthening, therapeutic activities, therapeutic exercise, flexibility, graded exercise and home exercise program    Frequency: 2x week  Duration in weeks: 8  Plan of Care beginning date: 2025  Plan of Care expiration date: 2025  Treatment plan discussed with: patient  Plan details: Patient was educated in HEP and Plan of Care.  All questions were answered to pt's satisfaction.        Subjective  Evaluation    History of Present Illness  Mechanism of injury: Pt is a 40 y.o female with a c/o ongoing progressive left shoulder pain for 2-3 months of insidious onset.  Pt is R hand dominant.  Pt saw PCP recently and is now referred for PT.  Pt states her MD believes it is tendinitis.  Pt reports pain/difficulty with prolonged OH activity, externally rotating her L UE, reaching back for seatbelt on left, holding things for a length of time (notes weakness), sleep (unable to lay a left side).    Patient Goals  Patient goals for therapy: decreased pain, increased motion, increased strength and independence with ADLs/IADLs    Pain  At best pain ratin  At worst pain ratin  Location: L shoulder  Relieving factors: medications (IBU)      Diagnostic Tests  No diagnostic tests performed  Treatments  Current treatment: medication      Objective     Tenderness     Additional Tenderness Details  Minimal tenderness biceps tendon and scapular spine    Cervical/Thoracic Screen   Cervical range of motion within normal limits  Cervical range of motion within normal limits with the following exceptions: (-)compression and (-)spurlings    Neurological Testing     Additional Neurological Details  Pt reports having chronic cubital tunnel on L UE and experiences n/t of digits 4+5.      Active Range of Motion   Left Shoulder   Flexion: WFL  Abduction: WFL  External rotation 0°: 55 degrees with pain  External rotation BTH: WFL  Internal rotation BTB: WFL    Right Shoulder   Flexion: WFL  Abduction: WFL  External rotation 0°: WFL  External rotation BTH: WFL  Internal rotation BTB: WFL    Additional Active Range of Motion Details  Pt notes tightness sensation at 125* ABD, but is able to push through it.      Passive Range of Motion   Left Shoulder   Flexion: WFL  Abduction: WFL  External rotation 45°: WFL  External rotation 90°: WFL  Internal rotation 90°: WFL    Strength/Myotome Testing     Left Shoulder     Planes of Motion    Flexion: 4-   Abduction: 4-   External rotation at 0°: 4- (pain)   Internal rotation at 0°: 4- (pain)     Isolated Muscles   Biceps: 5   Lower trapezius: 4- (pain)   Middle trapezius: 4- (pain)   Triceps: 5     Right Shoulder     Planes of Motion   Flexion: 5   Abduction: 5   External rotation at 0°: 5   Internal rotation at 0°: 5     Isolated Muscles   Biceps: 5   Lower trapezius: 5   Middle trapezius: 5   Triceps: 5     Tests     Left Shoulder   Positive empty can and Hawkin's.   Negative apprehension, crossover, drop arm, external rotation lag sign, full can, lift-off and Neer's.              Precautions: anemia, asthma, HTN,   POC expires Unit limit Auth Expiration date PT/OT + Visit Limit?   6/27 NA NA BOMN              Pertinent Findings:                                                                                          Test / Measure  5/1   FOTO (Predicted 73) 62                       Visits 1            Manuals 5/1            L post/inf GH Jm's PRN                                                    Neuro Re-Ed             [place] LPD             Bhumi rows             [place] IR             Bhumi ER             [place] face pulls             Scapular clocks w/TB             Ball circles against wall cw/ccw             Prone I+T             Ther Ex             UBE             Serratus wall slides             Scaption to 90*             Supine scap punch             S/L ER             S/L ABD                          Pt education+ HEP instruction TP 8 mins            Ther Activity             90/90 carry                          Gait Training                                       Modalities

## 2025-05-01 NOTE — HOME EXERCISE EDUCATION
Program_ID:[id number]   Access Code: [email or web address]  URL: [email or web address]  Date: 05-  Prepared By: Stan Mondragon    Program Notes      Exercises      - Scapular Wall Slides - 2 x daily - 7 x weekly -  sets - 10 reps - 5&#34; hold      - Doorway Pec Stretch at 90 Degrees Abduction - 2 x daily - 7 x weekly -  sets - 10 reps - 10&#34; hold      - Standing Isometric Shoulder Internal Rotation at Doorway - 2 x daily - 7 x weekly -  sets - 10 reps - 5&#34; hold      - Standing Isometric Shoulder External Rotation with Doorway - 2 x daily - 7 x weekly -  sets - 10 reps - 5&#34; hold

## 2025-05-07 ENCOUNTER — OFFICE VISIT (OUTPATIENT)
Dept: PHYSICAL THERAPY | Facility: REHABILITATION | Age: 40
End: 2025-05-07
Attending: FAMILY MEDICINE
Payer: COMMERCIAL

## 2025-05-07 DIAGNOSIS — M25.512 ACUTE PAIN OF LEFT SHOULDER: Primary | ICD-10-CM

## 2025-05-07 PROCEDURE — 97112 NEUROMUSCULAR REEDUCATION: CPT | Performed by: PHYSICAL THERAPIST

## 2025-05-07 PROCEDURE — 97110 THERAPEUTIC EXERCISES: CPT | Performed by: PHYSICAL THERAPIST

## 2025-05-07 NOTE — PROGRESS NOTES
"Daily Note     Today's date: 2025  Patient name: Kiara Ma  : 1985  MRN: [id number]  Referring provider: Imani Rhodes MD  Dx:   Encounter Diagnosis     ICD-10-CM    1. Acute pain of left shoulder  M25.512                      Subjective: Pt reports no change in status since IE.  Pt states HEP is going well without complaint or questions.        Objective: See treatment diary below      Assessment: Tolerated treatment well. Pt noted some pain with eros ER at available end-range; advised to perform in pain-free range.  No pain complaints with other exercises.  Muscle fatigue/soreness post session.  Monitor response NV.  Patient would benefit from continued PT      Plan: Continue per plan of care.      Precautions: anemia, asthma, HTN,   POC expires Unit limit Auth Expiration date PT/OT + Visit Limit?    NA NA BOMN              Pertinent Findings:                                                                                          Test / Measure     FOTO (Predicted 73) 62                       Visits 1 2           Manuals            L post/inf GH Jm's PRN                                                    Neuro Re-Ed             [place] LPD  12# 2x10           [place] rows  17# 2x10           [place] IR  7# 2x10           [place] ER  1# 2x10           [place] face pulls  15# 2x10           Scapular clocks w/TB  Otb x5 ea b/l           Ball circles against wall cw/ccw             Prone I+T             Ther Ex             UBE  90rpm 3'/3'           Serratus wall slides  5\"x10           Scaption to 90*  2# 2x10           Supine scap punch  2# 3\" 2x10           S/L ER  2# 2x10           S/L ABD  2# 2x10                        Pt education+ HEP instruction TP 8 mins            Ther Activity             90/90 carry                          Gait Training                                       Modalities                                            "

## 2025-05-08 ENCOUNTER — OFFICE VISIT (OUTPATIENT)
Dept: PHYSICAL THERAPY | Facility: REHABILITATION | Age: 40
End: 2025-05-08
Attending: FAMILY MEDICINE
Payer: COMMERCIAL

## 2025-05-08 DIAGNOSIS — M25.512 ACUTE PAIN OF LEFT SHOULDER: Primary | ICD-10-CM

## 2025-05-08 PROCEDURE — 97110 THERAPEUTIC EXERCISES: CPT | Performed by: PHYSICAL THERAPIST

## 2025-05-08 PROCEDURE — 97112 NEUROMUSCULAR REEDUCATION: CPT | Performed by: PHYSICAL THERAPIST

## 2025-05-08 NOTE — PROGRESS NOTES
"Daily Note     Today's date: 2025  Patient name: Kiara Ma  : 1985  MRN: [id number]  Referring provider: Imani Rhodes MD  Dx:   Encounter Diagnosis     ICD-10-CM    1. Acute pain of left shoulder  M25.512                      Subjective: Pt denies any increase in soreness after initiation of exercises yesterday.      Objective: See treatment diary below      Assessment: Tolerated treatment well. Patient demonstrated fatigue post treatment and would benefit from continued PT.  Increased muscle fatigue with exercise performance this session, but no pain complaints.        Plan: Continue per plan of care.      Precautions: anemia, asthma, HTN,   POC expires Unit limit Auth Expiration date PT/OT + Visit Limit?    NA NA BOMN              Pertinent Findings:                                                                                          Test / Measure     FOTO (Predicted 73) 62                       Visits 1 2 3          Manuals  5/8          L post/inf GH Jm's PRN                                                    Neuro Re-Ed             [place] LPD  12# 2x10 12# 2x10          [place] rows  17# 2x10 17# 2x10          Bhumi IR  7# 2x10 7# 2x10          [place] ER  1# 2x10 1# 2x10          [place] face pulls  15# 2x10 15# 2x10          Scapular clocks w/TB  Otb x5 ea b/l Otb x5ea b/l          Ball circles against wall cw/ccw   Rmb x15 ea          Prone I+T   3\"x15 ea          Ther Ex             UBE  90rpm 3'/3' 90rpm 3'/3'          Serratus wall slides  5\"x10 5\"x10          Scaption to 90*  2# 2x10 2# 2x10          Supine scap punch  2# 3\" 2x10 2# 3\" 2x10          S/L ER  2# 2x10 2# 2x10          S/L ABD  2# 2x10 2# 2x10                       Pt education+ HEP instruction TP 8 mins            Ther Activity             90/90 carry                          Gait Training                                       Modalities                                              "

## 2025-05-12 ENCOUNTER — OFFICE VISIT (OUTPATIENT)
Dept: PHYSICAL THERAPY | Facility: REHABILITATION | Age: 40
End: 2025-05-12
Attending: FAMILY MEDICINE
Payer: COMMERCIAL

## 2025-05-12 DIAGNOSIS — M25.512 ACUTE PAIN OF LEFT SHOULDER: Primary | ICD-10-CM

## 2025-05-12 PROCEDURE — 97110 THERAPEUTIC EXERCISES: CPT | Performed by: PHYSICAL THERAPIST

## 2025-05-12 PROCEDURE — 97112 NEUROMUSCULAR REEDUCATION: CPT | Performed by: PHYSICAL THERAPIST

## 2025-05-12 NOTE — PROGRESS NOTES
"Daily Note     Today's date: 2025  Patient name: Kiara Ma  : 1985  MRN: [id number]  Referring provider: Imani Rhodes MD  Dx:   Encounter Diagnosis     ICD-10-CM    1. Acute pain of left shoulder  M25.512                      Subjective: Pt reports mild pain from power washing yesterday.        Objective: See treatment diary below      Assessment: Tolerated treatment well. Patient demonstrated fatigue post treatment, exhibited good technique with therapeutic exercises, and would benefit from continued PT.  No significant pain complaints with exercise performance.        Plan: Continue per plan of care.      Precautions: anemia, asthma, HTN,   POC expires Unit limit Auth Expiration date PT/OT + Visit Limit?    NA NA BOMN              Pertinent Findings:                                                                                          Test / Measure     FOTO (Predicted 73) 62                       Visits 1 2 3 4         Manuals          L post/inf GH Jm's PRN                                                    Neuro Re-Ed             [place] LPD  12# 2x10 12# 2x10 12# 2x10         [place] rows  17# 2x10 17# 2x10 17# 2x15         Bhumi IR  7# 2x10 7# 2x10 7# 2x10         Bhumi ER  1# 2x10 1# 2x10 1#  2x10         Bhumi face pulls  15# 2x10 15# 2x10 15# 2x15         Scapular clocks w/TB  Otb x5 ea b/l Otb x5ea b/l Gtb x5 ea b/l         Ball circles against wall cw/ccw   Rmb x15 ea Rmb x15 ea         Prone I+T   3\"x15 ea 3\"x15 ea         Ther Ex             UBE  90rpm 3'/3' 90rpm 3'/3' 90rpm 3'/3'         Serratus wall slides  5\"x10 5\"x10 5\"x10         Scaption to 90*  2# 2x10 2# 2x10 2# 2x10         Supine scap punch  2# 3\" 2x10 2# 3\" 2x10 2# 3\" 2x15         S/L ER  2# 2x10 2# 2x10 2# 2x10         S/L ABD  2# 2x10 2# 2x10 2# 2x10                      Pt education+ HEP instruction TP 8 mins            Ther Activity             90/90 carry                          Gait " Training                                       Modalities

## 2025-05-14 ENCOUNTER — OFFICE VISIT (OUTPATIENT)
Dept: PHYSICAL THERAPY | Facility: REHABILITATION | Age: 40
End: 2025-05-14
Attending: FAMILY MEDICINE
Payer: COMMERCIAL

## 2025-05-14 DIAGNOSIS — M25.512 ACUTE PAIN OF LEFT SHOULDER: Primary | ICD-10-CM

## 2025-05-14 PROCEDURE — 97112 NEUROMUSCULAR REEDUCATION: CPT | Performed by: PHYSICAL THERAPIST

## 2025-05-14 PROCEDURE — 97110 THERAPEUTIC EXERCISES: CPT | Performed by: PHYSICAL THERAPIST

## 2025-05-14 NOTE — PROGRESS NOTES
"Daily Note     Today's date: 2025  Patient name: Kiara Ma  : 1985  MRN: [id number]  Referring provider: Imani Rhodes MD  Dx:   Encounter Diagnosis     ICD-10-CM    1. Acute pain of left shoulder  M25.512                      Subjective: Pt reports some achiness with the rainy weather today.  Pt notes still unable to lay on her left shoulder.        Objective: See treatment diary below      Assessment: Tolerated treatment and progressions well. Patient exhibited good technique with therapeutic exercises and would benefit from continued PT.  Muscle fatigue noted with exercise performance.  No pain complaints post session.       Plan: Continue per plan of care.      Precautions: anemia, asthma, HTN,   POC expires Unit limit Auth Expiration date PT/OT + Visit Limit?    NA NA BOMN              Pertinent Findings:                                                                                          Test / Measure     FOTO (Predicted 73) 62                       Visits 1 2 3 4 5        Manuals         L post/inf GH Jm's PRN     TP gr III                                               Neuro Re-Ed             Bhumi LPD  12# 2x10 12# 2x10 12# 2x10 12# 2x10        [place] rows  17# 2x10 17# 2x10 17# 2x15 17# 2x15        Bhumi IR  7# 2x10 7# 2x10 7# 2x10 7# 2x10        [place] ER  1# 2x10 1# 2x10 1#  2x10 2# 2x10        Bhumi face pulls  15# 2x10 15# 2x10 15# 2x15 15# 2x15        PNF D1+D2 flex+ext     D1+2 ext 7# x15 ea  D1+D2 flex 2# x15 ea        Scapular clocks w/TB  Otb x5 ea b/l Otb x5ea b/l Gtb x5 ea b/l Gtb x5 ea b/l        Ball circles against wall cw/ccw   Rmb x15 ea Rmb x15 ea Rmb x15 ea        Prone I+T   3\"x15 ea 3\"x15 ea I 3\" 2x15   T 3\"x15        Ther Ex             UBE  90rpm 3'/3' 90rpm 3'/3' 90rpm 3'/3' 85rpm 3'/3'        Serratus wall slides  5\"x10 5\"x10 5\"x10 Otb 5\"x10        Scaption to 90*  2# 2x10 2# 2x10 2# 2x10         Supine scap punch  2# 3\" 2x10 " "2# 3\" 2x10 2# 3\" 2x15 2# 3\" 2x15        S/L ER  2# 2x10 2# 2x10 2# 2x10 2# 2x10        S/L ABD  2# 2x10 2# 2x10 2# 2x10 2# 2x10                     Pt education+ HEP instruction TP 8 mins            Ther Activity             90/90 carry     2# 50ft x2 laps                     Gait Training                                       Modalities                                                  "

## 2025-05-19 ENCOUNTER — OFFICE VISIT (OUTPATIENT)
Dept: PHYSICAL THERAPY | Facility: REHABILITATION | Age: 40
End: 2025-05-19
Attending: FAMILY MEDICINE
Payer: COMMERCIAL

## 2025-05-19 DIAGNOSIS — E66.9 OBESITY (BMI 30-39.9): ICD-10-CM

## 2025-05-19 DIAGNOSIS — I10 BENIGN ESSENTIAL HYPERTENSION: ICD-10-CM

## 2025-05-19 DIAGNOSIS — M25.512 ACUTE PAIN OF LEFT SHOULDER: Primary | ICD-10-CM

## 2025-05-19 PROCEDURE — 97112 NEUROMUSCULAR REEDUCATION: CPT | Performed by: PHYSICAL THERAPIST

## 2025-05-19 PROCEDURE — 97110 THERAPEUTIC EXERCISES: CPT | Performed by: PHYSICAL THERAPIST

## 2025-05-19 NOTE — PROGRESS NOTES
"Daily Note     Today's date: 2025  Patient name: Kiara Ma  : 1985  MRN: [id number]  Referring provider: Imani Rhodes MD  Dx:   Encounter Diagnosis     ICD-10-CM    1. Acute pain of left shoulder  M25.512                      Subjective: Pt reports feeling alright currently, but notes experiencing increased pain Friday for unknown reason.  Pt notes pain/difficulty with ER AROM in standing with arm at side and 90/90 position.       Objective: See treatment diary below      Assessment: Tolerated treatment well. VC's required for correct technique with several exercises.  Muscle fatigue post session, but no pain complaints.  Pt instructed on ER stretch at doorway and TB b/l ER w/scap squeeze for HEP.   Patient would benefit from continued PT      Plan: Continue per plan of care.      Precautions: anemia, asthma, HTN,   POC expires Unit limit Auth Expiration date PT/OT + Visit Limit?    NA NA BOMN              Pertinent Findings:                                                                                          Test / Measure     FOTO (Predicted 73) 62                       Visits 1 2 3 4 5 6       Manuals        L post/inf GH Jm's PRN     TP gr III TP gr III                                              Neuro Re-Ed             Bhumi LPD  12# 2x10 12# 2x10 12# 2x10 12# 2x10 12# 2x15       Bhumi rows  17# 2x10 17# 2x10 17# 2x15 17# 2x15 18# 2x15       Bhumi IR  7# 2x10 7# 2x10 7# 2x10 7# 2x10 7# 2x10       Bhumi ER  1# 2x10 1# 2x10 1#  2x10 2# 2x10 2# 2x10       Bhumi face pulls  15# 2x10 15# 2x10 15# 2x15 15# 2x15 15# 2x15       PNF D1+D2 flex+ext     D1+2 ext 7# x15 ea  D1+D2 flex 2# x15 ea D1+2 ext 7# x15 ea  D1+D2 flex 2# x15 ea       Scapular clocks w/TB  Otb x5 ea b/l Otb x5ea b/l Gtb x5 ea b/l Gtb x5 ea b/l Otb x5 ea b/l       Ball circles against wall cw/ccw   Rmb x15 ea Rmb x15 ea Rmb x15 ea Rmb x20 ea       Prone I+T   3\"x15 ea 3\"x15 ea I 3\" 2x15 " "  T 3\"x15 I 3\" 2x15   T 3\"x15       Ther Ex             UBE  90rpm 3'/3' 90rpm 3'/3' 90rpm 3'/3' 85rpm 3'/3' 80rpm 3'/3'       Serratus wall slides  5\"x10 5\"x10 5\"x10 Otb 5\"x10 Otb 5\"x10       Scaption to 90*  2# 2x10 2# 2x10 2# 2x10         Supine scap punch  2# 3\" 2x10 2# 3\" 2x10 2# 3\" 2x15 2# 3\" 2x15 2# 3\" 2x15       S/L ER  2# 2x10 2# 2x10 2# 2x10 2# 2x10 2# 2x10       S/L ABD  2# 2x10 2# 2x10 2# 2x10 2# 2x10 2# 2x10                    Pt education+ HEP instruction TP 8 mins            Ther Activity             90/90 carry     2# 50ft x2 laps 2# 50ft x2 laps                    Gait Training                                       Modalities                                                    "

## 2025-05-20 DIAGNOSIS — E66.9 OBESITY (BMI 30-39.9): Primary | ICD-10-CM

## 2025-05-20 RX ORDER — POTASSIUM CHLORIDE 750 MG/1
20 CAPSULE, EXTENDED RELEASE ORAL DAILY
Qty: 180 CAPSULE | Refills: 1 | Status: SHIPPED | OUTPATIENT
Start: 2025-05-20

## 2025-05-20 RX ORDER — TIRZEPATIDE 12.5 MG/.5ML
12.5 INJECTION, SOLUTION SUBCUTANEOUS WEEKLY
Qty: 2 ML | Refills: 0 | Status: SHIPPED | OUTPATIENT
Start: 2025-05-20

## 2025-05-20 RX ORDER — TIRZEPATIDE 10 MG/.5ML
10 INJECTION, SOLUTION SUBCUTANEOUS WEEKLY
Qty: 2 ML | Refills: 0 | OUTPATIENT
Start: 2025-05-20

## 2025-05-21 ENCOUNTER — OFFICE VISIT (OUTPATIENT)
Dept: OBGYN CLINIC | Facility: CLINIC | Age: 40
End: 2025-05-21

## 2025-05-21 ENCOUNTER — OFFICE VISIT (OUTPATIENT)
Dept: PHYSICAL THERAPY | Facility: REHABILITATION | Age: 40
End: 2025-05-21
Attending: FAMILY MEDICINE
Payer: COMMERCIAL

## 2025-05-21 ENCOUNTER — NURSE TRIAGE (OUTPATIENT)
Age: 40
End: 2025-05-21

## 2025-05-21 VITALS — BODY MASS INDEX: 30.24 KG/M2 | WEIGHT: 176.2 LBS

## 2025-05-21 DIAGNOSIS — R31.29 OTHER MICROSCOPIC HEMATURIA: ICD-10-CM

## 2025-05-21 DIAGNOSIS — N90.89 VULVAR IRRITATION: Primary | ICD-10-CM

## 2025-05-21 DIAGNOSIS — N89.8 VAGINAL DISCHARGE: ICD-10-CM

## 2025-05-21 DIAGNOSIS — M25.512 ACUTE PAIN OF LEFT SHOULDER: Primary | ICD-10-CM

## 2025-05-21 DIAGNOSIS — R35.0 URINARY FREQUENCY: ICD-10-CM

## 2025-05-21 DIAGNOSIS — R39.15 URINARY URGENCY: ICD-10-CM

## 2025-05-21 LAB
SL AMB  POCT GLUCOSE, UA: ABNORMAL
SL AMB LEUKOCYTE ESTERASE,UA: ABNORMAL
SL AMB POCT BILIRUBIN,UA: ABNORMAL
SL AMB POCT BLOOD,UA: 50
SL AMB POCT CLARITY,UA: ABNORMAL
SL AMB POCT COLOR,UA: YELLOW
SL AMB POCT KETONES,UA: ABNORMAL
SL AMB POCT NITRITE,UA: ABNORMAL
SL AMB POCT PH,UA: 6.5
SL AMB POCT SPECIFIC GRAVITY,UA: 1.01
SL AMB POCT URINE PROTEIN: 15
SL AMB POCT UROBILINOGEN: 0.2

## 2025-05-21 PROCEDURE — 87086 URINE CULTURE/COLONY COUNT: CPT | Performed by: OBSTETRICS & GYNECOLOGY

## 2025-05-21 PROCEDURE — 97110 THERAPEUTIC EXERCISES: CPT

## 2025-05-21 PROCEDURE — 81514 NFCT DS BV&VAGINITIS DNA ALG: CPT | Performed by: OBSTETRICS & GYNECOLOGY

## 2025-05-21 PROCEDURE — 97112 NEUROMUSCULAR REEDUCATION: CPT

## 2025-05-21 RX ORDER — NYSTATIN AND TRIAMCINOLONE ACETONIDE 100000; 1 [USP'U]/G; MG/G
OINTMENT TOPICAL 2 TIMES DAILY
Qty: 30 G | Refills: 2 | Status: SHIPPED | OUTPATIENT
Start: 2025-05-21

## 2025-05-21 RX ORDER — NITROFURANTOIN 25; 75 MG/1; MG/1
100 CAPSULE ORAL 2 TIMES DAILY
Qty: 14 CAPSULE | Refills: 0 | Status: SHIPPED | OUTPATIENT
Start: 2025-05-21 | End: 2025-05-28

## 2025-05-21 NOTE — TELEPHONE ENCOUNTER
"FOLLOW UP: Appointment  made for today.    REASON FOR CONVERSATION: Vaginitis    SYMPTOMS: Vaginal irritation, burning with urination.     OTHER: Started last week, did use Monistat 7 which helped with the discharge and itching.  Does still report a yeast smell.     DISPOSITION: See Within 3 Days in Office      Reason for Disposition   Symptoms of a yeast infection (i.e., itchy, white discharge, not bad smelling) and not improved > 3 days following Care Advice    Answer Assessment - Initial Assessment Questions  1. SYMPTOM: \"What's the main symptom you're concerned about?\" (e.g., pain, itching, dryness)      Irritation and burning with urination  2. LOCATION: \"Where is this located?\" (e.g., inside/outside, left/right)      vulvovaginal  3. ONSET: \"When did this  start?\"      About a week ago - did use Monistat 7   4. PAIN: \"Is there any pain?\" If Yes, ask: \"How bad is it?\" (Scale: 1-10; mild, moderate, severe)      When going to the bathroom severe, mild otherwise.  5. ITCHING: \"Is there any itching?\" If Yes, ask: \"How bad is it?\" (Scale: 1-10; mild, moderate, severe)      Stopped after using monistat  6. CAUSE: \"What do you think is causing the discharge?\" \"Have you had the same problem before?\" \"What happened then?\"      Yeast infection  7. OTHER SYMPTOMS: \"Do you have any other symptoms?\" (e.g., fever, itching, vaginal bleeding, pain with urination, injury to genital area, vaginal foreign body)      denies  8. PREGNANCY: \"Is there any chance you are pregnant?\" \"When was your last menstrual period?\"      hysterectomy    Protocols used: Vaginal Symptoms-Adult-[place]    "

## 2025-05-21 NOTE — PROGRESS NOTES
"Daily Note     Today's date: 2025  Patient name: Kiara Ma  : 1985  MRN: [id number]  Referring provider: Imani Rhodes MD  Dx:   Encounter Diagnosis     ICD-10-CM    1. Acute pain of left shoulder  M25.512                      Subjective: Reports min to no pain but still notices mobility deficits.       Objective: See treatment diary below      Assessment: Tolerated treatment well. Demonstrates good recall of current treatment plan but does still require occasional cueing. ER strengthening most challenging. General fatigue but denied pain throughout.  Patient would benefit from continued PT      Plan: Continue per plan of care.      Precautions: anemia, asthma, HTN,   POC expires Unit limit Auth Expiration date PT/OT + Visit Limit?    NA NA BOMN              Pertinent Findings:                                                                                          Test / Measure     FOTO (Predicted 73) 62                       Visits 1 2 3 4 5 6 7      Manuals       L post/inf GH Jm's PRN     TP gr III TP gr III TP gr III                                             Neuro Re-Ed             Bhumi LPD  12# 2x10 12# 2x10 12# 2x10 12# 2x10 12# 2x15 12#  2x15      Bhumi rows  17# 2x10 17# 2x10 17# 2x15 17# 2x15 18# 2x15 18#  2x15      [place] IR  7# 2x10 7# 2x10 7# 2x10 7# 2x10 7# 2x10 7#  2x10      Bhumi ER  1# 2x10 1# 2x10 1#  2x10 2# 2x10 2# 2x10 2#  2x10      [place] face pulls  15# 2x10 15# 2x10 15# 2x15 15# 2x15 15# 2x15 15#  2x15      PNF D1+D2 flex+ext     D1+2 ext 7# x15 ea  D1+D2 flex 2# x15 ea D1+2 ext 7# x15 ea  D1+D2 flex 2# x15 ea D1+2 ext 7#  X15 ea.     D1+D2 flex  2#  X15 ea      Scapular clocks w/TB  Otb x5 ea b/l Otb x5ea b/l Gtb x5 ea b/l Gtb x5 ea b/l Otb x5 ea b/l OTB  X5 ea  B/L      Ball circles against wall cw/ccw   Rmb x15 ea Rmb x15 ea Rmb x15 ea Rmb x20 ea RMB  X20 ea.       Prone I+T   3\"x15 ea 3\"x15 ea I 3\" 2x15   T 3\"x15 I 3\" " "2x15   T 3\"x15 I 3\"  2x15    T 3\" x15      Ther Ex       5/21      UBE  90rpm 3'/3' 90rpm 3'/3' 90rpm 3'/3' 85rpm 3'/3' 80rpm 3'/3' 80rpm  3'/3'      S[place]atus wall slides  5\"x10 5\"x10 5\"x10 Otb 5\"x10 Otb 5\"x10 OTB  5\"x10      Scaption to 90*  2# 2x10 2# 2x10 2# 2x10         Supine scap punch  2# 3\" 2x10 2# 3\" 2x10 2# 3\" 2x15 2# 3\" 2x15 2# 3\" 2x15 2# 3\"  2x15      S/L ER  2# 2x10 2# 2x10 2# 2x10 2# 2x10 2# 2x10 2#  2x10      S/L ABD  2# 2x10 2# 2x10 2# 2x10 2# 2x10 2# 2x10 2#  2x10                   Pt education+ HEP instruction TP 8 mins            Ther Activity       5/21      90/90 carry     2# 50ft x2 laps 2# 50ft x2 laps 2# 50ft x 2 laps                    Gait Training                                       Modalities                                                    "

## 2025-05-21 NOTE — PROGRESS NOTES
:  Assessment & Plan  Vulvar irritation    Orders:  •  nystatin-triamcinolone (MYCOLOG-II) ointment; Apply topically 2 (two) times a day  •  Molecular Vaginal Panel    Other microscopic hematuria    Orders:  •  nitrofurantoin (MACROBID) 100 mg capsule; Take 1 capsule (100 mg total) by mouth 2 (two) times a day for 7 days    Urinary frequency    Orders:  •  POCT urine dip  •  Urine culture    Urinary urgency         Vaginal discharge             History of Present Illness     Kiara Ma is a 40 y.o. female   Patient is a 40-year-old female who presents today complaining of urinary frequency and urgency as well as vaginal and vulvar irritation with mild discharge    Patient reports that she is experiencing increased urinary pressure as well as vaginal irritation and vulvar irritation as well.    Patient is status post hysterectomy and denies any vaginal bleeding at this time    She also denies any fever shakes or chills    She denies any significant pelvic or abdominal pain    Urine dipstick today was positive for blood and white cells.    Therefore prescription for Macrobid 1 tablet twice daily for 7 days was sent to the pharmacy.  Appropriate culture was sent for the urine    In addition pelvic exam revealed white discharge and this was appropriately cultured as well.    Prescription for Mycolog was sent to the pharmacy to provide vulvar relief and patient was advised to utilize warm teabag soaks as well    Remainder the pelvic evaluation was unremarkable for any significant findings    All questions were answered for the patient at today's visit    Further treatment and planning will be based upon final culture results    Patient was told to feel free to call for any problems, questions, issues or concerns which may arise for her.      Total time of today's visit was 25 minutes of which greater than 50% was spent face-to-face counseling the patient as well as coordination of care, review of chart and lab  values, physical examination as well as computer entry into the [facility] medical record system.        Review of Systems   Constitutional: Negative.  Negative for appetite change, diaphoresis, fatigue, fever and unexpected weight change.   HENT: Negative.     Eyes: Negative.    Respiratory: Negative.     Cardiovascular: Negative.    Gastrointestinal: Negative.  Negative for abdominal pain, blood in stool, constipation, diarrhea, nausea and vomiting.   Endocrine: Negative.  Negative for cold intolerance and heat intolerance.   Genitourinary: Negative.  Negative for dysuria, frequency, hematuria, urgency, vaginal bleeding, vaginal discharge and vaginal pain.   Musculoskeletal: Negative.    Skin: Negative.    Allergic/Immunologic: Negative.    Neurological: Negative.         Followed for migraine   Hematological: Negative.  Negative for adenopathy.   Psychiatric/Behavioral: Negative.       Objective   Wt 79.9 kg (176 lb 3.2 oz)   LMP 07/23/2024   BMI 30.24 kg/m²      Physical Exam  Constitutional:       Appearance: Normal appearance.   HENT:      Head: Normocephalic and atraumatic.     Eyes:      Pupils: Pupils are equal, round, and reactive to light.       Cardiovascular:      Rate and Rhythm: Normal rate and regular rhythm.   Pulmonary:      Effort: Pulmonary effort is normal.      Breath sounds: Normal breath sounds.   Abdominal:      Palpations: Abdomen is soft.   Genitourinary:     Labia:         Right: No rash, tenderness, lesion or injury.         Left: No rash, tenderness, lesion or injury.       Vagina: No erythema, bleeding or lesions.      Cervix: No lesion or erythema.      Uterus: Normal. Not enlarged and not tender.       Adnexa: Right adnexa normal and left adnexa normal.        Right: No mass, tenderness or fullness.          Left: No mass, tenderness or fullness.       Musculoskeletal:         General: Normal range of motion.      Cervical back: Normal range of motion and neck supple.     Skin:      General: Skin is warm and dry.     Neurological:      General: No focal deficit present.      Mental Status: She is alert and oriented to person, place, and time.     Psychiatric:         Mood and Affect: Mood normal.         Behavior: Behavior normal.         Thought Content: Thought content normal.         Judgment: Judgment normal.

## 2025-05-21 NOTE — PATIENT INSTRUCTIONS
Topic: Urinary pressure and urgency and vaginal and vulvar irritation    Pelvic exam revealed mild white discharge and appropriate cultures obtained.    Urine dipstick revealed white cells and blood and prescription for Macrobid was sent to the pharmacy.  Appropriate culture was also sent as well.    Further treatment and follow-up planning will be based upon final culture results    Patient to call for any problems, questions, issues or concerns which may arise for her.

## 2025-05-22 LAB
BACTERIA UR CULT: NORMAL
C GLABRATA DNA VAG QL NAA+PROBE: NEGATIVE
C KRUSEI DNA VAG QL NAA+PROBE: NEGATIVE
CANDIDA SP 6 PNL VAG NAA+PROBE: NEGATIVE
T VAGINALIS DNA VAG QL NAA+PROBE: NEGATIVE
VAGINOSIS/ITIS DNA PNL VAG PROBE+SIG AMP: NEGATIVE

## 2025-05-28 ENCOUNTER — RESULTS FOLLOW-UP (OUTPATIENT)
Dept: OBGYN CLINIC | Facility: CLINIC | Age: 40
End: 2025-05-28

## 2025-05-28 ENCOUNTER — OFFICE VISIT (OUTPATIENT)
Dept: PHYSICAL THERAPY | Facility: REHABILITATION | Age: 40
End: 2025-05-28
Attending: FAMILY MEDICINE
Payer: COMMERCIAL

## 2025-05-28 ENCOUNTER — NURSE TRIAGE (OUTPATIENT)
Age: 40
End: 2025-05-28

## 2025-05-28 DIAGNOSIS — M25.512 ACUTE PAIN OF LEFT SHOULDER: Primary | ICD-10-CM

## 2025-05-28 PROCEDURE — 97112 NEUROMUSCULAR REEDUCATION: CPT | Performed by: PHYSICAL THERAPIST

## 2025-05-28 PROCEDURE — 97110 THERAPEUTIC EXERCISES: CPT | Performed by: PHYSICAL THERAPIST

## 2025-05-28 NOTE — TELEPHONE ENCOUNTER
"REASON FOR CONVERSATION: Vaginal Bleeding    SYMPTOMS: vaginal bleeding - slightly more than spotting that started this morning    OTHER HEALTH INFORMATION: s/p hysterectomy with Dr. Virgen in 7/2024    PROTOCOL DISPOSITION: See Within 2 Weeks in Office    CARE ADVICE PROVIDED: Keep record of bleeding. Reviewed to call or seek emergency care if she is saturating a pad with blood every hour for 2 or more hours, passes a clot the size of a golf ball or larger, or if she has severe, debilitating pain that makes it difficult to get out of bed or walk.     PRACTICE FOLLOW-UP: Appointment scheduled 6/4.          Reason for Disposition   Bleeding or spotting occurs after hysterectomy    Answer Assessment - Initial Assessment Questions  1. BLEEDING SEVERITY: \"Describe the bleeding that you are having.\" \"How much bleeding is there?\"       With wiping - slightly more than spotting  2. ONSET: \"When did the bleeding begin?\" \"Is it continuing now?\"      This morning  3. MENSTRUAL PERIOD: \"When was the last normal menstrual period?\" \"How is this different than your period?\"      S/p hysterectomy  4. REGULARITY: \"How regular are your periods?\"      N/A  5. ABDOMEN PAIN: \"Do you have any pain?\" \"How bad is the pain?\"  (e.g., Scale 0-10; none, mild, moderate, or severe)      Denies  6. PREGNANCY: \"Is there any chance you are pregnant?\" \"When was your last menstrual period?\"      N/A  7. BREASTFEEDING: \"Are you breastfeeding?\"      N/A  8. HORMONE MEDICINES: \"Are you taking any hormone medicines, prescription or over-the-counter?\" (e.g., birth control pills, estrogen)      Denies  9. BLOOD THINNER MEDICINES: \"Do you take any blood thinners?\" (e.g., Coumadin / warfarin, Pradaxa / dabigatran, aspirin)      Denies  10. CAUSE: \"What do you think is causing the bleeding?\" (e.g., recent gyn surgery, recent gyn procedure; known bleeding disorder, cervical cancer, polycystic ovarian disease, fibroids)          unsure  11. HEMODYNAMIC " "STATUS: \"Are you weak or feeling lightheaded?\" If Yes, ask: \"Can you stand and walk normally?\"         Denies  12. OTHER SYMPTOMS: \"What other symptoms are you having with the bleeding?\" (e.g., passed tissue, vaginal discharge, fever, menstrual-type cramps)        Denies fever, body aches, chill  Currently on antibiotic for UTI    Protocols used: Vaginal Bleeding - Abnormal-Adult-[place]    "

## 2025-05-28 NOTE — PROGRESS NOTES
Daily Note     Today's date: 2025  Patient name: Kiara Ma  : 1985  MRN: [id number]  Referring provider: Imani Rhodes MD  Dx:   Encounter Diagnosis     ICD-10-CM    1. Acute pain of left shoulder  M25.512            1on1 w/PT+PTA entire session.          Subjective: Pt reports she was able to reach into the backseat while sitting in the front seat with less pain and improved ROM.        Objective: See treatment diary below      Assessment: Tolerated treatment well. Patient exhibited good technique with therapeutic exercises and would benefit from continued PT.  No pain complaints during or after session.        Plan: Continue per plan of care.      Precautions: anemia, asthma, HTN,   POC expires Unit limit Auth Expiration date PT/OT + Visit Limit?    NA NA BOMN              Pertinent Findings:                                                                                          Test / Measure     FOTO (Predicted 73) 62                       Visits 1 2 3 4 5 6 7 8     Manuals      L post/inf GH Jm's PRN     TP gr III TP gr III TP gr III TP gr III                                            Neuro Re-Ed             [place] LPD  12# 2x10 12# 2x10 12# 2x10 12# 2x10 12# 2x15 12#  2x15 12# 2x15     Bhumi rows  17# 2x10 17# 2x10 17# 2x15 17# 2x15 18# 2x15 18#  2x15 18# 2x15     Bhumi IR  7# 2x10 7# 2x10 7# 2x10 7# 2x10 7# 2x10 7#  2x10 7# 2x10     [place] ER  1# 2x10 1# 2x10 1#  2x10 2# 2x10 2# 2x10 2#  2x10 2# 2x10     [place] face pulls  15# 2x10 15# 2x10 15# 2x15 15# 2x15 15# 2x15 15#  2x15 16# 2x15     PNF D1+D2 flex+ext     D1+2 ext 7# x15 ea  D1+D2 flex 2# x15 ea D1+2 ext 7# x15 ea  D1+D2 flex 2# x15 ea D1+2 ext 7#  X15 ea.     D1+D2 flex  2#  X15 ea D1+2 ext 7#  X15 ea.     D1+D2 flex  2#  X15 ea     Scapular clocks w/TB  Otb x5 ea b/l Otb x5ea b/l Gtb x5 ea b/l Gtb x5 ea b/l Otb x5 ea b/l OTB  X5 ea  B/L Otb x5ea b/l     Ball circles against wall  "cw/ccw   Rmb x15 ea Rmb x15 ea Rmb x15 ea Rmb x20 ea RMB  X20 ea.  Rmb x20 ea     Prone I+T   3\"x15 ea 3\"x15 ea I 3\" 2x15   T 3\"x15 I 3\" 2x15   T 3\"x15 I 3\"  2x15    T 3\" x15 I 2# 3\" 2x10  T 2# 3\" 2x10     Ther Ex       5/21      UBE  90rpm 3'/3' 90rpm 3'/3' 90rpm 3'/3' 85rpm 3'/3' 80rpm 3'/3' 80rpm  3'/3' 80rpm 3'/3'     Serratus wall slides  5\"x10 5\"x10 5\"x10 Otb 5\"x10 Otb 5\"x10 OTB  5\"x10 Otb 5\"x10     Scaption to 90*  2# 2x10 2# 2x10 2# 2x10         Supine scap punch  2# 3\" 2x10 2# 3\" 2x10 2# 3\" 2x15 2# 3\" 2x15 2# 3\" 2x15 2# 3\"  2x15 3# 3\" 2x10     S/L ER  2# 2x10 2# 2x10 2# 2x10 2# 2x10 2# 2x10 2#  2x10 2# 2x10     S/L ABD  2# 2x10 2# 2x10 2# 2x10 2# 2x10 2# 2x10 2#  2x10 2# 2x10                  Pt education+ HEP instruction TP 8 mins            Ther Activity       5/21      90/90 carry     2# 50ft x2 laps 2# 50ft x2 laps 2# 50ft x 2 laps  2# 50ft x2 laps                  Gait Training                                       Modalities                                                      "

## 2025-05-29 ENCOUNTER — APPOINTMENT (OUTPATIENT)
Dept: PHYSICAL THERAPY | Facility: REHABILITATION | Age: 40
End: 2025-05-29
Attending: FAMILY MEDICINE
Payer: COMMERCIAL

## 2025-05-29 ENCOUNTER — OFFICE VISIT (OUTPATIENT)
Dept: GYNECOLOGY | Facility: CLINIC | Age: 40
End: 2025-05-29
Payer: COMMERCIAL

## 2025-05-29 VITALS
HEART RATE: 74 BPM | DIASTOLIC BLOOD PRESSURE: 70 MMHG | SYSTOLIC BLOOD PRESSURE: 110 MMHG | BODY MASS INDEX: 29.12 KG/M2 | WEIGHT: 170.6 LBS | HEIGHT: 64 IN

## 2025-05-29 DIAGNOSIS — N93.9 ABNORMAL UTERINE BLEEDING (AUB): Primary | ICD-10-CM

## 2025-05-29 PROCEDURE — 99213 OFFICE O/P EST LOW 20 MIN: CPT | Performed by: OBSTETRICS & GYNECOLOGY

## 2025-05-29 NOTE — PROGRESS NOTES
":  Assessment & Plan  Abnormal uterine bleeding (AUB)  Likely retained endometrium status post supracervical hysterectomy.  If bleeding happens again we did discuss options including a trial of Depo-Provera versus trachelectomy.  Patient would likely proceed with Depo-Provera and then repeated 3 months later           History of Present Illness     Kiara Ma is a 40 y.o. female   HPI patient presents to the office complaining of an episode of light vaginal bleeding.  She is status post LSH BS in July 2024.  She also had a left oophorectomy and lysis of extensive adhesions.  She had a small amount of spotting in February of this year and then again some light bleeding this past few days.  She denies any fever or abdominal pain no urinary or GI complaints.  She was treated recently for a yeast infection and presently on antibiotics for UTI.  Her last Pap smear was in February and was negative along with negative HPV  Review of Systems  Objective   /70 (BP Location: Right arm, Patient Position: Sitting, Cuff Size: Large)   Pulse 74   Ht 5' 4\" (1.626 m)   Wt 77.4 kg (170 lb 9.6 oz)   LMP 07/23/2024   BMI 29.28 kg/m²      Physical Exam      "

## 2025-05-30 DIAGNOSIS — B37.31 VAGINAL YEAST INFECTION: Primary | ICD-10-CM

## 2025-05-30 RX ORDER — FLUCONAZOLE 200 MG/1
TABLET ORAL
Qty: 2 TABLET | Refills: 1 | Status: SHIPPED | OUTPATIENT
Start: 2025-05-30 | End: 2025-05-31

## 2025-06-02 ENCOUNTER — OFFICE VISIT (OUTPATIENT)
Dept: PHYSICAL THERAPY | Facility: REHABILITATION | Age: 40
End: 2025-06-02
Attending: FAMILY MEDICINE
Payer: COMMERCIAL

## 2025-06-02 DIAGNOSIS — M25.512 ACUTE PAIN OF LEFT SHOULDER: Primary | ICD-10-CM

## 2025-06-02 PROCEDURE — 97112 NEUROMUSCULAR REEDUCATION: CPT | Performed by: PHYSICAL THERAPIST

## 2025-06-02 PROCEDURE — 97110 THERAPEUTIC EXERCISES: CPT | Performed by: PHYSICAL THERAPIST

## 2025-06-02 NOTE — PROGRESS NOTES
Daily Note     Today's date: 2025  Patient name: Kiara Ma  : 1985  MRN: [id number]  Referring provider: Imani Rhodes MD  Dx:   Encounter Diagnosis     ICD-10-CM    1. Acute pain of left shoulder  M25.512               1on1 for 38 mins, IEP for remainder.         Subjective: Pt reports her shoulder has been feeling improved.        Objective: See treatment diary below      Assessment: Tolerated treatment well. Able to progress resistance with multiple exercises to good tolerance.  No pain complaints during or after session.  Patient exhibited good technique with therapeutic exercises      Plan: Pt scheduled for RA next week.     Precautions: anemia, asthma, HTN,   POC expires Unit limit Auth Expiration date PT/OT + Visit Limit?    NA NA BOMN              Pertinent Findings:                                                                                          Test / Measure     FOTO (Predicted 73) 62                       Visits 1 2 3 4 5 6 7 8 9    Manuals     L post/inf GH Jm's PRN     TP gr III TP gr III TP gr III TP gr III TP gr III                                           Neuro Re-Ed             [place]hum[place] LPD  12# 2x10 12# 2x10 12# 2x10 12# 2x10 12# 2x15 12#  2x15 12# 2x15 14# 2x15    Bhumi rows  17# 2x10 17# 2x10 17# 2x15 17# 2x15 18# 2x15 18#  2x15 18# 2x15 20# 2x15    Bhumi IR  7# 2x10 7# 2x10 7# 2x10 7# 2x10 7# 2x10 7#  2x10 7# 2x10 7# 2x10    [place] ER  1# 2x10 1# 2x10 1#  2x10 2# 2x10 2# 2x10 2#  2x10 2# 2x10 2# 2x10    [place] face pulls  15# 2x10 15# 2x10 15# 2x15 15# 2x15 15# 2x15 15#  2x15 16# 2x15 16# 2x15    PNF D1+D2 flex+ext     D1+2 ext 7# x15 ea  D1+D2 flex 2# x15 ea D1+2 ext 7# x15 ea  D1+D2 flex 2# x15 ea D1+2 ext 7#  X15 ea.     D1+D2 flex  2#  X15 ea D1+2 ext 7#  X15 ea.     D1+D2 flex  2#  X15 ea D1+2 ext 7#  X15 ea.     D1+D2 flex  2#  X15 ea    Scapular clocks w/TB  Otb x5 ea b/l Otb x5ea b/l Gtb x5 ea b/l Gtb x5 ea  "b/l Otb x5 ea b/l OTB  X5 ea  B/L Otb x5ea b/l Otb x5ea b/l    Ball circles against wall cw/ccw   Rmb x15 ea Rmb x15 ea Rmb x15 ea Rmb x20 ea RMB  X20 ea.  Rmb x20 ea Rmb x20 ea    Prone I+T   3\"x15 ea 3\"x15 ea I 3\" 2x15   T 3\"x15 I 3\" 2x15   T 3\"x15 I 3\"  2x15    T 3\" x15 I 2# 3\" 2x10  T 2# 3\" 2x10 I 3# 3\" 2x10  T 2# 3\" 2x10    Ther Ex       5/21      UBE  90rpm 3'/3' 90rpm 3'/3' 90rpm 3'/3' 85rpm 3'/3' 80rpm 3'/3' 80rpm  3'/3' 80rpm 3'/3' 80rpm 3'/3'    Serratus wall slides  5\"x10 5\"x10 5\"x10 Otb 5\"x10 Otb 5\"x10 OTB  5\"x10 Otb 5\"x10 Otb 5\"x10    Scaption to 90*  2# 2x10 2# 2x10 2# 2x10         Supine scap punch  2# 3\" 2x10 2# 3\" 2x10 2# 3\" 2x15 2# 3\" 2x15 2# 3\" 2x15 2# 3\"  2x15 3# 3\" 2x10 3# 3\" 2x10    S/L ER  2# 2x10 2# 2x10 2# 2x10 2# 2x10 2# 2x10 2#  2x10 2# 2x10 2# 2x10    S/L ABD  2# 2x10 2# 2x10 2# 2x10 2# 2x10 2# 2x10 2#  2x10 2# 2x10 3# 2x10                 Pt education+ HEP instruction TP 8 mins            Ther Activity       5/21      90/90 carry     2# 50ft x2 laps 2# 50ft x2 laps 2# 50ft x 2 laps  2# 50ft x2 laps 3# 50ft x2 laps                 Gait Training                                       Modalities                                                        "

## 2025-06-11 ENCOUNTER — EVALUATION (OUTPATIENT)
Dept: PHYSICAL THERAPY | Facility: REHABILITATION | Age: 40
End: 2025-06-11
Attending: FAMILY MEDICINE
Payer: COMMERCIAL

## 2025-06-11 DIAGNOSIS — M25.512 ACUTE PAIN OF LEFT SHOULDER: Primary | ICD-10-CM

## 2025-06-11 PROCEDURE — 97110 THERAPEUTIC EXERCISES: CPT | Performed by: PHYSICAL THERAPIST

## 2025-06-11 NOTE — PROGRESS NOTES
PT Re-Evaluation     Today's date: 2025  Patient name: Kiara Ma  : 1985  MRN: [id number]  Referring provider: Imani Rhodes MD  Dx:   Encounter Diagnosis     ICD-10-CM    1. Acute pain of left shoulder  M25.512               Updated measurements and functional status taken this session.         Assessment  Impairments: abnormal or restricted ROM, impaired physical strength, pain with function and activity limitations    Assessment details: Pt has demonstrated improvement in shoulder ROM, strength, and function with a decrease in pain since starting therapy. Pt continues to present with intermittent pain, decreased shoulder ER AROM, decreased shoulder ER strength and decreased function.  Pt has been compliant with performance of HEP and feels comfortable transitioning to ongoing HEP at this time.  Reviewed exercises for d/c and pt demonstrates an understanding.    Understanding of Dx/Px/POC: good     Prognosis: good    Goals  Short Term:  Pt will report decreased levels of pain by at least 2 subjective ratings in 4 weeks-met  Pt will demonstrate improved ROM by at least 10 degrees in 4 weeks-met  Pt will demonstrate improved strength by 1/2 grade MMT in 4 weeks-partially met  Long Term:   Pt will be independent in their HEP in 8 weeks-met  Pt will demonstrate improved FOTO, > predicted level-partially met  Pt will be independent with all ADL's-met  Pt will be able to sleep without waking due to pain-not met      Plan    Planned therapy interventions: home exercise program    Treatment plan discussed with: patient  Plan details: Patient was educated in HEP and Plan of Care.  All questions were answered to pt's satisfaction.          Subjective Evaluation    History of Present Illness  Mechanism of injury: Pt reports overall improvement since starting therapy.  However, pt continues to report some pain/difficulty with externally rotating L shoulder, reaching back for seatbelt on 's side  (although improved), sleep (unable to tolerate laying on L shoulder).   Pt notes improvement with prolonged OH activity.    Patient Goals  Patient goals for therapy: decreased pain, increased motion, increased strength and independence with ADLs/IADLs    Pain  At best pain ratin  At worst pain ratin  Location: L shoulder    Treatments  Current treatment: physical therapy      Objective     Active Range of Motion   Left Shoulder   Abduction: WFL  External rotation 0°: 76 degrees     Strength/Myotome Testing     Left Shoulder     Planes of Motion   Flexion: 5   Abduction: 5   External rotation at 0°: 4- (pain)   Internal rotation at 0°: 5     Isolated Muscles   Lower trapezius: 4+   Middle trapezius: 5     Tests     Left Shoulder   Negative empty can, full can and Hawkin's.              Precautions:  anemia, asthma, HTN,   POC expires Unit limit Auth Expiration date PT/OT + Visit Limit?    NA NA BOMN                   Pertinent Findings:                                                                                          Test / Measure     FOTO (Predicted 73) 62 65                                    Visits 1 2 3 4 5 6 7 8 9  10   Manuals    L post/inf GH Jm's PRN         TP gr III TP gr III TP gr III TP gr III TP gr III                                                                             Neuro Re-Ed                      [place] LPD   12# 2x10 12# 2x10 12# 2x10 12# 2x10 12# 2x15 12#  2x15 12# 2x15 14# 2x15     [place] rows   17# 2x10 17# 2x10 17# 2x15 17# 2x15 18# 2x15 18#  2x15 18# 2x15 20# 2x15     [place] IR   7# 2x10 7# 2x10 7# 2x10 7# 2x10 7# 2x10 7#  2x10 7# 2x10 7# 2x10     [place] ER   1# 2x10 1# 2x10 1#  2x10 2# 2x10 2# 2x10 2#  2x10 2# 2x10 2# 2x10     [place]i face pulls   15# 2x10 15# 2x10 15# 2x15 15# 2x15 15# 2x15 15#  2x15 16# 2x15 16# 2x15     PNF D1+D2 flex+ext         D1+2 ext 7# x15 ea  D1+D2 flex 2# x15 ea D1+2 ext 7# x15  "ea  D1+D2 flex 2# x15 ea D1+2 ext 7#  X15 ea.      D1+D2 flex  2#  X15 ea D1+2 ext 7#  X15 ea.      D1+D2 flex  2#  X15 ea D1+2 ext 7#  X15 ea.      D1+D2 flex  2#  X15 ea     Scapular clocks w/TB   Otb x5 ea b/l Otb x5ea b/l Gtb x5 ea b/l Gtb x5 ea b/l Otb x5 ea b/l OTB  X5 ea  B/L Otb x5ea b/l Otb x5ea b/l     Ball circles against wall cw/ccw     Rmb x15 ea Rmb x15 ea Rmb x15 ea Rmb x20 ea RMB  X20 ea.  Rmb x20 ea Rmb x20 ea     Prone I+T     3\"x15 ea 3\"x15 ea I 3\" 2x15   T 3\"x15 I 3\" 2x15   T 3\"x15 I 3\"  2x15     T 3\" x15 I 2# 3\" 2x10  T 2# 3\" 2x10 I 3# 3\" 2x10  T 2# 3\" 2x10     Ther Ex             5/21         UBE   90rpm 3'/3' 90rpm 3'/3' 90rpm 3'/3' 85rpm 3'/3' 80rpm 3'/3' 80rpm  3'/3' 80rpm 3'/3' 80rpm 3'/3'  80rpm 3'/3'   Serratus wall slides   5\"x10 5\"x10 5\"x10 Otb 5\"x10 Otb 5\"x10 OTB  5\"x10 Otb 5\"x10 Otb 5\"x10     Scaption to 90*   2# 2x10 2# 2x10 2# 2x10               Supine scap punch   2# 3\" 2x10 2# 3\" 2x10 2# 3\" 2x15 2# 3\" 2x15 2# 3\" 2x15 2# 3\"  2x15 3# 3\" 2x10 3# 3\" 2x10     S/L ER   2# 2x10 2# 2x10 2# 2x10 2# 2x10 2# 2x10 2#  2x10 2# 2x10 2# 2x10     S/L ABD   2# 2x10 2# 2x10 2# 2x10 2# 2x10 2# 2x10 2#  2x10 2# 2x10 3# 2x10      Re-assessment                    TP   Pt education+ HEP instruction TP 8 mins                  TP   Ther Activity             5/21 90/90 carry         2# 50ft x2 laps 2# 50ft x2 laps 2# 50ft x 2 laps  2# 50ft x2 laps 3# 50ft x2 laps                             Gait Training                                                                       Modalities                                                                              "

## 2025-06-16 ENCOUNTER — TELEPHONE (OUTPATIENT)
Age: 40
End: 2025-06-16

## 2025-06-16 NOTE — TELEPHONE ENCOUNTER
Pt called in stating that on Saturday she woke up and noticed that her urine was cloudy/concentrated. She then developed mild pain and feeling the need to urinate but nothing would come out. About 2 hours later she passed a small clot and afterward all ofd her symptoms went away and has been okay since. Pt states this happen two months ago as well. Please advise.

## 2025-06-17 ENCOUNTER — TELEPHONE (OUTPATIENT)
Age: 40
End: 2025-06-17

## 2025-06-17 NOTE — TELEPHONE ENCOUNTER
Pt calling in saying that she called PCP in regards to passing a kidney stone over the weekend, as per PCP, pt was notified to call OBGYN. Pt is notified that she would need to call PCP in regards to kidney stones as she would need a further work up and possibly see a nephrologist, pt is agreeable and isnt sure why PCP sent her to OBGYN. Pt stating she will be calling PCP office again. Pt is notified that if she can't be seen by PCP, to go to urgent care for work up, pt verbalized understanding.

## 2025-06-22 DIAGNOSIS — K21.9 GASTROESOPHAGEAL REFLUX DISEASE WITHOUT ESOPHAGITIS: ICD-10-CM

## 2025-06-22 DIAGNOSIS — E66.9 OBESITY (BMI 30-39.9): ICD-10-CM

## 2025-06-22 DIAGNOSIS — I10 ESSENTIAL HYPERTENSION: ICD-10-CM

## 2025-06-22 RX ORDER — PANTOPRAZOLE SODIUM 40 MG/1
40 TABLET, DELAYED RELEASE ORAL
Qty: 90 TABLET | Refills: 1 | Status: SHIPPED | OUTPATIENT
Start: 2025-06-22

## 2025-06-23 RX ORDER — CHLORTHALIDONE 25 MG/1
25 TABLET ORAL DAILY
Qty: 90 TABLET | Refills: 0 | Status: SHIPPED | OUTPATIENT
Start: 2025-06-23

## 2025-06-23 RX ORDER — CARVEDILOL PHOSPHATE 20 MG/1
40 CAPSULE, EXTENDED RELEASE ORAL DAILY
Qty: 180 CAPSULE | Refills: 0 | Status: SHIPPED | OUTPATIENT
Start: 2025-06-23

## 2025-06-23 RX ORDER — TIRZEPATIDE 12.5 MG/.5ML
12.5 INJECTION, SOLUTION SUBCUTANEOUS WEEKLY
Qty: 2 ML | Refills: 0 | Status: SHIPPED | OUTPATIENT
Start: 2025-06-23

## 2025-07-08 DIAGNOSIS — E66.9 OBESITY (BMI 30-39.9): Primary | ICD-10-CM

## 2025-07-08 RX ORDER — TIRZEPATIDE 10 MG/.5ML
10 INJECTION, SOLUTION SUBCUTANEOUS WEEKLY
Qty: 2 ML | Refills: 0 | Status: SHIPPED | OUTPATIENT
Start: 2025-07-08

## 2025-07-10 ENCOUNTER — CONSULT (OUTPATIENT)
Dept: DERMATOLOGY | Facility: CLINIC | Age: 40
End: 2025-07-10
Attending: NURSE PRACTITIONER

## 2025-07-10 VITALS — WEIGHT: 165 LBS | BODY MASS INDEX: 28.32 KG/M2 | TEMPERATURE: 97.8 F

## 2025-07-10 DIAGNOSIS — H01.131 ECZEMATOUS DERMATITIS OF UPPER AND LOWER EYELIDS OF BOTH EYES: ICD-10-CM

## 2025-07-10 DIAGNOSIS — L91.8 ACROCHORDON: Primary | ICD-10-CM

## 2025-07-10 DIAGNOSIS — H01.134 ECZEMATOUS DERMATITIS OF UPPER AND LOWER EYELIDS OF BOTH EYES: ICD-10-CM

## 2025-07-10 DIAGNOSIS — H01.132 ECZEMATOUS DERMATITIS OF UPPER AND LOWER EYELIDS OF BOTH EYES: ICD-10-CM

## 2025-07-10 DIAGNOSIS — H01.135 ECZEMATOUS DERMATITIS OF UPPER AND LOWER EYELIDS OF BOTH EYES: ICD-10-CM

## 2025-07-10 NOTE — PATIENT INSTRUCTIONS
EYELID DERMATITIS    Assessment and Plan:  Based on a thorough discussion of this condition and the management approach to it (including a comprehensive discussion of the known risks, side effects and potential benefits of treatment), the patient (family) agrees to implement the following specific plan:  Clear on exam today  Discussed if there is a flare, to use Vaseline instead of Aquaphor. Discussed can do another course of oral prednisone if necessary.  Patient will contact office if flare occurs, send pictures through [place] and we will have her scheduled for exam.  Discussed Patch Testing as patient notes she has had reactions when trying new sunscreens    What is periorificial dermatitis?  Periorificial dermatitis is a common facial skin problem characterized by groups of itchy or tender small red bumps. It is given this name because the bumps occur around the eyes, the nostrils, the mouth and occasionally, the genitals.  The more restrictive term, perioral dermatitis, is often used when the eruption is confined to the skin in the lower half of the face, particularly around the mouth. Periocular dermatitis may be used to describe the rash affecting the eyelids.  Periorificial dermatitis mainly affects adult women aged 15 to 45 years. It is less common in men. It can also affect children of any age.    What is the cause of periorificial or periorificial dermatitis?  The exact cause of periorificial is not understood. Periorificial dermatitis may be related to:  Skin barrier dysfunction  Activation of the body's immune system  Altered bacterial levels on the skin  Bacteria from hair follicles    Unlike seborrheic dermatitis which can affect similar areas of the face, malassezia yeasts are not involved in periorificial dermatitis.  Periorificial dermatitis may be directly caused by:  Topical steroids, whether applied deliberately to facial skin or inadvertently  Nasal steroids, steroid inhalers, and oral  steroids  Cosmetic creams, make-ups and sunscreens  Fluorinated toothpaste  Neglecting to wash the face  Hormonal changes and/or oral contraceptives    What are the symptoms of periorificial dermatitis?  The characteristics of facial periorificial dermatitis are:  Eruption on the chin, upper lip and eyelids   Sparing of the skin bordering the lips (which then appears pale), eyelids, nostrils  Clusters of 1-2 mm red bumps, potentially with pus  Dry and flaky skin surface  Burning irritation    In contrast to steroid-induced rosacea, periorificial dermatitis spares the cheeks and forehead.  Genital periorificial dermatitis has a similar clinical appearance. It involves the skin on and around labia majora (in females), scrotum (in males) and anus.    Granulomatous periorificial dermatitis is a variant of periorificial dermatitis that presents with persistent yellowish bumps. It occurs mainly in young children and nearly always follows the use of a corticosteroid.   Rebound flare of severe periorificial dermatitis may occur after abrupt cessation of application of potent topical steroid to facial skin.  The presentation of periorificial dermatitis is usually typical, so diagnosis can be made by history and skin exam. There are no specific tests.  Skin biopsy may occasionally be taken, and show similar findings to rosacea.     Periorificial dermatitis responds well to treatment, although it may take several weeks before there is a noticeable improvement.    General measures  Discontinue applying all face creams including topical steroids, cosmetics and sunscreens (zero therapy).  Consider a slower withdrawal from topical steroid/face creams if there is a severe flare after steroid cessation. Temporarily, replace it by a less potent or less occlusive cream or apply it less and less frequently until it is no longer required.  Wash the face with warm water alone while the rash is present. When it has cleared up, use a  "non-soap bar or liquid cleanser if you wish.  Choose a liquid or gel sunscreen.    Topical therapy: used to treat mild periorificial dermatitis. Choices include:  Erythromycin  Clindamycin  Metronidazole  Pimecrolimus  Azelaic acid    Oral therapy: in more severe cases, a course of oral antibiotics may be prescribed for 6-12 weeks.  Most often, a tetracycline such as doxycycline is recommended. A sub-antimicrobial dose may be sufficient.  Oral erythromycin is used during pregnancy and in pre-pubertal children.  Oral low-dose isotretinoin may be used if antibiotics are ineffective or contraindicated.    Periorificial dermatitis can generally be prevented by the avoidance of topical steroids and occlusive face creams. When topical steroids are necessary to treat an inflammatory facial rash, they should be applied accurately to the affected area, no more than once daily in the lowest effective potency, and discontinued as soon as the rash responds.   Periorificial dermatitis sometimes recurs when the antibiotics are discontinued, or at a later date. The same treatment can be used again.    ACROCHORDON (\"SKIN TAG\")    Assessment and Plan:  Based on a thorough discussion of this condition and the management approach to it (including a comprehensive discussion of the known risks, side effects and potential benefits of treatment), the patient (family) agrees to implement the following specific plan:  Cosmetic removal. $150 for up to 10.    Skin tags are common, soft, harmless skin lesions that are also called, in the appropriate settings, papillomas, fibroepithelial polyps, and soft fibromas.  They are made up of loosely arranged collagen fibers and blood vessels surrounded by a thickened or thinned-out epidermis.    Skin tags tend to develop in both men and women as we grow older.  They are usually found on the skin folds (neck, armpits, groin).  It is not known what specifically causes skin tags.  Certain factors, " though, do appear to play a role:  Chaffing and irritation from skin rubbing together  High levels of growth factors (as seen, for example, in pregnancy or in acromegaly/gigantism)  Insulin resistance  Human papillomavirus (wart virus)    We discussed that most skin tags do not need to be treated unless they are specifically causing the patient physical distress or limitation or pose a risk for a larger problem such as an infection that forms secondary to excoriation or chronic irritation.    We had a thorough discussion of treatment options and specific risks (including that any procedural treatment may not be covered by insurance and would then be the patient's responsibility) and benefits/alternatives including but not limited to the following:  Cryotherapy (freezing)  Shave removal  Surgical excision (snip excision with scissors)  Electrosurgery  Ligation (we do not do this procedure and counseled against it due to risk of tissue necrosis and infection)

## 2025-07-10 NOTE — PROGRESS NOTES
"[facility]at[facility] Note     Patient Name: Kiara Ma  Encounter Date: 7/10/2025     Have you been cared for by a [facility] Dermatologist in the last 3 years and, if so, which description applies to you? NO. I am considered a \"new\" patient and must complete all patient intake questions. I am of child-bearing potential.     REVIEW OF SYSTEMS:  Have you recently had or currently have any of the following? Recent fever or chills? No  Any non-healing wound? No  Are you pregnant or planning to become pregnant? No  Are you currently or planning to be nursing or breast feeding? No   PAST MEDICAL HISTORY:  Have you personally ever had or currently have any of the following?  If \"YES,\" then please provide more detail. Skin cancer (such as Melanoma, Basal Cell Carcinoma, Squamous Cell Carcinoma?  No  Tuberculosis, HIV/AIDS, Hepatitis B or C: No  Radiation Treatment No   HISTORY OF IMMUNOSUPPRESSION:   Do you have a history of any of the following:  Systemic Immunosuppression such as Diabetes, Biologic or Immunotherapy, Chemotherapy, Organ Transplantation, Bone Marrow Transplantation or Prednsione?  No    Answering \"YES\" requires the addition of the dotphrase \"IMMUNOSUPPRESSED\" as the first diagnosis of the patient's visit.   FAMILY HISTORY:  Any \"first degree relatives\" (parent, brother, sister, or child) with the following?    Skin Cancer, Pancreatic or Other Cancer? No   PATIENT EXPERIENCE:    Do you want the Dermatologist to perform a COMPLETE skin exam today including a clinical examination under the \"bra and underwear\" areas?  NO  If necessary, do we have your permission to call and leave a detailed message on your Preferred Phone number that includes your specific medical information?  Yes      Allergies[1] Current Medications[2]        Whom besides the patient is providing clinical information about today's encounter?   NO ADDITIONAL HISTORIAN (patient alone provided history)    Physical Exam and " Assessment/Plan by Diagnosis:    EYELID DERMATITIS    Physical Exam:  Anatomic Location Affected:  upper and lower eyelids, NLF  Morphological Description:  clear on exam  Pertinent Positives:  Pertinent Negatives:    Additional History of Present Condition:  patient reports started in January with dry, scaly, itching and swelling. She saw her PCP and was put on an oral course of prednisone and everything cleared. She states it did briefly flare again, however it is now completely clear and hasn't come back. Patient notes there were no triggers, so had no new topicals or medications, she was not sick. Patient notes she did apply Aquaphor at its worst.    Assessment and Plan:  Based on a thorough discussion of this condition and the management approach to it (including a comprehensive discussion of the known risks, side effects and potential benefits of treatment), the patient (family) agrees to implement the following specific plan:  Clear on exam today  Discussed if there is a flare, to use Vaseline instead of Aquaphor.   Patient will contact office if flare occurs, send pictures through [employer] and we will have her scheduled for exam.  Discussed Patch Testing as patient notes she has had rashes and itching when trying different sunscreens for the face. Recommend trial of vanicream SPF    What is periorificial dermatitis?  Periorificial dermatitis is a common facial skin problem characterized by groups of itchy or tender small red bumps. It is given this name because the bumps occur around the eyes, the nostrils, the mouth and occasionally, the genitals.  The more restrictive term, perioral dermatitis, is often used when the eruption is confined to the skin in the lower half of the face, particularly around the mouth. Periocular dermatitis may be used to describe the rash affecting the eyelids.  Periorificial dermatitis mainly affects adult women aged 15 to 45 years. It is less common in men. It can also affect  children of any age.    What is the cause of periorificial or periorificial dermatitis?  The exact cause of periorificial is not understood. Periorificial dermatitis may be related to:  Skin barrier dysfunction  Activation of the body's immune system  Altered bacterial levels on the skin  Bacteria from hair follicles    Unlike seborrheic dermatitis which can affect similar areas of the face, malassezia yeasts are not involved in periorificial dermatitis.  Periorificial dermatitis may be directly caused by:  Topical steroids, whether applied deliberately to facial skin or inadvertently  Nasal steroids, steroid inhalers, and oral steroids  Cosmetic creams, make-ups and sunscreens  Fluorinated toothpaste  Neglecting to wash the face  Hormonal changes and/or oral contraceptives    What are the symptoms of periorificial dermatitis?  The characteristics of facial periorificial dermatitis are:  Eruption on the chin, upper lip and eyelids   Sparing of the skin bordering the lips (which then appears pale), eyelids, nostrils  Clusters of 1-2 mm red bumps, potentially with pus  Dry and flaky skin surface  Burning irritation    In contrast to steroid-induced rosacea, periorificial dermatitis spares the cheeks and forehead.  Genital periorificial dermatitis has a similar clinical appearance. It involves the skin on and around labia majora (in females), scrotum (in males) and anus.    Granulomatous periorificial dermatitis is a variant of periorificial dermatitis that presents with persistent yellowish bumps. It occurs mainly in young children and nearly always follows the use of a corticosteroid.   Rebound flare of severe periorificial dermatitis may occur after abrupt cessation of application of potent topical steroid to facial skin.  The presentation of periorificial dermatitis is usually typical, so diagnosis can be made by history and skin exam. There are no specific tests.  Skin biopsy may occasionally be taken, and show  "similar findings to rosacea.     Periorificial dermatitis responds well to treatment, although it may take several weeks before there is a noticeable improvement.    General measures  Discontinue applying all face creams including topical steroids, cosmetics and sunscreens (zero therapy).  Consider a slower withdrawal from topical steroid/face creams if there is a severe flare after steroid cessation. Temporarily, replace it by a less potent or less occlusive cream or apply it less and less frequently until it is no longer required.  Wash the face with warm water alone while the rash is present. When it has cleared up, use a non-soap bar or liquid cleanser if you wish.  Choose a liquid or gel sunscreen.    Topical therapy: used to treat mild periorificial dermatitis. Choices include:  Erythromycin  Clindamycin  Metronidazole  Pimecrolimus  Azelaic acid    Oral therapy: in more severe cases, a course of oral antibiotics may be prescribed for 6-12 weeks.  Most often, a tetracycline such as doxycycline is recommended. A sub-antimicrobial dose may be sufficient.  Oral erythromycin is used during pregnancy and in pre-pubertal children.  Oral low-dose isotretinoin may be used if antibiotics are ineffective or contraindicated.    Periorificial dermatitis can generally be prevented by the avoidance of topical steroids and occlusive face creams. When topical steroids are necessary to treat an inflammatory facial rash, they should be applied accurately to the affected area, no more than once daily in the lowest effective potency, and discontinued as soon as the rash responds.   Periorificial dermatitis sometimes recurs when the antibiotics are discontinued, or at a later date. The same treatment can be used again.    ACROCHORDON (\"SKIN TAG\")    Physical Exam:  Anatomic Location Affected:  axilla, under eye  Morphological Description:  small brown to pink pedunculated papules  Pertinent Positives:  Pertinent " Negatives:    Additional History of Present Condition:  patient would like to have them removed    Assessment and Plan:  Based on a thorough discussion of this condition and the management approach to it (including a comprehensive discussion of the known risks, side effects and potential benefits of treatment), the patient (family) agrees to implement the following specific plan:  Cosmetic removal. $150 for up to 10.    Skin tags are common, soft, harmless skin lesions that are also called, in the appropriate settings, papillomas, fibroepithelial polyps, and soft fibromas.  They are made up of loosely arranged collagen fibers and blood vessels surrounded by a thickened or thinned-out epidermis.    Skin tags tend to develop in both men and women as we grow older.  They are usually found on the skin folds (neck, armpits, groin).  It is not known what specifically causes skin tags.  Certain factors, though, do appear to play a role:  Chaffing and irritation from skin rubbing together  High levels of growth factors (as seen, for example, in pregnancy or in acromegaly/gigantism)  Insulin resistance  Human papillomavirus (wart virus)    We discussed that most skin tags do not need to be treated unless they are specifically causing the patient physical distress or limitation or pose a risk for a larger problem such as an infection that forms secondary to excoriation or chronic irritation.    We had a thorough discussion of treatment options and specific risks (including that any procedural treatment may not be covered by insurance and would then be the patient's responsibility) and benefits/alternatives including but not limited to the following:  Cryotherapy (freezing)  Shave removal  Surgical excision (snip excision with scissors)  Electrosurgery  Ligation (we do not do this procedure and counseled against it due to risk of tissue necrosis and infection)        Scribe Attestation    I,:  Mary Jane Schmitt MA am acting as a  scribe while in the presence of the attending physician.:       I,:  Radha Galloway PA-C personally performed the services described in this documentation    as scribed in my presence.:                  [1]  No Known Allergies[2]    Current Outpatient Medications:   •  albuterol (ProAir HFA) 90 mcg/act inhaler, Inhale 2 puffs every 4 (four) hours as needed (When has flare up), Disp: 8.5 g, Rfl: 5  •  benzonatate (TESSALON) 200 MG capsule, Take 1 capsule (200 mg total) by mouth 3 (three) times a day as needed for cough, Disp: 20 capsule, Rfl: 0  •  carvedilol (COREG CR) 20 MG 24 hr capsule, Take 2 capsules (40 mg total) by mouth daily, Disp: 180 capsule, Rfl: 0  •  chlorthalidone 25 mg tablet, Take 1 tablet (25 mg total) by mouth daily, Disp: 90 tablet, Rfl: 0  •  Cholecalciferol (VITAMIN D3 PO), Take 2,000 mg by mouth daily in the early morning, Disp: , Rfl:   •  docusate sodium (COLACE) 100 mg capsule, Take 1 capsule (100 mg total) by mouth 2 (two) times a day, Disp: 60 capsule, Rfl: 0  •  meloxicam (MOBIC) 7.5 mg tablet, Take 1 tablet (7.5 mg total) by mouth daily (Patient taking differently: Take 7.5 mg by mouth if needed), Disp: 15 tablet, Rfl: 0  •  methocarbamol (ROBAXIN) 500 mg tablet, Take 1 tablet (500 mg total) by mouth 3 (three) times a day as needed for muscle spasms, Disp: 30 tablet, Rfl: 0  •  Multiple Vitamins-Minerals (Womens Multivitamin) TABS, Take 1 tablet by mouth in the morning., Disp: , Rfl:   •  nystatin-triamcinolone (MYCOLOG-II) ointment, Apply topically 2 (two) times a day (Patient taking differently: Apply topically if needed), Disp: 30 g, Rfl: 2  •  pantoprazole (PROTONIX) 40 mg tablet, Take 1 tablet (40 mg total) by mouth daily before breakfast, Disp: 90 tablet, Rfl: 1  •  polyethylene glycol (MIRALAX) 17 g packet, Take 17 g by mouth daily, Disp: 20 each, Rfl: 0  •  potassium chloride (MICRO-K) 10 MEQ CR capsule, Take 2 capsules (20 mEq total) by mouth daily Take 2 tablets daily, Disp: 180  capsule, Rfl: 1  •  rizatriptan (MAXALT-MLT) 10 mg disintegrating tablet, Take 1 tablet (10 mg total) by mouth once as needed for migraine for up to 1 dose may repeat in 2 hours if necessary, Disp: 10 tablet, Rfl: 0  •  tirzepatide (Zepbound) 10 mg/0.5 mL auto-injector, Inject 0.5 mL (10 mg total) under the skin once a week, Disp: 2 mL, Rfl: 0  •  triamcinolone (KENALOG) 0.1 % ointment, Apply topically 2 (two) times a day (Patient taking differently: Apply topically if needed), Disp: 30 g, Rfl: 0  •  ibuprofen (MOTRIN) 600 mg tablet, Take 1 tablet (600 mg total) by mouth every 6 (six) hours as needed for moderate pain for up to 14 days, Disp: 30 tablet, Rfl: 0  •  scopolamine (TRANSDERM-SCOP) 1 mg/3 days TD 72 hr patch, Place 1 patch on the skin over 72 hours every third day (Patient not taking: Reported on 7/10/2025), Disp: 10 patch, Rfl: 0

## 2025-07-24 ENCOUNTER — OFFICE VISIT (OUTPATIENT)
Age: 40
End: 2025-07-24
Payer: COMMERCIAL

## 2025-07-24 VITALS
SYSTOLIC BLOOD PRESSURE: 112 MMHG | OXYGEN SATURATION: 100 % | WEIGHT: 157 LBS | DIASTOLIC BLOOD PRESSURE: 64 MMHG | HEIGHT: 64 IN | HEART RATE: 83 BPM | BODY MASS INDEX: 26.8 KG/M2

## 2025-07-24 DIAGNOSIS — I10 BENIGN ESSENTIAL HYPERTENSION: Primary | ICD-10-CM

## 2025-07-24 DIAGNOSIS — I10 ESSENTIAL HYPERTENSION: ICD-10-CM

## 2025-07-24 PROCEDURE — 99213 OFFICE O/P EST LOW 20 MIN: CPT | Performed by: INTERNAL MEDICINE

## 2025-07-24 RX ORDER — CARVEDILOL PHOSPHATE 20 MG/1
20 CAPSULE, EXTENDED RELEASE ORAL DAILY
Start: 2025-07-24

## 2025-07-24 NOTE — PROGRESS NOTES
.[facility]                                            Cardiology Follow Up Visit     [facility]iology [facility]    Kiara Ma  1985  [id number]  [facility] [place]  [address]  [facility] [address]  [facility] [id number] [phone]    1. Benign essential hypertension                Discussion/Summary:    1.  Improved essential hypertension in the setting of 50 pound weight loss on Zepbound.  Patient feels significantly better overall.  Coronary CTA with a calcium score of 0 and no atherosclerotic disease seen in her coronary vessels.  Has a history of normal LV function and no significant valve disease.    Plan: Decrease Coreg CR to 20 mg daily.  Continue chlorthalidone.  Blood pressure is significantly improved with her weight loss.  Told home monitoring blood pressure with weight loss will be beneficial.    Interval History:    40-year-old female here for essential hypertension follow-up    Echocardiogram December 2022 performed for vague shortness of breath was without evidence of ischemia.  Normal LV function    CTA performed last year normal calcium score.  No atherosclerotic coronary artery disease.    Lost 50 pounds on Zepbound.  Blood pressure significantly improved..    Patient Active Problem List   Diagnosis    Herniated lumbar intervertebral disc    Asthma in adult, mild intermittent, uncomplicated    Benign essential hypertension    Mitral valve insufficiency    Obesity (BMI 30-39.9)    PVC (premature ventricular contraction)    Fibroids, submucosal    GERD (gastroesophageal reflux disease)    Female infertility    Polycystic ovaries    Migraine without aura and without status migrainosus, not intractable    Family history of sudden cardiac death (SCD)    Iron deficiency anemia due to chronic blood loss    Numbness and tingling in both hands    Sickle cell trait (HCC)    Excessive sleepiness    Circadian rhythm sleep disorder     Ocular migraine    Ulnar neuropathy at elbow, left     Past Medical History:   Diagnosis Date    Abdominal pain     Abnormal ECG     Anemia     Asthma     mild intermittent    Female infertility     Fibroid     Myomectomy 12/10/20    GERD (gastroesophageal reflux disease)     Headache, tension-type     History of palpitations     Hypertension     Lower back pain     Migraines     Ovarian cyst     Papilledema 01/03/2023    Polycystic ovary syndrome     Seasonal allergies     Trace mitral regurgitation by prior echocardiogram     Trigger finger of right thumb 02/28/2017     Social History     Socioeconomic History    Marital status: /Civil Union     Spouse name: Not on file    Number of children: Not on file    Years of education: Not on file    Highest education level: Not on file   Occupational History    Not on file   Tobacco Use    Smoking status: Never     Passive exposure: Never    Smokeless tobacco: Never   Vaping Use    Vaping status: Never Used   Substance and Sexual Activity    Alcohol use: Yes     Alcohol/week: 4.0 standard drinks of alcohol     Types: 4 Glasses of wine per week     Comment: social- drinks twice weekly- beer/wine- last used 7/21    Drug use: Yes     Types: Marijuana     Comment: Medical- last used on 7/20/24    Sexual activity: Yes     Partners: Male     Birth control/protection: None   Other Topics Concern    Not on file   Social History Narrative    Exercises 3x week    Pet: dog     Social Drivers of Health     Financial Resource Strain: Not on file   Food Insecurity: Not on file   Transportation Needs: Not on file   Physical Activity: Not on file   Stress: Not on file   Social Connections: Not on file   Intimate Partner Violence: Not on file   Housing Stability: Not on file      Family History   Problem Relation Name Age of Onset    Diabetes Mother Opal         type2    Hypertension Mother Opal     Depression Mother Opal     Thyroid disease Mother Opal     Arthritis  Mother Opal     Anxiety disorder Mother Opal     Miscarriages / Stillbirths Mother Opal     Neuropathy Mother Opal     Stroke Father Israel     Diabetes Father Israel         type2    Hypertension Father Israel     Alcohol abuse Father [place]     Substance Abuse Father [place]     Arthritis Father Israel     Drug abuse Father Israel     Dialysis Father Israel     Transient ischemic attack Father Israel     Kidney disease Father Israel         Began dialysis 3/2022    Neuropathy Father Israel     Esophageal cancer Maternal Grandmother Natalio Russell     Cancer Maternal Grandmother Natalio Russell 60        Pancreatic    Breast cancer Maternal Grandmother Natalio Wells         Maternal great grandmother    Heart attack Maternal Grandfather Ray Wells         acute MI    Arthritis Maternal Grandfather Ray Wells     Stroke Maternal Grandfather Ray Wells         CVA    Hyperlipidemia Maternal Grandfather Ray Wells     Heart disease Maternal Grandfather Ray Wells     No Known Problems Paternal Grandmother      Arthritis Paternal Grandfather Israel Hutson Sr     Stroke Paternal Grandfather Israel Hutson Sr         CVA    Hyperlipidemia Paternal Grandfather Israel Hutson Sr     Colon cancer Paternal Grandfather Israel Hutson Sr         age unknown    Cancer Paternal Grandfather Israel Hutson Sr     Bipolar disorder Maternal Aunt Ashley     Heart attack Maternal Uncle          acute MI    Arthritis Family grandmother     Stroke Family grandmother         CVA    Cancer Family grandmother     Hyperlipidemia Family grandmother     Stroke Family          CVA    Hyperlipidemia Family      No Known Problems Paternal Aunt      Hypertension Brother Israel Hutson III     Brain cancer Maternal Aunt Hilda Wells      Past Surgical History:   Procedure Laterality Date    BACK SURGERY  9/2016    CHOLECYSTECTOMY      laparoscopic    COLONOSCOPY  2017    EXPLORATORY LAPAROTOMY  12/10/2020    FL  LUMBAR PUNCTURE DIAGNOSTIC  01/03/2023    HERNIA REPAIR      umbilical    HYSTERECTOMY      LAPAROTOMY N/A 12/10/2020    Procedure: ABDOMINAL MYOMECTOMY;  Surgeon: Tara Budinetz, DO;  Location: [facility];  Service: Gynecology    LUMBAR LAMINECTOMY Right 09/14/2016    Procedure: Right L5/S1 Metryx microdiscectomy;  Surgeon: Mark Holt MD;  Location: [facility];  Service:     MYOMECTOMY  12/10/20    OOPHORECTOMY Left     CO COLONOSCOPY FLX DX W/COLLJ SPEC WHEN PFRMD N/A 07/07/2016    Procedure: COLONOSCOPY;  Surgeon: Taty Estrella DO;  Location: [facility];  Service: Gastroenterology    CO HYSTEROSCOPY BX ENDOMETRIUM&/POLYPC W/WO D&C N/A 03/19/2018    Procedure: HYSTEROSCOPY; MYOMECTOMY; ENDOMETRIAL BIOPSY;  Surgeon: Tara Budinetz, DO;  [facility];  Service: Gynecology    CO LAPS SUPRACRV HYSTERECT 250 GM/< RMVL TUBE/OVAR N/A 07/23/2024    Procedure: EXAM UNDER ANESTHESIA, (LSH) W/ BILATERAL SALPINGECTOMY AND LEFT OOPHORECTOMY;  Surgeon: Low Virgen DO;  Location: [facility];  Service: Gynecology    CO TENDON SHEATH INCISION Right 02/28/2017    Procedure: THUMB TRIGGER RELEASE ;  Surgeon: Prasad Mooney DO;  Location: [facility];  Service: Orthopedics    SPINE SURGERY  9/2016    Microdisectomy    STOMACH SURGERY  2009    umbilical hernia repair    UPPER GASTROINTESTINAL ENDOSCOPY  2017    WISDOM TOOTH EXTRACTION         Current Outpatient Medications:     albuterol (ProAir HFA) 90 mcg/act inhaler, Inhale 2 puffs every 4 (four) hours as needed (When has flare up), Disp: 8.5 g, Rfl: 5    benzonatate (TESSALON) 200 MG capsule, Take 1 capsule (200 mg total) by mouth 3 (three) times a day as needed for cough, Disp: 20 capsule, Rfl: 0    carvedilol (COREG CR) 20 MG 24 hr capsule, Take 2 capsules (40 mg total) by mouth daily, Disp: 180 capsule, Rfl: 0    chlorthalidone 25 mg tablet, Take 1 tablet (25 mg total) by mouth daily, Disp: 90 tablet, Rfl: 0    Cholecalciferol (VITAMIN D3 PO), Take 2,000 mg  by mouth daily in the early morning, Disp: , Rfl:     ibuprofen (MOTRIN) 600 mg tablet, Take 1 tablet (600 mg total) by mouth every 6 (six) hours as needed for moderate pain for up to 14 days, Disp: 30 tablet, Rfl: 0    meloxicam (MOBIC) 7.5 mg tablet, Take 1 tablet (7.5 mg total) by mouth daily, Disp: 15 tablet, Rfl: 0    methocarbamol (ROBAXIN) 500 mg tablet, Take 1 tablet (500 mg total) by mouth 3 (three) times a day as needed for muscle spasms, Disp: 30 tablet, Rfl: 0    Multiple Vitamins-Minerals (Womens Multivitamin) TABS, Take 1 tablet by mouth in the morning., Disp: , Rfl:     nystatin-triamcinolone (MYCOLOG-II) ointment, Apply topically 2 (two) times a day, Disp: 30 g, Rfl: 2    pantoprazole (PROTONIX) 40 mg tablet, Take 1 tablet (40 mg total) by mouth daily before breakfast, Disp: 90 tablet, Rfl: 1    polyethylene glycol (MIRALAX) 17 g packet, Take 17 g by mouth daily, Disp: 20 each, Rfl: 0    potassium chloride (MICRO-K) 10 MEQ CR capsule, Take 2 capsules (20 mEq total) by mouth daily Take 2 tablets daily, Disp: 180 capsule, Rfl: 1    rizatriptan (MAXALT-MLT) 10 mg disintegrating tablet, Take 1 tablet (10 mg total) by mouth once as needed for migraine for up to 1 dose may repeat in 2 hours if necessary, Disp: 10 tablet, Rfl: 0    tirzepatide (Zepbound) 10 mg/0.5 mL auto-injector, Inject 0.5 mL (10 mg total) under the skin once a week, Disp: 2 mL, Rfl: 0    triamcinolone (KENALOG) 0.1 % ointment, Apply topically 2 (two) times a day, Disp: 30 g, Rfl: 0    docusate sodium (COLACE) 100 mg capsule, Take 1 capsule (100 mg total) by mouth 2 (two) times a day (Patient not taking: Reported on 7/24/2025), Disp: 60 capsule, Rfl: 0    scopolamine (TRANSDERM-SCOP) 1 mg/3 days TD 72 hr patch, Place 1 patch on the skin over 72 hours every third day (Patient not taking: Reported on 5/29/2025), Disp: 10 patch, Rfl: 0  No Known Allergies      Social, Family, Medication history reviewed and updated as necessary      Labs:  "    Lab Results   Component Value Date     11/17/2015    K 3.6 05/01/2025    CL 99 05/01/2025    CO2 29 05/01/2025    BUN 15 05/01/2025    CREATININE 1.03 05/01/2025    CREATININE 0.89 04/23/2025    GLUCOSE 85 11/17/2015    CALCIUM 9.4 05/01/2025       Lab Results   Component Value Date    WBC 7.10 04/23/2025    HGB 13.0 04/23/2025    HGB 13.0 07/27/2024    HCT 40.0 04/23/2025    HCT 39.6 07/27/2024     04/23/2025     07/27/2024       Lab Results   Component Value Date    CHOL 181 06/23/2015    CHOL 187 01/13/2015     Lab Results   Component Value Date    HDL 66 04/23/2025    HDL 66 06/10/2024     Lab Results   Component Value Date    LDLCALC 102 (H) 04/23/2025    LDLCALC 108 (H) 06/10/2024     No results found for: \"LDLDIRECT\"          Lab Results   Component Value Date    TRIG 126 04/23/2025    TRIG 133 06/10/2024       Lab Results   Component Value Date    ALT 26 04/23/2025    ALT 15 07/27/2024    AST 28 04/23/2025    AST 13 07/27/2024    ALKPHOS 45 04/23/2025    ALKPHOS 50 07/27/2024       Lab Results   Component Value Date    INR 0.92 01/03/2023    INR 0.98 12/10/2020       No results found for: \"NTBNP\"    Lab Results   Component Value Date    HGBA1C 5.3 04/23/2025    HGBA1C 6.1 (H) 06/10/2024    HGBA1C 6.3 (H) 08/30/2023           Imaging: Reviewed in epic        Review of Systems:  14 systems reviewed and negative with exception of the above        PHYSICAL EXAM:      Vitals:    07/24/25 1336   BP: 112/64   Pulse: 83   SpO2: 100%       Body mass index is 26.95 kg/m².   Weight (last 2 days)       Date/Time Weight    07/24/25 1336 71.2 (157)              Gen: No acute distress  HEENT: anicteric, mucous membranes moist  Neck: supple, no jugular venous distention, or carotid bruit  Heart: regular, normal s1 and s2, no murmur/rub or gallop  Lungs :clear to auscultation bilaterally, no rales/rhonchi or wheeze  Abdomen: soft nontender, normoactive bowel sounds, no organomegaly  Ext: warm and " perfused, normal femoral pulses, no edema, or clubbing  Skin: warm, no rashes  Neuro: AAO x 3, no focal findings  Psychiatric: normal affect  Musculoskeletal: no obvious joint deformities.        This note was completed in part utilizing [employer] direct voice recognition software.   Grammatical errors, random word insertion, spelling mistakes, and incomplete sentences may be an occasional consequence of the system secondary to software limitations, ambient noise and hardware issues. At the time of dictation, efforts were made to edit, clarify and /or correct errors.  Please read the chart carefully and recognize, using context, where substitutions have occurred.  If you have any questions or concerns about the context, text or information contained within the body of this dictation, please contact myself, the provider, for further clarification.

## 2025-07-25 PROCEDURE — 93000 ELECTROCARDIOGRAM COMPLETE: CPT | Performed by: INTERNAL MEDICINE

## 2025-07-29 ENCOUNTER — OFFICE VISIT (OUTPATIENT)
Dept: GYNECOLOGY | Facility: CLINIC | Age: 40
End: 2025-07-29
Payer: COMMERCIAL

## 2025-07-29 VITALS
HEART RATE: 82 BPM | WEIGHT: 161.4 LBS | HEIGHT: 64 IN | DIASTOLIC BLOOD PRESSURE: 70 MMHG | BODY MASS INDEX: 27.55 KG/M2 | SYSTOLIC BLOOD PRESSURE: 100 MMHG

## 2025-07-29 DIAGNOSIS — N95.2 ATROPHIC VAGINITIS: Primary | ICD-10-CM

## 2025-07-29 DIAGNOSIS — N94.10 DYSPAREUNIA, FEMALE: ICD-10-CM

## 2025-07-29 PROCEDURE — 99213 OFFICE O/P EST LOW 20 MIN: CPT | Performed by: OBSTETRICS & GYNECOLOGY

## 2025-07-29 RX ORDER — ESTRADIOL 0.1 MG/G
1 CREAM VAGINAL WEEKLY
Qty: 42.5 G | Refills: 3 | Status: SHIPPED | OUTPATIENT
Start: 2025-07-29

## 2025-07-30 ENCOUNTER — OFFICE VISIT (OUTPATIENT)
Dept: FAMILY MEDICINE CLINIC | Facility: CLINIC | Age: 40
End: 2025-07-30
Payer: COMMERCIAL

## 2025-07-30 VITALS
OXYGEN SATURATION: 98 % | SYSTOLIC BLOOD PRESSURE: 108 MMHG | DIASTOLIC BLOOD PRESSURE: 70 MMHG | WEIGHT: 159.6 LBS | RESPIRATION RATE: 20 BRPM | TEMPERATURE: 97.2 F | BODY MASS INDEX: 27.25 KG/M2 | HEART RATE: 85 BPM | HEIGHT: 64 IN

## 2025-07-30 DIAGNOSIS — G43.009 MIGRAINE WITHOUT AURA AND WITHOUT STATUS MIGRAINOSUS, NOT INTRACTABLE: ICD-10-CM

## 2025-07-30 DIAGNOSIS — J31.2 CHRONIC SORE THROAT: ICD-10-CM

## 2025-07-30 DIAGNOSIS — I10 BENIGN ESSENTIAL HYPERTENSION: ICD-10-CM

## 2025-07-30 DIAGNOSIS — E66.9 OBESITY (BMI 30-39.9): Primary | ICD-10-CM

## 2025-07-30 PROCEDURE — 99214 OFFICE O/P EST MOD 30 MIN: CPT | Performed by: FAMILY MEDICINE

## 2025-07-30 RX ORDER — TIRZEPATIDE 5 MG/.5ML
5 INJECTION, SOLUTION SUBCUTANEOUS WEEKLY
Qty: 2 ML | Refills: 0 | Status: SHIPPED | OUTPATIENT
Start: 2025-07-30

## 2025-08-20 ENCOUNTER — ULTRASOUND (OUTPATIENT)
Dept: GYNECOLOGY | Facility: CLINIC | Age: 40
End: 2025-08-20
Payer: COMMERCIAL

## 2025-08-20 ENCOUNTER — OFFICE VISIT (OUTPATIENT)
Dept: GYNECOLOGY | Facility: CLINIC | Age: 40
End: 2025-08-20
Payer: COMMERCIAL

## 2025-08-20 DIAGNOSIS — N95.2 ATROPHIC VAGINITIS: ICD-10-CM

## 2025-08-20 DIAGNOSIS — N94.10 DYSPAREUNIA, FEMALE: Primary | ICD-10-CM

## 2025-08-20 DIAGNOSIS — N94.10 DYSPAREUNIA IN FEMALE: Primary | ICD-10-CM

## 2025-08-20 PROCEDURE — 99213 OFFICE O/P EST LOW 20 MIN: CPT | Performed by: OBSTETRICS & GYNECOLOGY

## 2025-08-20 PROCEDURE — 76830 TRANSVAGINAL US NON-OB: CPT

## (undated) DEVICE — EXIDINE 4 PCT

## (undated) DEVICE — GLOVE PI ULTRA TOUCH SZ.7.5

## (undated) DEVICE — INTENDED FOR TISSUE SEPARATION, AND OTHER PROCEDURES THAT REQUIRE A SHARP SURGICAL BLADE TO PUNCTURE OR CUT.: Brand: BARD-PARKER SAFETY BLADES SIZE 15, STERILE

## (undated) DEVICE — SUT PLAIN 3-0 PS-1 27 IN 1640H

## (undated) DEVICE — 3000CC GUARDIAN II: Brand: GUARDIAN

## (undated) DEVICE — SUT ETHILON 3-0 PS-1 18 IN 1663G

## (undated) DEVICE — SUT VICRYL 0 CT-1 36 IN J946H

## (undated) DEVICE — CHLORAPREP HI-LITE 26ML ORANGE

## (undated) DEVICE — UTERINE MANIPULATOR 4.5MM STRL

## (undated) DEVICE — STERILE SURGICAL LUBRICANT,  TUBE: Brand: SURGILUBE

## (undated) DEVICE — TRAY FOLEY 16FR URIMETER SILICONE SURESTEP

## (undated) DEVICE — LAPAROSCOPIC SMOKE EVAC TUBING

## (undated) DEVICE — EXOFIN PRECISION PEN HIGH VISCOSITY TOPICAL SKIN ADHESIVE: Brand: EXOFIN PRECISION PEN, 1G

## (undated) DEVICE — TROCARS: Brand: KII® OPTICAL ACCESS SYSTEM

## (undated) DEVICE — SUT VICRYL 0 UR-6 27 IN J603H

## (undated) DEVICE — GLOVE SRG BIOGEL 6.5

## (undated) DEVICE — ETS45 RELOAD STANDARD 45MM: Brand: ENDOPATH

## (undated) DEVICE — PREMIUM DRY TRAY LF: Brand: MEDLINE INDUSTRIES, INC.

## (undated) DEVICE — STRL COTTON TIP APPLCTR 6IN PK: Brand: CARDINAL HEALTH

## (undated) DEVICE — DEVICE MYOSURE TISSUE REMOVAL HYSTEROSCOPIC XLRG

## (undated) DEVICE — INSUFFLATION NEEDLE TO ESTABLISH PNEUMOPERITONEUM.: Brand: INSUFFLATION NEEDLE

## (undated) DEVICE — LIGHT HANDLE COVER SLEEVE DISP BLUE STELLAR

## (undated) DEVICE — TROCAR: Brand: KII SLEEVE

## (undated) DEVICE — SUT VICRYL 4-0 PS-2 27 IN J426H

## (undated) DEVICE — PMI DISPOSABLE PUNCTURE CLOSURE DEVICE / SUTURE GRASPER: Brand: PMI

## (undated) DEVICE — 3M™ STERI-DRAPE™ UNDER BUTTOCKS DRAPE WITH POUCH 1084: Brand: STERI-DRAPE™

## (undated) DEVICE — SUT VLOC 90 3-0 V-20 9IN VLOCM0644

## (undated) DEVICE — DRAPE EQUIPMENT RF WAND

## (undated) DEVICE — GLOVE INDICATOR PI UNDERGLOVE SZ 9 BLUE

## (undated) DEVICE — INTENDED FOR TISSUE SEPARATION, AND OTHER PROCEDURES THAT REQUIRE A SHARP SURGICAL BLADE TO PUNCTURE OR CUT.: Brand: BARD-PARKER SAFETY BLADES SIZE 11, STERILE

## (undated) DEVICE — ENDOPATH ETS-FLEX45 ARTICULATING ENDOSCOPIC LINEAR CUTTER, NO RELOAD: Brand: ENDOPATH

## (undated) DEVICE — GLOVE INDICATOR PI UNDERGLOVE SZ 7 BLUE

## (undated) DEVICE — SYRINGE 20ML LL

## (undated) DEVICE — SUT VICRYL 2-0 SH 27 IN UNDYED J417H

## (undated) DEVICE — STOCKINETTE REGULAR

## (undated) DEVICE — MEDI-VAC YANK SUCT HNDL W/TPRD BULBOUS TIP: Brand: CARDINAL HEALTH

## (undated) DEVICE — ENDOMETRIAL BX PIPELLE

## (undated) DEVICE — SEPRA FILM 6 X 5

## (undated) DEVICE — GLOVE INDICATOR PI UNDERGLOVE SZ 8 BLUE

## (undated) DEVICE — SUT MONOCRYL 4-0 SH 27 IN Y415H

## (undated) DEVICE — TROCAR: Brand: KII FIOS FIRST ENTRY

## (undated) DEVICE — M-CLOSE KIT

## (undated) DEVICE — OCCLUSIVE GAUZE STRIP,3% BISMUTH TRIBROMOPHENATE IN PETROLATUM BLEND: Brand: XEROFORM

## (undated) DEVICE — Device: Brand: OLYMPUS

## (undated) DEVICE — GAUZE SPONGES,16 PLY: Brand: CURITY

## (undated) DEVICE — TOWEL SURG XR DETECT GREEN STRL RFD

## (undated) DEVICE — SUT VICRYL 2-0 CT-1 27 IN J259H

## (undated) DEVICE — GLOVE SRG BIOGEL ECLIPSE 8

## (undated) DEVICE — GLOVE INDICATOR PI UNDERGLOVE SZ 8.5 BLUE

## (undated) DEVICE — 5 MM CURVED DISSECTORS WITH MONOPOLAR CAUTERY: Brand: ENDOPATH

## (undated) DEVICE — Device: Brand: TISSUE RETRIEVAL SYSTEM

## (undated) DEVICE — NEEDLE 22 G X 1 1/2 SAFETY

## (undated) DEVICE — SPONGE LAP 18 X 18 IN

## (undated) DEVICE — GLOVE PI ULTRA TOUCH SZ.8.5

## (undated) DEVICE — BETHLEHEM UNIVERSAL GYN LAP PK: Brand: CARDINAL HEALTH

## (undated) DEVICE — 3M™ STERI-STRIP™ REINFORCED ADHESIVE SKIN CLOSURES, R1547, 1/2 IN X 4 IN (12 MM X 100 MM), 6 STRIPS/ENVELOPE: Brand: 3M™ STERI-STRIP™

## (undated) DEVICE — STERILE ALLENTOWN HAND PACK: Brand: CARDINAL HEALTH

## (undated) DEVICE — DRAPE TOWEL: Brand: CONVERTORS

## (undated) DEVICE — BLUE HEAT SCOPE WARMER

## (undated) DEVICE — NEEDLE 25G X 1 1/2

## (undated) DEVICE — WOUND RETRACTOR AND PROTECTOR: Brand: ALEXIS O WOUND PROTECTOR-RETRACTOR

## (undated) DEVICE — 3M™ TEGADERM™ TRANSPARENT FILM DRESSING FRAME STYLE, 1626W, 4 IN X 4-3/4 IN (10 CM X 12 CM), 50/CT 4CT/CASE: Brand: 3M™ TEGADERM™

## (undated) DEVICE — 3M™ TEGADERM™ TRANSPARENT FILM DRESSING FRAME STYLE, 1627, 4 IN X 10 IN (10 CM X 25 CM), 20/CT 4CT/CASE: Brand: 3M™ TEGADERM™

## (undated) DEVICE — CHLORHEXIDINE 4PCT 4 OZ

## (undated) DEVICE — PENCIL ELECTROSURG E-Z CLEAN -0035H

## (undated) DEVICE — MAYO STAND COVER: Brand: CONVERTORS

## (undated) DEVICE — DRAPE SHEET THREE QUARTER

## (undated) DEVICE — ELECTRODE LAP J HOOK E-Z CLEAN 33CM-0021

## (undated) DEVICE — CUFF TOURNIQUET 18 X 4 IN QUICK CONNECT DISP 1 BLADDER

## (undated) DEVICE — TISSUE RETRIEVAL SYSTEM: Brand: INZII RETRIEVAL SYSTEM

## (undated) DEVICE — SUT VICRYL 2-0 REEL 54 IN J286G

## (undated) DEVICE — 40595 XL TRENDELENBURG POSITIONING KIT: Brand: 40595 XL TRENDELENBURG POSITIONING KIT

## (undated) DEVICE — TIBURON TRANSVERSE LAPAROTOMY SHEET: Brand: CONVERTORS

## (undated) DEVICE — VISUALIZATION SYSTEM: Brand: CLEARIFY

## (undated) DEVICE — PAD GROUNDING ADULT

## (undated) DEVICE — SCD SEQUENTIAL COMPRESSION COMFORT SLEEVE MEDIUM KNEE LENGTH: Brand: KENDALL SCD

## (undated) DEVICE — [HIGH FLOW INSUFFLATOR,  DO NOT USE IF PACKAGE IS DAMAGED,  KEEP DRY,  KEEP AWAY FROM SUNLIGHT,  PROTECT FROM HEAT AND RADIOACTIVE SOURCES.]: Brand: PNEUMOSURE

## (undated) DEVICE — PLASTIC ADHESIVE BANDAGE: Brand: CURITY

## (undated) DEVICE — DRESSING MEPORE FILM ADHESIVE 4 X 5IN

## (undated) DEVICE — Device

## (undated) DEVICE — MORCELLATOR LAP LINA XCISE 15 MM OBTURATOR CRDLS

## (undated) DEVICE — PVC URETHRAL CATHETER: Brand: DOVER

## (undated) DEVICE — ADHESIVE SKIN HIGH VISCOSITY EXOFIN 1ML

## (undated) DEVICE — INTENDED FOR TISSUE SEPARATION, AND OTHER PROCEDURES THAT REQUIRE A SHARP SURGICAL BLADE TO PUNCTURE OR CUT.: Brand: BARD-PARKER SAFETY BLADES SIZE 10, STERILE

## (undated) DEVICE — DRAPE LAPAROTOMY W/POUCHES

## (undated) DEVICE — LIGASURE LAP SLR/DIV MARYLAND 5MM

## (undated) DEVICE — GLOVE PI ULTRA TOUCH SZ.6.5

## (undated) DEVICE — IRRIG ENDO FLO TUBING

## (undated) DEVICE — SUT STRATAFIX SPIRAL PDS PLUS 2-0 CT-1 9IN SXPP1B456

## (undated) DEVICE — SPONGE PVP SCRUB WING STERILE

## (undated) DEVICE — METZENBAUM ADTEC SINGLE USE DISSECTING SCISSORS, SHAFT ONLY, MONOPOLAR, CURVED TO LEFT, WORKING LENGTH: 12 1/4", (310 MM), DIAM. 5 MM, INSULATED, DOUBLE ACTION, STERILE, DISPOSABLE, PACKAGE OF 10 PIECES: Brand: AESCULAP

## (undated) DEVICE — PACK PBDS STERILE LAP LITHOTOMY RF

## (undated) DEVICE — UTERINE MANIPULATOR RUMI 6.7 X 8 CM

## (undated) DEVICE — STRL ALLENTOWN HYSTEROSCOPY PK: Brand: CARDINAL HEALTH

## (undated) DEVICE — ENDOPATH ETS45 2.5MM RELOADS (VASCULAR/THIN): Brand: ENDOPATH

## (undated) DEVICE — ASTOUND STANDARD SURGICAL GOWN, XL: Brand: CONVERTORS